# Patient Record
Sex: MALE | Race: ASIAN | NOT HISPANIC OR LATINO | URBAN - METROPOLITAN AREA
[De-identification: names, ages, dates, MRNs, and addresses within clinical notes are randomized per-mention and may not be internally consistent; named-entity substitution may affect disease eponyms.]

---

## 2021-01-01 ENCOUNTER — OUTPATIENT (OUTPATIENT)
Dept: OUTPATIENT SERVICES | Facility: HOSPITAL | Age: 66
LOS: 1 days | Discharge: HOME | End: 2021-01-01

## 2021-01-01 ENCOUNTER — NON-APPOINTMENT (OUTPATIENT)
Age: 66
End: 2021-01-01

## 2021-01-01 ENCOUNTER — RESULT REVIEW (OUTPATIENT)
Age: 66
End: 2021-01-01

## 2021-01-01 ENCOUNTER — APPOINTMENT (OUTPATIENT)
Dept: HEMATOLOGY ONCOLOGY | Facility: CLINIC | Age: 66
End: 2021-01-01
Payer: MEDICARE

## 2021-01-01 ENCOUNTER — LABORATORY RESULT (OUTPATIENT)
Age: 66
End: 2021-01-01

## 2021-01-01 ENCOUNTER — APPOINTMENT (OUTPATIENT)
Dept: INFUSION THERAPY | Facility: HOSPITAL | Age: 66
End: 2021-01-01

## 2021-01-01 ENCOUNTER — APPOINTMENT (OUTPATIENT)
Dept: ULTRASOUND IMAGING | Facility: IMAGING CENTER | Age: 66
End: 2021-01-01
Payer: MEDICARE

## 2021-01-01 ENCOUNTER — APPOINTMENT (OUTPATIENT)
Dept: HEMATOLOGY ONCOLOGY | Facility: CLINIC | Age: 66
End: 2021-01-01
Payer: MEDICAID

## 2021-01-01 ENCOUNTER — OUTPATIENT (OUTPATIENT)
Dept: OUTPATIENT SERVICES | Facility: HOSPITAL | Age: 66
LOS: 1 days | End: 2021-01-01
Payer: MEDICARE

## 2021-01-01 ENCOUNTER — APPOINTMENT (OUTPATIENT)
Dept: RADIATION ONCOLOGY | Facility: CLINIC | Age: 66
End: 2021-01-01
Payer: MEDICARE

## 2021-01-01 VITALS
TEMPERATURE: 97 F | HEART RATE: 90 BPM | DIASTOLIC BLOOD PRESSURE: 65 MMHG | RESPIRATION RATE: 16 BRPM | OXYGEN SATURATION: 99 % | WEIGHT: 139.99 LBS | SYSTOLIC BLOOD PRESSURE: 108 MMHG | BODY MASS INDEX: 21.93 KG/M2

## 2021-01-01 VITALS
DIASTOLIC BLOOD PRESSURE: 86 MMHG | RESPIRATION RATE: 16 BRPM | HEART RATE: 93 BPM | SYSTOLIC BLOOD PRESSURE: 134 MMHG | BODY MASS INDEX: 21.39 KG/M2 | WEIGHT: 140 LBS | OXYGEN SATURATION: 98 % | TEMPERATURE: 98.42 F

## 2021-01-01 VITALS
TEMPERATURE: 97.9 F | HEART RATE: 80 BPM | OXYGEN SATURATION: 99 % | HEIGHT: 67.01 IN | RESPIRATION RATE: 17 BRPM | DIASTOLIC BLOOD PRESSURE: 71 MMHG | WEIGHT: 139.99 LBS | BODY MASS INDEX: 21.97 KG/M2 | SYSTOLIC BLOOD PRESSURE: 107 MMHG

## 2021-01-01 VITALS
HEART RATE: 80 BPM | BODY MASS INDEX: 21.97 KG/M2 | WEIGHT: 140 LBS | OXYGEN SATURATION: 98 % | DIASTOLIC BLOOD PRESSURE: 78 MMHG | RESPIRATION RATE: 16 BRPM | SYSTOLIC BLOOD PRESSURE: 132 MMHG | HEIGHT: 67 IN

## 2021-01-01 DIAGNOSIS — C46.9 KAPOSI'S SARCOMA, UNSPECIFIED: ICD-10-CM

## 2021-01-01 DIAGNOSIS — R60.0 LOCALIZED EDEMA: ICD-10-CM

## 2021-01-01 DIAGNOSIS — R11.2 NAUSEA WITH VOMITING, UNSPECIFIED: ICD-10-CM

## 2021-01-01 DIAGNOSIS — Z00.8 ENCOUNTER FOR OTHER GENERAL EXAMINATION: ICD-10-CM

## 2021-01-01 DIAGNOSIS — Z51.11 ENCOUNTER FOR ANTINEOPLASTIC CHEMOTHERAPY: ICD-10-CM

## 2021-01-01 DIAGNOSIS — Z00.00 ENCOUNTER FOR GENERAL ADULT MEDICAL EXAMINATION W/OUT ABNORMAL FINDINGS: ICD-10-CM

## 2021-01-01 LAB
ALBUMIN SERPL ELPH-MCNC: 3.2 G/DL — LOW (ref 3.3–5)
ALBUMIN SERPL ELPH-MCNC: 3.3 G/DL — SIGNIFICANT CHANGE UP (ref 3.3–5)
ALBUMIN SERPL ELPH-MCNC: 3.7 G/DL — SIGNIFICANT CHANGE UP (ref 3.3–5)
ALP SERPL-CCNC: 59 U/L — SIGNIFICANT CHANGE UP (ref 40–120)
ALP SERPL-CCNC: 59 U/L — SIGNIFICANT CHANGE UP (ref 40–120)
ALP SERPL-CCNC: 60 U/L — SIGNIFICANT CHANGE UP (ref 40–120)
ALT FLD-CCNC: 10 U/L — SIGNIFICANT CHANGE UP (ref 10–45)
ALT FLD-CCNC: 10 U/L — SIGNIFICANT CHANGE UP (ref 10–45)
ALT FLD-CCNC: 9 U/L — LOW (ref 10–45)
ANION GAP SERPL CALC-SCNC: 10 MMOL/L — SIGNIFICANT CHANGE UP (ref 5–17)
ANION GAP SERPL CALC-SCNC: 12 MMOL/L — SIGNIFICANT CHANGE UP (ref 5–17)
ANION GAP SERPL CALC-SCNC: 16 MMOL/L — SIGNIFICANT CHANGE UP (ref 5–17)
AST SERPL-CCNC: 14 U/L — SIGNIFICANT CHANGE UP (ref 10–40)
AST SERPL-CCNC: 14 U/L — SIGNIFICANT CHANGE UP (ref 10–40)
AST SERPL-CCNC: 15 U/L — SIGNIFICANT CHANGE UP (ref 10–40)
BASOPHILS # BLD AUTO: 0.03 K/UL — SIGNIFICANT CHANGE UP (ref 0–0.2)
BASOPHILS # BLD AUTO: 0.05 K/UL — SIGNIFICANT CHANGE UP (ref 0–0.2)
BASOPHILS # BLD AUTO: 0.05 K/UL — SIGNIFICANT CHANGE UP (ref 0–0.2)
BASOPHILS # BLD AUTO: 0.07 K/UL — SIGNIFICANT CHANGE UP (ref 0–0.2)
BASOPHILS NFR BLD AUTO: 0.8 % — SIGNIFICANT CHANGE UP (ref 0–2)
BASOPHILS NFR BLD AUTO: 1.1 % — SIGNIFICANT CHANGE UP (ref 0–2)
BASOPHILS NFR BLD AUTO: 1.2 % — SIGNIFICANT CHANGE UP (ref 0–2)
BASOPHILS NFR BLD AUTO: 1.2 % — SIGNIFICANT CHANGE UP (ref 0–2)
BILIRUB SERPL-MCNC: 0.3 MG/DL — SIGNIFICANT CHANGE UP (ref 0.2–1.2)
BILIRUB SERPL-MCNC: 0.3 MG/DL — SIGNIFICANT CHANGE UP (ref 0.2–1.2)
BILIRUB SERPL-MCNC: 0.4 MG/DL — SIGNIFICANT CHANGE UP (ref 0.2–1.2)
BUN SERPL-MCNC: 8 MG/DL — SIGNIFICANT CHANGE UP (ref 7–23)
BUN SERPL-MCNC: 8 MG/DL — SIGNIFICANT CHANGE UP (ref 7–23)
BUN SERPL-MCNC: 9 MG/DL — SIGNIFICANT CHANGE UP (ref 7–23)
CALCIUM SERPL-MCNC: 8.4 MG/DL — SIGNIFICANT CHANGE UP (ref 8.4–10.5)
CALCIUM SERPL-MCNC: 8.5 MG/DL — SIGNIFICANT CHANGE UP (ref 8.4–10.5)
CALCIUM SERPL-MCNC: 8.8 MG/DL — SIGNIFICANT CHANGE UP (ref 8.4–10.5)
CHLORIDE SERPL-SCNC: 100 MMOL/L — SIGNIFICANT CHANGE UP (ref 96–108)
CHLORIDE SERPL-SCNC: 100 MMOL/L — SIGNIFICANT CHANGE UP (ref 96–108)
CHLORIDE SERPL-SCNC: 101 MMOL/L — SIGNIFICANT CHANGE UP (ref 96–108)
CO2 SERPL-SCNC: 22 MMOL/L — SIGNIFICANT CHANGE UP (ref 22–31)
CO2 SERPL-SCNC: 26 MMOL/L — SIGNIFICANT CHANGE UP (ref 22–31)
CO2 SERPL-SCNC: 26 MMOL/L — SIGNIFICANT CHANGE UP (ref 22–31)
CREAT SERPL-MCNC: 0.56 MG/DL — SIGNIFICANT CHANGE UP (ref 0.5–1.3)
CREAT SERPL-MCNC: 0.64 MG/DL — SIGNIFICANT CHANGE UP (ref 0.5–1.3)
CREAT SERPL-MCNC: 0.65 MG/DL — SIGNIFICANT CHANGE UP (ref 0.5–1.3)
EOSINOPHIL # BLD AUTO: 0.06 K/UL — SIGNIFICANT CHANGE UP (ref 0–0.5)
EOSINOPHIL # BLD AUTO: 0.07 K/UL — SIGNIFICANT CHANGE UP (ref 0–0.5)
EOSINOPHIL # BLD AUTO: 0.3 K/UL — SIGNIFICANT CHANGE UP (ref 0–0.5)
EOSINOPHIL # BLD AUTO: 0.32 K/UL — SIGNIFICANT CHANGE UP (ref 0–0.5)
EOSINOPHIL NFR BLD AUTO: 1.6 % — SIGNIFICANT CHANGE UP (ref 0–6)
EOSINOPHIL NFR BLD AUTO: 1.7 % — SIGNIFICANT CHANGE UP (ref 0–6)
EOSINOPHIL NFR BLD AUTO: 5.1 % — SIGNIFICANT CHANGE UP (ref 0–6)
EOSINOPHIL NFR BLD AUTO: 7.2 % — HIGH (ref 0–6)
GLUCOSE SERPL-MCNC: 117 MG/DL — HIGH (ref 70–99)
GLUCOSE SERPL-MCNC: 129 MG/DL — HIGH (ref 70–99)
GLUCOSE SERPL-MCNC: 145 MG/DL — HIGH (ref 70–99)
HBV CORE AB SER-ACNC: REACTIVE
HBV SURFACE AB SER-ACNC: REACTIVE
HBV SURFACE AG SER-ACNC: SIGNIFICANT CHANGE UP
HCT VFR BLD CALC: 27.3 % — LOW (ref 39–50)
HCT VFR BLD CALC: 28.2 % — LOW (ref 39–50)
HCT VFR BLD CALC: 30.9 % — LOW (ref 39–50)
HCT VFR BLD CALC: 34.9 % — LOW (ref 39–50)
HCV AB S/CO SERPL IA: 0.08 S/CO — SIGNIFICANT CHANGE UP (ref 0–0.99)
HCV AB SERPL-IMP: SIGNIFICANT CHANGE UP
HGB BLD-MCNC: 10.4 G/DL — LOW (ref 13–17)
HGB BLD-MCNC: 11.9 G/DL — LOW (ref 13–17)
HGB BLD-MCNC: 9.2 G/DL — LOW (ref 13–17)
HGB BLD-MCNC: 9.6 G/DL — LOW (ref 13–17)
IMM GRANULOCYTES NFR BLD AUTO: 1.9 % — HIGH (ref 0–1.5)
IMM GRANULOCYTES NFR BLD AUTO: 2.4 % — HIGH (ref 0–1.5)
IMM GRANULOCYTES NFR BLD AUTO: 2.6 % — HIGH (ref 0–1.5)
IMM GRANULOCYTES NFR BLD AUTO: 7.9 % — HIGH (ref 0–1.5)
LYMPHOCYTES # BLD AUTO: 0.49 K/UL — LOW (ref 1–3.3)
LYMPHOCYTES # BLD AUTO: 0.54 K/UL — LOW (ref 1–3.3)
LYMPHOCYTES # BLD AUTO: 0.58 K/UL — LOW (ref 1–3.3)
LYMPHOCYTES # BLD AUTO: 0.71 K/UL — LOW (ref 1–3.3)
LYMPHOCYTES # BLD AUTO: 11 % — LOW (ref 13–44)
LYMPHOCYTES # BLD AUTO: 12 % — LOW (ref 13–44)
LYMPHOCYTES # BLD AUTO: 13.9 % — SIGNIFICANT CHANGE UP (ref 13–44)
LYMPHOCYTES # BLD AUTO: 14 % — SIGNIFICANT CHANGE UP (ref 13–44)
MCHC RBC-ENTMCNC: 30.9 PG — SIGNIFICANT CHANGE UP (ref 27–34)
MCHC RBC-ENTMCNC: 30.9 PG — SIGNIFICANT CHANGE UP (ref 27–34)
MCHC RBC-ENTMCNC: 31.1 PG — SIGNIFICANT CHANGE UP (ref 27–34)
MCHC RBC-ENTMCNC: 31.3 PG — SIGNIFICANT CHANGE UP (ref 27–34)
MCHC RBC-ENTMCNC: 33.7 G/DL — SIGNIFICANT CHANGE UP (ref 32–36)
MCHC RBC-ENTMCNC: 33.7 G/DL — SIGNIFICANT CHANGE UP (ref 32–36)
MCHC RBC-ENTMCNC: 34 G/DL — SIGNIFICANT CHANGE UP (ref 32–36)
MCHC RBC-ENTMCNC: 34.1 G/DL — SIGNIFICANT CHANGE UP (ref 32–36)
MCV RBC AUTO: 90.6 FL — SIGNIFICANT CHANGE UP (ref 80–100)
MCV RBC AUTO: 91.7 FL — SIGNIFICANT CHANGE UP (ref 80–100)
MCV RBC AUTO: 91.9 FL — SIGNIFICANT CHANGE UP (ref 80–100)
MCV RBC AUTO: 92.2 FL — SIGNIFICANT CHANGE UP (ref 80–100)
MONOCYTES # BLD AUTO: 0.56 K/UL — SIGNIFICANT CHANGE UP (ref 0–0.9)
MONOCYTES # BLD AUTO: 0.63 K/UL — SIGNIFICANT CHANGE UP (ref 0–0.9)
MONOCYTES # BLD AUTO: 0.66 K/UL — SIGNIFICANT CHANGE UP (ref 0–0.9)
MONOCYTES # BLD AUTO: 0.73 K/UL — SIGNIFICANT CHANGE UP (ref 0–0.9)
MONOCYTES NFR BLD AUTO: 12.4 % — SIGNIFICANT CHANGE UP (ref 2–14)
MONOCYTES NFR BLD AUTO: 12.6 % — SIGNIFICANT CHANGE UP (ref 2–14)
MONOCYTES NFR BLD AUTO: 15.9 % — HIGH (ref 2–14)
MONOCYTES NFR BLD AUTO: 16.3 % — HIGH (ref 2–14)
NEUTROPHILS # BLD AUTO: 2.51 K/UL — SIGNIFICANT CHANGE UP (ref 1.8–7.4)
NEUTROPHILS # BLD AUTO: 2.68 K/UL — SIGNIFICANT CHANGE UP (ref 1.8–7.4)
NEUTROPHILS # BLD AUTO: 2.7 K/UL — SIGNIFICANT CHANGE UP (ref 1.8–7.4)
NEUTROPHILS # BLD AUTO: 3.98 K/UL — SIGNIFICANT CHANGE UP (ref 1.8–7.4)
NEUTROPHILS NFR BLD AUTO: 60.2 % — SIGNIFICANT CHANGE UP (ref 43–77)
NEUTROPHILS NFR BLD AUTO: 64.7 % — SIGNIFICANT CHANGE UP (ref 43–77)
NEUTROPHILS NFR BLD AUTO: 64.9 % — SIGNIFICANT CHANGE UP (ref 43–77)
NEUTROPHILS NFR BLD AUTO: 67.4 % — SIGNIFICANT CHANGE UP (ref 43–77)
NRBC # BLD: 0 /100 WBCS — SIGNIFICANT CHANGE UP (ref 0–0)
PLATELET # BLD AUTO: 226 K/UL — SIGNIFICANT CHANGE UP (ref 150–400)
PLATELET # BLD AUTO: 375 K/UL — SIGNIFICANT CHANGE UP (ref 150–400)
PLATELET # BLD AUTO: 377 K/UL — SIGNIFICANT CHANGE UP (ref 150–400)
PLATELET # BLD AUTO: 474 K/UL — HIGH (ref 150–400)
POTASSIUM SERPL-MCNC: 4 MMOL/L — SIGNIFICANT CHANGE UP (ref 3.5–5.3)
POTASSIUM SERPL-MCNC: 4.2 MMOL/L — SIGNIFICANT CHANGE UP (ref 3.5–5.3)
POTASSIUM SERPL-MCNC: 4.4 MMOL/L — SIGNIFICANT CHANGE UP (ref 3.5–5.3)
POTASSIUM SERPL-SCNC: 4 MMOL/L — SIGNIFICANT CHANGE UP (ref 3.5–5.3)
POTASSIUM SERPL-SCNC: 4.2 MMOL/L — SIGNIFICANT CHANGE UP (ref 3.5–5.3)
POTASSIUM SERPL-SCNC: 4.4 MMOL/L — SIGNIFICANT CHANGE UP (ref 3.5–5.3)
PROT SERPL-MCNC: 5.7 G/DL — LOW (ref 6–8.3)
PROT SERPL-MCNC: 5.9 G/DL — LOW (ref 6–8.3)
PROT SERPL-MCNC: 6.2 G/DL — SIGNIFICANT CHANGE UP (ref 6–8.3)
RBC # BLD: 2.96 M/UL — LOW (ref 4.2–5.8)
RBC # BLD: 3.07 M/UL — LOW (ref 4.2–5.8)
RBC # BLD: 3.37 M/UL — LOW (ref 4.2–5.8)
RBC # BLD: 3.85 M/UL — LOW (ref 4.2–5.8)
RBC # FLD: 11.9 % — SIGNIFICANT CHANGE UP (ref 10.3–14.5)
RBC # FLD: 12.3 % — SIGNIFICANT CHANGE UP (ref 10.3–14.5)
RBC # FLD: 13.4 % — SIGNIFICANT CHANGE UP (ref 10.3–14.5)
RBC # FLD: 13.4 % — SIGNIFICANT CHANGE UP (ref 10.3–14.5)
SODIUM SERPL-SCNC: 137 MMOL/L — SIGNIFICANT CHANGE UP (ref 135–145)
SODIUM SERPL-SCNC: 137 MMOL/L — SIGNIFICANT CHANGE UP (ref 135–145)
SODIUM SERPL-SCNC: 139 MMOL/L — SIGNIFICANT CHANGE UP (ref 135–145)
WBC # BLD: 3.87 K/UL — SIGNIFICANT CHANGE UP (ref 3.8–10.5)
WBC # BLD: 4.16 K/UL — SIGNIFICANT CHANGE UP (ref 3.8–10.5)
WBC # BLD: 4.45 K/UL — SIGNIFICANT CHANGE UP (ref 3.8–10.5)
WBC # BLD: 5.9 K/UL — SIGNIFICANT CHANGE UP (ref 3.8–10.5)
WBC # FLD AUTO: 3.87 K/UL — SIGNIFICANT CHANGE UP (ref 3.8–10.5)
WBC # FLD AUTO: 4.16 K/UL — SIGNIFICANT CHANGE UP (ref 3.8–10.5)
WBC # FLD AUTO: 4.45 K/UL — SIGNIFICANT CHANGE UP (ref 3.8–10.5)
WBC # FLD AUTO: 5.9 K/UL — SIGNIFICANT CHANGE UP (ref 3.8–10.5)

## 2021-01-01 PROCEDURE — G6002: CPT | Mod: 26

## 2021-01-01 PROCEDURE — 77427 RADIATION TX MANAGEMENT X5: CPT

## 2021-01-01 PROCEDURE — 77470 SPECIAL RADIATION TREATMENT: CPT | Mod: 26

## 2021-01-01 PROCEDURE — 77338 DESIGN MLC DEVICE FOR IMRT: CPT | Mod: 26

## 2021-01-01 PROCEDURE — 99442: CPT

## 2021-01-01 PROCEDURE — 93971 EXTREMITY STUDY: CPT | Mod: 26,RT

## 2021-01-01 PROCEDURE — 77301 RADIOTHERAPY DOSE PLAN IMRT: CPT | Mod: 26

## 2021-01-01 PROCEDURE — 99213 OFFICE O/P EST LOW 20 MIN: CPT

## 2021-01-01 PROCEDURE — 99205 OFFICE O/P NEW HI 60 MIN: CPT

## 2021-01-01 PROCEDURE — 99214 OFFICE O/P EST MOD 30 MIN: CPT

## 2021-01-01 PROCEDURE — 77334 RADIATION TREATMENT AID(S): CPT | Mod: 26

## 2021-01-01 PROCEDURE — 77014: CPT | Mod: 26

## 2021-01-01 PROCEDURE — 99024 POSTOP FOLLOW-UP VISIT: CPT

## 2021-01-01 PROCEDURE — 99215 OFFICE O/P EST HI 40 MIN: CPT

## 2021-01-01 PROCEDURE — 77300 RADIATION THERAPY DOSE PLAN: CPT | Mod: 26

## 2021-01-01 PROCEDURE — 93971 EXTREMITY STUDY: CPT

## 2021-01-01 RX ORDER — POLYETHYLENE GLYCOL 3350 17 G/17G
17 POWDER, FOR SOLUTION ORAL DAILY
Qty: 1 | Refills: 0 | Status: ACTIVE | COMMUNITY
Start: 2021-01-01 | End: 1900-01-01

## 2021-01-01 RX ORDER — METHADONE HYDROCHLORIDE 5 MG/1
5 TABLET ORAL
Qty: 30 | Refills: 0 | Status: DISCONTINUED | COMMUNITY
Start: 2021-10-14 | End: 2021-01-01

## 2021-01-01 RX ORDER — OXYCODONE HYDROCHLORIDE 10 MG/1
10 TABLET, FILM COATED, EXTENDED RELEASE ORAL
Qty: 30 | Refills: 0 | Status: DISCONTINUED | COMMUNITY
Start: 2021-01-01 | End: 2021-01-01

## 2021-01-01 RX ORDER — PROCHLORPERAZINE MALEATE 10 MG/1
10 TABLET ORAL EVERY 8 HOURS
Qty: 90 | Refills: 0 | Status: ACTIVE | COMMUNITY
Start: 2021-01-01 | End: 1900-01-01

## 2021-01-01 RX ORDER — GABAPENTIN 300 MG/1
300 CAPSULE ORAL 3 TIMES DAILY
Qty: 270 | Refills: 3 | Status: DISCONTINUED | COMMUNITY
Start: 2021-10-14 | End: 2021-01-01

## 2021-01-01 RX ORDER — MORPHINE SULFATE 15 MG/1
15 TABLET, FILM COATED, EXTENDED RELEASE ORAL
Qty: 30 | Refills: 0 | Status: DISCONTINUED | COMMUNITY
Start: 2021-01-01 | End: 2021-01-01

## 2021-09-28 ENCOUNTER — INPATIENT (INPATIENT)
Facility: HOSPITAL | Age: 66
LOS: 6 days | Discharge: HOME | DRG: 501 | End: 2021-10-05
Attending: INTERNAL MEDICINE | Admitting: STUDENT IN AN ORGANIZED HEALTH CARE EDUCATION/TRAINING PROGRAM
Payer: MEDICARE

## 2021-09-28 VITALS
HEART RATE: 78 BPM | SYSTOLIC BLOOD PRESSURE: 134 MMHG | DIASTOLIC BLOOD PRESSURE: 79 MMHG | RESPIRATION RATE: 17 BRPM | WEIGHT: 149.91 LBS | TEMPERATURE: 99 F | OXYGEN SATURATION: 96 % | HEIGHT: 67 IN

## 2021-09-28 LAB
ALBUMIN SERPL ELPH-MCNC: 3.9 G/DL — SIGNIFICANT CHANGE UP (ref 3.3–5)
ALP SERPL-CCNC: 82 U/L — SIGNIFICANT CHANGE UP (ref 40–120)
ALT FLD-CCNC: 11 U/L — SIGNIFICANT CHANGE UP (ref 10–45)
ANION GAP SERPL CALC-SCNC: 13 MMOL/L — SIGNIFICANT CHANGE UP (ref 5–17)
AST SERPL-CCNC: 14 U/L — SIGNIFICANT CHANGE UP (ref 10–40)
BASOPHILS # BLD AUTO: 0.05 K/UL — SIGNIFICANT CHANGE UP (ref 0–0.2)
BASOPHILS NFR BLD AUTO: 0.7 % — SIGNIFICANT CHANGE UP (ref 0–2)
BILIRUB SERPL-MCNC: 0.6 MG/DL — SIGNIFICANT CHANGE UP (ref 0.2–1.2)
BUN SERPL-MCNC: 14 MG/DL — SIGNIFICANT CHANGE UP (ref 7–23)
CALCIUM SERPL-MCNC: 8.9 MG/DL — SIGNIFICANT CHANGE UP (ref 8.4–10.5)
CHLORIDE SERPL-SCNC: 98 MMOL/L — SIGNIFICANT CHANGE UP (ref 96–108)
CO2 SERPL-SCNC: 22 MMOL/L — SIGNIFICANT CHANGE UP (ref 22–31)
CREAT SERPL-MCNC: 0.67 MG/DL — SIGNIFICANT CHANGE UP (ref 0.5–1.3)
EOSINOPHIL # BLD AUTO: 0.16 K/UL — SIGNIFICANT CHANGE UP (ref 0–0.5)
EOSINOPHIL NFR BLD AUTO: 2.4 % — SIGNIFICANT CHANGE UP (ref 0–6)
GAS PNL BLDV: SIGNIFICANT CHANGE UP
GLUCOSE SERPL-MCNC: 176 MG/DL — HIGH (ref 70–99)
HCT VFR BLD CALC: 37.6 % — LOW (ref 39–50)
HGB BLD-MCNC: 12.3 G/DL — LOW (ref 13–17)
IMM GRANULOCYTES NFR BLD AUTO: 0.3 % — SIGNIFICANT CHANGE UP (ref 0–1.5)
LYMPHOCYTES # BLD AUTO: 1.09 K/UL — SIGNIFICANT CHANGE UP (ref 1–3.3)
LYMPHOCYTES # BLD AUTO: 16.1 % — SIGNIFICANT CHANGE UP (ref 13–44)
MCHC RBC-ENTMCNC: 30.1 PG — SIGNIFICANT CHANGE UP (ref 27–34)
MCHC RBC-ENTMCNC: 32.7 GM/DL — SIGNIFICANT CHANGE UP (ref 32–36)
MCV RBC AUTO: 92.2 FL — SIGNIFICANT CHANGE UP (ref 80–100)
MONOCYTES # BLD AUTO: 0.67 K/UL — SIGNIFICANT CHANGE UP (ref 0–0.9)
MONOCYTES NFR BLD AUTO: 9.9 % — SIGNIFICANT CHANGE UP (ref 2–14)
NEUTROPHILS # BLD AUTO: 4.8 K/UL — SIGNIFICANT CHANGE UP (ref 1.8–7.4)
NEUTROPHILS NFR BLD AUTO: 70.6 % — SIGNIFICANT CHANGE UP (ref 43–77)
NRBC # BLD: 0 /100 WBCS — SIGNIFICANT CHANGE UP (ref 0–0)
PLATELET # BLD AUTO: 299 K/UL — SIGNIFICANT CHANGE UP (ref 150–400)
POTASSIUM SERPL-MCNC: 4.3 MMOL/L — SIGNIFICANT CHANGE UP (ref 3.5–5.3)
POTASSIUM SERPL-SCNC: 4.3 MMOL/L — SIGNIFICANT CHANGE UP (ref 3.5–5.3)
PROT SERPL-MCNC: 7 G/DL — SIGNIFICANT CHANGE UP (ref 6–8.3)
RBC # BLD: 4.08 M/UL — LOW (ref 4.2–5.8)
RBC # FLD: 12.1 % — SIGNIFICANT CHANGE UP (ref 10.3–14.5)
SODIUM SERPL-SCNC: 133 MMOL/L — LOW (ref 135–145)
WBC # BLD: 6.79 K/UL — SIGNIFICANT CHANGE UP (ref 3.8–10.5)
WBC # FLD AUTO: 6.79 K/UL — SIGNIFICANT CHANGE UP (ref 3.8–10.5)

## 2021-09-28 PROCEDURE — 93010 ELECTROCARDIOGRAM REPORT: CPT

## 2021-09-28 PROCEDURE — 99285 EMERGENCY DEPT VISIT HI MDM: CPT

## 2021-09-28 RX ORDER — ACETAMINOPHEN 500 MG
975 TABLET ORAL ONCE
Refills: 0 | Status: COMPLETED | OUTPATIENT
Start: 2021-09-28 | End: 2021-09-28

## 2021-09-28 RX ORDER — MORPHINE SULFATE 50 MG/1
2 CAPSULE, EXTENDED RELEASE ORAL ONCE
Refills: 0 | Status: DISCONTINUED | OUTPATIENT
Start: 2021-09-28 | End: 2021-09-28

## 2021-09-28 RX ORDER — MORPHINE SULFATE 50 MG/1
4 CAPSULE, EXTENDED RELEASE ORAL ONCE
Refills: 0 | Status: DISCONTINUED | OUTPATIENT
Start: 2021-09-28 | End: 2021-09-28

## 2021-09-28 RX ADMIN — MORPHINE SULFATE 2 MILLIGRAM(S): 50 CAPSULE, EXTENDED RELEASE ORAL at 22:50

## 2021-09-28 RX ADMIN — Medication 975 MILLIGRAM(S): at 22:50

## 2021-09-28 RX ADMIN — MORPHINE SULFATE 2 MILLIGRAM(S): 50 CAPSULE, EXTENDED RELEASE ORAL at 23:01

## 2021-09-28 RX ADMIN — Medication 975 MILLIGRAM(S): at 23:01

## 2021-09-28 NOTE — ED PROVIDER NOTE - PHYSICAL EXAMINATION
Attending MD Samuel :   PHYSICAL EXAM:    GENERAL: NAD  HEENT:  Atraumatic  CHEST/LUNG: Chest rise equal bilaterally  HEART: Regular rate and rhythm  ABDOMEN: Soft, Nontender, Nondistended  EXTREMITIES:  Extremities warm  PSYCH: A&Ox3  SKIN: No obvious rashes or lesions  MSK: No cervical spine TTP, able to range neck to the left and right/ No midline spinal TTP/ No swelling, redness, pain or discharge from   NEUROLOGY: Sensation diminished in RLE. RLE 5/5 at hip, 4/5 knee flexion, 5/5 knee extension, 0/5 ankle flexion, 5/5 ankle extension. LLE 5/5.

## 2021-09-28 NOTE — ED ADULT NURSE NOTE - OBJECTIVE STATEMENT
66y male arrived to ED complaining of back pain. 66y male arrived to ED complaining of back pain. PMHx colon CA s/p chemoradiation, chronic back pain. Patient sent to ED by PMD for r/o spinal cord compression due to complaints of RLE numbness/tingling x2 weeks, difficulty walking. Patient has baseline occasional urinary and bowel incontinence x10 years. Recent weight loss of 10lbs in 2 weeks. Patient found to have pelvic mass on MRI outpatient today. Endorses mild decreased sensation in RLE. Patient A&Ox4, ambulatory (w/ difficulty), VS stable. Neurologically intact on exam. No pain meds PTA. Mandarin speaking and accompanied by daughter at the bedside.

## 2021-09-28 NOTE — ED PROVIDER NOTE - NEUROLOGICAL, MLM
Alert and oriented. Right leg sensory deficit. Right leg hip flexion and foot dorsiflexion strength 3/5.

## 2021-09-28 NOTE — ED ADULT NURSE NOTE - NSIMPLEMENTINTERV_GEN_ALL_ED
Implemented All Fall Risk Interventions:  [place] to call system. Call bell, personal items and telephone within reach. Instruct patient to call for assistance. Room bathroom lighting operational. Non-slip footwear when patient is off stretcher. Physically safe environment: no spills, clutter or unnecessary equipment. Stretcher in lowest position, wheels locked, appropriate side rails in place. Provide visual cue, wrist band, yellow gown, etc. Monitor gait and stability. Monitor for mental status changes and reorient to person, place, and time. Review medications for side effects contributing to fall risk. Reinforce activity limits and safety measures with patient and family.

## 2021-09-28 NOTE — ED PROVIDER NOTE - OBJECTIVE STATEMENT
66M presents from PCP with right buttocks pain, lower back pain radiating down right leg. Patient also had right lower leg pain. He has numbness down the left leg. He is beginign to have trouble walking. Symptoms have been present and worsening for 10-15 days. Patient has baseline urinary incontinence x 10 years. Patient has had 10 pound weight loss in past two weeks. No n/v/d. No saddle ansesthsida. No abd pain. Patient has been using meloxicam 15mg daily for back and leg pain. Surgical hx colon ca s/p chemo/radiation/ressection 10 years ago. No other medical hx. No other meds.     MR today shows large mass in right lower quadrent with infiltration into the illiac muscle, involvement of the right lumbosacral truck and extension to the right anterior sacrum. No extension into the spinal canal is seen. pt has a sacral insufficiency fracture. 66M presents from PCP with right buttocks pain, lower back pain radiating down right leg. Patient also had right lower leg pain. He has numbness down the left leg. He is beginign to have trouble walking. Symptoms have been present and worsening for 10-15 days. Patient has baseline urinary incontinence x 10 years. Patient has had 10 pound weight loss in past two weeks. No n/v/d. No saddle anesthsia No abd pain. Patient has been using meloxicam 15mg daily for back and leg pain. Surgical hx colon ca s/p chemoradiation 10 years ago. No other medical hx. No other meds.     MR today shows large mass in right lower quadrant with infiltration into the illiac muscle, involvement of the right lumbosacral truck and extension to the right anterior sacrum. No extension into the spinal canal is seen. pt has a sacral insufficiency fracture. mild subluxation L5/SI. 66M presents from PCP with right buttocks pain, lower back pain radiating down right leg. Patient also had right lower leg pain. He has numbness down the left leg. He is beginning to have trouble walking. Symptoms have been present and worsening for 10-15 days. Patient has baseline urinary incontinence x 10 years. Patient has had 10 pound weight loss in past two weeks. No n/v/d. No saddle anesthesia No abd pain. Patient has been using meloxicam 15mg daily for back and leg pain. Surgical hx colon ca s/p chemoradiation 10 years ago. No other medical hx. No other meds.     MR today shows large mass in right lower quadrant with infiltration into the illiac muscle, involvement of the right lumbosacral truck and extension to the right anterior sacrum. No extension into the spinal canal is seen. pt has a sacral insufficiency fracture. mild subluxation L5/SI. 66M presents from PCP with right buttocks pain, lower back pain radiating down right leg since February worsening over the past two weeks. Patient also had right lower leg pain and numbness over the past two weeks. He is beginning to have trouble walking.  Patient has baseline urinary incontinence x 10 years. Patient has had 10 pound weight loss in past two weeks. No n/v/d. No saddle anesthesia .No abd pain. Patient has been using meloxicam 15mg daily for back and leg pain. Surgical hx colon ca s/p chemoradiation 10 years ago. No other medical hx. No other meds.     MR today shows large mass in right lower quadrant with infiltration into the illiac muscle, involvement of the right lumbosacral truck and extension to the right anterior sacrum. No extension into the spinal canal is seen. pt has a sacral insufficiency fracture. mild subluxation L5/SI.

## 2021-09-28 NOTE — ED ADULT TRIAGE NOTE - CHIEF COMPLAINT QUOTE
low pack pain going down leg; dx with mass on pelvis via MRI, sent from MD recommending CT to abdomen/pelvis to rule out compression fx/need surgery ; numbness and tingling in right foot x2 weeks, full ROM in right leg; baseline incontinent x10 years not new or worsening

## 2021-09-28 NOTE — ED PROVIDER NOTE - CLINICAL SUMMARY MEDICAL DECISION MAKING FREE TEXT BOX
Keisha - adult male presents with pelvic mass and weight loss, suspicious of cancer. pt also having back pain/right gluteal pain/right leg pain and numbness with associated right hip weakness and right foot dorsiflexion weakness. vss. Plan: CTAP to eval pelvic mass, labs, pain control, to be admitted for further management. Keisha - adult male presents with pelvic mass and weight loss, suspicious of cancer. pt also having back pain/right gluteal pain/right leg pain and numbness with associated right hip weakness and right foot dorsiflexion weakness. vss. Plan: CTAP to eval pelvic mass, labs, pain control, to be admitted for further management.    Attending MD Samuel: Agree with above. Doubt cord compression as MRI notes no spinal cord stenosis or involvement. Patient's weakness likely 2/2 mass effect. Will obtain CTAP to assess. Likely TBA for onc workup.

## 2021-09-28 NOTE — ED PROVIDER NOTE - MUSCULOSKELETAL, MLM
Spine appears normal, range of motion is not limited. Back pain. Right lower leg pain. Right lower leg venous dermatitis.

## 2021-09-29 DIAGNOSIS — R19.00 INTRA-ABDOMINAL AND PELVIC SWELLING, MASS AND LUMP, UNSPECIFIED SITE: ICD-10-CM

## 2021-09-29 LAB
APPEARANCE UR: ABNORMAL
BACTERIA # UR AUTO: NEGATIVE — SIGNIFICANT CHANGE UP
BILIRUB UR-MCNC: NEGATIVE — SIGNIFICANT CHANGE UP
COLOR SPEC: ABNORMAL
DIFF PNL FLD: NEGATIVE — SIGNIFICANT CHANGE UP
EPI CELLS # UR: 0 /HPF — SIGNIFICANT CHANGE UP
GLUCOSE UR QL: NEGATIVE — SIGNIFICANT CHANGE UP
HYALINE CASTS # UR AUTO: 2 /LPF — SIGNIFICANT CHANGE UP (ref 0–2)
KETONES UR-MCNC: NEGATIVE — SIGNIFICANT CHANGE UP
LEUKOCYTE ESTERASE UR-ACNC: NEGATIVE — SIGNIFICANT CHANGE UP
NITRITE UR-MCNC: NEGATIVE — SIGNIFICANT CHANGE UP
PH UR: 7.5 — SIGNIFICANT CHANGE UP (ref 5–8)
PROT UR-MCNC: SIGNIFICANT CHANGE UP
RBC CASTS # UR COMP ASSIST: 3 /HPF — SIGNIFICANT CHANGE UP (ref 0–4)
SARS-COV-2 RNA SPEC QL NAA+PROBE: SIGNIFICANT CHANGE UP
SP GR SPEC: 1.04 — HIGH (ref 1.01–1.02)
UROBILINOGEN FLD QL: NEGATIVE — SIGNIFICANT CHANGE UP
WBC UR QL: 1 /HPF — SIGNIFICANT CHANGE UP (ref 0–5)

## 2021-09-29 PROCEDURE — 71045 X-RAY EXAM CHEST 1 VIEW: CPT | Mod: 26

## 2021-09-29 PROCEDURE — 72197 MRI PELVIS W/O & W/DYE: CPT | Mod: 26

## 2021-09-29 PROCEDURE — 72158 MRI LUMBAR SPINE W/O & W/DYE: CPT | Mod: 26

## 2021-09-29 PROCEDURE — 74177 CT ABD & PELVIS W/CONTRAST: CPT | Mod: 26,MA

## 2021-09-29 RX ORDER — GABAPENTIN 400 MG/1
300 CAPSULE ORAL DAILY
Refills: 0 | Status: DISCONTINUED | OUTPATIENT
Start: 2021-09-29 | End: 2021-10-03

## 2021-09-29 RX ORDER — HYDROMORPHONE HYDROCHLORIDE 2 MG/ML
1 INJECTION INTRAMUSCULAR; INTRAVENOUS; SUBCUTANEOUS ONCE
Refills: 0 | Status: DISCONTINUED | OUTPATIENT
Start: 2021-09-29 | End: 2021-09-29

## 2021-09-29 RX ORDER — ENOXAPARIN SODIUM 100 MG/ML
40 INJECTION SUBCUTANEOUS DAILY
Refills: 0 | Status: DISCONTINUED | OUTPATIENT
Start: 2021-09-29 | End: 2021-10-01

## 2021-09-29 RX ORDER — HYDROMORPHONE HYDROCHLORIDE 2 MG/ML
0.5 INJECTION INTRAMUSCULAR; INTRAVENOUS; SUBCUTANEOUS EVERY 4 HOURS
Refills: 0 | Status: DISCONTINUED | OUTPATIENT
Start: 2021-09-29 | End: 2021-10-04

## 2021-09-29 RX ORDER — POLYETHYLENE GLYCOL 3350 17 G/17G
17 POWDER, FOR SOLUTION ORAL DAILY
Refills: 0 | Status: DISCONTINUED | OUTPATIENT
Start: 2021-09-29 | End: 2021-10-05

## 2021-09-29 RX ORDER — TAMSULOSIN HYDROCHLORIDE 0.4 MG/1
0.4 CAPSULE ORAL AT BEDTIME
Refills: 0 | Status: DISCONTINUED | OUTPATIENT
Start: 2021-09-29 | End: 2021-10-05

## 2021-09-29 RX ADMIN — HYDROMORPHONE HYDROCHLORIDE 0.5 MILLIGRAM(S): 2 INJECTION INTRAMUSCULAR; INTRAVENOUS; SUBCUTANEOUS at 20:38

## 2021-09-29 RX ADMIN — TAMSULOSIN HYDROCHLORIDE 0.4 MILLIGRAM(S): 0.4 CAPSULE ORAL at 22:32

## 2021-09-29 RX ADMIN — HYDROMORPHONE HYDROCHLORIDE 0.5 MILLIGRAM(S): 2 INJECTION INTRAMUSCULAR; INTRAVENOUS; SUBCUTANEOUS at 23:56

## 2021-09-29 RX ADMIN — HYDROMORPHONE HYDROCHLORIDE 0.5 MILLIGRAM(S): 2 INJECTION INTRAMUSCULAR; INTRAVENOUS; SUBCUTANEOUS at 23:41

## 2021-09-29 RX ADMIN — HYDROMORPHONE HYDROCHLORIDE 1 MILLIGRAM(S): 2 INJECTION INTRAMUSCULAR; INTRAVENOUS; SUBCUTANEOUS at 02:41

## 2021-09-29 RX ADMIN — GABAPENTIN 300 MILLIGRAM(S): 400 CAPSULE ORAL at 22:32

## 2021-09-29 RX ADMIN — ENOXAPARIN SODIUM 40 MILLIGRAM(S): 100 INJECTION SUBCUTANEOUS at 22:32

## 2021-09-29 RX ADMIN — HYDROMORPHONE HYDROCHLORIDE 0.5 MILLIGRAM(S): 2 INJECTION INTRAMUSCULAR; INTRAVENOUS; SUBCUTANEOUS at 20:53

## 2021-09-29 NOTE — H&P ADULT - HISTORY OF PRESENT ILLNESS
66M presents from PCP with right buttocks pain, lower back pain radiating down right leg since February worsening over the past two weeks. Patient also had right lower leg pain and numbness over the past two weeks. He is beginning to have trouble walking.  Patient has baseline urinary incontinence x 10 years. Patient has had 10 pound weight loss in past two weeks. No n/v/d. No saddle anesthesia .No abd pain. Patient has been using meloxicam 15mg daily for back and leg pain. Surgical hx colon ca s/p chemoradiation 10 years ago. No other medical hx. No other meds.     MR today shows large mass in right lower quadrant with infiltration into the illiac muscle, involvement of the right lumbosacral truck and extension to the right anterior sacrum. No extension into the spinal canal is seen. pt has a sacral insufficiency fracture. mild subluxation L5/SI.

## 2021-09-29 NOTE — CONSULT NOTE ADULT - SUBJECTIVE AND OBJECTIVE BOX
p ([id number])     HPI:  66M Hx Sx for rectal cancer w/ ostomy and reversal 10 years ago, no AC/AP, p/w R sciatica since february and 2 weeks R foot drop. 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 Exam: UE 5/5, BLE 5/5 except R DF 2/5.    --Anticoagulation:    =====================  PAST MEDICAL HISTORY   Abdominal tumor    Back pain      PAST SURGICAL HISTORY   No significant past surgical history          MEDICATIONS:  Antibiotics:    Neuro:    Other:      SOCIAL HISTORY:   Occupation:   Marital Status:     FAMILY HISTORY:      ROS: Negative except per HPI    LABS:                          12.3   6.79  )-----------( 299      ( 28 Sep 2021 22:20 )             37.6     09-28    133<L>  |  98  |  14  ----------------------------<  176<H>  4.3   |  22  |  0.67    Ca    8.9      28 Sep 2021 22:20    TPro  7.0  /  Alb  3.9  /  TBili  0.6  /  DBili  x   /  AST  14  /  ALT  11  /  AlkPhos  82  09-28

## 2021-09-29 NOTE — H&P ADULT - ASSESSMENT
66M presents from PCP with right buttocks pain, lower back pain radiating down right leg since February worsening over the past two weeks. Patient also had right lower leg pain and numbness over the past two weeks. He is beginning to have trouble walking.  Patient has baseline urinary incontinence x 10 years. Patient has had 10 pound weight loss in past two weeks. No n/v/d. No saddle anesthesia .No abd pain. Patient has been using meloxicam 15mg daily for back and leg pain. Surgical hx colon ca s/p chemoradiation 10 years ago. No other medical hx. No other meds.     MR today shows large mass in right lower quadrant with infiltration into the illiac muscle, involvement of the right lumbosacral truck and extension to the right anterior sacrum. No extension into the spinal canal is seen. pt has a sacral insufficiency fracture. mild subluxation L5/SI. 66M presents from PCP with right buttocks pain, lower back pain radiating down right leg since February worsening over the past two weeks. Patient also had right lower leg pain and numbness over the past two weeks. He is beginning to have trouble walking.  Patient has baseline urinary incontinence x 10 years. Patient has had 10 pound weight loss in past two weeks. No n/v/d. No saddle anesthesia .No abd pain. Patient has been using meloxicam 15mg daily for back and leg pain. Surgical hx colon ca s/p chemoradiation 10 years ago. No other medical hx. No other meds.     MR today shows large mass in right lower quadrant with infiltration into the illiac muscle, involvement of the right lumbosacral truck and extension to the right anterior sacrum. No extension into the spinal canal is seen. pt has a sacral insufficiency fracture. mild subluxation L5/SI.    right PRP mass  - MR Pelvis w/wo and MR lumbar wwo   -  IR CT guided biopsy of mass with sheath for diagnosis  - CT Chest with contast r/o other primary  - Heme/onc consult after diagnosis is made  - pain control with Dilaudid  - increase Neurontin as needed    BPH  - c/w flomax    HLD  - vascepa is not formulary    dvt px  - lovenox

## 2021-09-29 NOTE — CONSULT NOTE ADULT - ASSESSMENT
MikeBalwinder  66M Hx Sx for rectal cancer w/ ostomy and reversal 10 years ago, no AC/AP, p/w R sciatica since february and 2 weeks R foot drop. 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 Exam: UE 5/5, BLE 5/5 except R DF 2/5.  -Rec MR Pelvis w/wo  -Rec IR CT guided biopsy of mass with sheath for diagnosis  -Rec CT Chest r/o other primary  -Heme/onc consult after diagnosis is made MikeBalwinder  66M Hx Sx for rectal cancer w/ ostomy and reversal 10 years ago, no AC/AP, p/w R sciatica since february and 2 weeks R foot drop. 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 Exam: UE 5/5, BLE 5/5 except R DF 2/5.  -Rec MR Pelvis w/wo and MR lumbar wwo   -Rec IR CT guided biopsy of mass with sheath for diagnosis  -Rec CT Chest with contast r/o other primary  -Heme/onc consult after diagnosis is made

## 2021-09-29 NOTE — H&P ADULT - NSHPLABSRESULTS_GEN_ALL_CORE
LABS:                        12.3   6.79  )-----------( 299      ( 28 Sep 2021 22:20 )             37.6         133<L>  |  98  |  14  ----------------------------<  176<H>  4.3   |  22  |  0.67    Ca    8.9      28 Sep 2021 22:20    TPro  7.0  /  Alb  3.9  /  TBili  0.6  /  DBili  x   /  AST  14  /  ALT  11  /  AlkPhos  82            Urinalysis Basic - ( 29 Sep 2021 09:16 )    Color: Light Orange / Appearance: Slightly Turbid / S.036 / pH: x  Gluc: x / Ketone: Negative  / Bili: Negative / Urobili: Negative   Blood: x / Protein: Trace / Nitrite: Negative   Leuk Esterase: Negative / RBC: 3 /hpf / WBC 1 /HPF   Sq Epi: x / Non Sq Epi: 0 /hpf / Bacteria: Negative LABS:                        12.3   6.79  )-----------( 299      ( 28 Sep 2021 22:20 )             37.6         133<L>  |  98  |  14  ----------------------------<  176<H>  4.3   |  22  |  0.67    Ca    8.9      28 Sep 2021 22:20    TPro  7.0  /  Alb  3.9  /  TBili  0.6  /  DBili  x   /  AST  14  /  ALT  11  /  AlkPhos  82            Urinalysis Basic - ( 29 Sep 2021 09:16 )    Color: Light Orange / Appearance: Slightly Turbid / S.036 / pH: x  Gluc: x / Ketone: Negative  / Bili: Negative / Urobili: Negative   Blood: x / Protein: Trace / Nitrite: Negative   Leuk Esterase: Negative / RBC: 3 /hpf / WBC 1 /HPF   Sq Epi: x / Non Sq Epi: 0 /hpf / Bacteria: Negative    < from: CT Abdomen and Pelvis w/ IV Cont (21 @ 00:03) >      INTERPRETATION:  CLINICAL INFORMATION: Right buttock pain and weight loss, evaluate for pelvic mass.    COMPARISON: None.    CONTRAST/COMPLICATIONS:  IV Contrast: Omnipaque 350  90 cc administered   10 cc discarded  Oral Contrast: None  Complications: None reported at time of study completion    PROCEDURE:  CT of the Abdomen and Pelvis was performed.  Sagittal and coronal reformats were performed.    FINDINGS:  LOWER CHEST: Within normal limits.    LIVER: Within normal limits.  BILE DUCTS: Normal caliber.  GALLBLADDER: Within normal limits.  SPLEEN: Within normal limits.  PANCREAS: Within normal limits.  ADRENALS: Within normal limits.  KIDNEYS/URETERS: No renal stones or hydronephrosis. Subcentimeter hypodensities too small to characterize.    BLADDER: Within normal limits.  REPRODUCTIVE ORGANS: Prostate within normal limits.    BOWEL: No bowel obstruction. Appendix is normal. Right lower quadrant colonic anastomosis.  PERITONEUM: No ascites.  VESSELS: Atherosclerotic changes.  RETROPERITONEUM/LYMPH NODES: A 4.6 x 6.9 x 5.7 cm right pelvic sidewall mass with destruction of the right sacrum. The mass lifts/is inseparable from the right iliopsoas musculature and contacts the common iliac vein. The mass also extends to multiple right sacral neural foramina.  ABDOMINAL WALL: Within normal limits.  BONES: Erosion of the right sacrum secondary to the mass as described above and question minimal iliac erosion. Degenerative changes.    IMPRESSION:  Large right pelvic sidewall retroperitoneal mass with destruction of the sacrum and likely involvement of multiple nerve roots, concerning for primary malignancy.        --- End of Report ---      < end of copied text >

## 2021-09-30 LAB
ANION GAP SERPL CALC-SCNC: 12 MMOL/L — SIGNIFICANT CHANGE UP (ref 5–17)
BUN SERPL-MCNC: 17 MG/DL — SIGNIFICANT CHANGE UP (ref 7–23)
CALCIUM SERPL-MCNC: 9.1 MG/DL — SIGNIFICANT CHANGE UP (ref 8.4–10.5)
CHLORIDE SERPL-SCNC: 103 MMOL/L — SIGNIFICANT CHANGE UP (ref 96–108)
CHOLEST SERPL-MCNC: 153 MG/DL — SIGNIFICANT CHANGE UP
CO2 SERPL-SCNC: 26 MMOL/L — SIGNIFICANT CHANGE UP (ref 22–31)
CREAT SERPL-MCNC: 0.75 MG/DL — SIGNIFICANT CHANGE UP (ref 0.5–1.3)
FOLATE SERPL-MCNC: 11.5 NG/ML — SIGNIFICANT CHANGE UP
GLUCOSE SERPL-MCNC: 85 MG/DL — SIGNIFICANT CHANGE UP (ref 70–99)
HCT VFR BLD CALC: 38.8 % — LOW (ref 39–50)
HCV AB S/CO SERPL IA: 0.1 S/CO — SIGNIFICANT CHANGE UP (ref 0–0.99)
HCV AB SERPL-IMP: SIGNIFICANT CHANGE UP
HDLC SERPL-MCNC: 52 MG/DL — SIGNIFICANT CHANGE UP
HGB BLD-MCNC: 12.7 G/DL — LOW (ref 13–17)
LIPID PNL WITH DIRECT LDL SERPL: 86 MG/DL — SIGNIFICANT CHANGE UP
MAGNESIUM SERPL-MCNC: 2.5 MG/DL — SIGNIFICANT CHANGE UP (ref 1.6–2.6)
MCHC RBC-ENTMCNC: 30.2 PG — SIGNIFICANT CHANGE UP (ref 27–34)
MCHC RBC-ENTMCNC: 32.7 GM/DL — SIGNIFICANT CHANGE UP (ref 32–36)
MCV RBC AUTO: 92.4 FL — SIGNIFICANT CHANGE UP (ref 80–100)
NON HDL CHOLESTEROL: 101 MG/DL — SIGNIFICANT CHANGE UP
NRBC # BLD: 0 /100 WBCS — SIGNIFICANT CHANGE UP (ref 0–0)
PHOSPHATE SERPL-MCNC: 3.6 MG/DL — SIGNIFICANT CHANGE UP (ref 2.5–4.5)
PLATELET # BLD AUTO: 323 K/UL — SIGNIFICANT CHANGE UP (ref 150–400)
POTASSIUM SERPL-MCNC: 3.7 MMOL/L — SIGNIFICANT CHANGE UP (ref 3.5–5.3)
POTASSIUM SERPL-SCNC: 3.7 MMOL/L — SIGNIFICANT CHANGE UP (ref 3.5–5.3)
RBC # BLD: 4.2 M/UL — SIGNIFICANT CHANGE UP (ref 4.2–5.8)
RBC # FLD: 12.4 % — SIGNIFICANT CHANGE UP (ref 10.3–14.5)
SODIUM SERPL-SCNC: 141 MMOL/L — SIGNIFICANT CHANGE UP (ref 135–145)
TRIGL SERPL-MCNC: 71 MG/DL — SIGNIFICANT CHANGE UP
TSH SERPL-MCNC: 2.56 UIU/ML — SIGNIFICANT CHANGE UP (ref 0.27–4.2)
WBC # BLD: 5.12 K/UL — SIGNIFICANT CHANGE UP (ref 3.8–10.5)
WBC # FLD AUTO: 5.12 K/UL — SIGNIFICANT CHANGE UP (ref 3.8–10.5)

## 2021-09-30 PROCEDURE — 99223 1ST HOSP IP/OBS HIGH 75: CPT | Mod: GC

## 2021-09-30 PROCEDURE — 93971 EXTREMITY STUDY: CPT | Mod: 26,RT

## 2021-09-30 PROCEDURE — 71260 CT THORAX DX C+: CPT | Mod: 26

## 2021-09-30 RX ORDER — HYDROMORPHONE HYDROCHLORIDE 2 MG/ML
0.5 INJECTION INTRAMUSCULAR; INTRAVENOUS; SUBCUTANEOUS ONCE
Refills: 0 | Status: DISCONTINUED | OUTPATIENT
Start: 2021-09-30 | End: 2021-09-30

## 2021-09-30 RX ADMIN — TAMSULOSIN HYDROCHLORIDE 0.4 MILLIGRAM(S): 0.4 CAPSULE ORAL at 21:27

## 2021-09-30 RX ADMIN — HYDROMORPHONE HYDROCHLORIDE 0.5 MILLIGRAM(S): 2 INJECTION INTRAMUSCULAR; INTRAVENOUS; SUBCUTANEOUS at 21:28

## 2021-09-30 RX ADMIN — HYDROMORPHONE HYDROCHLORIDE 0.5 MILLIGRAM(S): 2 INJECTION INTRAMUSCULAR; INTRAVENOUS; SUBCUTANEOUS at 21:43

## 2021-09-30 RX ADMIN — ENOXAPARIN SODIUM 40 MILLIGRAM(S): 100 INJECTION SUBCUTANEOUS at 14:06

## 2021-09-30 RX ADMIN — POLYETHYLENE GLYCOL 3350 17 GRAM(S): 17 POWDER, FOR SOLUTION ORAL at 14:06

## 2021-09-30 RX ADMIN — GABAPENTIN 300 MILLIGRAM(S): 400 CAPSULE ORAL at 14:06

## 2021-09-30 NOTE — PROGRESS NOTE ADULT - ASSESSMENT
Mike Balwinder  66M Hx Sx for rectal cancer w/ ostomy and reversal 10 years ago, no AC/AP, p/w R sciatica since february and 2 weeks R foot drop. 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     - Adm medicine, q4h neuro checks  -MRI pelvis shows R contrast-enhancing sacral mass, nyperintense on T2, pending final read  - Rec IR CT guided biopsy of mass with sheath for diagnosis  - Rec CT Chest +C r/o other primary -pending  - Heme/onc consult   - q4h neuro checks  - pain control per primary Balwinder Mckeon  66M Hx Sx for rectal cancer w/ ostomy and reversal 10 years ago, no AC/AP, p/w R sciatica since february and 2 weeks R foot drop. 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     - Adm medicine, q4h neuro checks  -MRI pelvis shows R contrast-enhancing sacral mass, nyperintense on T2, pending final read  - Rec IR CT guided biopsy of mass with sheath for diagnosis  - Rec CT Chest +C r/o other primary -pending  - Rec Heme/onc consult   - q4h neuro checks  - pain control per primary

## 2021-09-30 NOTE — CONSULT NOTE ADULT - ATTENDING COMMENTS
66 M w/ h/o rectal cancer 10 yrs ago s/p chemo, RT and resection found to have a large RP mass. Await tissue for diagnosis. 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 [facility] post biopsy.

## 2021-09-30 NOTE — CONSULT NOTE ADULT - ASSESSMENT
Vascular & Interventional Radiology Brief Consult Note    Evaluate for Procedure: Retroperitoneal mass biopsy     HPI: 67 y/o M with right buttocks pain and lower back pain radiating down right leg since February worsening over the past two weeks who was found to have a large retroperitoneal mass in the right lower quadrant.    Allergies:   Medications (Abx/Cardiac/Anticoagulation/Blood Products)  enoxaparin Injectable: 40 milliGRAM(s) SubCutaneous (09-29 @ 22:32)  tamsulosin: 0.4 milliGRAM(s) Oral (09-29 @ 22:32)    Data:    69.2  T(C): 36.7  HR: 65  BP: 128/79  RR: 18  SpO2: 96%    -WBC 5.12 / HgB 12.7 / Hct 38.8 / Plt 323  -Na 141 / Cl 103 / BUN 17 / Glucose 85  -K 3.7 / CO2 26 / Cr 0.75  -ALT -- / Alk Phos -- / T.Bili --    Imaging: Recent MRI and CT reviewed     Assessment/Plan:   -67 y/o M with right buttocks pain and lower back pain radiating down right leg since February worsening over the past two weeks who was found to have a large retroperitoneal mass in the right lower quadrant.  -Please place IR procedure order under Dr. Betancourt   -Plan for procedure tomorrow, 10/01/21  -Please order CBC, BMP, and coags for morning of procedure   -NPO at midnight   -Hold morning anticoagulation

## 2021-09-30 NOTE — PROGRESS NOTE ADULT - ASSESSMENT
66M presents from PCP with right buttocks pain, lower back pain radiating down right leg since February worsening over the past two weeks. Patient also had right lower leg pain and numbness over the past two weeks. He is beginning to have trouble walking.  Patient has baseline urinary incontinence x 10 years. Patient has had 10 pound weight loss in past two weeks. No n/v/d. No saddle anesthesia .No abd pain. Patient has been using meloxicam 15mg daily for back and leg pain. Surgical hx colon ca s/p chemoradiation 10 years ago. No other medical hx. No other meds.     MR today shows large mass in right lower quadrant with infiltration into the illiac muscle, involvement of the right lumbosacral truck and extension to the right anterior sacrum. No extension into the spinal canal is seen. pt has a sacral insufficiency fracture. mild subluxation L5/SI.    right PRP mass  - MR Pelvis w/wo and MR lumbar wwo   -  IR CT guided biopsy of mass with sheath for diagnosis  - CT Chest with contast r/o other primary  - Heme/onc consult after diagnosis is made  - pain control with Dilaudid  - increase Neurontin as needed    BPH  - c/w flomax    HLD  - vascepa is not formulary    dvt px  - lovenox

## 2021-09-30 NOTE — CONSULT NOTE ADULT - ASSESSMENT
65 yo M with a h/o rectal cancer diagnosed 10 years ago, s/p ChemoRT and resection with ABHILASH for the past 5-6 years who presented from his PCP with right buttock pain, lower back pain radiating down right leg and chronic urinary incontinence. Imaging showed a large pelvic mass c/f malignancy. Oncology has been consulted for further recommendations.    #History of Rectal Cancer  #Pelvic Mass  -Patient was diagnosed with rectal cancer 10 years ago. Followed with Dr. Albert Bajwa ([facility] [phone]). s/p ChemoRT and resection with ABHILASH for 5-6 years. Complicated by chronic urinary incontinence  -Now presenting with buttock pain and LBP  -CT A/P 9/29 showing a 4.6 x 6.9 x 5.7 cm right pelvic sidewall mass with destruction of the right sacrum. The mass lifts/is inseparable from the right iliopsoas musculature and contacts the common iliac vein. The mass also extends to multiple right sacral neural foramina.  -MRI L-spine 9/29 showing Partially visualized destructive right pelvic lesion with infiltration of the right iliac bone and sacrum with at least mild to moderate right S1-S2 neuroforaminal narrowing  -MRI pelvis 9/29 shows R contrast-enhancing sacral mass, hyperintense on T2, pending final read  -Neurosurgery consulted. Appreciate recommendations  -Please check CT Chest with contrast for full malignancy w/u  -Please check CEA  -Agree with IR biopsy. Will follow-up pathology  -Spoke to daughter, Renetta, regarding cancer history. She will bring records from [facility] to the hospital today  -Will refer patient to [facility] on discharge    Hiwot Farrar MD  Hematology/Oncology Fellow, PGY-5  Pager: [phone]  After 5pm and on weekends please page on-call fellow 65 yo M with a h/o rectal cancer diagnosed 10 years ago, s/p ChemoRT and resection with ABHILASH for the past 5-6 years who presented from his PCP with right buttock pain, lower back pain radiating down right leg and chronic urinary incontinence. Imaging showed a large pelvic mass c/f malignancy. Oncology has been consulted for further recommendations.    #History of Rectal Cancer  #Pelvic Mass  -Patient was diagnosed with rectal cancer 10 years ago. Followed with Dr. Albert Bajwa ([facility] [phone], though has changed practices). s/p ChemoRT and resection with ABHILASH for 5-6 years. Complicated by chronic urinary incontinence. Patient also notes urgency with BM's.  -Now presenting with buttock pain and LBP  -CT A/P 9/29 showing a 4.6 x 6.9 x 5.7 cm right pelvic sidewall mass with destruction of the right sacrum. The mass lifts/is inseparable from the right iliopsoas musculature and contacts the common iliac vein. The mass also extends to multiple right sacral neural foramina.  -MRI L-spine 9/29 showing Partially visualized destructive right pelvic lesion with infiltration of the right iliac bone and sacrum with at least mild to moderate right S1-S2 neuroforaminal narrowing  -MRI pelvis 9/29 shows R contrast-enhancing sacral mass, hyperintense on T2, pending final read  -Neurosurgery consulted. Appreciate recommendations  -Please check CT Chest with contrast for full malignancy w/u  -Please check CEA  -Agree with IR biopsy. Will follow-up pathology  -Spoke to daughter, Renetta, regarding cancer history. She will bring records from [facility] to the hospital today  -Will refer patient to [facility] on discharge    Hiwot Farrar MD  Hematology/Oncology Fellow, PGY-5  Pager: [phone]  After 5pm and on weekends please page on-call fellow

## 2021-09-30 NOTE — CONSULT NOTE ADULT - SUBJECTIVE AND OBJECTIVE BOX
HPI:  66M presents from PCP with right buttocks pain, lower back pain radiating down right leg since February worsening over the past two weeks. Patient also had right lower leg pain and numbness over the past two weeks. He is beginning to have trouble walking.  Patient has baseline urinary incontinence x 10 years. Patient has had 10 pound weight loss in past two weeks. No n/v/d. No saddle anesthesia .No abd pain. Patient has been using meloxicam 15mg daily for back and leg pain. Surgical hx rectal ca s/p chemoradiation 10 years ago. No other medical hx. No other meds.     MR today shows large mass in right lower quadrant with infiltration into the illiac muscle, involvement of the right lumbosacral truck and extension to the right anterior sacrum. No extension into the spinal canal is seen. pt has a sacral insufficiency fracture. mild subluxation L5/SI.    Oncology has been consulted for further recommendations given the concern for relapsed rectal cancer.    I spoke with patient's daughter, Renetta, on the phone today. Patient was diagnosed with rectal cancer approximately 10 years ago. He was following with Dr. Albert Bajwa out of [facility]. He received ChemoRT and underwent a resection at the time of diagnosis. He has not been seen by Oncology in 5-6 years as he was told he was free of cancer and no longer required regular oncologic follow-up. He has been following-up regularly with his PCP.       PAST MEDICAL & SURGICAL HISTORY:  Abdominal tumor    Back pain    Rectal cancer    No significant past surgical history        Allergies    No Known Allergies    Intolerances        MEDICATIONS  (STANDING):  enoxaparin Injectable 40 milliGRAM(s) SubCutaneous daily  gabapentin 300 milliGRAM(s) Oral daily  polyethylene glycol 3350 17 Gram(s) Oral daily  tamsulosin 0.4 milliGRAM(s) Oral at bedtime    MEDICATIONS  (PRN):  HYDROmorphone  Injectable 0.5 milliGRAM(s) IV Push every 4 hours PRN Moderate Pain (4 - 6)      FAMILY HISTORY:      SOCIAL HISTORY: No EtOH, no tobacco    REVIEW OF SYSTEMS:  12 point review of systems is negative unless indicated above.      Weight (kg): 69.2 (09-29 @ 18:06)    T(F): 98.1 (09-29-21 @ 18:06), Max: 98.5 (09-29-21 @ 16:22)  HR: 65 (09-29-21 @ 18:06)  BP: 128/79 (09-29-21 @ 18:06)  RR: 18 (09-29-21 @ 18:06)  SpO2: 96% (09-29-21 @ 18:06)  Wt(kg): --    GENERAL: NAD, well-developed  HEAD:  Atraumatic, Normocephalic  EYES: EOMI, PERRLA, conjunctiva and sclera clear  NECK: Supple, No JVD  CHEST/LUNG: Clear to auscultation bilaterally; No wheeze  HEART: Regular rate and rhythm; No murmurs, rubs, or gallops  ABDOMEN: Soft, Nontender, Nondistended; Bowel sounds present  EXTREMITIES:  2+ Peripheral Pulses, No clubbing, cyanosis, or edema  NEUROLOGY: non-focal  SKIN: No rashes or lesions                          12.7   5.12  )-----------( 323      ( 30 Sep 2021 06:49 )             38.8       09-30    141  |  103  |  17  ----------------------------<  85  3.7   |  26  |  0.75    Ca    9.1      30 Sep 2021 06:53  Phos  3.6     09-30  Mg     2.5     09-30    TPro  7.0  /  Alb  3.9  /  TBili  0.6  /  DBili  x   /  AST  14  /  ALT  11  /  AlkPhos  82  09-28      Magnesium, Serum: 2.5 mg/dL (09-30 @ 06:53)  Phosphorus Level, Serum: 3.6 mg/dL (09-30 @ 06:53)           HPI:  66M presents from PCP with right buttocks pain, lower back pain radiating down right leg since February worsening over the past two weeks. Patient also had right lower leg pain and numbness over the past two weeks. He is beginning to have trouble walking.  Patient has baseline urinary incontinence x 10 years. Patient has had 10 pound weight loss in past two weeks. No n/v/d. No saddle anesthesia .No abd pain. Patient has been using meloxicam 15mg daily for back and leg pain. Surgical hx rectal ca s/p chemoradiation 10 years ago. No other medical hx. No other meds.     MR today shows large mass in right lower quadrant with infiltration into the illiac muscle, involvement of the right lumbosacral truck and extension to the right anterior sacrum. No extension into the spinal canal is seen. pt has a sacral insufficiency fracture. mild subluxation L5/SI.    Oncology has been consulted for further recommendations given the concern for relapsed rectal cancer.    I spoke with patient's daughter, Renetta, on the phone today. Patient was diagnosed with rectal cancer approximately 10 years ago. He was following with Dr. Albert Bajwa out of [facility]. He received ChemoRT and underwent a resection at the time of diagnosis. He has not been seen by Oncology in 5-6 years as he was told he was free of cancer and no longer required regular oncologic follow-up. He has been following-up regularly with his PCP.       PAST MEDICAL & SURGICAL HISTORY:  Abdominal tumor    Back pain    Rectal cancer    No significant past surgical history        Allergies    No Known Allergies    Intolerances        MEDICATIONS  (STANDING):  enoxaparin Injectable 40 milliGRAM(s) SubCutaneous daily  gabapentin 300 milliGRAM(s) Oral daily  polyethylene glycol 3350 17 Gram(s) Oral daily  tamsulosin 0.4 milliGRAM(s) Oral at bedtime    MEDICATIONS  (PRN):  HYDROmorphone  Injectable 0.5 milliGRAM(s) IV Push every 4 hours PRN Moderate Pain (4 - 6)      FAMILY HISTORY:      SOCIAL HISTORY: No EtOH, no tobacco    REVIEW OF SYSTEMS:  12 point review of systems is negative unless indicated above.      Weight (kg): 69.2 (09-29 @ 18:06)    T(F): 98.1 (09-29-21 @ 18:06), Max: 98.5 (09-29-21 @ 16:22)  HR: 65 (09-29-21 @ 18:06)  BP: 128/79 (09-29-21 @ 18:06)  RR: 18 (09-29-21 @ 18:06)  SpO2: 96% (09-29-21 @ 18:06)  Wt(kg): --    GENERAL: NAD, well-developed  HEAD:  Atraumatic, Normocephalic  EYES: EOMI, PERRLA, conjunctiva and sclera clear  NECK: Supple, No JVD  CHEST/LUNG: Clear to auscultation bilaterally; No wheeze  HEART: Regular rate and rhythm; No murmurs, rubs, or gallops  ABDOMEN: Soft, Nontender, Nondistended; Bowel sounds present  EXTREMITIES:  2+ Peripheral Pulses, No clubbing, cyanosis, or edema  NEUROLOGY: 4/5 strength in RLE, 5/5 in all others  SKIN: No rashes or lesions                          12.7   5.12  )-----------( 323      ( 30 Sep 2021 06:49 )             38.8       09-30    141  |  103  |  17  ----------------------------<  85  3.7   |  26  |  0.75    Ca    9.1      30 Sep 2021 06:53  Phos  3.6     09-30  Mg     2.5     09-30    TPro  7.0  /  Alb  3.9  /  TBili  0.6  /  DBili  x   /  AST  14  /  ALT  11  /  AlkPhos  82  09-28      Magnesium, Serum: 2.5 mg/dL (09-30 @ 06:53)  Phosphorus Level, Serum: 3.6 mg/dL (09-30 @ 06:53)

## 2021-10-01 LAB
ALBUMIN SERPL ELPH-MCNC: 3.6 G/DL — SIGNIFICANT CHANGE UP (ref 3.3–5)
ALP SERPL-CCNC: 79 U/L — SIGNIFICANT CHANGE UP (ref 40–120)
ALT FLD-CCNC: 8 U/L — LOW (ref 10–45)
ANION GAP SERPL CALC-SCNC: 12 MMOL/L — SIGNIFICANT CHANGE UP (ref 5–17)
AST SERPL-CCNC: 13 U/L — SIGNIFICANT CHANGE UP (ref 10–40)
BASOPHILS # BLD AUTO: 0.08 K/UL — SIGNIFICANT CHANGE UP (ref 0–0.2)
BASOPHILS NFR BLD AUTO: 1.4 % — SIGNIFICANT CHANGE UP (ref 0–2)
BILIRUB SERPL-MCNC: 0.6 MG/DL — SIGNIFICANT CHANGE UP (ref 0.2–1.2)
BUN SERPL-MCNC: 19 MG/DL — SIGNIFICANT CHANGE UP (ref 7–23)
CALCIUM SERPL-MCNC: 8.9 MG/DL — SIGNIFICANT CHANGE UP (ref 8.4–10.5)
CEA SERPL-MCNC: <0.6 NG/ML — SIGNIFICANT CHANGE UP (ref 0–3.8)
CHLORIDE SERPL-SCNC: 103 MMOL/L — SIGNIFICANT CHANGE UP (ref 96–108)
CO2 SERPL-SCNC: 22 MMOL/L — SIGNIFICANT CHANGE UP (ref 22–31)
COVID-19 SPIKE DOMAIN AB INTERP: POSITIVE
COVID-19 SPIKE DOMAIN ANTIBODY RESULT: 226 U/ML — HIGH
CREAT SERPL-MCNC: 0.77 MG/DL — SIGNIFICANT CHANGE UP (ref 0.5–1.3)
EOSINOPHIL # BLD AUTO: 0.41 K/UL — SIGNIFICANT CHANGE UP (ref 0–0.5)
EOSINOPHIL NFR BLD AUTO: 7 % — HIGH (ref 0–6)
GLUCOSE SERPL-MCNC: 91 MG/DL — SIGNIFICANT CHANGE UP (ref 70–99)
HCT VFR BLD CALC: 36.9 % — LOW (ref 39–50)
HGB BLD-MCNC: 12.4 G/DL — LOW (ref 13–17)
IMM GRANULOCYTES NFR BLD AUTO: 0.5 % — SIGNIFICANT CHANGE UP (ref 0–1.5)
INR BLD: 1.02 RATIO — SIGNIFICANT CHANGE UP (ref 0.88–1.16)
LYMPHOCYTES # BLD AUTO: 1.2 K/UL — SIGNIFICANT CHANGE UP (ref 1–3.3)
LYMPHOCYTES # BLD AUTO: 20.5 % — SIGNIFICANT CHANGE UP (ref 13–44)
MAGNESIUM SERPL-MCNC: 2.3 MG/DL — SIGNIFICANT CHANGE UP (ref 1.6–2.6)
MCHC RBC-ENTMCNC: 30.8 PG — SIGNIFICANT CHANGE UP (ref 27–34)
MCHC RBC-ENTMCNC: 33.6 GM/DL — SIGNIFICANT CHANGE UP (ref 32–36)
MCV RBC AUTO: 91.6 FL — SIGNIFICANT CHANGE UP (ref 80–100)
MONOCYTES # BLD AUTO: 0.84 K/UL — SIGNIFICANT CHANGE UP (ref 0–0.9)
MONOCYTES NFR BLD AUTO: 14.4 % — HIGH (ref 2–14)
NEUTROPHILS # BLD AUTO: 3.29 K/UL — SIGNIFICANT CHANGE UP (ref 1.8–7.4)
NEUTROPHILS NFR BLD AUTO: 56.2 % — SIGNIFICANT CHANGE UP (ref 43–77)
NRBC # BLD: 0 /100 WBCS — SIGNIFICANT CHANGE UP (ref 0–0)
PLATELET # BLD AUTO: 316 K/UL — SIGNIFICANT CHANGE UP (ref 150–400)
POTASSIUM SERPL-MCNC: 4 MMOL/L — SIGNIFICANT CHANGE UP (ref 3.5–5.3)
POTASSIUM SERPL-SCNC: 4 MMOL/L — SIGNIFICANT CHANGE UP (ref 3.5–5.3)
PROT SERPL-MCNC: 6.5 G/DL — SIGNIFICANT CHANGE UP (ref 6–8.3)
PROTHROM AB SERPL-ACNC: 12.2 SEC — SIGNIFICANT CHANGE UP (ref 10.6–13.6)
RBC # BLD: 4.03 M/UL — LOW (ref 4.2–5.8)
RBC # FLD: 12.2 % — SIGNIFICANT CHANGE UP (ref 10.3–14.5)
SARS-COV-2 IGG+IGM SERPL QL IA: 226 U/ML — HIGH
SARS-COV-2 IGG+IGM SERPL QL IA: POSITIVE
SODIUM SERPL-SCNC: 137 MMOL/L — SIGNIFICANT CHANGE UP (ref 135–145)
WBC # BLD: 5.85 K/UL — SIGNIFICANT CHANGE UP (ref 3.8–10.5)
WBC # FLD AUTO: 5.85 K/UL — SIGNIFICANT CHANGE UP (ref 3.8–10.5)

## 2021-10-01 RX ORDER — ACETAMINOPHEN 500 MG
1000 TABLET ORAL ONCE
Refills: 0 | Status: COMPLETED | OUTPATIENT
Start: 2021-10-01 | End: 2021-10-01

## 2021-10-01 RX ORDER — ENOXAPARIN SODIUM 100 MG/ML
40 INJECTION SUBCUTANEOUS DAILY
Refills: 0 | Status: DISCONTINUED | OUTPATIENT
Start: 2021-10-01 | End: 2021-10-03

## 2021-10-01 RX ADMIN — TAMSULOSIN HYDROCHLORIDE 0.4 MILLIGRAM(S): 0.4 CAPSULE ORAL at 21:38

## 2021-10-01 RX ADMIN — Medication 1000 MILLIGRAM(S): at 03:30

## 2021-10-01 RX ADMIN — HYDROMORPHONE HYDROCHLORIDE 0.5 MILLIGRAM(S): 2 INJECTION INTRAMUSCULAR; INTRAVENOUS; SUBCUTANEOUS at 06:16

## 2021-10-01 RX ADMIN — HYDROMORPHONE HYDROCHLORIDE 0.5 MILLIGRAM(S): 2 INJECTION INTRAMUSCULAR; INTRAVENOUS; SUBCUTANEOUS at 06:31

## 2021-10-01 RX ADMIN — HYDROMORPHONE HYDROCHLORIDE 0.5 MILLIGRAM(S): 2 INJECTION INTRAMUSCULAR; INTRAVENOUS; SUBCUTANEOUS at 01:10

## 2021-10-01 RX ADMIN — HYDROMORPHONE HYDROCHLORIDE 0.5 MILLIGRAM(S): 2 INJECTION INTRAMUSCULAR; INTRAVENOUS; SUBCUTANEOUS at 01:25

## 2021-10-01 RX ADMIN — HYDROMORPHONE HYDROCHLORIDE 0.5 MILLIGRAM(S): 2 INJECTION INTRAMUSCULAR; INTRAVENOUS; SUBCUTANEOUS at 21:38

## 2021-10-01 RX ADMIN — HYDROMORPHONE HYDROCHLORIDE 0.5 MILLIGRAM(S): 2 INJECTION INTRAMUSCULAR; INTRAVENOUS; SUBCUTANEOUS at 17:41

## 2021-10-01 RX ADMIN — HYDROMORPHONE HYDROCHLORIDE 0.5 MILLIGRAM(S): 2 INJECTION INTRAMUSCULAR; INTRAVENOUS; SUBCUTANEOUS at 18:17

## 2021-10-01 RX ADMIN — HYDROMORPHONE HYDROCHLORIDE 0.5 MILLIGRAM(S): 2 INJECTION INTRAMUSCULAR; INTRAVENOUS; SUBCUTANEOUS at 00:16

## 2021-10-01 RX ADMIN — ENOXAPARIN SODIUM 40 MILLIGRAM(S): 100 INJECTION SUBCUTANEOUS at 14:48

## 2021-10-01 RX ADMIN — Medication 400 MILLIGRAM(S): at 02:57

## 2021-10-01 RX ADMIN — HYDROMORPHONE HYDROCHLORIDE 0.5 MILLIGRAM(S): 2 INJECTION INTRAMUSCULAR; INTRAVENOUS; SUBCUTANEOUS at 23:20

## 2021-10-01 RX ADMIN — HYDROMORPHONE HYDROCHLORIDE 0.5 MILLIGRAM(S): 2 INJECTION INTRAMUSCULAR; INTRAVENOUS; SUBCUTANEOUS at 00:04

## 2021-10-01 NOTE — PROGRESS NOTE ADULT - ASSESSMENT
Balwinder Mckeon  66M Hx Sx for rectal cancer w/ ostomy and reversal 10 years ago, no AC/AP, p/w R sciatica since february and 2 weeks R foot drop. 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   -Adm Medicine, q4h neuro checks  -MRI R-contrast enhancing sacral mass  -CT chest two 2 mm left nodules  -CT-guided biopsy with sheath this AM with IR  -Pain per primary

## 2021-10-01 NOTE — PROGRESS NOTE ADULT - ASSESSMENT
66M presents from PCP with right buttocks pain, lower back pain radiating down right leg since February worsening over the past two weeks. Patient also had right lower leg pain and numbness over the past two weeks. He is beginning to have trouble walking.  Patient has baseline urinary incontinence x 10 years. Patient has had 10 pound weight loss in past two weeks. No n/v/d. No saddle anesthesia .No abd pain. Patient has been using meloxicam 15mg daily for back and leg pain. Surgical hx colon ca s/p chemoradiation 10 years ago. No other medical hx. No other meds.     MR today shows large mass in right lower quadrant with infiltration into the illiac muscle, involvement of the right lumbosacral truck and extension to the right anterior sacrum. No extension into the spinal canal is seen. pt has a sacral insufficiency fracture. mild subluxation L5/SI.    right PRP mass  - MR Pelvis w/wo and MR lumbar wwo  - done  -  IR CT guided biopsy of mass with sheath for diagnosis postponed to manday  - CT Chest with contrast r/o other primary done  - Heme/onc consult   - pain control with Dilaudid  - increase Neurontin as needed    BPH  - c/w flomax    HLD  - vascepa is not formulary    dvt px  - lovenox

## 2021-10-02 LAB
ALBUMIN SERPL ELPH-MCNC: 3.7 G/DL — SIGNIFICANT CHANGE UP (ref 3.3–5)
ALP SERPL-CCNC: 81 U/L — SIGNIFICANT CHANGE UP (ref 40–120)
ALT FLD-CCNC: 8 U/L — LOW (ref 10–45)
ANION GAP SERPL CALC-SCNC: 12 MMOL/L — SIGNIFICANT CHANGE UP (ref 5–17)
AST SERPL-CCNC: 13 U/L — SIGNIFICANT CHANGE UP (ref 10–40)
BASOPHILS # BLD AUTO: 0.06 K/UL — SIGNIFICANT CHANGE UP (ref 0–0.2)
BASOPHILS NFR BLD AUTO: 0.9 % — SIGNIFICANT CHANGE UP (ref 0–2)
BILIRUB SERPL-MCNC: 0.5 MG/DL — SIGNIFICANT CHANGE UP (ref 0.2–1.2)
BUN SERPL-MCNC: 26 MG/DL — HIGH (ref 7–23)
CALCIUM SERPL-MCNC: 9.1 MG/DL — SIGNIFICANT CHANGE UP (ref 8.4–10.5)
CHLORIDE SERPL-SCNC: 105 MMOL/L — SIGNIFICANT CHANGE UP (ref 96–108)
CO2 SERPL-SCNC: 23 MMOL/L — SIGNIFICANT CHANGE UP (ref 22–31)
CREAT SERPL-MCNC: 0.8 MG/DL — SIGNIFICANT CHANGE UP (ref 0.5–1.3)
EOSINOPHIL # BLD AUTO: 0.28 K/UL — SIGNIFICANT CHANGE UP (ref 0–0.5)
EOSINOPHIL NFR BLD AUTO: 4.4 % — SIGNIFICANT CHANGE UP (ref 0–6)
GLUCOSE SERPL-MCNC: 114 MG/DL — HIGH (ref 70–99)
HCT VFR BLD CALC: 37.4 % — LOW (ref 39–50)
HGB BLD-MCNC: 12.9 G/DL — LOW (ref 13–17)
IMM GRANULOCYTES NFR BLD AUTO: 0.5 % — SIGNIFICANT CHANGE UP (ref 0–1.5)
INR BLD: 1.04 RATIO — SIGNIFICANT CHANGE UP (ref 0.88–1.16)
LYMPHOCYTES # BLD AUTO: 0.68 K/UL — LOW (ref 1–3.3)
LYMPHOCYTES # BLD AUTO: 10.6 % — LOW (ref 13–44)
MCHC RBC-ENTMCNC: 31 PG — SIGNIFICANT CHANGE UP (ref 27–34)
MCHC RBC-ENTMCNC: 34.5 GM/DL — SIGNIFICANT CHANGE UP (ref 32–36)
MCV RBC AUTO: 89.9 FL — SIGNIFICANT CHANGE UP (ref 80–100)
MONOCYTES # BLD AUTO: 0.67 K/UL — SIGNIFICANT CHANGE UP (ref 0–0.9)
MONOCYTES NFR BLD AUTO: 10.5 % — SIGNIFICANT CHANGE UP (ref 2–14)
NEUTROPHILS # BLD AUTO: 4.67 K/UL — SIGNIFICANT CHANGE UP (ref 1.8–7.4)
NEUTROPHILS NFR BLD AUTO: 73.1 % — SIGNIFICANT CHANGE UP (ref 43–77)
NRBC # BLD: 0 /100 WBCS — SIGNIFICANT CHANGE UP (ref 0–0)
PLATELET # BLD AUTO: 307 K/UL — SIGNIFICANT CHANGE UP (ref 150–400)
POTASSIUM SERPL-MCNC: 3.8 MMOL/L — SIGNIFICANT CHANGE UP (ref 3.5–5.3)
POTASSIUM SERPL-SCNC: 3.8 MMOL/L — SIGNIFICANT CHANGE UP (ref 3.5–5.3)
PROT SERPL-MCNC: 6.6 G/DL — SIGNIFICANT CHANGE UP (ref 6–8.3)
PROTHROM AB SERPL-ACNC: 12.5 SEC — SIGNIFICANT CHANGE UP (ref 10.6–13.6)
RBC # BLD: 4.16 M/UL — LOW (ref 4.2–5.8)
RBC # FLD: 12.2 % — SIGNIFICANT CHANGE UP (ref 10.3–14.5)
SODIUM SERPL-SCNC: 140 MMOL/L — SIGNIFICANT CHANGE UP (ref 135–145)
WBC # BLD: 6.39 K/UL — SIGNIFICANT CHANGE UP (ref 3.8–10.5)
WBC # FLD AUTO: 6.39 K/UL — SIGNIFICANT CHANGE UP (ref 3.8–10.5)

## 2021-10-02 RX ADMIN — HYDROMORPHONE HYDROCHLORIDE 0.5 MILLIGRAM(S): 2 INJECTION INTRAMUSCULAR; INTRAVENOUS; SUBCUTANEOUS at 05:32

## 2021-10-02 RX ADMIN — HYDROMORPHONE HYDROCHLORIDE 0.5 MILLIGRAM(S): 2 INJECTION INTRAMUSCULAR; INTRAVENOUS; SUBCUTANEOUS at 12:43

## 2021-10-02 RX ADMIN — ENOXAPARIN SODIUM 40 MILLIGRAM(S): 100 INJECTION SUBCUTANEOUS at 12:12

## 2021-10-02 RX ADMIN — HYDROMORPHONE HYDROCHLORIDE 0.5 MILLIGRAM(S): 2 INJECTION INTRAMUSCULAR; INTRAVENOUS; SUBCUTANEOUS at 21:07

## 2021-10-02 RX ADMIN — GABAPENTIN 300 MILLIGRAM(S): 400 CAPSULE ORAL at 12:12

## 2021-10-02 RX ADMIN — HYDROMORPHONE HYDROCHLORIDE 0.5 MILLIGRAM(S): 2 INJECTION INTRAMUSCULAR; INTRAVENOUS; SUBCUTANEOUS at 01:34

## 2021-10-02 RX ADMIN — TAMSULOSIN HYDROCHLORIDE 0.4 MILLIGRAM(S): 0.4 CAPSULE ORAL at 21:07

## 2021-10-02 RX ADMIN — HYDROMORPHONE HYDROCHLORIDE 0.5 MILLIGRAM(S): 2 INJECTION INTRAMUSCULAR; INTRAVENOUS; SUBCUTANEOUS at 17:10

## 2021-10-02 RX ADMIN — HYDROMORPHONE HYDROCHLORIDE 0.5 MILLIGRAM(S): 2 INJECTION INTRAMUSCULAR; INTRAVENOUS; SUBCUTANEOUS at 12:23

## 2021-10-02 RX ADMIN — HYDROMORPHONE HYDROCHLORIDE 0.5 MILLIGRAM(S): 2 INJECTION INTRAMUSCULAR; INTRAVENOUS; SUBCUTANEOUS at 07:25

## 2021-10-02 RX ADMIN — POLYETHYLENE GLYCOL 3350 17 GRAM(S): 17 POWDER, FOR SOLUTION ORAL at 12:12

## 2021-10-02 RX ADMIN — HYDROMORPHONE HYDROCHLORIDE 0.5 MILLIGRAM(S): 2 INJECTION INTRAMUSCULAR; INTRAVENOUS; SUBCUTANEOUS at 21:25

## 2021-10-02 RX ADMIN — HYDROMORPHONE HYDROCHLORIDE 0.5 MILLIGRAM(S): 2 INJECTION INTRAMUSCULAR; INTRAVENOUS; SUBCUTANEOUS at 02:11

## 2021-10-02 RX ADMIN — HYDROMORPHONE HYDROCHLORIDE 0.5 MILLIGRAM(S): 2 INJECTION INTRAMUSCULAR; INTRAVENOUS; SUBCUTANEOUS at 16:42

## 2021-10-03 LAB
ANION GAP SERPL CALC-SCNC: 12 MMOL/L — SIGNIFICANT CHANGE UP (ref 5–17)
BUN SERPL-MCNC: 19 MG/DL — SIGNIFICANT CHANGE UP (ref 7–23)
CALCIUM SERPL-MCNC: 9.1 MG/DL — SIGNIFICANT CHANGE UP (ref 8.4–10.5)
CHLORIDE SERPL-SCNC: 102 MMOL/L — SIGNIFICANT CHANGE UP (ref 96–108)
CO2 SERPL-SCNC: 23 MMOL/L — SIGNIFICANT CHANGE UP (ref 22–31)
CREAT SERPL-MCNC: 0.68 MG/DL — SIGNIFICANT CHANGE UP (ref 0.5–1.3)
GLUCOSE SERPL-MCNC: 102 MG/DL — HIGH (ref 70–99)
HCT VFR BLD CALC: 36.2 % — LOW (ref 39–50)
HGB BLD-MCNC: 12.4 G/DL — LOW (ref 13–17)
MCHC RBC-ENTMCNC: 31.3 PG — SIGNIFICANT CHANGE UP (ref 27–34)
MCHC RBC-ENTMCNC: 34.3 GM/DL — SIGNIFICANT CHANGE UP (ref 32–36)
MCV RBC AUTO: 91.4 FL — SIGNIFICANT CHANGE UP (ref 80–100)
NRBC # BLD: 0 /100 WBCS — SIGNIFICANT CHANGE UP (ref 0–0)
PLATELET # BLD AUTO: 297 K/UL — SIGNIFICANT CHANGE UP (ref 150–400)
POTASSIUM SERPL-MCNC: 3.7 MMOL/L — SIGNIFICANT CHANGE UP (ref 3.5–5.3)
POTASSIUM SERPL-SCNC: 3.7 MMOL/L — SIGNIFICANT CHANGE UP (ref 3.5–5.3)
RBC # BLD: 3.96 M/UL — LOW (ref 4.2–5.8)
RBC # FLD: 12.2 % — SIGNIFICANT CHANGE UP (ref 10.3–14.5)
SARS-COV-2 RNA SPEC QL NAA+PROBE: SIGNIFICANT CHANGE UP
SODIUM SERPL-SCNC: 137 MMOL/L — SIGNIFICANT CHANGE UP (ref 135–145)
WBC # BLD: 4.95 K/UL — SIGNIFICANT CHANGE UP (ref 3.8–10.5)
WBC # FLD AUTO: 4.95 K/UL — SIGNIFICANT CHANGE UP (ref 3.8–10.5)

## 2021-10-03 RX ORDER — GABAPENTIN 400 MG/1
300 CAPSULE ORAL
Refills: 0 | Status: DISCONTINUED | OUTPATIENT
Start: 2021-10-03 | End: 2021-10-05

## 2021-10-03 RX ORDER — ACETAMINOPHEN 500 MG
1000 TABLET ORAL ONCE
Refills: 0 | Status: COMPLETED | OUTPATIENT
Start: 2021-10-03 | End: 2021-10-03

## 2021-10-03 RX ADMIN — HYDROMORPHONE HYDROCHLORIDE 0.5 MILLIGRAM(S): 2 INJECTION INTRAMUSCULAR; INTRAVENOUS; SUBCUTANEOUS at 21:49

## 2021-10-03 RX ADMIN — POLYETHYLENE GLYCOL 3350 17 GRAM(S): 17 POWDER, FOR SOLUTION ORAL at 12:02

## 2021-10-03 RX ADMIN — Medication 400 MILLIGRAM(S): at 00:51

## 2021-10-03 RX ADMIN — ENOXAPARIN SODIUM 40 MILLIGRAM(S): 100 INJECTION SUBCUTANEOUS at 12:02

## 2021-10-03 RX ADMIN — HYDROMORPHONE HYDROCHLORIDE 0.5 MILLIGRAM(S): 2 INJECTION INTRAMUSCULAR; INTRAVENOUS; SUBCUTANEOUS at 22:11

## 2021-10-03 RX ADMIN — GABAPENTIN 300 MILLIGRAM(S): 400 CAPSULE ORAL at 12:02

## 2021-10-03 RX ADMIN — Medication 1000 MILLIGRAM(S): at 01:21

## 2021-10-03 RX ADMIN — HYDROMORPHONE HYDROCHLORIDE 0.5 MILLIGRAM(S): 2 INJECTION INTRAMUSCULAR; INTRAVENOUS; SUBCUTANEOUS at 13:00

## 2021-10-03 RX ADMIN — GABAPENTIN 300 MILLIGRAM(S): 400 CAPSULE ORAL at 21:50

## 2021-10-03 RX ADMIN — HYDROMORPHONE HYDROCHLORIDE 0.5 MILLIGRAM(S): 2 INJECTION INTRAMUSCULAR; INTRAVENOUS; SUBCUTANEOUS at 17:55

## 2021-10-03 RX ADMIN — Medication 400 MILLIGRAM(S): at 20:28

## 2021-10-03 RX ADMIN — HYDROMORPHONE HYDROCHLORIDE 0.5 MILLIGRAM(S): 2 INJECTION INTRAMUSCULAR; INTRAVENOUS; SUBCUTANEOUS at 13:15

## 2021-10-03 RX ADMIN — HYDROMORPHONE HYDROCHLORIDE 0.5 MILLIGRAM(S): 2 INJECTION INTRAMUSCULAR; INTRAVENOUS; SUBCUTANEOUS at 18:10

## 2021-10-03 RX ADMIN — Medication 1000 MILLIGRAM(S): at 21:02

## 2021-10-03 RX ADMIN — TAMSULOSIN HYDROCHLORIDE 0.4 MILLIGRAM(S): 0.4 CAPSULE ORAL at 21:49

## 2021-10-03 NOTE — PROGRESS NOTE ADULT - ASSESSMENT
66M presents from PCP with right buttocks pain, lower back pain radiating down right leg since February worsening over the past two weeks. Patient also had right lower leg pain and numbness over the past two weeks. He is beginning to have trouble walking.  Patient has baseline urinary incontinence x 10 years. Patient has had 10 pound weight loss in past two weeks. No n/v/d. No saddle anesthesia .No abd pain. Patient has been using meloxicam 15mg daily for back and leg pain. Surgical hx colon ca s/p chemoradiation 10 years ago. No other medical hx. No other meds.     MR today shows large mass in right lower quadrant with infiltration into the illiac muscle, involvement of the right lumbosacral truck and extension to the right anterior sacrum. No extension into the spinal canal is seen. pt has a sacral insufficiency fracture. mild subluxation L5/SI.    right PRP mass  - MR Pelvis w/wo and MR lumbar wwo  - done  -  IR CT guided biopsy of mass with sheath for diagnosis postponed to Monday  - CT Chest with contrast r/o other primary done  - Heme/onc consult   - pain control with Dilaudid  - increase Neurontin     BPH  - c/w flomax    HLD  - vascepa is not formulary    d/w daughter    dvt px  - lovenox

## 2021-10-03 NOTE — PRE-ANESTHESIA EVALUATION ADULT - NSANTHAPLANRD_GEN_ALL_CORE
Beryl from the [facility] is the caller-    She is stating that patient had an appointment with Dr. Cedeño and lab work to be done prior. Patient did have lab work done at [facility] but had to reschedule his appt with doctor. Patient is now back at [facility] stating that he needs to redo his labs and patient is there right now and they need orders for these labs.    Ph#[phone]   monitored anesthesia care (MAC)

## 2021-10-04 ENCOUNTER — RESULT REVIEW (OUTPATIENT)
Age: 66
End: 2021-10-04

## 2021-10-04 ENCOUNTER — TRANSCRIPTION ENCOUNTER (OUTPATIENT)
Age: 66
End: 2021-10-04

## 2021-10-04 LAB
ALBUMIN SERPL ELPH-MCNC: 3.9 G/DL — SIGNIFICANT CHANGE UP (ref 3.3–5)
ALP SERPL-CCNC: 72 U/L — SIGNIFICANT CHANGE UP (ref 40–120)
ALT FLD-CCNC: 28 U/L — SIGNIFICANT CHANGE UP (ref 10–45)
ANION GAP SERPL CALC-SCNC: 13 MMOL/L — SIGNIFICANT CHANGE UP (ref 5–17)
AST SERPL-CCNC: 30 U/L — SIGNIFICANT CHANGE UP (ref 10–40)
BASOPHILS # BLD AUTO: 0.05 K/UL — SIGNIFICANT CHANGE UP (ref 0–0.2)
BASOPHILS NFR BLD AUTO: 1.1 % — SIGNIFICANT CHANGE UP (ref 0–2)
BILIRUB SERPL-MCNC: 0.4 MG/DL — SIGNIFICANT CHANGE UP (ref 0.2–1.2)
BUN SERPL-MCNC: 22 MG/DL — SIGNIFICANT CHANGE UP (ref 7–23)
CALCIUM SERPL-MCNC: 9 MG/DL — SIGNIFICANT CHANGE UP (ref 8.4–10.5)
CHLORIDE SERPL-SCNC: 104 MMOL/L — SIGNIFICANT CHANGE UP (ref 96–108)
CO2 SERPL-SCNC: 23 MMOL/L — SIGNIFICANT CHANGE UP (ref 22–31)
CREAT SERPL-MCNC: 0.62 MG/DL — SIGNIFICANT CHANGE UP (ref 0.5–1.3)
EOSINOPHIL # BLD AUTO: 0.31 K/UL — SIGNIFICANT CHANGE UP (ref 0–0.5)
EOSINOPHIL NFR BLD AUTO: 6.6 % — HIGH (ref 0–6)
GLUCOSE SERPL-MCNC: 98 MG/DL — SIGNIFICANT CHANGE UP (ref 70–99)
HCT VFR BLD CALC: 37.1 % — LOW (ref 39–50)
HGB BLD-MCNC: 12.4 G/DL — LOW (ref 13–17)
IMM GRANULOCYTES NFR BLD AUTO: 0.2 % — SIGNIFICANT CHANGE UP (ref 0–1.5)
INR BLD: 0.96 RATIO — SIGNIFICANT CHANGE UP (ref 0.88–1.16)
LYMPHOCYTES # BLD AUTO: 0.87 K/UL — LOW (ref 1–3.3)
LYMPHOCYTES # BLD AUTO: 18.6 % — SIGNIFICANT CHANGE UP (ref 13–44)
MAGNESIUM SERPL-MCNC: 2.5 MG/DL — SIGNIFICANT CHANGE UP (ref 1.6–2.6)
MCHC RBC-ENTMCNC: 30.6 PG — SIGNIFICANT CHANGE UP (ref 27–34)
MCHC RBC-ENTMCNC: 33.4 GM/DL — SIGNIFICANT CHANGE UP (ref 32–36)
MCV RBC AUTO: 91.6 FL — SIGNIFICANT CHANGE UP (ref 80–100)
MONOCYTES # BLD AUTO: 0.65 K/UL — SIGNIFICANT CHANGE UP (ref 0–0.9)
MONOCYTES NFR BLD AUTO: 13.9 % — SIGNIFICANT CHANGE UP (ref 2–14)
NEUTROPHILS # BLD AUTO: 2.79 K/UL — SIGNIFICANT CHANGE UP (ref 1.8–7.4)
NEUTROPHILS NFR BLD AUTO: 59.6 % — SIGNIFICANT CHANGE UP (ref 43–77)
NRBC # BLD: 0 /100 WBCS — SIGNIFICANT CHANGE UP (ref 0–0)
PLATELET # BLD AUTO: 285 K/UL — SIGNIFICANT CHANGE UP (ref 150–400)
POTASSIUM SERPL-MCNC: 3.7 MMOL/L — SIGNIFICANT CHANGE UP (ref 3.5–5.3)
POTASSIUM SERPL-SCNC: 3.7 MMOL/L — SIGNIFICANT CHANGE UP (ref 3.5–5.3)
PROT SERPL-MCNC: 6.7 G/DL — SIGNIFICANT CHANGE UP (ref 6–8.3)
PROTHROM AB SERPL-ACNC: 11.5 SEC — SIGNIFICANT CHANGE UP (ref 10.6–13.6)
RBC # BLD: 4.05 M/UL — LOW (ref 4.2–5.8)
RBC # FLD: 12.4 % — SIGNIFICANT CHANGE UP (ref 10.3–14.5)
SODIUM SERPL-SCNC: 140 MMOL/L — SIGNIFICANT CHANGE UP (ref 135–145)
WBC # BLD: 4.68 K/UL — SIGNIFICANT CHANGE UP (ref 3.8–10.5)
WBC # FLD AUTO: 4.68 K/UL — SIGNIFICANT CHANGE UP (ref 3.8–10.5)

## 2021-10-04 PROCEDURE — 88341 IMHCHEM/IMCYTCHM EA ADD ANTB: CPT | Mod: 26,59

## 2021-10-04 PROCEDURE — 88342 IMHCHEM/IMCYTCHM 1ST ANTB: CPT | Mod: 26,59

## 2021-10-04 PROCEDURE — 88360 TUMOR IMMUNOHISTOCHEM/MANUAL: CPT | Mod: 26

## 2021-10-04 PROCEDURE — 88305 TISSUE EXAM BY PATHOLOGIST: CPT | Mod: 26

## 2021-10-04 PROCEDURE — 77012 CT SCAN FOR NEEDLE BIOPSY: CPT | Mod: 26

## 2021-10-04 PROCEDURE — 88173 CYTOPATH EVAL FNA REPORT: CPT | Mod: 26

## 2021-10-04 PROCEDURE — 49180 BIOPSY ABDOMINAL MASS: CPT

## 2021-10-04 RX ORDER — OXYCODONE HYDROCHLORIDE 5 MG/1
5 TABLET ORAL EVERY 6 HOURS
Refills: 0 | Status: DISCONTINUED | OUTPATIENT
Start: 2021-10-04 | End: 2021-10-05

## 2021-10-04 RX ORDER — HYDROMORPHONE HYDROCHLORIDE 2 MG/ML
0.5 INJECTION INTRAMUSCULAR; INTRAVENOUS; SUBCUTANEOUS EVERY 4 HOURS
Refills: 0 | Status: DISCONTINUED | OUTPATIENT
Start: 2021-10-04 | End: 2021-10-05

## 2021-10-04 RX ADMIN — HYDROMORPHONE HYDROCHLORIDE 0.5 MILLIGRAM(S): 2 INJECTION INTRAMUSCULAR; INTRAVENOUS; SUBCUTANEOUS at 16:40

## 2021-10-04 RX ADMIN — TAMSULOSIN HYDROCHLORIDE 0.4 MILLIGRAM(S): 0.4 CAPSULE ORAL at 22:29

## 2021-10-04 RX ADMIN — HYDROMORPHONE HYDROCHLORIDE 0.5 MILLIGRAM(S): 2 INJECTION INTRAMUSCULAR; INTRAVENOUS; SUBCUTANEOUS at 16:16

## 2021-10-04 RX ADMIN — POLYETHYLENE GLYCOL 3350 17 GRAM(S): 17 POWDER, FOR SOLUTION ORAL at 11:00

## 2021-10-04 RX ADMIN — GABAPENTIN 300 MILLIGRAM(S): 400 CAPSULE ORAL at 10:54

## 2021-10-04 RX ADMIN — GABAPENTIN 300 MILLIGRAM(S): 400 CAPSULE ORAL at 22:29

## 2021-10-04 RX ADMIN — HYDROMORPHONE HYDROCHLORIDE 0.5 MILLIGRAM(S): 2 INJECTION INTRAMUSCULAR; INTRAVENOUS; SUBCUTANEOUS at 04:02

## 2021-10-04 RX ADMIN — HYDROMORPHONE HYDROCHLORIDE 0.5 MILLIGRAM(S): 2 INJECTION INTRAMUSCULAR; INTRAVENOUS; SUBCUTANEOUS at 03:47

## 2021-10-04 NOTE — DISCHARGE NOTE PROVIDER - HOSPITAL COURSE
66M Hx Sx for rectal cancer w/ ostomy and reversal 10 years ago, no AC/AP, presents from PCP with right buttocks pain, lower back pain radiating down right leg since February worsening over the past two weeks. Patient also had right lower leg pain and numbness over the past two weeks. He is beginning to have trouble walking.  Patient has baseline urinary incontinence x 10 years. Patient has had 10 pound weight loss in past two weeks. Admitted with Right buttock pain, radiating down right leg. 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 Patient had IR biopsy on 10/4/2021. He will follow up results with PCP within 1 week and follow up biopsy results with oncology Dr Janay Fung at [facility] within 1 week. Plan discussed with patient and daughter Renetta. Patient is covid vaccinated with Moderna. No PT needs. 66M presents from PCP with right buttocks pain, lower back pain radiating down right leg since February worsening over the past two weeks. Patient also had right lower leg pain and numbness over the past two weeks. He is beginning to have trouble walking.  Patient has baseline urinary incontinence x 10 years. Patient has had 10 pound weight loss in past two weeks. No n/v/d. No saddle anesthesia .No abd pain. Patient has been using meloxicam 15mg daily for back and leg pain. Surgical hx colon ca s/p chemoradiation 10 years ago. No other medical hx. No other meds.     MR today shows large mass in right lower quadrant with infiltration into the illiac muscle, involvement of the right lumbosacral truck and extension to the right anterior sacrum. No extension into the spinal canal is seen. pt has a sacral insufficiency fracture. mild subluxation L5/SI.    right PRP mass  - MR Pelvis w/wo and MR lumbar wwo  - done  -  IR CT guided biopsy of mass with sheath for diagnosis - done  - CT Chest with contrast r/o other primary done  - Heme/onc consult   - pain control with Dilaudid, and oxycodone 5/10 prn   - increase Neurontin 300 bid    BPH  - c/w flomax    HLD  - vascepa is not formulary

## 2021-10-04 NOTE — DISCHARGE NOTE PROVIDER - NSDCCPCAREPLAN_GEN_ALL_CORE_FT
PRINCIPAL DISCHARGE DIAGNOSIS  Diagnosis: Pelvic mass  Assessment and Plan of Treatment: Follow up with PCP and oncology in 1 week for results of biopsy taken on 10/4/2021  return to the hospital if worsens. 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 s/p biopsy  Follow up with PCP and oncology in 1 week for results of biopsy taken on 10/4/2021  return to the hospital if worsens. Bring these papers with you to pcp visit

## 2021-10-04 NOTE — DISCHARGE NOTE PROVIDER - NSDCFUADDAPPT_GEN_ALL_CORE_FT
APPTS ARE READY TO BE MADE: [ x] YES    Best Family or Patient Contact (if needed):    Additional Information about above appointments (if needed):    1: pcp  2: Dr Fung  3:     Other comments or requests:    APPTS ARE READY TO BE MADE: [ x] YES    Best Family or Patient Contact (if needed):    Additional Information about above appointments (if needed):    1: Oncology follow up for biopsy results  2: PCP follow up post discharge  3:     Other comments or requests:

## 2021-10-04 NOTE — DISCHARGE NOTE PROVIDER - PROVIDER TOKENS
PROVIDER:[TOKEN:[[id number]],FOLLOWUP:[1 week]],PROVIDER:[TOKEN:[[id number]],FOLLOWUP:[1-3 days],ESTABLISHEDPATIENT:[T]]

## 2021-10-04 NOTE — DISCHARGE NOTE PROVIDER - NSDCMRMEDTOKEN_GEN_ALL_CORE_FT
Flomax 0.4 mg oral capsule: 1 cap(s) orally once a day  gabapentin 300 mg oral capsule: 1 cap(s) orally once a day  MiraLax oral powder for reconstitution: 17gram once daily  naproxen 500 mg oral tablet: 1 tab(s) orally 2 times a day  Vascepa 1 g oral capsule: 2 cap(s) orally 2 times a day   gabapentin 300 mg oral capsule: 1 cap(s) orally 2 times a day  MiraLax oral powder for reconstitution: 17gram once daily  oxyCODONE 5 mg oral tablet: 1 tab(s) orally every 6 hours, As needed, Moderate Pain (4 - 6) MDD:4  tamsulosin 0.4 mg oral capsule: 1 cap(s) orally once a day (at bedtime)

## 2021-10-04 NOTE — PRE PROCEDURE NOTE - PRE PROCEDURE EVALUATION
Interventional Radiology Pre Procedure Note    HPI: 66y Male with pelvic mass. Biopsy is requested.     Allergies:   Medications (Abx/Cardiac/Anticoagulation/Blood Products)  enoxaparin Injectable: 40 milliGRAM(s) SubCutaneous (10-03 @ 12:02)  tamsulosin: 0.4 milliGRAM(s) Oral (10-03 @ 21:49)    Data:  170.2  69.2  T(C): 36.4  HR: 84  BP: 125/80  RR: 18  SpO2: 100%    Exam  General: No acute distress  Chest: Non labored breathing    -WBC 4.68 / HgB 12.4 / Hct 37.1 / Plt 285  -Na 140 / Cl 104 / BUN 22 / Glucose 98  -K 3.7 / CO2 23 / Cr 0.62  -ALT 28 / Alk Phos 72 / T.Bili 0.4  -INR0.96    Plan: 66y Male presents for pelvic mass biopsy. Procedure and risks discussed with patient and he is agreeable to proceed.   -Risks/Benefits/alternatives explained with the patient and/or healthcare proxy and witnessed informed consent obtained.

## 2021-10-04 NOTE — PROCEDURE NOTE - PROCEDURE FINDINGS AND DETAILS
Successful right iliacus mass biopsy using coaxial technique from anterior approach as discussed with neurosurgery team.

## 2021-10-04 NOTE — PROGRESS NOTE ADULT - ASSESSMENT
Mike Balwinder  66M Hx Sx for rectal cancer w/ ostomy and reversal 10 years ago, no AC/AP, p/w R sciatica since february and 2 weeks R foot drop. 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   -[employer]m Medicine, q4h neuro checks  -MRI R-contrast enhancing sacral mass  -CT chest two 2 mm left nodules  -CT-guided biopsy with sheath pending, scheduled for today. See IR note from 9/30  -Pain per primary

## 2021-10-04 NOTE — PROGRESS NOTE ADULT - ASSESSMENT
66M presents from PCP with right buttocks pain, lower back pain radiating down right leg since February worsening over the past two weeks. Patient also had right lower leg pain and numbness over the past two weeks. He is beginning to have trouble walking.  Patient has baseline urinary incontinence x 10 years. Patient has had 10 pound weight loss in past two weeks. No n/v/d. No saddle anesthesia .No abd pain. Patient has been using meloxicam 15mg daily for back and leg pain. Surgical hx colon ca s/p chemoradiation 10 years ago. No other medical hx. No other meds.     MR today shows large mass in right lower quadrant with infiltration into the illiac muscle, involvement of the right lumbosacral truck and extension to the right anterior sacrum. No extension into the spinal canal is seen. pt has a sacral insufficiency fracture. mild subluxation L5/SI.    right PRP mass  - MR Pelvis w/wo and MR lumbar wwo  - done  -  IR CT guided biopsy of mass with sheath for diagnosis - done  - CT Chest with contrast r/o other primary done  - Heme/onc consult   - pain control with Dilaudid, and oxycodone 5/10 prn   - increase Neurontin 300 bid    BPH  - c/w flomax    HLD  - vascepa is not formulary    d/w daughter    dvt px  - lovenox    follow up with Dr Janay Rai [facility] [place] for bx results

## 2021-10-04 NOTE — DISCHARGE NOTE PROVIDER - CARE PROVIDER_API CALL
Janay Fung (DO)  HematologyOncology; Internal Medicine  [address] [place] [address]  Phone: [phone]  Fax: [phone]  Follow Up Time: 1 week    CB RENTERIA  Internal Medicine  [address] [place] [id number]  Phone: ()-  Fax: ()-  Established Patient  Follow Up Time: 1-3 days

## 2021-10-05 ENCOUNTER — TRANSCRIPTION ENCOUNTER (OUTPATIENT)
Age: 66
End: 2021-10-05

## 2021-10-05 VITALS
OXYGEN SATURATION: 96 % | SYSTOLIC BLOOD PRESSURE: 125 MMHG | TEMPERATURE: 98 F | HEART RATE: 74 BPM | DIASTOLIC BLOOD PRESSURE: 75 MMHG | RESPIRATION RATE: 18 BRPM

## 2021-10-05 LAB
ALBUMIN SERPL ELPH-MCNC: 4 G/DL — SIGNIFICANT CHANGE UP (ref 3.3–5)
ALP SERPL-CCNC: 75 U/L — SIGNIFICANT CHANGE UP (ref 40–120)
ALT FLD-CCNC: 43 U/L — SIGNIFICANT CHANGE UP (ref 10–45)
ANION GAP SERPL CALC-SCNC: 13 MMOL/L — SIGNIFICANT CHANGE UP (ref 5–17)
AST SERPL-CCNC: 36 U/L — SIGNIFICANT CHANGE UP (ref 10–40)
BILIRUB SERPL-MCNC: 0.5 MG/DL — SIGNIFICANT CHANGE UP (ref 0.2–1.2)
BUN SERPL-MCNC: 20 MG/DL — SIGNIFICANT CHANGE UP (ref 7–23)
CALCIUM SERPL-MCNC: 9.1 MG/DL — SIGNIFICANT CHANGE UP (ref 8.4–10.5)
CHLORIDE SERPL-SCNC: 103 MMOL/L — SIGNIFICANT CHANGE UP (ref 96–108)
CO2 SERPL-SCNC: 25 MMOL/L — SIGNIFICANT CHANGE UP (ref 22–31)
CREAT SERPL-MCNC: 0.69 MG/DL — SIGNIFICANT CHANGE UP (ref 0.5–1.3)
GLUCOSE SERPL-MCNC: 100 MG/DL — HIGH (ref 70–99)
HCT VFR BLD CALC: 37.7 % — LOW (ref 39–50)
HGB BLD-MCNC: 12.3 G/DL — LOW (ref 13–17)
MCHC RBC-ENTMCNC: 30 PG — SIGNIFICANT CHANGE UP (ref 27–34)
MCHC RBC-ENTMCNC: 32.6 GM/DL — SIGNIFICANT CHANGE UP (ref 32–36)
MCV RBC AUTO: 92 FL — SIGNIFICANT CHANGE UP (ref 80–100)
NRBC # BLD: 0 /100 WBCS — SIGNIFICANT CHANGE UP (ref 0–0)
PLATELET # BLD AUTO: 311 K/UL — SIGNIFICANT CHANGE UP (ref 150–400)
POTASSIUM SERPL-MCNC: 3.9 MMOL/L — SIGNIFICANT CHANGE UP (ref 3.5–5.3)
POTASSIUM SERPL-SCNC: 3.9 MMOL/L — SIGNIFICANT CHANGE UP (ref 3.5–5.3)
PROT SERPL-MCNC: 7 G/DL — SIGNIFICANT CHANGE UP (ref 6–8.3)
RBC # BLD: 4.1 M/UL — LOW (ref 4.2–5.8)
RBC # FLD: 12.3 % — SIGNIFICANT CHANGE UP (ref 10.3–14.5)
SODIUM SERPL-SCNC: 141 MMOL/L — SIGNIFICANT CHANGE UP (ref 135–145)
WBC # BLD: 5 K/UL — SIGNIFICANT CHANGE UP (ref 3.8–10.5)
WBC # FLD AUTO: 5 K/UL — SIGNIFICANT CHANGE UP (ref 3.8–10.5)

## 2021-10-05 PROCEDURE — 80053 COMPREHEN METABOLIC PANEL: CPT

## 2021-10-05 PROCEDURE — 88342 IMHCHEM/IMCYTCHM 1ST ANTB: CPT

## 2021-10-05 PROCEDURE — 82947 ASSAY GLUCOSE BLOOD QUANT: CPT

## 2021-10-05 PROCEDURE — 82803 BLOOD GASES ANY COMBINATION: CPT

## 2021-10-05 PROCEDURE — 83605 ASSAY OF LACTIC ACID: CPT

## 2021-10-05 PROCEDURE — 96375 TX/PRO/DX INJ NEW DRUG ADDON: CPT

## 2021-10-05 PROCEDURE — 72197 MRI PELVIS W/O & W/DYE: CPT

## 2021-10-05 PROCEDURE — 84100 ASSAY OF PHOSPHORUS: CPT

## 2021-10-05 PROCEDURE — 80048 BASIC METABOLIC PNL TOTAL CA: CPT

## 2021-10-05 PROCEDURE — 84443 ASSAY THYROID STIM HORMONE: CPT

## 2021-10-05 PROCEDURE — 85014 HEMATOCRIT: CPT

## 2021-10-05 PROCEDURE — 88173 CYTOPATH EVAL FNA REPORT: CPT

## 2021-10-05 PROCEDURE — 82330 ASSAY OF CALCIUM: CPT

## 2021-10-05 PROCEDURE — 49180 BIOPSY ABDOMINAL MASS: CPT

## 2021-10-05 PROCEDURE — 85025 COMPLETE CBC W/AUTO DIFF WBC: CPT

## 2021-10-05 PROCEDURE — 85018 HEMOGLOBIN: CPT

## 2021-10-05 PROCEDURE — 71260 CT THORAX DX C+: CPT

## 2021-10-05 PROCEDURE — 85610 PROTHROMBIN TIME: CPT

## 2021-10-05 PROCEDURE — 77012 CT SCAN FOR NEEDLE BIOPSY: CPT

## 2021-10-05 PROCEDURE — U0005: CPT

## 2021-10-05 PROCEDURE — 81001 URINALYSIS AUTO W/SCOPE: CPT

## 2021-10-05 PROCEDURE — 93005 ELECTROCARDIOGRAM TRACING: CPT

## 2021-10-05 PROCEDURE — 84132 ASSAY OF SERUM POTASSIUM: CPT

## 2021-10-05 PROCEDURE — 99285 EMERGENCY DEPT VISIT HI MDM: CPT | Mod: 25

## 2021-10-05 PROCEDURE — 86803 HEPATITIS C AB TEST: CPT

## 2021-10-05 PROCEDURE — 86769 SARS-COV-2 COVID-19 ANTIBODY: CPT

## 2021-10-05 PROCEDURE — 96374 THER/PROPH/DIAG INJ IV PUSH: CPT | Mod: XU

## 2021-10-05 PROCEDURE — 82435 ASSAY OF BLOOD CHLORIDE: CPT

## 2021-10-05 PROCEDURE — 82378 CARCINOEMBRYONIC ANTIGEN: CPT

## 2021-10-05 PROCEDURE — U0003: CPT

## 2021-10-05 PROCEDURE — 88305 TISSUE EXAM BY PATHOLOGIST: CPT

## 2021-10-05 PROCEDURE — 72158 MRI LUMBAR SPINE W/O & W/DYE: CPT

## 2021-10-05 PROCEDURE — 88172 CYTP DX EVAL FNA 1ST EA SITE: CPT

## 2021-10-05 PROCEDURE — 84295 ASSAY OF SERUM SODIUM: CPT

## 2021-10-05 PROCEDURE — 83735 ASSAY OF MAGNESIUM: CPT

## 2021-10-05 PROCEDURE — 85027 COMPLETE CBC AUTOMATED: CPT

## 2021-10-05 PROCEDURE — 88341 IMHCHEM/IMCYTCHM EA ADD ANTB: CPT

## 2021-10-05 PROCEDURE — 36415 COLL VENOUS BLD VENIPUNCTURE: CPT

## 2021-10-05 PROCEDURE — 71045 X-RAY EXAM CHEST 1 VIEW: CPT

## 2021-10-05 PROCEDURE — 93971 EXTREMITY STUDY: CPT

## 2021-10-05 PROCEDURE — 99231 SBSQ HOSP IP/OBS SF/LOW 25: CPT

## 2021-10-05 PROCEDURE — A9585: CPT

## 2021-10-05 PROCEDURE — 88360 TUMOR IMMUNOHISTOCHEM/MANUAL: CPT

## 2021-10-05 PROCEDURE — 80061 LIPID PANEL: CPT

## 2021-10-05 PROCEDURE — 74177 CT ABD & PELVIS W/CONTRAST: CPT | Mod: MA

## 2021-10-05 PROCEDURE — 82746 ASSAY OF FOLIC ACID SERUM: CPT

## 2021-10-05 RX ORDER — GABAPENTIN 400 MG/1
1 CAPSULE ORAL
Qty: 60 | Refills: 0
Start: 2021-10-05 | End: 2021-01-01

## 2021-10-05 RX ORDER — TAMSULOSIN HYDROCHLORIDE 0.4 MG/1
1 CAPSULE ORAL
Qty: 0 | Refills: 0 | DISCHARGE
Start: 2021-10-05

## 2021-10-05 RX ORDER — GABAPENTIN 400 MG/1
1 CAPSULE ORAL
Qty: 0 | Refills: 0 | DISCHARGE

## 2021-10-05 RX ORDER — TAMSULOSIN HYDROCHLORIDE 0.4 MG/1
1 CAPSULE ORAL
Qty: 0 | Refills: 0 | DISCHARGE

## 2021-10-05 RX ORDER — ICOSAPENT ETHYL 500 MG/1
2 CAPSULE, LIQUID FILLED ORAL
Qty: 0 | Refills: 0 | DISCHARGE

## 2021-10-05 RX ORDER — OXYCODONE HYDROCHLORIDE 5 MG/1
1 TABLET ORAL
Qty: 16 | Refills: 0
Start: 2021-10-05 | End: 2021-10-08

## 2021-10-05 RX ADMIN — GABAPENTIN 300 MILLIGRAM(S): 400 CAPSULE ORAL at 12:13

## 2021-10-05 RX ADMIN — HYDROMORPHONE HYDROCHLORIDE 0.5 MILLIGRAM(S): 2 INJECTION INTRAMUSCULAR; INTRAVENOUS; SUBCUTANEOUS at 02:40

## 2021-10-05 RX ADMIN — OXYCODONE HYDROCHLORIDE 5 MILLIGRAM(S): 5 TABLET ORAL at 17:35

## 2021-10-05 RX ADMIN — HYDROMORPHONE HYDROCHLORIDE 0.5 MILLIGRAM(S): 2 INJECTION INTRAMUSCULAR; INTRAVENOUS; SUBCUTANEOUS at 12:13

## 2021-10-05 RX ADMIN — HYDROMORPHONE HYDROCHLORIDE 0.5 MILLIGRAM(S): 2 INJECTION INTRAMUSCULAR; INTRAVENOUS; SUBCUTANEOUS at 12:30

## 2021-10-05 RX ADMIN — OXYCODONE HYDROCHLORIDE 5 MILLIGRAM(S): 5 TABLET ORAL at 17:05

## 2021-10-05 RX ADMIN — HYDROMORPHONE HYDROCHLORIDE 0.5 MILLIGRAM(S): 2 INJECTION INTRAMUSCULAR; INTRAVENOUS; SUBCUTANEOUS at 03:10

## 2021-10-05 RX ADMIN — POLYETHYLENE GLYCOL 3350 17 GRAM(S): 17 POWDER, FOR SOLUTION ORAL at 12:13

## 2021-10-05 NOTE — PROGRESS NOTE ADULT - SUBJECTIVE AND OBJECTIVE BOX
Interventional Radiology Follow-Up Note.     Patient seen and examined @ bedside between 7am and 7:30 am.    This is a 66y Male s/p right iliacus mass biopsy on 10/4 in Interventional Radiology with Dr. Porras.     No complaint offered.      Medication:   enoxaparin Injectable: (10-03)  tamsulosin: (10-04)    Vitals:   T(F): 97.8, Max: 99.4 (21:09)  HR: 64  BP: 113/65  RR: 18  SpO2: 97%    Physical Exam:  General: Nontoxic, in NAD.  Abdomen: soft, NTND.   Extremities:  right groin clean, dry and intact, soft with no evidence of hematoma, +2 femoral pulse.       Aspartate Aminotransferase (AST/SGOT): 30 U/L (10-04-21 @ 06:51)  Alanine Aminotransferase (ALT/SGPT): 28 U/L (10-04-21 @ 06:51)        LABS:  Na: 140 (10-04 @ 06:51), 137 (10-03 @ 07:16)  K: 3.7 (10-04 @ 06:51), 3.7 (10-03 @ 07:16)  Cl: 104 (10-04 @ 06:51), 102 (10-03 @ 07:16)  CO2: 23 (10-04 @ 06:51), 23 (10-03 @ 07:16)  BUN: 22 (10-04 @ 06:51), 19 (10-03 @ 07:16)  Cr: 0.62 (10-04 @ 06:51), 0.68 (10-03 @ 07:16)  Glu: 98(10-04 @ 06:51), 102(10-03 @ 07:16)    Hgb: 12.4 (10-04 @ 06:53), 12.4 (10-03 @ 07:18)  Hct: 37.1 (10-04 @ 06:53), 36.2 (10-03 @ 07:18)  WBC: 4.68 (10-04 @ 06:53), 4.95 (10-03 @ 07:18)  Plt: 285 (10-04 @ 06:53), 297 (10-03 @ 07:18)    INR: 0.96 10-04-21 @ 06:55  PTT:           LIVER FUNCTIONS - ( 04 Oct 2021 06:51 )  Alb: 3.9 g/dL / Pro: 6.7 g/dL / ALK PHOS: 72 U/L / ALT: 28 U/L / AST: 30 U/L / GGT: x                    Assessment/Plan: 66M Hx Sx for rectal cancer w/ ostomy and reversal 10 years ago, no AC/AP, p/w R sciatica since february and 2 weeks R foot drop. 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  Pt s/p biopsy.     - Neurosx following/  - Pathology pending.   - IR will sign off.   - Will d/w Dr. Porras         Please call IR at  [phone] with any questions, concerns, or issues regarding above.    Also available on Teams.  
DATE OF SERVICE: 10-03-21 @ 13:23    Patient is a 66y old  Male who presents with a chief complaint of right leg tingling and numbness and pain (02 Oct 2021 12:15)      SUBJECTIVE / OVERNIGHT EVENTS:  c/o pain rle    MEDICATIONS  (STANDING):  enoxaparin Injectable 40 milliGRAM(s) SubCutaneous daily  gabapentin 300 milliGRAM(s) Oral daily  polyethylene glycol 3350 17 Gram(s) Oral daily  tamsulosin 0.4 milliGRAM(s) Oral at bedtime    MEDICATIONS  (PRN):  HYDROmorphone  Injectable 0.5 milliGRAM(s) IV Push every 4 hours PRN Moderate Pain (4 - 6)      Vital Signs Last 24 Hrs  T(C): 36.6 (03 Oct 2021 11:40), Max: 36.7 (02 Oct 2021 20:35)  T(F): 97.9 (03 Oct 2021 11:40), Max: 98.1 (02 Oct 2021 20:35)  HR: 88 (03 Oct 2021 11:40) (69 - 88)  BP: 119/74 (03 Oct 2021 11:40) (119/74 - 130/70)  BP(mean): --  RR: 18 (03 Oct 2021 11:40) (18 - 18)  SpO2: 95% (03 Oct 2021 11:40) (95% - 99%)  CAPILLARY BLOOD GLUCOSE        I&O's Summary    02 Oct 2021 07:01  -  03 Oct 2021 07:00  --------------------------------------------------------  IN: 840 mL / OUT: 0 mL / NET: 840 mL    03 Oct 2021 07:01  -  03 Oct 2021 13:23  --------------------------------------------------------  IN: 240 mL / OUT: 0 mL / NET: 240 mL        PHYSICAL EXAM:  GENERAL: NAD, well-developed  HEAD:  Atraumatic, Normocephalic  EYES: EOMI, PERRLA, conjunctiva and sclera clear  NECK: Supple, No JVD  CHEST/LUNG: Clear to auscultation bilaterally; No wheeze  HEART: Regular rate and rhythm; No murmurs, rubs, or gallops  ABDOMEN: Soft, Nontender, Nondistended; Bowel sounds present  EXTREMITIES:  2+ Peripheral Pulses, No clubbing, cyanosis, or edema  PSYCH: AAOx3  NEUROLOGY: non-focal  SKIN: No rashes or lesions    LABS:                        12.4   4.95  )-----------( 297      ( 03 Oct 2021 07:18 )             36.2     10-03    137  |  102  |  19  ----------------------------<  102<H>  3.7   |  23  |  0.68    Ca    9.1      03 Oct 2021 07:16    TPro  6.6  /  Alb  3.7  /  TBili  0.5  /  DBili  x   /  AST  13  /  ALT  8<L>  /  AlkPhos  81  10-02    PT/INR - ( 02 Oct 2021 06:13 )   PT: 12.5 sec;   INR: 1.04 ratio                   RADIOLOGY & ADDITIONAL TESTS:    Imaging Personally Reviewed:    Consultant(s) Notes Reviewed:      Care Discussed with Consultants/Other Providers:  
DATE OF SERVICE: 21 @ 12:23    Patient is a 66y old  Male who presents with a chief complaint of right leg tingling and numbness and pain (30 Sep 2021 10:07)      SUBJECTIVE / OVERNIGHT EVENTS:  rle pain    MEDICATIONS  (STANDING):  enoxaparin Injectable 40 milliGRAM(s) SubCutaneous daily  gabapentin 300 milliGRAM(s) Oral daily  polyethylene glycol 3350 17 Gram(s) Oral daily  tamsulosin 0.4 milliGRAM(s) Oral at bedtime    MEDICATIONS  (PRN):  HYDROmorphone  Injectable 0.5 milliGRAM(s) IV Push every 4 hours PRN Moderate Pain (4 - 6)      Vital Signs Last 24 Hrs  T(C): 36.7 (29 Sep 2021 18:06), Max: 36.9 (29 Sep 2021 16:22)  T(F): 98.1 (29 Sep 2021 18:06), Max: 98.5 (29 Sep 2021 16:22)  HR: 65 (29 Sep 2021 18:06) (65 - 69)  BP: 128/79 (29 Sep 2021 18:06) (125/81 - 128/79)  BP(mean): --  RR: 18 (29 Sep 2021 18:06) (18 - 18)  SpO2: 96% (29 Sep 2021 18:06) (96% - 98%)  CAPILLARY BLOOD GLUCOSE        I&O's Summary      PHYSICAL EXAM:  GENERAL: NAD, well-developed  HEAD:  Atraumatic, Normocephalic  EYES: EOMI, PERRLA, conjunctiva and sclera clear  NECK: Supple, No JVD  CHEST/LUNG: Clear to auscultation bilaterally; No wheeze  HEART: Regular rate and rhythm; No murmurs, rubs, or gallops  ABDOMEN: Soft, Nontender, Nondistended; Bowel sounds present  EXTREMITIES:  2+ Peripheral Pulses, No clubbing, cyanosis, or edema  PSYCH: AAOx3  NEUROLOGY: non-focal  SKIN: No rashes or lesions    LABS:                        12.7   5.12  )-----------( 323      ( 30 Sep 2021 06:49 )             38.8         141  |  103  |  17  ----------------------------<  85  3.7   |  26  |  0.75    Ca    9.1      30 Sep 2021 06:53  Phos  3.6       Mg     2.5         TPro  7.0  /  Alb  3.9  /  TBili  0.6  /  DBili  x   /  AST  14  /  ALT  11  /  AlkPhos  82            Urinalysis Basic - ( 29 Sep 2021 09:16 )    Color: Light Orange / Appearance: Slightly Turbid / S.036 / pH: x  Gluc: x / Ketone: Negative  / Bili: Negative / Urobili: Negative   Blood: x / Protein: Trace / Nitrite: Negative   Leuk Esterase: Negative / RBC: 3 /hpf / WBC 1 /HPF   Sq Epi: x / Non Sq Epi: 0 /hpf / Bacteria: Negative    < from: CT Abdomen and Pelvis w/ IV Cont (21 @ 00:03) >  FINDINGS:  LOWER CHEST: Within normal limits.    LIVER: Within normal limits.  BILE DUCTS: Normal caliber.  GALLBLADDER: Within normal limits.  SPLEEN: Within normal limits.  PANCREAS: Within normal limits.  ADRENALS: Within normal limits.  KIDNEYS/URETERS: No renal stones or hydronephrosis. Subcentimeter hypodensities too small to characterize.    BLADDER: Within normal limits.  REPRODUCTIVE ORGANS: Prostate within normal limits.    BOWEL: No bowel obstruction. Appendix is normal. Right lower quadrant colonic anastomosis.  PERITONEUM: No ascites.  VESSELS: Atherosclerotic changes.  RETROPERITONEUM/LYMPH NODES: A 4.6 x 6.9 x 5.7 cm right pelvic sidewall mass with destruction of the right sacrum. The mass lifts/is inseparable from the right iliopsoas musculature and contacts the common iliac vein. The mass also extends to multiple right sacral neural foramina.  ABDOMINAL WALL: Within normal limits.  BONES: Erosion of the right sacrum secondary to the mass as described above and question minimal iliac erosion. Degenerative changes.    IMPRESSION:  Large right pelvic sidewall retroperitoneal mass with destruction of the sacrum and likely involvement of multiple nerve roots, concerning for primary malignancy.          < end of copied text >  < from: MR Lumbar Spine w/wo IV Cont (21 @ 22:15) >  FINDINGS:    ALIGNMENT:  The alignment is normal.    VERTEBRAE: Partially visualized enhancing destructive mass involving the right pelvis with destructive changes of the iliac bone and sacrum. Please see concurrent MRI of the pelvis for further evaluation.    Lumbar spine demonstrates no destructive lesion. Endplate degenerative changes are noted most prominent at L5-S1 level.    DISCS: The disc spaces are maintained.    CONUS MEDULLARIS AND CAUDA EQUINA: The conus medullaris terminates at L1-L2. There is normal appearance of the conus medullaris and cauda equina.    EVALUATION OF INDIVIDUAL LEVELS:  L1-2: No disc herniation, spinal canal or neuroforaminal stenosis.    L2-3: Mild disc bulge. No spinal canal or neuroforaminal stenosis.    L3-4: No disc herniation, spinal canal or neuroforaminal stenosis.    L4-5: No disc herniation, spinal canal or neuroforaminal stenosis.    L5-S1: Right central annular fissure. Diffuse disc bulge. No spinal canal stenosis. Mild bilateral neuroforaminal stenosis.    S1-S2: No spinal canal stenosis. Destructive lesion partially invades the right S1-S2 neuroforamen resulting in at least mild to moderate stenosis.    PARAVERTEBRAL SOFT TISSUES: Paraspinal musculature is unremarkable. Please see recent CT abdomen and pelvis for intra-abdominal findings.    IMPRESSION:    No spinal canal stenosis or destructive lesion throughout the lumbar spine.    Partially visualized destructive right pelvic lesion with infiltration of the right iliac bone and sacrum with at least mild to moderate right S1-S2 neuroforaminal narrowing. Please see concurrent dedicated MRI of the pelvis for further evaluation.    --- End of Report ---      < end of copied text >      RADIOLOGY & ADDITIONAL TESTS:    Imaging Personally Reviewed:    Consultant(s) Notes Reviewed:      Care Discussed with Consultants/Other Providers:  
DATE OF SERVICE: 10-04-21 @ 19:57    Patient is a 66y old  Male who presents with a chief complaint of right leg tingling and numbness and pain (04 Oct 2021 16:18)      SUBJECTIVE / OVERNIGHT EVENTS:  no new complaints    MEDICATIONS  (STANDING):  gabapentin 300 milliGRAM(s) Oral two times a day  polyethylene glycol 3350 17 Gram(s) Oral daily  tamsulosin 0.4 milliGRAM(s) Oral at bedtime    MEDICATIONS  (PRN):  HYDROmorphone  Injectable 0.5 milliGRAM(s) IV Push every 4 hours PRN Severe Pain (7 - 10)  oxyCODONE    IR 5 milliGRAM(s) Oral every 6 hours PRN Moderate Pain (4 - 6)      Vital Signs Last 24 Hrs  T(C): 36.5 (04 Oct 2021 11:22), Max: 37 (03 Oct 2021 21:09)  T(F): 97.7 (04 Oct 2021 11:22), Max: 98.6 (03 Oct 2021 21:09)  HR: 65 (04 Oct 2021 11:22) (64 - 84)  BP: 116/72 (04 Oct 2021 11:22) (101/78 - 125/80)  BP(mean): 92 (04 Oct 2021 10:15) (86 - 92)  RR: 18 (04 Oct 2021 11:22) (14 - 19)  SpO2: 98% (04 Oct 2021 11:22) (95% - 100%)  CAPILLARY BLOOD GLUCOSE        I&O's Summary    03 Oct 2021 07:01  -  04 Oct 2021 07:00  --------------------------------------------------------  IN: 1560 mL / OUT: 0 mL / NET: 1560 mL    04 Oct 2021 07:01  -  04 Oct 2021 19:57  --------------------------------------------------------  IN: 240 mL / OUT: 0 mL / NET: 240 mL        PHYSICAL EXAM:  GENERAL: NAD, well-developed  HEAD:  Atraumatic, Normocephalic  EYES: EOMI, PERRLA, conjunctiva and sclera clear  NECK: Supple, No JVD  CHEST/LUNG: Clear to auscultation bilaterally; No wheeze  HEART: Regular rate and rhythm; No murmurs, rubs, or gallops  ABDOMEN: Soft, Nontender, Nondistended; Bowel sounds present  EXTREMITIES:  2+ Peripheral Pulses, No clubbing, cyanosis, or edema  PSYCH: AAOx3  NEUROLOGY: non-focal  SKIN: No rashes or lesions    LABS:                        12.4   4.68  )-----------( 285      ( 04 Oct 2021 06:53 )             37.1     10-04    140  |  104  |  22  ----------------------------<  98  3.7   |  23  |  0.62    Ca    9.0      04 Oct 2021 06:51  Mg     2.5     10-04    TPro  6.7  /  Alb  3.9  /  TBili  0.4  /  DBili  x   /  AST  30  /  ALT  28  /  AlkPhos  72  10-04    PT/INR - ( 04 Oct 2021 06:55 )   PT: 11.5 sec;   INR: 0.96 ratio                   RADIOLOGY & ADDITIONAL TESTS:    Imaging Personally Reviewed:    Consultant(s) Notes Reviewed:      Care Discussed with Consultants/Other Providers:  
p ([id number])     HPI:  66M Hx Sx for rectal cancer w/ ostomy and reversal 10 years ago, no AC/AP, p/w R sciatica since february and 2 weeks R foot drop. 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    Exam: UE 5/5, BLE 5/5 except R DF 2/5.    --Anticoagulation:    =====================  PAST MEDICAL HISTORY   Abdominal tumor    Back pain      PAST SURGICAL HISTORY   No significant past surgical history          MEDICATIONS:  Antibiotics:    Neuro:    Other:      SOCIAL HISTORY:   Occupation:   Marital Status:     FAMILY HISTORY:      ROS: Negative except per HPI      
DATE OF SERVICE: 10-01-21 @ 16:57    Patient is a 66y old  Male who presents with a chief complaint of right leg tingling and numbness and pain (01 Oct 2021 04:44)      SUBJECTIVE / OVERNIGHT EVENTS:  No chest pain. No shortness of breath. No new complaints. No events overnight. pain is controlled    MEDICATIONS  (STANDING):  enoxaparin Injectable 40 milliGRAM(s) SubCutaneous daily  gabapentin 300 milliGRAM(s) Oral daily  polyethylene glycol 3350 17 Gram(s) Oral daily  tamsulosin 0.4 milliGRAM(s) Oral at bedtime    MEDICATIONS  (PRN):  HYDROmorphone  Injectable 0.5 milliGRAM(s) IV Push every 4 hours PRN Moderate Pain (4 - 6)      Vital Signs Last 24 Hrs  T(C): 36.7 (01 Oct 2021 14:44), Max: 37 (01 Oct 2021 06:06)  T(F): 98.1 (01 Oct 2021 14:44), Max: 98.6 (01 Oct 2021 06:06)  HR: 72 (01 Oct 2021 14:44) (65 - 78)  BP: 123/72 (01 Oct 2021 14:44) (115/75 - 124/87)  BP(mean): --  RR: 18 (01 Oct 2021 14:44) (18 - 18)  SpO2: 97% (01 Oct 2021 14:44) (95% - 98%)  CAPILLARY BLOOD GLUCOSE        I&O's Summary    30 Sep 2021 07:01  -  01 Oct 2021 07:00  --------------------------------------------------------  IN: 600 mL / OUT: 0 mL / NET: 600 mL        PHYSICAL EXAM:  GENERAL: NAD, well-developed  HEAD:  Atraumatic, Normocephalic  EYES: EOMI, PERRLA, conjunctiva and sclera clear  NECK: Supple, No JVD  CHEST/LUNG: Clear to auscultation bilaterally; No wheeze  HEART: Regular rate and rhythm; No murmurs, rubs, or gallops  ABDOMEN: Soft, Nontender, Nondistended; Bowel sounds present  EXTREMITIES:  2+ Peripheral Pulses, No clubbing, cyanosis, or edema  PSYCH: AAOx3  NEUROLOGY: non-focal  SKIN: No rashes or lesions    LABS:                        12.4   5.85  )-----------( 316      ( 01 Oct 2021 07:11 )             36.9     10-01    137  |  103  |  19  ----------------------------<  91  4.0   |  22  |  0.77    Ca    8.9      01 Oct 2021 07:11  Phos  3.6     09-30  Mg     2.3     10-01    TPro  6.5  /  Alb  3.6  /  TBili  0.6  /  DBili  x   /  AST  13  /  ALT  8<L>  /  AlkPhos  79  10-01    PT/INR - ( 01 Oct 2021 07:36 )   PT: 12.2 sec;   INR: 1.02 ratio         < from: CT Abdomen and Pelvis w/ IV Cont (09.29.21 @ 00:03) >  INTERPRETATION:  CLINICAL INFORMATION: Right buttock pain and weight loss, evaluate for pelvic mass.    COMPARISON: None.    CONTRAST/COMPLICATIONS:  IV Contrast: Omnipaque 350  90 cc administered   10 cc discarded  Oral Contrast: None  Complications: None reported at time of study completion    PROCEDURE:  CT of the Abdomen and Pelvis was performed.  Sagittal and coronal reformats were performed.    FINDINGS:  LOWER CHEST: Within normal limits.    LIVER: Within normal limits.  BILE DUCTS: Normal caliber.  GALLBLADDER: Within normal limits.  SPLEEN: Within normal limits.  PANCREAS: Within normal limits.  ADRENALS: Within normal limits.  KIDNEYS/URETERS: No renal stones or hydronephrosis. Subcentimeter hypodensities too small to characterize.    BLADDER: Within normal limits.  REPRODUCTIVE ORGANS: Prostate within normal limits.    BOWEL: No bowel obstruction. Appendix is normal. Right lower quadrant colonic anastomosis.  PERITONEUM: No ascites.  VESSELS: Atherosclerotic changes.  RETROPERITONEUM/LYMPH NODES: A 4.6 x 6.9 x 5.7 cm right pelvic sidewall mass with destruction of the right sacrum. The mass lifts/is inseparable from the right iliopsoas musculature and contacts the common iliac vein. The mass also extends to multiple right sacral neural foramina.  ABDOMINAL WALL: Within normal limits.  BONES: Erosion of the right sacrum secondary to the mass as described above and question minimal iliac erosion. Degenerative changes.    IMPRESSION:  Large right pelvic sidewall retroperitoneal mass with destruction of the sacrum and likely involvement of multiple nerve roots, concerning for primary malignancy.        --- End of Report ---      < end of copied text >  < from: CT Chest w/ IV Cont (09.30.21 @ 09:22) >  PROCEDURE:  CT of the Chest was performed.  Sagittal and coronal reformats were performed.    FINDINGS:    LUNGS AND AIRWAYS: Patent central airways.  Right upper lobe calcified granuloma. 2 mm left upper lobe nodules (series 8, images 82 and 63).  PLEURA: No pleural effusion.  MEDIASTINUM AND MONSE: No lymphadenopathy. Calcified left hilar lymph nodes consistent with prior granulomatous disease.  VESSELS: Within normal limits.  HEART: Heart size is normal. No pericardial effusion.  CHEST WALL AND LOWER NECK: Within normal limits.  VISUALIZED UPPER ABDOMEN: Within normal limits.  BONES: Degenerative changes of the spine.    IMPRESSION:  Two 2 mm left upper lobe nodules. Recommend follow-up per protocol of primary malignancy.    < end of copied text >  < from: MR Lumbar Spine w/wo IV Cont (09.29.21 @ 22:15) >  FINDINGS:    ALIGNMENT:  The alignment is normal.    VERTEBRAE: Partially visualized enhancing destructive mass involving the right pelvis with destructive changes of the iliac bone and sacrum. Please see concurrent MRI of the pelvis for further evaluation.    Lumbar spine demonstrates no destructive lesion. Endplate degenerative changes are noted most prominent at L5-S1 level.    DISCS: The disc spaces are maintained.    CONUS MEDULLARIS AND CAUDA EQUINA: The conus medullaris terminates at L1-L2. There is normal appearance of the conus medullaris and cauda equina.    EVALUATION OF INDIVIDUAL LEVELS:  L1-2: No disc herniation, spinal canal or neuroforaminal stenosis.    L2-3: Mild disc bulge. No spinal canal or neuroforaminal stenosis.    L3-4: No disc herniation, spinal canal or neuroforaminal stenosis.    L4-5: No disc herniation, spinal canal or neuroforaminal stenosis.    L5-S1: Right central annular fissure. Diffuse disc bulge. No spinal canal stenosis. Mild bilateral neuroforaminal stenosis.    S1-S2: No spinal canal stenosis. Destructive lesion partially invades the right S1-S2 neuroforamen resulting in at least mild to moderate stenosis.    PARAVERTEBRAL SOFT TISSUES: Paraspinal musculature is unremarkable. Please see recent CT abdomen and pelvis for intra-abdominal findings.    IMPRESSION:    No spinal canal stenosis or destructive lesion throughout the lumbar spine.    Partially visualized destructive right pelvic lesion with infiltration of the right iliac bone and sacrum with at least mild to moderate right S1-S2 neuroforaminal narrowing. Please see concurrent dedicated MRI of the pelvis for further evaluation.      < end of copied text >  < from: MR Pelvis w/wo IV Cont (09.29.21 @ 22:14) >  FINDINGS:  Enhancing, multilobulated mass measuring 9.7 x 7.5 x 6.2 cm (AP x TR x CC) which appears to be centered in the right hemisacrum and extends into the right pelvic sidewall. The mass abuts and displaces the posterior aspect of the distal psoas and right iliacus muscles. The mass extends to the right neural foramina encasing the exiting L5-S3 nerve roots. A portion of the mass appears to cross the sacroiliac joint extending into the adjacent posterior iliac bone most pronounced at series 4 image 17. There is a superimposed or insufficiency fracture of the sacrum involving the S1 segment and bilateral sacral alae.    VESSELS: Right pelvic mass exerts mass effect on the right common iliac vein. The mass abuts the posterior aspect of the right common iliac artery. 180 degrees encasement of theproximal right internal and external iliac veins. Complete encasement of the proximal right internal iliac artery.      REPRODUCTIVE ORGANS: Prostate within normal limits. Mild prominence of the right seminal vesicle of uncertain significance.  BLADDER: Within normal limits.    VISUALIZED PORTIONS:  ABDOMINAL ORGANS: Not included in field-of-view. No ascites.  BOWEL: Within normal limits.    ABDOMINAL WALL: Within normal limits.  No additional osseous lesions are identified.      IMPRESSION:  Enhancing multilobulated mass lesion arising in the right hemipelvis which appears to be centered in the region of the right hemisacrum extending into the right pelvic sidewall with partial encasement of the sacral nerve roots and right sided vasculature, as further described above. Differential diagnosis for a lesion of this appearance includes metastasis, tumors of neural origin, fibrous tumor, or less likely chondrosarcoma given absence of internal mineralization on CT. Histologic sampling is recommended for definitive diagnosis.    < end of copied text >            RADIOLOGY & ADDITIONAL TESTS:    Imaging Personally Reviewed:    Consultant(s) Notes Reviewed:      Care Discussed with Consultants/Other Providers:  
p ([id number])     HPI:  66M Hx Sx for rectal cancer w/ ostomy and reversal 10 years ago, no AC/AP, p/w R sciatica since february and 2 weeks R foot drop. 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    Exam: UE 5/5, BLE 5/5 except R DF 2/5.    --Anticoagulation:    =====================  PAST MEDICAL HISTORY   Abdominal tumor    Back pain      PAST SURGICAL HISTORY   No significant past surgical history          MEDICATIONS:  Antibiotics:    Neuro:    Other:      SOCIAL HISTORY:   Occupation:   Marital Status:     FAMILY HISTORY:      ROS: Negative except per HPI    LABS:                          12.7   5.12  )-----------( 323      ( 30 Sep 2021 06:49 )             38.8       09-30    141  |  103  |  17  ----------------------------<  85  3.7   |  26  |  0.75    Ca    9.1      30 Sep 2021 06:53  Phos  3.6     09-30  Mg     2.5     09-30        
DATE OF SERVICE: 10-02-21 @ 12:16    Patient is a 66y old  Male who presents with a chief complaint of right leg tingling and numbness and pain (02 Oct 2021 08:25)      SUBJECTIVE / OVERNIGHT EVENTS:  No chest pain. No shortness of breath. No complaints. No events overnight. pain is controlled    MEDICATIONS  (STANDING):  enoxaparin Injectable 40 milliGRAM(s) SubCutaneous daily  gabapentin 300 milliGRAM(s) Oral daily  polyethylene glycol 3350 17 Gram(s) Oral daily  tamsulosin 0.4 milliGRAM(s) Oral at bedtime    MEDICATIONS  (PRN):  HYDROmorphone  Injectable 0.5 milliGRAM(s) IV Push every 4 hours PRN Moderate Pain (4 - 6)      Vital Signs Last 24 Hrs  T(C): 36.4 (02 Oct 2021 11:49), Max: 37.1 (01 Oct 2021 22:05)  T(F): 97.5 (02 Oct 2021 11:49), Max: 98.7 (01 Oct 2021 22:05)  HR: 86 (02 Oct 2021 11:49) (64 - 97)  BP: 123/73 (02 Oct 2021 11:49) (106/67 - 123/73)  BP(mean): --  RR: 18 (02 Oct 2021 11:49) (18 - 18)  SpO2: 95% (02 Oct 2021 11:49) (94% - 97%)  CAPILLARY BLOOD GLUCOSE        I&O's Summary    01 Oct 2021 07:01  -  02 Oct 2021 07:00  --------------------------------------------------------  IN: 720 mL / OUT: 0 mL / NET: 720 mL    02 Oct 2021 07:01  -  02 Oct 2021 12:16  --------------------------------------------------------  IN: 240 mL / OUT: 0 mL / NET: 240 mL        PHYSICAL EXAM:  GENERAL: NAD, well-developed  HEAD:  Atraumatic, Normocephalic  EYES: EOMI, PERRLA, conjunctiva and sclera clear  NECK: Supple, No JVD  CHEST/LUNG: Clear to auscultation bilaterally; No wheeze  HEART: Regular rate and rhythm; No murmurs, rubs, or gallops  ABDOMEN: Soft, Nontender, Nondistended; Bowel sounds present  EXTREMITIES:  2+ Peripheral Pulses, No clubbing, cyanosis, or edema  PSYCH: AAOx3  NEUROLOGY: non-focal  SKIN: No rashes or lesions    LABS:                        12.9   6.39  )-----------( 307      ( 02 Oct 2021 06:12 )             37.4     10-02    140  |  105  |  26<H>  ----------------------------<  114<H>  3.8   |  23  |  0.80    Ca    9.1      02 Oct 2021 06:14  Mg     2.3     10-01    TPro  6.6  /  Alb  3.7  /  TBili  0.5  /  DBili  x   /  AST  13  /  ALT  8<L>  /  AlkPhos  81  10-02    PT/INR - ( 02 Oct 2021 06:13 )   PT: 12.5 sec;   INR: 1.04 ratio                   RADIOLOGY & ADDITIONAL TESTS:    Imaging Personally Reviewed:    Consultant(s) Notes Reviewed:      Care Discussed with Consultants/Other Providers:  
DATE OF SERVICE: 10-05-21 @ 18:14    Patient is a 66y old  Male who presents with a chief complaint of right leg tingling and numbness and pain (05 Oct 2021 07:25)      SUBJECTIVE / OVERNIGHT EVENTS:  No chest pain. No shortness of breath. No complaints. No events overnight.     MEDICATIONS  (STANDING):  gabapentin 300 milliGRAM(s) Oral two times a day  polyethylene glycol 3350 17 Gram(s) Oral daily  tamsulosin 0.4 milliGRAM(s) Oral at bedtime    MEDICATIONS  (PRN):  oxyCODONE    IR 5 milliGRAM(s) Oral every 6 hours PRN Moderate Pain (4 - 6)      Vital Signs Last 24 Hrs  T(C): 36.7 (05 Oct 2021 11:32), Max: 37.4 (04 Oct 2021 21:09)  T(F): 98 (05 Oct 2021 11:32), Max: 99.4 (04 Oct 2021 21:09)  HR: 74 (05 Oct 2021 11:32) (64 - 81)  BP: 125/75 (05 Oct 2021 11:32) (108/65 - 125/75)  BP(mean): --  RR: 18 (05 Oct 2021 11:32) (17 - 18)  SpO2: 96% (05 Oct 2021 11:32) (96% - 97%)  CAPILLARY BLOOD GLUCOSE        I&O's Summary    04 Oct 2021 07:01  -  05 Oct 2021 07:00  --------------------------------------------------------  IN: 480 mL / OUT: 0 mL / NET: 480 mL    05 Oct 2021 07:01  -  05 Oct 2021 18:14  --------------------------------------------------------  IN: 240 mL / OUT: 0 mL / NET: 240 mL        PHYSICAL EXAM:  GENERAL: NAD, well-developed  HEAD:  Atraumatic, Normocephalic  EYES: EOMI, PERRLA, conjunctiva and sclera clear  NECK: Supple, No JVD  CHEST/LUNG: Clear to auscultation bilaterally; No wheeze  HEART: Regular rate and rhythm; No murmurs, rubs, or gallops  ABDOMEN: Soft, Nontender, Nondistended; Bowel sounds present  EXTREMITIES:  2+ Peripheral Pulses, No clubbing, cyanosis, or edema  PSYCH: AAOx3  NEUROLOGY: non-focal  SKIN: No rashes or lesions    LABS:                        12.3   5.00  )-----------( 311      ( 05 Oct 2021 07:10 )             37.7     10-05    141  |  103  |  20  ----------------------------<  100<H>  3.9   |  25  |  0.69    Ca    9.1      05 Oct 2021 07:05  Mg     2.5     10-04    TPro  7.0  /  Alb  4.0  /  TBili  0.5  /  DBili  x   /  AST  36  /  ALT  43  /  AlkPhos  75  10-05    PT/INR - ( 04 Oct 2021 06:55 )   PT: 11.5 sec;   INR: 0.96 ratio                   RADIOLOGY & ADDITIONAL TESTS:    Imaging Personally Reviewed:    Consultant(s) Notes Reviewed:      Care Discussed with Consultants/Other Providers:  
p (5308)     HPI:  66M Hx Sx for rectal cancer w/ ostomy and reversal 10 years ago, no AC/AP, p/w R sciatica since february and 2 weeks R foot drop. 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    Exam: UE 5/5, BLE 5/5 except R DF 2/5.    --Anticoagulation:    =====================  PAST MEDICAL HISTORY   Abdominal tumor    Back pain      PAST SURGICAL HISTORY   No significant past surgical history          MEDICATIONS:  Antibiotics:    Neuro:    Other:      SOCIAL HISTORY:   Occupation:   Marital Status:     FAMILY HISTORY:      ROS: Negative except per HPI      
p (1480)     HPI:  66M Hx Sx for rectal cancer w/ ostomy and reversal 10 years ago, no AC/AP, p/w R sciatica since february and 2 weeks R foot drop. 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    Exam: UE 5/5, BLE 5/5 except R DF 2/5.    --Anticoagulation:    =====================  PAST MEDICAL HISTORY   Abdominal tumor    Back pain      PAST SURGICAL HISTORY   No significant past surgical history          MEDICATIONS:  Antibiotics:    Neuro:    Other:      SOCIAL HISTORY:   Occupation:   Marital Status:     FAMILY HISTORY:      ROS: Negative except per HPI    LABS:                                      12.3   6.79  )-----------( 299      ( 28 Sep 2021 22:20 )             37.6     09-28    133<L>  |  98  |  14  ----------------------------<  176<H>  4.3   |  22  |  0.67    Ca    8.9      28 Sep 2021 22:20    TPro  7.0  /  Alb  3.9  /  TBili  0.6  /  DBili  x   /  AST  14  /  ALT  11  /  AlkPhos  82  09-28      
p (1480)     HPI:  66M Hx Sx for rectal cancer w/ ostomy and reversal 10 years ago, no AC/AP, p/w R sciatica since february and 2 weeks R foot drop. 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    Exam: UE 5/5, BLE 5/5 except R DF 2/5.    --Anticoagulation:    =====================  PAST MEDICAL HISTORY   Abdominal tumor    Back pain      PAST SURGICAL HISTORY   No significant past surgical history          MEDICATIONS:  Antibiotics:    Neuro:    Other:      SOCIAL HISTORY:   Occupation:   Marital Status:     FAMILY HISTORY:      ROS: Negative except per HPI    LABS:                          12.7   5.12  )-----------( 323      ( 30 Sep 2021 06:49 )             38.8       09-30    141  |  103  |  17  ----------------------------<  85  3.7   |  26  |  0.75    Ca    9.1      30 Sep 2021 06:53  Phos  3.6     09-30  Mg     2.5     09-30

## 2021-10-05 NOTE — PROGRESS NOTE ADULT - REASON FOR ADMISSION
right leg tingling and numbness and pain

## 2021-10-05 NOTE — DISCHARGE NOTE NURSING/CASE MANAGEMENT/SOCIAL WORK - PATIENT PORTAL LINK FT
You can access the FollowMyHealth Patient Portal offered by [facility] by registering at the following website: [email or web address]. By joining [facility]’s FollowMyHealth portal, you will also be able to view your health information using other applications (apps) compatible with our system.

## 2021-10-05 NOTE — DISCHARGE NOTE NURSING/CASE MANAGEMENT/SOCIAL WORK - NSDCFUADDAPPT_GEN_ALL_CORE_FT
APPTS ARE READY TO BE MADE: [ x] YES    Best Family or Patient Contact (if needed):    Additional Information about above appointments (if needed):    1: Oncology follow up for biopsy results  2: PCP follow up post discharge  3:     Other comments or requests:

## 2021-10-05 NOTE — PROGRESS NOTE ADULT - ASSESSMENT
66M presents from PCP with right buttocks pain, lower back pain radiating down right leg since February worsening over the past two weeks. Patient also had right lower leg pain and numbness over the past two weeks. He is beginning to have trouble walking.  Patient has baseline urinary incontinence x 10 years. Patient has had 10 pound weight loss in past two weeks. No n/v/d. No saddle anesthesia .No abd pain. Patient has been using meloxicam 15mg daily for back and leg pain. Surgical hx colon ca s/p chemoradiation 10 years ago. No other medical hx. No other meds.     MR today shows large mass in right lower quadrant with infiltration into the illiac muscle, involvement of the right lumbosacral truck and extension to the right anterior sacrum. No extension into the spinal canal is seen. pt has a sacral insufficiency fracture. mild subluxation L5/SI.    right PRP mass  - MR Pelvis w/wo and MR lumbar wwo  - done  -  IR CT guided biopsy of mass with sheath for diagnosis - done  - CT Chest with contrast r/o other primary done  - Heme/onc consult   - pain control with Dilaudid, and oxycodone 5/10 prn   - increase Neurontin 300 bid    BPH  - c/w flomax    HLD  - vascepa is not formulary    d/w daughter    dvt px  - lovenox    follow up with Dr Janay Fung [facility] [place] for bx results

## 2021-10-05 NOTE — PROGRESS NOTE ADULT - ASSESSMENT
Balwinder Mckeon  66M Hx Sx for rectal cancer w/ ostomy and reversal 10 years ago, no AC/AP, p/w R sciatica since february and 2 weeks R foot drop. 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   -S/p CT-guided biopsy with sheath  -Please contact neurosurgery once biopsy results are back  -Nsgy to check for bx results  -MRI R-contrast enhancing sacral mass  -CT chest two 2 mm left nodules

## 2021-10-05 NOTE — PROGRESS NOTE ADULT - PROVIDER SPECIALTY LIST ADULT
Internal Medicine
Neurosurgery
Internal Medicine
Intervent Radiology
Internal Medicine
Internal Medicine
Neurosurgery
Neurosurgery

## 2021-10-11 LAB — NON-GYNECOLOGICAL CYTOLOGY STUDY: SIGNIFICANT CHANGE UP

## 2021-10-13 ENCOUNTER — TRANSCRIPTION ENCOUNTER (OUTPATIENT)
Age: 66
End: 2021-10-13

## 2021-10-13 ENCOUNTER — OUTPATIENT (OUTPATIENT)
Dept: OUTPATIENT SERVICES | Facility: HOSPITAL | Age: 66
LOS: 1 days | Discharge: HOME | End: 2021-10-13

## 2021-10-13 DIAGNOSIS — C46.9 KAPOSI'S SARCOMA, UNSPECIFIED: ICD-10-CM

## 2021-10-13 PROBLEM — D49.89 NEOPLASM OF UNSPECIFIED BEHAVIOR OF OTHER SPECIFIED SITES: Chronic | Status: ACTIVE | Noted: 2021-09-28

## 2021-10-13 PROBLEM — M54.9 DORSALGIA, UNSPECIFIED: Chronic | Status: ACTIVE | Noted: 2021-09-28

## 2021-10-13 PROBLEM — C20 MALIGNANT NEOPLASM OF RECTUM: Chronic | Status: ACTIVE | Noted: 2021-09-29

## 2021-10-14 ENCOUNTER — APPOINTMENT (OUTPATIENT)
Dept: HEMATOLOGY ONCOLOGY | Facility: CLINIC | Age: 66
End: 2021-10-14
Payer: MEDICARE

## 2021-10-14 ENCOUNTER — NON-APPOINTMENT (OUTPATIENT)
Age: 66
End: 2021-10-14

## 2021-10-14 VITALS
OXYGEN SATURATION: 97 % | HEART RATE: 76 BPM | SYSTOLIC BLOOD PRESSURE: 102 MMHG | WEIGHT: 150.11 LBS | BODY MASS INDEX: 23.02 KG/M2 | DIASTOLIC BLOOD PRESSURE: 68 MMHG | HEIGHT: 67.83 IN | TEMPERATURE: 97.8 F | RESPIRATION RATE: 18 BRPM

## 2021-10-14 DIAGNOSIS — Z85.048 PERSONAL HISTORY OF OTHER MALIGNANT NEOPLASM OF RECTUM, RECTOSIGMOID JUNCTION, AND ANUS: ICD-10-CM

## 2021-10-14 DIAGNOSIS — R19.00 INTRA-ABDOMINAL AND PELVIC SWELLING, MASS AND LUMP, UNSPECIFIED SITE: ICD-10-CM

## 2021-10-14 PROCEDURE — 99205 OFFICE O/P NEW HI 60 MIN: CPT

## 2021-10-14 PROCEDURE — G2212 PROLONG OUTPT/OFFICE VIS: CPT

## 2021-10-14 PROCEDURE — 99215 OFFICE O/P EST HI 40 MIN: CPT

## 2021-10-18 ENCOUNTER — OUTPATIENT (OUTPATIENT)
Dept: OUTPATIENT SERVICES | Facility: HOSPITAL | Age: 66
LOS: 1 days | End: 2021-10-18
Payer: MEDICARE

## 2021-10-18 ENCOUNTER — APPOINTMENT (OUTPATIENT)
Dept: ULTRASOUND IMAGING | Facility: CLINIC | Age: 66
End: 2021-10-18
Payer: MEDICARE

## 2021-10-18 DIAGNOSIS — R60.0 LOCALIZED EDEMA: ICD-10-CM

## 2021-10-18 DIAGNOSIS — G89.3 NEOPLASM RELATED PAIN (ACUTE) (CHRONIC): ICD-10-CM

## 2021-10-18 DIAGNOSIS — R19.00 INTRA-ABDOMINAL AND PELVIC SWELLING, MASS AND LUMP, UNSPECIFIED SITE: ICD-10-CM

## 2021-10-18 PROCEDURE — 93971 EXTREMITY STUDY: CPT

## 2021-10-18 PROCEDURE — 93971 EXTREMITY STUDY: CPT | Mod: 26,RT

## 2021-10-21 ENCOUNTER — OUTPATIENT (OUTPATIENT)
Dept: OUTPATIENT SERVICES | Facility: HOSPITAL | Age: 66
LOS: 1 days | Discharge: HOME | End: 2021-10-21
Payer: MEDICAID

## 2021-10-21 ENCOUNTER — APPOINTMENT (OUTPATIENT)
Dept: RADIATION ONCOLOGY | Facility: CLINIC | Age: 66
End: 2021-10-21
Payer: MEDICARE

## 2021-10-21 VITALS
SYSTOLIC BLOOD PRESSURE: 128 MMHG | TEMPERATURE: 96.8 F | OXYGEN SATURATION: 98 % | RESPIRATION RATE: 16 BRPM | HEART RATE: 80 BPM | DIASTOLIC BLOOD PRESSURE: 75 MMHG

## 2021-10-21 PROCEDURE — 99205 OFFICE O/P NEW HI 60 MIN: CPT | Mod: 25

## 2021-10-21 NOTE — REASON FOR VISIT
[Other: _____] : [unfilled] [Consideration for Non-Curative Therapy] : consideration for non-curative therapy for [Rectal Cancer] : cancer

## 2021-10-21 NOTE — REVIEW OF SYSTEMS
[Fatigue] : fatigue [Recent Change In Weight] : ~T recent weight change [Loss of Hearing] : loss of hearing [Diarrhea] : diarrhea [Joint Pain] : joint pain [Disturbance Of Gait] : gait disturbance [Anxiety] : anxiety [Insomnia] : insomnia [Eye Pain] : no eye pain [Visual Disturbances] : no visual disturbances [Chest Pain] : no chest pain [Palpitations] : no palpitations [Shortness Of Breath] : no shortness of breath [Cough] : no cough [Abdominal Pain] : no abdominal pain [Vomiting] : no vomiting [Constipation] : no constipation [Nocturia] : no nocturia [Urinary Frequency] : no urinary frequency [Muscle Pain] : no muscle pain [Muscle Weakness] : no muscle weakness [Skin Rash] : no skin rash [Skin Wound] : no skin wound [Confused] : no confusion [Dizziness] : no dizziness [Easy Bleeding] : no tendency for easy bleeding [Easy Bruising] : no tendency for easy bruising [Swollen Glands] : no swollen glands [FreeTextEntry4] : RADHA [FreeTextEntry9] : as noted in HPI

## 2021-10-21 NOTE — DATA REVIEWED
[FreeTextEntry1] : MR lumbar spine/pelvis 9/29/21 showed destructive right pelvic lesion with infiltration of right iliac/sacrum, involvement of S1/S2 neuroforamina. 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 Final histology pending.

## 2021-10-21 NOTE — DISEASE MANAGEMENT
[Clinical] : TNM Stage: c [N/A] : Currently not applicable [FreeTextEntry4] : high grade malignant neoplasm; final staging pending final pathology results [TTNM] : x [NTNM] : x [MTNM] : x

## 2021-10-21 NOTE — HISTORY OF PRESENT ILLNESS
[FreeTextEntry1] : [place]  # [id number] & daughter\par \par 66 M w/ h/o rectal cancer in 2011, s/p chemoradiation ([facility] in [facility], under Dr. Tadeo Mosqueda), now with large invasive right pelvic mass c/w high grade malignancy. \par \par Patient reported back pain which started in Feb 2021, went to PT but symptoms persisted and he ultimately saw pain management for worsening pain in the low back and RLE.  Hospital evaluation recommended by pain management (Dr. Wilmar Oliva) for worsening pain\par \par 9/29/21: MRI lumbar spine: No spinal canal stenosis or destructive lesion throughout the lumbar spine.\par Partially visualized destructive right pelvic lesion with infiltration of the right iliac bone and sacrum with at least mild to moderate right S1-S2 neuroforaminal narrowing\par 9/29/21: MR pelvis: Enhancing multilobulated mass lesion arising in the right hemipelvis which appears to be centered in the region of the right hemisacrum extending into the right pelvic sidewall with partial encasement of the sacral nerve roots and right sided vasculature, as further described above. Differential diagnosis for a lesion of this appearance includes metastasis, tumors of neural origin, fibrous tumor, or less likely chondrosarcoma given absence of internal mineralization on CT. Histologic sampling is recommended for definitive diagnosis.\par \par 9/30/21: CT chest: Two 2 mm left upper lobe nodules. Recommend follow-up per protocol of primary malignancy\par \par 10/4/21: Right iliac core biopsy: Pathology showed high grade malignant neoplasm. Final histology pending.\par \par 10/14/21: Saw Dr. Fung- Right leg doppler to r/o DVT (10/18/21: Right leg US: negative for DVT)\par \par Today: Notes pain right buttock radiating to right leg/foot 6-10/10. Worse with walking and partially relieved with opioids/ gabapentin/tylenol. Notes abnormal frequent BMs since surgery 2011 with fecal incontinence-  sensation is intact, worsening chronic urinary incontinence over past 2 weeks. Unintentional weight loss of 10 lbs past 3 weeks due to pain and lack of sleep. Uses walker

## 2021-10-21 NOTE — VITALS
[Maximal Pain Intensity: 6/10] : 6/10 [Pain Location: ___] : Pain Location: [unfilled] [OTC] : OTC [Opioid] : opioid [Adjuvant (neuroleptics)] : adjuvant (neuroleptics) [70: Cares for self; unalbe to carry on normal activity or do active work.] : 70: Cares for self; unable to carry on normal activity or do active work. [6 - Distress Level] : Distress Level: 6 [ECOG Performance Status: 2 - Ambulatory and capable of all self care but unable to carry out any work activities] : Performance Status: 2 - Ambulatory and capable of all self care but unable to carry out any work activities. Up and about more than 50% of waking hours

## 2021-10-21 NOTE — PHYSICAL EXAM
[General Appearance - Alert] : alert [General Appearance - In No Acute Distress] : in no acute distress [Sclera] : the sclera and conjunctiva were normal [Hearing Threshold Finger Rub Not [place]] : hearing was normal [Skin Color & Pigmentation] : normal skin color and pigmentation [Oriented To Time, Place, And Person] : oriented to person, place, and time [Thin] : thin [Extraocular Movements] : extraocular movements were intact [Outer Ear] : the ears and nose were normal in appearance [Respiration, Rhythm And Depth] : normal respiratory rhythm and effort [Exaggerated Use Of Accessory Muscles For Inspiration] : no accessory muscle use [Normal] : no JVD, no calf tenderness [Abdomen Soft] : soft [Nondistended] : nondistended [Abdomen Tenderness] : non-tender [Musculoskeletal - Swelling] : no joint swelling [Nail Clubbing] : no clubbing  or cyanosis of the fingernails [No Spine Tenderness] : no tenderness to palpation of the vertebral spine [] : no rash [Affect] : the affect was normal [Not Anxious] : not anxious [de-identified] : gait instability; flexion of right hip limited by pain

## 2021-10-22 PROCEDURE — 77263 THER RADIOLOGY TX PLNG CPLX: CPT

## 2021-11-10 NOTE — REVIEW OF SYSTEMS
[Constipation: Grade 0] : Constipation: Grade 0 [Diarrhea: Grade 0] : Diarrhea: Grade 0 [Urinary Tract Pain: Grade 0] : Urinary Tract Pain: Grade 0 [Dermatitis Radiation: Grade 0] : Dermatitis Radiation: Grade 0

## 2021-11-10 NOTE — DISEASE MANAGEMENT
[Clinical] : TNM Stage: c [IIIB] : IIIB [TTNM] : 3 [NTNM] : x [MTNM] : x [de-identified] : 900 [de-identified] : 5573 [de-identified] : right pelvis

## 2021-11-10 NOTE — HISTORY OF PRESENT ILLNESS
[FreeTextEntry1] : 66M w/ history of rectal cancer treated with chemoradiation, chemotherapy, and surgery in 2011, now with invasive right pelvic mass involving the iliac/sacrum and the lumbosacral trunks/sciatic nerve. 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 \par \par 11/10/21: 3/10 Fx: Notes right hip pain radiating to right leg worse since starting RT partially controlled with opioids and gabapentin 300 mg TID. Skin care reviewed. Plan to increase gabapentin

## 2021-11-10 NOTE — VITALS
[Maximal Pain Intensity: 6/10] : 6/10 [Pain Location: ___] : Pain Location: [unfilled] [Opioid] : opioid [Adjuvant (neuroleptics)] : adjuvant (neuroleptics) [60: Requires occasional assistance, but is able to care for most of his/her needs] : 60: Requires occasional assistance, but is able to care for most of his/her needs

## 2021-11-10 NOTE — PHYSICAL EXAM
[General Appearance - Alert] : alert [General Appearance - In No Acute Distress] : in no acute distress [] : no respiratory distress [Abdomen Soft] : soft [Oriented To Time, Place, And Person] : oriented to person, place, and time [Thin] : thin [Extraocular Movements] : extraocular movements were intact [Sclera] : the sclera and conjunctiva were normal [Respiration, Rhythm And Depth] : normal respiratory rhythm and effort [Exaggerated Use Of Accessory Muscles For Inspiration] : no accessory muscle use [Arterial Pulses Normal] : the arterial pulses were normal [Nondistended] : nondistended [Abdomen Tenderness] : non-tender [Normal] : normal skin color and pigmentation and no rash [Not Anxious] : not anxious [de-identified] : uncomfortable [de-identified] : some right leg swelling, stable [de-identified] : no erythema

## 2021-11-13 PROBLEM — R19.00 PELVIC MASS: Status: ACTIVE | Noted: 2021-01-01

## 2021-11-13 NOTE — HISTORY OF PRESENT ILLNESS
[Disease: _____________________] : Disease: [unfilled] [AJCC Stage: ____] : AJCC Stage: [unfilled] [de-identified] : 66 M w/ h/o rectal cancer in 2011 treated with chemotherapy and radiation, presents for evaluation of large RP mass c/w high grade malignancy. \par \par Balwinder walker has been experiencing low back/upper buttock pain since Feb 2021. Pain has been progressive and refractory to conservative treatment with PT and NSAIDs. He was referred by his PCP to Dr Wilmar Oliva, pain management, who directed him to go to [facility] for further evaluation. On Sept 29, 2021 pt went to [facility] ER and underwent a CT A/P \par which showed a 4.6 x 6.9 x 5.7 cm R pelvic sidewall mass with destruction of the sacrum and involvement of multiple nerve roots concerning for primary malignancy, CT Chest with 2 mm two JOURDAN nodules. An MRI Pelvis described this mass as invading hemisacrum, involving nerve roots and vasculature. Biopsy was performed on 10/4/21 and pathology so far only c/w high grade malignancy. Preliminary diagnosis upon discussion with pathologist is that this may be a sarcoma.  [de-identified] : high grade malignancy [de-identified] : Patient has admitted to worsening pain in the low back and RLE\par \par - minimal relief with oxycodone , wears out after 2 hours\par - swelling of R foot (r > L), after he was discharged from hospital\par - difficulty with self-care ADLs, receives assistance\par + difficulty with solid bowel movements, 7-8 bowel movements but tiny amounts - uses MiraLAX \par - radiating sensation from his right low back to his R anterior shin region \par - decreased sensation and strength of his right foot - uses walker as of today\par - appetite reported to be stable\par - difficult sleepy\par - unintentional weight loss (currently 150 lbs, was 160 lbs during his hospital admission)\par \par 50% pain reduction in first 2 hrs after taking the oxycodone then pain increases. \par has had abnormal frequent BMs since surgery 2011 but fecal incontinence started before going to the hospital. Loss of continence but sensation is intact. \par + chronic urinary incontinence but this is getting worse over past 2 weeks. \par Can only walk 10 steps due to pain and weakness in R leg. \par \par \par

## 2021-11-13 NOTE — REASON FOR VISIT
[Initial Consultation] : an initial consultation [Other: _____] : [unfilled] [FreeTextEntry2] : High grade malignancy

## 2021-11-13 NOTE — REVIEW OF SYSTEMS
[Constipation] : constipation [Joint Pain] : joint pain [Joint Stiffness] : joint stiffness [Muscle Pain] : muscle pain [Muscle Weakness] : muscle weakness [Difficulty Walking] : difficulty walking [Negative] : Allergic/Immunologic

## 2021-11-13 NOTE — PHYSICAL EXAM
[Ambulatory and capable of all self care but unable to carry out any work activities] : Status 2- Ambulatory and capable of all self care but unable to carry out any work activities. Up and about more than 50% of waking hours [Normal] : affect appropriate [de-identified] : RLE edema  [de-identified] : RLE weakness 2/2 pain

## 2021-11-17 NOTE — REVIEW OF SYSTEMS
[Constipation: Grade 0] : Constipation: Grade 0 [Diarrhea: Grade 0] : Diarrhea: Grade 0 [Fatigue: Grade 1 - Fatigue relieved by rest] : Fatigue: Grade 1 - Fatigue relieved by rest [Urinary Tract Pain: Grade 0] : Urinary Tract Pain: Grade 0 [Dermatitis Radiation: Grade 0] : Dermatitis Radiation: Grade 0

## 2021-11-17 NOTE — DISEASE MANAGEMENT
[Clinical] : TNM Stage: c [IIIB] : IIIB [TTNM] : 3 [NTNM] : x [MTNM] : x [de-identified] : 5561 [de-identified] : 6232 [de-identified] : right pelvis

## 2021-11-17 NOTE — HISTORY OF PRESENT ILLNESS
[FreeTextEntry1] : 66M w/ history of rectal cancer treated with chemoradiation, chemotherapy, and surgery in 2011, now with invasive right pelvic mass involving the iliac/sacrum and the lumbosacral trunks/sciatic nerve. 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 \par \par 11/17/21: 8/10 Fx: Pain is less intense but constant throbbing 5-6/10 with increased dose of gabapentin/opioids. Plan to increase gabapentin & PT referral\par \par 11/10/21: 3/10 Fx: Notes right hip pain radiating to right leg worse since starting RT partially controlled with opioids and gabapentin 300 mg TID. Skin care reviewed. Plan to increase gabapentin

## 2021-11-17 NOTE — PHYSICAL EXAM
[General Appearance - Alert] : alert [General Appearance - In No Acute Distress] : in no acute distress [Thin] : thin [Sclera] : the sclera and conjunctiva were normal [Extraocular Movements] : extraocular movements were intact [] : no respiratory distress [Respiration, Rhythm And Depth] : normal respiratory rhythm and effort [Exaggerated Use Of Accessory Muscles For Inspiration] : no accessory muscle use [Arterial Pulses Normal] : the arterial pulses were normal [Abdomen Soft] : soft [Nondistended] : nondistended [Abdomen Tenderness] : non-tender [Normal] : normal skin color and pigmentation and no rash [Oriented To Time, Place, And Person] : oriented to person, place, and time [Not Anxious] : not anxious [Outer Ear] : the ears and nose were normal in appearance [Hearing Threshold Finger Rub Not [place]] : hearing was normal [de-identified] : uncomfortable [de-identified] : some right leg swelling, stable [de-identified] : no erythema

## 2021-11-17 NOTE — REASON FOR VISIT
[Routine On-Treatment] : a routine on-treatment visit for [Other: ___] : [unfilled] [Other: _____] : [unfilled] [Spouse] : spouse

## 2021-11-20 NOTE — HISTORY OF PRESENT ILLNESS
[Disease: _____________________] : Disease: [unfilled] [AJCC Stage: ____] : AJCC Stage: [unfilled] [de-identified] : 66 M w/ h/o rectal cancer in 2011 treated with chemotherapy and radiation, presents for evaluation of large RP mass c/w high grade malignancy. \par \par Balwinder reports has been experiencing low back/upper buttock pain since Feb 2021. Pain has been progressive and refractory to conservative treatment with PT and NSAIDs. He was referred by his PCP to Dr Wilmar Oliva, pain management, who directed him to go to [facility] for further evaluation. On Sept 29, 2021 pt went to [facility] ER and underwent a CT A/P \par which showed a 4.6 x 6.9 x 5.7 cm R pelvic sidewall mass with destruction of the sacrum and involvement of multiple nerve roots concerning for primary malignancy, CT Chest with 2 mm two JOURDAN nodules. An MRI Pelvis described this mass as invading hemisacrum, involving nerve roots and vasculature. Biopsy was performed on 10/4/21 and pathology so far only c/w high grade malignancy. Preliminary diagnosis upon discussion with pathologist is that this may be a sarcoma. was seen by Dr Fung initially\par \par Referred to Dr. Alvarado for RT and received palliative RT \par \par path review at [facility] confirmed to be high grade sarcoma likely UPS  [de-identified] : presents with daughter for eval\par pain improved but not using pain meds regularly \par completed RT today \par no fever chills nausea or vomitting\par no bowel or bladder incontinence

## 2021-11-20 NOTE — PHYSICAL EXAM
[Ambulatory and capable of all self care but unable to carry out any work activities] : Status 2- Ambulatory and capable of all self care but unable to carry out any work activities. Up and about more than 50% of waking hours [Normal] : affect appropriate [de-identified] : anicteric  [de-identified] : no jvd  [de-identified] : reg rate  [de-identified] : normal respiratory effort, no audible wheeze [de-identified] : moves UE normally

## 2021-11-20 NOTE — HISTORY OF PRESENT ILLNESS
[Disease: _____________________] : Disease: [unfilled] [AJCC Stage: ____] : AJCC Stage: [unfilled] [de-identified] : 66 M w/ h/o rectal cancer in 2011 treated with chemotherapy and radiation, presents for evaluation of large RP mass c/w high grade malignancy. \par \par Balwinder reports has been experiencing low back/upper buttock pain since Feb 2021. Pain has been progressive and refractory to conservative treatment with PT and NSAIDs. He was referred by his PCP to Dr Wilmar Oliva, pain management, who directed him to go to [facility] for further evaluation. On Sept 29, 2021 pt went to [facility] ER and underwent a CT A/P \par which showed a 4.6 x 6.9 x 5.7 cm R pelvic sidewall mass with destruction of the sacrum and involvement of multiple nerve roots concerning for primary malignancy, CT Chest with 2 mm two JOURDAN nodules. An MRI Pelvis described this mass as invading hemisacrum, involving nerve roots and vasculature. Biopsy was performed on 10/4/21 and pathology so far only c/w high grade malignancy. Preliminary diagnosis upon discussion with pathologist is that this may be a sarcoma. was seen by Dr Fung initially\par \par Referred to Dr. 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

## 2021-11-20 NOTE — PHYSICAL EXAM
[Ambulatory and capable of all self care but unable to carry out any work activities] : Status 2- Ambulatory and capable of all self care but unable to carry out any work activities. Up and about more than 50% of waking hours [Normal] : affect appropriate [de-identified] : anicteric  [de-identified] : no jvd  [de-identified] : reg rate  [de-identified] : normal respiratory effort, no audible wheeze [de-identified] : moves UE normally

## 2021-12-05 NOTE — PHYSICAL EXAM
[Ambulatory and capable of all self care but unable to carry out any work activities] : Status 2- Ambulatory and capable of all self care but unable to carry out any work activities. Up and about more than 50% of waking hours [Normal] : normal spine exam without palpable tenderness, no kyphosis or scoliosis [de-identified] : anicteric  [de-identified] : no jvd  [de-identified] : normal respiratory effort, no audible wheeze [de-identified] : reg rate  [de-identified] : no lower extremity swelling B/L [de-identified] : soft NT/ND [de-identified] : moves UE normally  [de-identified] : + tender edema to right lateral hip and buttock area; no induration, no masses, no fluctuance, no warmth, erythema or skin breaks

## 2021-12-05 NOTE — HISTORY OF PRESENT ILLNESS
[Disease: _____________________] : Disease: [unfilled] [AJCC Stage: ____] : AJCC Stage: [unfilled] [de-identified] : 66 M w/ h/o rectal cancer in 2011 treated with chemotherapy and radiation, presents for evaluation of large RP mass c/w high grade malignancy. \par \par Balwinder reports has been experiencing low back/upper buttock pain since Feb 2021. Pain has been progressive and refractory to conservative treatment with PT and NSAIDs. He was referred by his PCP to Dr Wilmar Oliva, pain management, who directed him to go to [facility] for further evaluation. On Sept 29, 2021 pt went to [facility] ER and underwent a CT A/P \par which showed a 4.6 x 6.9 x 5.7 cm R pelvic sidewall mass with destruction of the sacrum and involvement of multiple nerve roots concerning for primary malignancy, CT Chest with 2 mm two JOURDAN nodules. An MRI Pelvis described this mass as invading hemisacrum, involving nerve roots and vasculature. Biopsy was performed on 10/4/21 and pathology so far only c/w high grade malignancy. Preliminary diagnosis upon discussion with pathologist is that this may be a sarcoma. was seen by Dr Fung initially\par \par Referred to Dr. 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  Patient completed RT and states that he feels the pain has improved slightly.  He notes some new swelling to the right buttocks at the site of radiation that developed one week afterward completing treatment that is uncomfortable and doesn't allow patient to lie on his right side.  Patient is currently taking Gabapentin 900 mg TID and Oxycodone 10 mg every 4 hours.  Patient takes Miralax daily and states that he has 4-5 BM's daily (his baseline since his previous rectal cancer).  Patient is able to ambulate with a walker at home but the daughter notes that the patient puts very little weight on his RLE.  Patient's daughter states that patient's appetite has been good and he is eating well.  Denies any other complaints.

## 2021-12-05 NOTE — REVIEW OF SYSTEMS
[Fatigue] : fatigue [Negative] : Allergic/Immunologic [Joint Pain] : joint pain [Muscle Pain] : muscle pain [FreeTextEntry9] : leg weakness , back pain

## 2021-12-16 PROBLEM — Z00.00 ENCOUNTER FOR PREVENTIVE HEALTH EXAMINATION: Status: ACTIVE | Noted: 2021-10-13

## 2021-12-17 NOTE — VITALS
[Maximal Pain Intensity: 4/10] : 4/10 [Opioid] : opioid [Adjuvant (neuroleptics)] : adjuvant (neuroleptics) [60: Requires occasional assistance, but is able to care for most of his/her needs] : 60: Requires occasional assistance, but is able to care for most of his/her needs

## 2021-12-18 NOTE — HISTORY OF PRESENT ILLNESS
[Home] : at home, [unfilled] , at the time of the visit. [Verbal consent obtained from patient] : the patient, [unfilled] [FreeTextEntry1] : Mandarin -dtr Renetta\par \par 66-year-old male with history of rectal cancer treated with chemoradiation, chemotherapy, and surgery in 2011, now with retroperitoneal/right pelvic malignancy. MR lumbar spine/pelvis 9/29/21 showed destructive right pelvic lesion with infiltration of right iliac/sacrum, involvement of S1/S2 neuroforamina. 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 Patient underwent a course of radiation therapy as outlined below. \par \par Radiation Treatment Summary: Pelvis_RT  3,000 cGy  from 11/08/2021 - 11/19/2021 \par CLINICAL COURSE: Tolerated the treatment well with no adverse effects from the radiation therapy; \par his Neurontin was uptitrated during treatment and his pain improved over the course of therapy.      \par \par Today: Notes improvement in pain right buttock/leg. Now with intermittent pain 0-4/10 relieved with opioids and position change. Some constipation, managed with miralax. Ambulation improved with walker. Saw pain management yesterday. Had telephonic visit today with med/onc. Continuing on chemotherapy.

## 2021-12-18 NOTE — PHYSICAL EXAM
[General Appearance - Alert] : alert [General Appearance - In No Acute Distress] : in no acute distress [Oriented To Time, Place, And Person] : oriented to person, place, and time [General Appearance - Well Developed] : well developed [General Appearance - Well Nourished] : well nourished [Sclera] : the sclera and conjunctiva were normal [Extraocular Movements] : extraocular movements were intact [Outer Ear] : the ears and nose were normal in appearance [Hearing Threshold Finger Rub Not [place]] : hearing was normal [] : no respiratory distress [Respiration, Rhythm And Depth] : normal respiratory rhythm and effort [Exaggerated Use Of Accessory Muscles For Inspiration] : no accessory muscle use [Nondistended] : nondistended

## 2021-12-18 NOTE — HISTORY OF PRESENT ILLNESS
[Home] : at home, [unfilled] , at the time of the visit. [Medical Office: ([facility])___] : at the medical office located in  [Family Member] : family member [Verbal consent obtained from patient] : the patient, [unfilled] [Disease: _____________________] : Disease: [unfilled] [AJCC Stage: ____] : AJCC Stage: [unfilled] [de-identified] : 66 M w/ h/o rectal cancer in 2011 treated with chemotherapy and radiation, presents for evaluation of large RP mass c/w high grade malignancy. \par \par Balwinder reports has been experiencing low back/upper buttock pain since Feb 2021. Pain has been progressive and refractory to conservative treatment with PT and NSAIDs. He was referred by his PCP to Dr Wilmar Oliva, pain management, who directed him to go to [facility] for further evaluation. On Sept 29, 2021 pt went to [facility] ER and underwent a CT A/P \par which showed a 4.6 x 6.9 x 5.7 cm R pelvic sidewall mass with destruction of the sacrum and involvement of multiple nerve roots concerning for primary malignancy, CT Chest with 2 mm two JOURDAN nodules. An MRI Pelvis described this mass as invading hemisacrum, involving nerve roots and vasculature. Biopsy was performed on 10/4/21 and pathology so far only c/w high grade malignancy. Preliminary diagnosis upon discussion with pathologist is that this may be a sarcoma. was seen by Dr Fung initially\par \par Referred to Dr. 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

## 2021-12-18 NOTE — REVIEW OF SYSTEMS
[Diarrhea: Grade 0] : Diarrhea: Grade 0 [Fatigue: Grade 1 - Fatigue relieved by rest] : Fatigue: Grade 1 - Fatigue relieved by rest [Hematuria: Grade 0] : Hematuria: Grade 0 [Urinary Tract Pain: Grade 0] : Urinary Tract Pain: Grade 0 [Dermatitis Radiation: Grade 0] : Dermatitis Radiation: Grade 0 [Constipation: Grade 1 - Occasional or intermittent symptoms; occasional use of stool softeners, laxatives, dietary modification, or enema] : Constipation: Grade 1 - Occasional or intermittent symptoms; occasional use of stool softeners, laxatives, dietary modification, or enema [Nausea: Grade 0] : Nausea: Grade 0 [Proctitis: Grade 0] : Proctitis: Grade 0 [Rectal Pain: Grade 0] : Rectal Pain: Grade 0

## 2021-12-18 NOTE — ASSESSMENT
[Work-up necessary to assess local, regional or metastatic recurrence] : Work-up necessary to assess local, regional or metastatic recurrence [FreeTextEntry1] : S

## 2021-12-29 NOTE — HISTORY OF PRESENT ILLNESS
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 Moon.\par \par Patient is Mandarin-speaking and prefers for his daughter Gaby to translate for him. \par \par Patient developed pain in his R hip in 2/2021. It was thought that he was suffering with sciatica and he started PT. This did not make much difference in his pain. He established care with a pain management doctor in [place].  Workup revealed a large RP mass. \par He is s/p 10 fractions of RT to the pelvic mass, completed 11/19. \par \par His pain is localized to the R buttock and extends down his R leg. He endorses tingling along the dorsum of his r foot. The pain is always present  The pain renders him unable to bear weight on his R leg. He uses a walker for assistance with ambulation. \par Currently he rates his pain as 6/10. He states that since the chemotherapy has started the intensity of his pain has lessened. Prior to chemotherapy his pain got up to 10/10 regularly. Nowadays his pain tends to only get to 10/10 at night, and this is \par \par He is currently taking Gabapentin 900mg TID, PRN Oxycodone 10mg, up to four times daily.  \par He was started on methadone two months ago for his pain, he felt restless on it and self-discontinued it.  He was subsequently started on MS ER 15mg BID.  He states that he did not feel well on it and stopped using it. \par \par ROS:\par +cold R foot - applies hot packs to it\par +low appetite since starting chemotherapy \par +10lb weight loss in the last 2 months\par +nausea - the smell of some foods spur nausea - has prochlorperazine for PRN use\par +pressure sore on back\par +constipation - uses miralax prn\par \par He spends majority of the day lying flat in bed. Requires frequent readjustments due to his pain. He spends the day watching TV, talking to his friends. \par \par Patient is , lives with his wife. They have one daughter, Gaby and three grandchildren. \par \par I-Stop Ref#: [id number]

## 2021-12-29 NOTE — DATA REVIEWED
[FreeTextEntry1] : MRI L Spine (9/29/21):FINDINGS:\par \par ALIGNMENT:  The alignment is normal.\par \par VERTEBRAE: Partially visualized enhancing destructive mass involving the right pelvis with destructive changes of the iliac bone and sacrum. Please see concurrent MRI of the pelvis for further evaluation.\par \par Lumbar spine demonstrates no destructive lesion. Endplate degenerative changes are noted most prominent at L5-S1 level.\par \par DISCS: The disc spaces are maintained.\par \par CONUS MEDULLARIS AND CAUDA EQUINA: The conus medullaris terminates at L1-L2. There is normal appearance of the conus medullaris and cauda equina.\par \par EVALUATION OF INDIVIDUAL LEVELS:\par L1-2: No disc herniation, spinal canal or neuroforaminal stenosis.\par \par L2-3: Mild disc bulge. No spinal canal or neuroforaminal stenosis.\par \par L3-4: No disc herniation, spinal canal or neuroforaminal stenosis.\par \par L4-5: No disc herniation, spinal canal or neuroforaminal stenosis.\par \par L5-S1: Right central annular fissure. Diffuse disc bulge. No spinal canal stenosis. Mild bilateral neuroforaminal stenosis.\par \par S1-S2: No spinal canal stenosis. Destructive lesion partially invades the right S1-S2 neuroforamen resulting in at least mild to moderate stenosis.\par \par PARAVERTEBRAL SOFT TISSUES: Paraspinal musculature is unremarkable. Please see recent CT abdomen and pelvis for intra-abdominal findings.\par \par IMPRESSION:\par No spinal canal stenosis or destructive lesion throughout the lumbar spine.\par Partially visualized destructive right pelvic lesion with infiltration of the right iliac bone and sacrum with at least mild to moderate right S1-S2 neuroforaminal narrowing. Please see concurrent dedicated MRI of the pelvis for further evaluation.\par \par \par \par MRI Pelvis (9/29/21):\par FINDINGS:\par Enhancing, multilobulated mass measuring 9.7 x 7.5 x 6.2 cm (AP x TR x CC) which appears to be centered in the right hemisacrum and extends into the right pelvic sidewall. The mass abuts and displaces the posterior aspect of the distal psoas and right iliacus muscles. The mass extends to the right neural foramina encasing the exiting L5-S3 nerve roots. A portion of the mass appears to cross the sacroiliac joint extending into the adjacent posterior iliac bone most pronounced at series 4 image 17. There is a superimposed or insufficiency fracture of the sacrum involving the S1 segment and bilateral sacral alae.\par \par VESSELS: Right pelvic mass exerts mass effect on the right common iliac vein. The mass abuts the posterior aspect of the right common iliac artery. 180 degrees encasement of the proximal right internal and external iliac veins. Complete encasement of the proximal right internal iliac artery.\par \par \par REPRODUCTIVE ORGANS: Prostate within normal limits. Mild prominence of the right seminal vesicle of uncertain significance.\par BLADDER: Within normal limits.\par \par VISUALIZED PORTIONS:\par ABDOMINAL ORGANS: Not included in field-of-view. No ascites.\par BOWEL: Within normal limits.\par \par ABDOMINAL WALL: Within normal limits.\par No additional osseous lesions are identified.\par \par IMPRESSION:\par Enhancing multilobulated mass lesion arising in the right hemipelvis which appears to be centered in the region of the right hemisacrum extending into the right pelvic sidewall with partial encasement of the sacral nerve roots and right sided vasculature, as further described above. Differential diagnosis for a lesion of this appearance includes metastasis, tumors of neural origin, fibrous tumor, or less likely chondrosarcoma given absence of internal mineralization on CT. Histologic sampling is recommended for definitive diagnosis.

## 2021-12-29 NOTE — PHYSICAL EXAM
[General Appearance - Alert] : alert [Sclera] : the sclera and conjunctiva were normal [Normal Oral Mucosa] : normal oral mucosa [Neck Appearance] : the appearance of the neck was normal [Bowel Sounds] : normal bowel sounds [Abdomen Soft] : soft [Abdomen Tenderness] : non-tender [Oriented To Time, Place, And Person] : oriented to person, place, and time [] : no respiratory distress [Auscultation Breath Sounds / Voice Sounds] : lungs were clear to auscultation bilaterally [Heart Rate And Rhythm] : heart rate was normal and rhythm regular [Heart Sounds] : normal S1 and S2 [FreeTextEntry1] : +TTP R leg  [Skin Color & Pigmentation] : normal skin color and pigmentation

## 2021-12-29 NOTE — ASSESSMENT
[______] : HCP: [unfilled] [FreeTextEntry1] : 66yoM with:\par \par 1. Soft tissue sarcoma - On Gemzar/Taxotere; Med Onc follow up. \par \par 2. Pain 2/2 Neoplasm - Discussed the rationale for using a longer-acting opioid for his pain control. Recommend he re-trial methadone, we can use a small dose to start - 1mg TID.  He should c/w PRN Oxycodone IR 10mg.  \par \par Medical cannabis certification completed today. Provided cannabis education, overview of state program, and discussed adverse effects in detail. Counseled that vaporized cannabis is not the preferred route of administration due to the fact that both short-term and long-term risks associated with vaporizing oils are not yet fully understood. Recommend starting with 1:1 THC:CBD formulation at low dose of THC (~2mg/dose).\par \par 3. Nausea - c/w PRN compazine; medical cannabis may be of benefit.\par \par 4. Encounter for Palliative Care - Emotional support provided.\par Health Care Proxy (HCP) form completed in office today after explanation of the role of a HCP.\par \par Follow up in 2 weeks, call sooner with questions or issues.

## 2022-01-01 ENCOUNTER — APPOINTMENT (OUTPATIENT)
Dept: CT IMAGING | Facility: IMAGING CENTER | Age: 67
End: 2022-01-01
Payer: MEDICARE

## 2022-01-01 ENCOUNTER — NON-APPOINTMENT (OUTPATIENT)
Age: 67
End: 2022-01-01

## 2022-01-01 ENCOUNTER — RESULT REVIEW (OUTPATIENT)
Age: 67
End: 2022-01-01

## 2022-01-01 ENCOUNTER — APPOINTMENT (OUTPATIENT)
Dept: INFUSION THERAPY | Facility: HOSPITAL | Age: 67
End: 2022-01-01

## 2022-01-01 ENCOUNTER — OUTPATIENT (OUTPATIENT)
Dept: OUTPATIENT SERVICES | Facility: HOSPITAL | Age: 67
LOS: 1 days | End: 2022-01-01
Payer: MEDICARE

## 2022-01-01 ENCOUNTER — APPOINTMENT (OUTPATIENT)
Dept: HEMATOLOGY ONCOLOGY | Facility: CLINIC | Age: 67
End: 2022-01-01

## 2022-01-01 ENCOUNTER — TRANSCRIPTION ENCOUNTER (OUTPATIENT)
Age: 67
End: 2022-01-01

## 2022-01-01 ENCOUNTER — APPOINTMENT (OUTPATIENT)
Dept: HEMATOLOGY ONCOLOGY | Facility: CLINIC | Age: 67
End: 2022-01-01
Payer: MEDICARE

## 2022-01-01 ENCOUNTER — OUTPATIENT (OUTPATIENT)
Dept: OUTPATIENT SERVICES | Facility: HOSPITAL | Age: 67
LOS: 1 days | Discharge: HOME | End: 2022-01-01

## 2022-01-01 ENCOUNTER — APPOINTMENT (OUTPATIENT)
Dept: MRI IMAGING | Facility: CLINIC | Age: 67
End: 2022-01-01
Payer: MEDICARE

## 2022-01-01 ENCOUNTER — INPATIENT (INPATIENT)
Facility: HOSPITAL | Age: 67
LOS: 12 days | Discharge: HOME HEALTH | DRG: 949 | End: 2022-10-03
Attending: PHYSICAL MEDICINE & REHABILITATION | Admitting: PHYSICAL MEDICINE & REHABILITATION
Payer: MEDICARE

## 2022-01-01 ENCOUNTER — INPATIENT (INPATIENT)
Facility: HOSPITAL | Age: 67
LOS: 7 days | Discharge: NURSING, REHAB OR LONG-TERM CARE | DRG: 25 | End: 2022-09-20
Attending: NEUROLOGICAL SURGERY | Admitting: NEUROLOGICAL SURGERY
Payer: MEDICARE

## 2022-01-01 ENCOUNTER — APPOINTMENT (OUTPATIENT)
Dept: NEUROSURGERY | Facility: HOSPITAL | Age: 67
End: 2022-01-01

## 2022-01-01 ENCOUNTER — APPOINTMENT (OUTPATIENT)
Dept: ULTRASOUND IMAGING | Facility: CLINIC | Age: 67
End: 2022-01-01
Payer: MEDICARE

## 2022-01-01 ENCOUNTER — APPOINTMENT (OUTPATIENT)
Dept: PHYSICAL MEDICINE AND REHAB | Facility: CLINIC | Age: 67
End: 2022-01-01

## 2022-01-01 ENCOUNTER — APPOINTMENT (OUTPATIENT)
Dept: PHYSICAL MEDICINE AND REHAB | Facility: CLINIC | Age: 67
End: 2022-01-01
Payer: MEDICARE

## 2022-01-01 ENCOUNTER — OUTPATIENT (OUTPATIENT)
Dept: OUTPATIENT SERVICES | Facility: HOSPITAL | Age: 67
LOS: 1 days | End: 2022-01-01
Payer: COMMERCIAL

## 2022-01-01 ENCOUNTER — APPOINTMENT (OUTPATIENT)
Dept: CT IMAGING | Facility: CLINIC | Age: 67
End: 2022-01-01
Payer: MEDICARE

## 2022-01-01 ENCOUNTER — OUTPATIENT (OUTPATIENT)
Dept: OUTPATIENT SERVICES | Facility: HOSPITAL | Age: 67
LOS: 1 days | Discharge: HOME | End: 2022-01-01
Payer: MEDICARE

## 2022-01-01 ENCOUNTER — APPOINTMENT (OUTPATIENT)
Dept: RADIATION ONCOLOGY | Facility: CLINIC | Age: 67
End: 2022-01-01
Payer: MEDICARE

## 2022-01-01 ENCOUNTER — APPOINTMENT (OUTPATIENT)
Dept: CARDIOLOGY | Facility: CLINIC | Age: 67
End: 2022-01-01
Payer: MEDICARE

## 2022-01-01 ENCOUNTER — APPOINTMENT (OUTPATIENT)
Dept: CT IMAGING | Facility: IMAGING CENTER | Age: 67
End: 2022-01-01

## 2022-01-01 ENCOUNTER — APPOINTMENT (OUTPATIENT)
Dept: GERIATRICS | Facility: CLINIC | Age: 67
End: 2022-01-01

## 2022-01-01 ENCOUNTER — APPOINTMENT (OUTPATIENT)
Dept: CARDIOLOGY | Facility: CLINIC | Age: 67
End: 2022-01-01

## 2022-01-01 VITALS
HEART RATE: 76 BPM | OXYGEN SATURATION: 100 % | RESPIRATION RATE: 16 BRPM | TEMPERATURE: 97 F | SYSTOLIC BLOOD PRESSURE: 97 MMHG | DIASTOLIC BLOOD PRESSURE: 63 MMHG

## 2022-01-01 VITALS
TEMPERATURE: 98.2 F | BODY MASS INDEX: 20.4 KG/M2 | WEIGHT: 129.98 LBS | SYSTOLIC BLOOD PRESSURE: 119 MMHG | RESPIRATION RATE: 16 BRPM | OXYGEN SATURATION: 99 % | HEART RATE: 58 BPM | HEIGHT: 67.01 IN | DIASTOLIC BLOOD PRESSURE: 75 MMHG

## 2022-01-01 VITALS
TEMPERATURE: 99 F | WEIGHT: 139.99 LBS | OXYGEN SATURATION: 98 % | HEART RATE: 80 BPM | RESPIRATION RATE: 18 BRPM | SYSTOLIC BLOOD PRESSURE: 105 MMHG | HEIGHT: 67 IN | DIASTOLIC BLOOD PRESSURE: 63 MMHG

## 2022-01-01 VITALS
DIASTOLIC BLOOD PRESSURE: 74 MMHG | RESPIRATION RATE: 16 BRPM | SYSTOLIC BLOOD PRESSURE: 123 MMHG | WEIGHT: 130 LBS | BODY MASS INDEX: 20.36 KG/M2 | TEMPERATURE: 98.6 F | HEART RATE: 75 BPM | OXYGEN SATURATION: 97 %

## 2022-01-01 VITALS
BODY MASS INDEX: 21.92 KG/M2 | DIASTOLIC BLOOD PRESSURE: 79 MMHG | OXYGEN SATURATION: 99 % | RESPIRATION RATE: 16 BRPM | WEIGHT: 139.99 LBS | HEART RATE: 77 BPM | TEMPERATURE: 97.1 F | SYSTOLIC BLOOD PRESSURE: 117 MMHG

## 2022-01-01 VITALS
DIASTOLIC BLOOD PRESSURE: 56 MMHG | SYSTOLIC BLOOD PRESSURE: 116 MMHG | OXYGEN SATURATION: 98 % | RESPIRATION RATE: 16 BRPM | WEIGHT: 138 LBS | HEART RATE: 67 BPM | TEMPERATURE: 98.2 F | BODY MASS INDEX: 21.61 KG/M2

## 2022-01-01 VITALS
OXYGEN SATURATION: 97 % | HEART RATE: 56 BPM | TEMPERATURE: 98 F | DIASTOLIC BLOOD PRESSURE: 59 MMHG | RESPIRATION RATE: 16 BRPM | HEIGHT: 68 IN | SYSTOLIC BLOOD PRESSURE: 103 MMHG | WEIGHT: 113.98 LBS

## 2022-01-01 VITALS
TEMPERATURE: 98.2 F | DIASTOLIC BLOOD PRESSURE: 72 MMHG | OXYGEN SATURATION: 98 % | SYSTOLIC BLOOD PRESSURE: 115 MMHG | RESPIRATION RATE: 16 BRPM | HEIGHT: 67 IN | BODY MASS INDEX: 21.19 KG/M2 | WEIGHT: 134.99 LBS | HEART RATE: 57 BPM

## 2022-01-01 VITALS
WEIGHT: 131.99 LBS | RESPIRATION RATE: 16 BRPM | DIASTOLIC BLOOD PRESSURE: 59 MMHG | HEART RATE: 79 BPM | SYSTOLIC BLOOD PRESSURE: 96 MMHG | BODY MASS INDEX: 20.67 KG/M2 | OXYGEN SATURATION: 99 % | TEMPERATURE: 97 F

## 2022-01-01 VITALS
DIASTOLIC BLOOD PRESSURE: 64 MMHG | SYSTOLIC BLOOD PRESSURE: 98 MMHG | BODY MASS INDEX: 21.14 KG/M2 | OXYGEN SATURATION: 99 % | RESPIRATION RATE: 16 BRPM | WEIGHT: 135 LBS | TEMPERATURE: 96.3 F | HEART RATE: 73 BPM

## 2022-01-01 VITALS
RESPIRATION RATE: 16 BRPM | WEIGHT: 139.99 LBS | HEART RATE: 90 BPM | DIASTOLIC BLOOD PRESSURE: 71 MMHG | SYSTOLIC BLOOD PRESSURE: 99 MMHG | OXYGEN SATURATION: 98 % | BODY MASS INDEX: 21.92 KG/M2 | TEMPERATURE: 97.1 F

## 2022-01-01 VITALS
BODY MASS INDEX: 21.18 KG/M2 | SYSTOLIC BLOOD PRESSURE: 101 MMHG | TEMPERATURE: 97.3 F | OXYGEN SATURATION: 99 % | HEART RATE: 79 BPM | HEIGHT: 67.01 IN | WEIGHT: 134.92 LBS | RESPIRATION RATE: 16 BRPM | DIASTOLIC BLOOD PRESSURE: 66 MMHG

## 2022-01-01 VITALS
HEART RATE: 66 BPM | TEMPERATURE: 98.4 F | OXYGEN SATURATION: 99 % | WEIGHT: 129.98 LBS | BODY MASS INDEX: 20.4 KG/M2 | HEIGHT: 67.01 IN | DIASTOLIC BLOOD PRESSURE: 65 MMHG | RESPIRATION RATE: 16 BRPM | SYSTOLIC BLOOD PRESSURE: 106 MMHG

## 2022-01-01 VITALS
OXYGEN SATURATION: 99 % | SYSTOLIC BLOOD PRESSURE: 94 MMHG | HEART RATE: 79 BPM | DIASTOLIC BLOOD PRESSURE: 57 MMHG | RESPIRATION RATE: 16 BRPM

## 2022-01-01 VITALS
OXYGEN SATURATION: 97 % | HEART RATE: 64 BPM | HEIGHT: 67 IN | DIASTOLIC BLOOD PRESSURE: 75 MMHG | TEMPERATURE: 98 F | SYSTOLIC BLOOD PRESSURE: 130 MMHG | RESPIRATION RATE: 16 BRPM

## 2022-01-01 VITALS
HEIGHT: 67.01 IN | RESPIRATION RATE: 16 BRPM | BODY MASS INDEX: 21.19 KG/M2 | OXYGEN SATURATION: 97 % | TEMPERATURE: 97 F | WEIGHT: 134.99 LBS | SYSTOLIC BLOOD PRESSURE: 113 MMHG | HEART RATE: 67 BPM | DIASTOLIC BLOOD PRESSURE: 72 MMHG

## 2022-01-01 VITALS
OXYGEN SATURATION: 100 % | WEIGHT: 140 LBS | SYSTOLIC BLOOD PRESSURE: 103 MMHG | RESPIRATION RATE: 16 BRPM | DIASTOLIC BLOOD PRESSURE: 66 MMHG | BODY MASS INDEX: 21.92 KG/M2 | HEART RATE: 73 BPM | TEMPERATURE: 97.7 F

## 2022-01-01 VITALS
RESPIRATION RATE: 18 BRPM | SYSTOLIC BLOOD PRESSURE: 125 MMHG | TEMPERATURE: 98 F | HEART RATE: 57 BPM | DIASTOLIC BLOOD PRESSURE: 74 MMHG | OXYGEN SATURATION: 98 %

## 2022-01-01 VITALS — WEIGHT: 126.99 LBS

## 2022-01-01 DIAGNOSIS — E88.89 OTHER SPECIFIED METABOLIC DISORDERS: ICD-10-CM

## 2022-01-01 DIAGNOSIS — D32.0 BENIGN NEOPLASM OF CEREBRAL MENINGES: ICD-10-CM

## 2022-01-01 DIAGNOSIS — R53.1 WEAKNESS: ICD-10-CM

## 2022-01-01 DIAGNOSIS — Z51.5 ENCOUNTER FOR PALLIATIVE CARE: ICD-10-CM

## 2022-01-01 DIAGNOSIS — Z45.2 ENCOUNTER FOR ADJUSTMENT AND MANAGEMENT OF VASCULAR ACCESS DEVICE: ICD-10-CM

## 2022-01-01 DIAGNOSIS — C78.00 SECONDARY MALIGNANT NEOPLASM OF UNSPECIFIED LUNG: ICD-10-CM

## 2022-01-01 DIAGNOSIS — C49.5 MALIGNANT NEOPLASM OF CONNECTIVE AND SOFT TISSUE OF PELVIS: ICD-10-CM

## 2022-01-01 DIAGNOSIS — R60.0 LOCALIZED EDEMA: ICD-10-CM

## 2022-01-01 DIAGNOSIS — Z00.8 ENCOUNTER FOR OTHER GENERAL EXAMINATION: ICD-10-CM

## 2022-01-01 DIAGNOSIS — C49.9 SECONDARY MALIGNANT NEOPLASM OF UNSPECIFIED LUNG: ICD-10-CM

## 2022-01-01 DIAGNOSIS — Z51.11 ENCOUNTER FOR ANTINEOPLASTIC CHEMOTHERAPY: ICD-10-CM

## 2022-01-01 DIAGNOSIS — C46.9 KAPOSI'S SARCOMA, UNSPECIFIED: ICD-10-CM

## 2022-01-01 DIAGNOSIS — Z51.89 ENCOUNTER FOR OTHER SPECIFIED AFTERCARE: ICD-10-CM

## 2022-01-01 DIAGNOSIS — K59.03 DRUG INDUCED CONSTIPATION: ICD-10-CM

## 2022-01-01 DIAGNOSIS — R11.2 NAUSEA WITH VOMITING, UNSPECIFIED: ICD-10-CM

## 2022-01-01 DIAGNOSIS — G89.3 NEOPLASM RELATED PAIN (ACUTE) (CHRONIC): ICD-10-CM

## 2022-01-01 DIAGNOSIS — C79.31 SECONDARY MALIGNANT NEOPLASM OF BRAIN: ICD-10-CM

## 2022-01-01 DIAGNOSIS — G93.9 DISORDER OF BRAIN, UNSPECIFIED: ICD-10-CM

## 2022-01-01 DIAGNOSIS — C49.9 MALIGNANT NEOPLASM OF CONNECTIVE AND SOFT TISSUE, UNSPECIFIED: ICD-10-CM

## 2022-01-01 DIAGNOSIS — Z11.52 ENCOUNTER FOR SCREENING FOR COVID-19: ICD-10-CM

## 2022-01-01 DIAGNOSIS — E86.0 DEHYDRATION: ICD-10-CM

## 2022-01-01 DIAGNOSIS — R11.0 NAUSEA: ICD-10-CM

## 2022-01-01 DIAGNOSIS — D43.0 NEOPLASM OF UNCERTAIN BEHAVIOR OF BRAIN, SUPRATENTORIAL: ICD-10-CM

## 2022-01-01 LAB
A1C WITH ESTIMATED AVERAGE GLUCOSE RESULT: 6.1 % — HIGH (ref 4–5.6)
ALBUMIN SERPL ELPH-MCNC: 2.1 G/DL — LOW (ref 3.3–5)
ALBUMIN SERPL ELPH-MCNC: 2.4 G/DL — LOW (ref 3.3–5)
ALBUMIN SERPL ELPH-MCNC: 2.4 G/DL — LOW (ref 3.3–5)
ALBUMIN SERPL ELPH-MCNC: 3.3 G/DL — SIGNIFICANT CHANGE UP (ref 3.3–5)
ALBUMIN SERPL ELPH-MCNC: 3.4 G/DL — SIGNIFICANT CHANGE UP (ref 3.3–5)
ALBUMIN SERPL ELPH-MCNC: 3.4 G/DL — SIGNIFICANT CHANGE UP (ref 3.3–5)
ALBUMIN SERPL ELPH-MCNC: 3.5 G/DL — SIGNIFICANT CHANGE UP (ref 3.3–5)
ALBUMIN SERPL ELPH-MCNC: 3.5 G/DL — SIGNIFICANT CHANGE UP (ref 3.3–5)
ALBUMIN SERPL ELPH-MCNC: 3.6 G/DL — SIGNIFICANT CHANGE UP (ref 3.3–5)
ALBUMIN SERPL ELPH-MCNC: 3.7 G/DL
ALBUMIN SERPL ELPH-MCNC: 3.7 G/DL — SIGNIFICANT CHANGE UP (ref 3.3–5)
ALBUMIN SERPL ELPH-MCNC: 3.8 G/DL
ALBUMIN SERPL ELPH-MCNC: 3.8 G/DL
ALBUMIN SERPL ELPH-MCNC: 3.9 G/DL
ALBUMIN SERPL ELPH-MCNC: 4 G/DL
ALBUMIN SERPL ELPH-MCNC: 4.2 G/DL — SIGNIFICANT CHANGE UP (ref 3.3–5)
ALP BLD-CCNC: 192 U/L
ALP BLD-CCNC: 251 U/L
ALP BLD-CCNC: 296 U/L
ALP BLD-CCNC: 340 U/L
ALP BLD-CCNC: 429 U/L
ALP BLD-CCNC: 540 U/L
ALP BLD-CCNC: 580 U/L
ALP BLD-CCNC: 60 U/L
ALP BLD-CCNC: 63 U/L
ALP SERPL-CCNC: 103 U/L — SIGNIFICANT CHANGE UP (ref 40–120)
ALP SERPL-CCNC: 124 U/L — HIGH (ref 40–120)
ALP SERPL-CCNC: 159 U/L — HIGH (ref 40–120)
ALP SERPL-CCNC: 44 U/L — SIGNIFICANT CHANGE UP (ref 40–120)
ALP SERPL-CCNC: 47 U/L — SIGNIFICANT CHANGE UP (ref 40–120)
ALP SERPL-CCNC: 48 U/L — SIGNIFICANT CHANGE UP (ref 40–120)
ALP SERPL-CCNC: 49 U/L — SIGNIFICANT CHANGE UP (ref 40–120)
ALP SERPL-CCNC: 49 U/L — SIGNIFICANT CHANGE UP (ref 40–120)
ALP SERPL-CCNC: 51 U/L — SIGNIFICANT CHANGE UP (ref 40–120)
ALP SERPL-CCNC: 53 U/L — SIGNIFICANT CHANGE UP (ref 40–120)
ALP SERPL-CCNC: 55 U/L — SIGNIFICANT CHANGE UP (ref 40–120)
ALP SERPL-CCNC: 57 U/L — SIGNIFICANT CHANGE UP (ref 40–120)
ALP SERPL-CCNC: 87 U/L — SIGNIFICANT CHANGE UP (ref 40–120)
ALP SERPL-CCNC: 94 U/L — SIGNIFICANT CHANGE UP (ref 40–120)
ALT FLD-CCNC: 10 U/L — SIGNIFICANT CHANGE UP (ref 10–45)
ALT FLD-CCNC: 13 U/L — SIGNIFICANT CHANGE UP (ref 10–45)
ALT FLD-CCNC: 18 U/L — SIGNIFICANT CHANGE UP (ref 10–45)
ALT FLD-CCNC: 18 U/L — SIGNIFICANT CHANGE UP (ref 10–45)
ALT FLD-CCNC: 34 U/L — SIGNIFICANT CHANGE UP (ref 10–45)
ALT FLD-CCNC: 41 U/L — SIGNIFICANT CHANGE UP (ref 10–45)
ALT FLD-CCNC: 46 U/L — HIGH (ref 10–45)
ALT FLD-CCNC: 5 U/L — LOW (ref 10–45)
ALT FLD-CCNC: 56 U/L — HIGH (ref 10–45)
ALT FLD-CCNC: 6 U/L — LOW (ref 10–45)
ALT FLD-CCNC: 7 U/L — LOW (ref 10–45)
ALT FLD-CCNC: 7 U/L — LOW (ref 10–45)
ALT FLD-CCNC: 8 U/L — LOW (ref 10–45)
ALT FLD-CCNC: 9 U/L — LOW (ref 10–45)
ALT SERPL-CCNC: 104 U/L
ALT SERPL-CCNC: 12 U/L
ALT SERPL-CCNC: 18 U/L
ALT SERPL-CCNC: 192 U/L
ALT SERPL-CCNC: 202 U/L
ALT SERPL-CCNC: 21 U/L
ALT SERPL-CCNC: 32 U/L
ALT SERPL-CCNC: 51 U/L
ALT SERPL-CCNC: 55 U/L
ANION GAP SERPL CALC-SCNC: 10 MMOL/L
ANION GAP SERPL CALC-SCNC: 10 MMOL/L
ANION GAP SERPL CALC-SCNC: 10 MMOL/L — SIGNIFICANT CHANGE UP (ref 5–17)
ANION GAP SERPL CALC-SCNC: 10 MMOL/L — SIGNIFICANT CHANGE UP (ref 5–17)
ANION GAP SERPL CALC-SCNC: 11 MMOL/L
ANION GAP SERPL CALC-SCNC: 11 MMOL/L — SIGNIFICANT CHANGE UP (ref 5–17)
ANION GAP SERPL CALC-SCNC: 12 MMOL/L — SIGNIFICANT CHANGE UP (ref 5–17)
ANION GAP SERPL CALC-SCNC: 12 MMOL/L — SIGNIFICANT CHANGE UP (ref 5–17)
ANION GAP SERPL CALC-SCNC: 13 MMOL/L
ANION GAP SERPL CALC-SCNC: 13 MMOL/L — SIGNIFICANT CHANGE UP (ref 5–17)
ANION GAP SERPL CALC-SCNC: 14 MMOL/L — SIGNIFICANT CHANGE UP (ref 5–17)
ANION GAP SERPL CALC-SCNC: 15 MMOL/L — SIGNIFICANT CHANGE UP (ref 5–17)
ANION GAP SERPL CALC-SCNC: 3 MMOL/L — LOW (ref 5–17)
ANION GAP SERPL CALC-SCNC: 5 MMOL/L — SIGNIFICANT CHANGE UP (ref 5–17)
ANION GAP SERPL CALC-SCNC: 6 MMOL/L — SIGNIFICANT CHANGE UP (ref 5–17)
ANION GAP SERPL CALC-SCNC: 6 MMOL/L — SIGNIFICANT CHANGE UP (ref 5–17)
ANION GAP SERPL CALC-SCNC: 8 MMOL/L — SIGNIFICANT CHANGE UP (ref 5–17)
ANION GAP SERPL CALC-SCNC: 8 MMOL/L — SIGNIFICANT CHANGE UP (ref 5–17)
ANION GAP SERPL CALC-SCNC: 9 MMOL/L
ANION GAP SERPL CALC-SCNC: 9 MMOL/L — SIGNIFICANT CHANGE UP (ref 5–17)
ANION GAP SERPL CALC-SCNC: 9 MMOL/L — SIGNIFICANT CHANGE UP (ref 5–17)
APPEARANCE UR: CLEAR — SIGNIFICANT CHANGE UP
APPEARANCE UR: CLEAR — SIGNIFICANT CHANGE UP
APPEARANCE: CLEAR
APPEARANCE: CLEAR
APTT BLD: 34.3 SEC — SIGNIFICANT CHANGE UP (ref 27.5–35.5)
APTT BLD: 37.3 SEC — HIGH (ref 27.5–35.5)
AST SERPL-CCNC: 10 U/L — SIGNIFICANT CHANGE UP (ref 10–40)
AST SERPL-CCNC: 104 U/L
AST SERPL-CCNC: 11 U/L — SIGNIFICANT CHANGE UP (ref 10–40)
AST SERPL-CCNC: 11 U/L — SIGNIFICANT CHANGE UP (ref 10–40)
AST SERPL-CCNC: 12 U/L — SIGNIFICANT CHANGE UP (ref 10–40)
AST SERPL-CCNC: 128 U/L
AST SERPL-CCNC: 13 U/L — SIGNIFICANT CHANGE UP (ref 10–40)
AST SERPL-CCNC: 137 U/L
AST SERPL-CCNC: 14 U/L — SIGNIFICANT CHANGE UP (ref 10–40)
AST SERPL-CCNC: 16 U/L — SIGNIFICANT CHANGE UP (ref 10–40)
AST SERPL-CCNC: 17 U/L
AST SERPL-CCNC: 18 U/L
AST SERPL-CCNC: 18 U/L — SIGNIFICANT CHANGE UP (ref 10–40)
AST SERPL-CCNC: 20 U/L — SIGNIFICANT CHANGE UP (ref 10–40)
AST SERPL-CCNC: 21 U/L
AST SERPL-CCNC: 22 U/L — SIGNIFICANT CHANGE UP (ref 10–40)
AST SERPL-CCNC: 26 U/L — SIGNIFICANT CHANGE UP (ref 10–40)
AST SERPL-CCNC: 28 U/L
AST SERPL-CCNC: 28 U/L — SIGNIFICANT CHANGE UP (ref 10–40)
AST SERPL-CCNC: 32 U/L
AST SERPL-CCNC: 42 U/L
BACTERIA # UR AUTO: NEGATIVE /HPF — SIGNIFICANT CHANGE UP
BACTERIA # UR AUTO: NEGATIVE — SIGNIFICANT CHANGE UP
BACTERIA: NEGATIVE
BASOPHILS # BLD AUTO: 0 K/UL — SIGNIFICANT CHANGE UP (ref 0–0.2)
BASOPHILS # BLD AUTO: 0.01 K/UL — SIGNIFICANT CHANGE UP (ref 0–0.2)
BASOPHILS # BLD AUTO: 0.02 K/UL — SIGNIFICANT CHANGE UP (ref 0–0.2)
BASOPHILS # BLD AUTO: 0.02 K/UL — SIGNIFICANT CHANGE UP (ref 0–0.2)
BASOPHILS # BLD AUTO: 0.03 K/UL — SIGNIFICANT CHANGE UP (ref 0–0.2)
BASOPHILS # BLD AUTO: 0.04 K/UL — SIGNIFICANT CHANGE UP (ref 0–0.2)
BASOPHILS # BLD AUTO: 0.05 K/UL — SIGNIFICANT CHANGE UP (ref 0–0.2)
BASOPHILS # BLD AUTO: 0.06 K/UL — SIGNIFICANT CHANGE UP (ref 0–0.2)
BASOPHILS NFR BLD AUTO: 0 % — SIGNIFICANT CHANGE UP (ref 0–2)
BASOPHILS NFR BLD AUTO: 0.1 % — SIGNIFICANT CHANGE UP (ref 0–2)
BASOPHILS NFR BLD AUTO: 0.4 % — SIGNIFICANT CHANGE UP (ref 0–2)
BASOPHILS NFR BLD AUTO: 0.6 % — SIGNIFICANT CHANGE UP (ref 0–2)
BASOPHILS NFR BLD AUTO: 0.7 % — SIGNIFICANT CHANGE UP (ref 0–2)
BASOPHILS NFR BLD AUTO: 0.8 % — SIGNIFICANT CHANGE UP (ref 0–2)
BASOPHILS NFR BLD AUTO: 0.9 % — SIGNIFICANT CHANGE UP (ref 0–2)
BASOPHILS NFR BLD AUTO: 1 % — SIGNIFICANT CHANGE UP (ref 0–2)
BASOPHILS NFR BLD AUTO: 1 % — SIGNIFICANT CHANGE UP (ref 0–2)
BASOPHILS NFR BLD AUTO: 1.2 % — SIGNIFICANT CHANGE UP (ref 0–2)
BASOPHILS NFR BLD AUTO: 1.4 % — SIGNIFICANT CHANGE UP (ref 0–2)
BASOPHILS NFR BLD AUTO: 1.9 % — SIGNIFICANT CHANGE UP (ref 0–2)
BILIRUB SERPL-MCNC: 0.2 MG/DL
BILIRUB SERPL-MCNC: 0.2 MG/DL — SIGNIFICANT CHANGE UP (ref 0.2–1.2)
BILIRUB SERPL-MCNC: 0.2 MG/DL — SIGNIFICANT CHANGE UP (ref 0.2–1.2)
BILIRUB SERPL-MCNC: 0.3 MG/DL — SIGNIFICANT CHANGE UP (ref 0.2–1.2)
BILIRUB SERPL-MCNC: 0.4 MG/DL
BILIRUB SERPL-MCNC: 0.4 MG/DL — SIGNIFICANT CHANGE UP (ref 0.2–1.2)
BILIRUB SERPL-MCNC: 0.4 MG/DL — SIGNIFICANT CHANGE UP (ref 0.2–1.2)
BILIRUB SERPL-MCNC: 0.5 MG/DL
BILIRUB SERPL-MCNC: 0.5 MG/DL — SIGNIFICANT CHANGE UP (ref 0.2–1.2)
BILIRUB SERPL-MCNC: 0.6 MG/DL
BILIRUB SERPL-MCNC: 0.7 MG/DL
BILIRUB SERPL-MCNC: 0.7 MG/DL — SIGNIFICANT CHANGE UP (ref 0.2–1.2)
BILIRUB SERPL-MCNC: 0.8 MG/DL
BILIRUB SERPL-MCNC: 1.1 MG/DL
BILIRUB SERPL-MCNC: <0.2 MG/DL — SIGNIFICANT CHANGE UP (ref 0.2–1.2)
BILIRUB UR-MCNC: NEGATIVE — SIGNIFICANT CHANGE UP
BILIRUB UR-MCNC: NEGATIVE — SIGNIFICANT CHANGE UP
BILIRUBIN URINE: NEGATIVE
BILIRUBIN URINE: NEGATIVE
BLD GP AB SCN SERPL QL: NEGATIVE — SIGNIFICANT CHANGE UP
BLOOD URINE: NEGATIVE
BLOOD URINE: NEGATIVE
BUN SERPL-MCNC: 10 MG/DL — SIGNIFICANT CHANGE UP (ref 7–23)
BUN SERPL-MCNC: 11 MG/DL
BUN SERPL-MCNC: 11 MG/DL — SIGNIFICANT CHANGE UP (ref 7–23)
BUN SERPL-MCNC: 12 MG/DL
BUN SERPL-MCNC: 12 MG/DL — SIGNIFICANT CHANGE UP (ref 7–23)
BUN SERPL-MCNC: 16 MG/DL — SIGNIFICANT CHANGE UP (ref 7–23)
BUN SERPL-MCNC: 18 MG/DL — SIGNIFICANT CHANGE UP (ref 7–23)
BUN SERPL-MCNC: 21 MG/DL — SIGNIFICANT CHANGE UP (ref 7–23)
BUN SERPL-MCNC: 21 MG/DL — SIGNIFICANT CHANGE UP (ref 7–23)
BUN SERPL-MCNC: 22 MG/DL — SIGNIFICANT CHANGE UP (ref 7–23)
BUN SERPL-MCNC: 23 MG/DL — SIGNIFICANT CHANGE UP (ref 7–23)
BUN SERPL-MCNC: 26 MG/DL — HIGH (ref 7–23)
BUN SERPL-MCNC: 26 MG/DL — HIGH (ref 7–23)
BUN SERPL-MCNC: 27 MG/DL — HIGH (ref 7–23)
BUN SERPL-MCNC: 30 MG/DL — HIGH (ref 7–23)
BUN SERPL-MCNC: 30 MG/DL — HIGH (ref 7–23)
BUN SERPL-MCNC: 31 MG/DL — HIGH (ref 7–23)
BUN SERPL-MCNC: 8 MG/DL — SIGNIFICANT CHANGE UP (ref 7–23)
BUN SERPL-MCNC: 8 MG/DL — SIGNIFICANT CHANGE UP (ref 7–23)
BUN SERPL-MCNC: 9 MG/DL — SIGNIFICANT CHANGE UP (ref 7–23)
BUN SERPL-MCNC: 9 MG/DL — SIGNIFICANT CHANGE UP (ref 7–23)
CALCIUM SERPL-MCNC: 8 MG/DL — LOW (ref 8.4–10.5)
CALCIUM SERPL-MCNC: 8.1 MG/DL — LOW (ref 8.4–10.5)
CALCIUM SERPL-MCNC: 8.2 MG/DL — LOW (ref 8.4–10.5)
CALCIUM SERPL-MCNC: 8.4 MG/DL — SIGNIFICANT CHANGE UP (ref 8.4–10.5)
CALCIUM SERPL-MCNC: 8.5 MG/DL — SIGNIFICANT CHANGE UP (ref 8.4–10.5)
CALCIUM SERPL-MCNC: 8.6 MG/DL — SIGNIFICANT CHANGE UP (ref 8.4–10.5)
CALCIUM SERPL-MCNC: 8.7 MG/DL
CALCIUM SERPL-MCNC: 8.7 MG/DL
CALCIUM SERPL-MCNC: 8.7 MG/DL — SIGNIFICANT CHANGE UP (ref 8.4–10.5)
CALCIUM SERPL-MCNC: 8.7 MG/DL — SIGNIFICANT CHANGE UP (ref 8.4–10.5)
CALCIUM SERPL-MCNC: 8.8 MG/DL
CALCIUM SERPL-MCNC: 8.8 MG/DL — SIGNIFICANT CHANGE UP (ref 8.4–10.5)
CALCIUM SERPL-MCNC: 8.8 MG/DL — SIGNIFICANT CHANGE UP (ref 8.4–10.5)
CALCIUM SERPL-MCNC: 8.9 MG/DL
CALCIUM SERPL-MCNC: 8.9 MG/DL — SIGNIFICANT CHANGE UP (ref 8.4–10.5)
CALCIUM SERPL-MCNC: 8.9 MG/DL — SIGNIFICANT CHANGE UP (ref 8.4–10.5)
CALCIUM SERPL-MCNC: 9 MG/DL
CALCIUM SERPL-MCNC: 9 MG/DL
CALCIUM SERPL-MCNC: 9 MG/DL — SIGNIFICANT CHANGE UP (ref 8.4–10.5)
CALCIUM SERPL-MCNC: 9.1 MG/DL — SIGNIFICANT CHANGE UP (ref 8.4–10.5)
CALCIUM SERPL-MCNC: 9.2 MG/DL
CALCIUM SERPL-MCNC: 9.2 MG/DL — SIGNIFICANT CHANGE UP (ref 8.4–10.5)
CALCIUM SERPL-MCNC: 9.3 MG/DL
CALCIUM SERPL-MCNC: 9.4 MG/DL
CALCIUM SERPL-MCNC: 9.4 MG/DL — SIGNIFICANT CHANGE UP (ref 8.4–10.5)
CALCIUM SERPL-MCNC: 9.5 MG/DL — SIGNIFICANT CHANGE UP (ref 8.4–10.5)
CHLORIDE SERPL-SCNC: 100 MMOL/L — SIGNIFICANT CHANGE UP (ref 96–108)
CHLORIDE SERPL-SCNC: 101 MMOL/L
CHLORIDE SERPL-SCNC: 101 MMOL/L
CHLORIDE SERPL-SCNC: 101 MMOL/L — SIGNIFICANT CHANGE UP (ref 96–108)
CHLORIDE SERPL-SCNC: 102 MMOL/L
CHLORIDE SERPL-SCNC: 102 MMOL/L — SIGNIFICANT CHANGE UP (ref 96–108)
CHLORIDE SERPL-SCNC: 103 MMOL/L
CHLORIDE SERPL-SCNC: 103 MMOL/L — SIGNIFICANT CHANGE UP (ref 96–108)
CHLORIDE SERPL-SCNC: 103 MMOL/L — SIGNIFICANT CHANGE UP (ref 96–108)
CHLORIDE SERPL-SCNC: 104 MMOL/L
CHLORIDE SERPL-SCNC: 104 MMOL/L — SIGNIFICANT CHANGE UP (ref 96–108)
CHLORIDE SERPL-SCNC: 105 MMOL/L — SIGNIFICANT CHANGE UP (ref 96–108)
CHLORIDE SERPL-SCNC: 108 MMOL/L — SIGNIFICANT CHANGE UP (ref 96–108)
CHLORIDE SERPL-SCNC: 109 MMOL/L — HIGH (ref 96–108)
CHLORIDE SERPL-SCNC: 110 MMOL/L — HIGH (ref 96–108)
CHLORIDE SERPL-SCNC: 111 MMOL/L — HIGH (ref 96–108)
CHLORIDE SERPL-SCNC: 99 MMOL/L — SIGNIFICANT CHANGE UP (ref 96–108)
CO2 SERPL-SCNC: 23 MMOL/L — SIGNIFICANT CHANGE UP (ref 22–31)
CO2 SERPL-SCNC: 23 MMOL/L — SIGNIFICANT CHANGE UP (ref 22–31)
CO2 SERPL-SCNC: 24 MMOL/L
CO2 SERPL-SCNC: 24 MMOL/L — SIGNIFICANT CHANGE UP (ref 22–31)
CO2 SERPL-SCNC: 25 MMOL/L — SIGNIFICANT CHANGE UP (ref 22–31)
CO2 SERPL-SCNC: 26 MMOL/L
CO2 SERPL-SCNC: 27 MMOL/L
CO2 SERPL-SCNC: 27 MMOL/L
CO2 SERPL-SCNC: 27 MMOL/L — SIGNIFICANT CHANGE UP (ref 22–31)
CO2 SERPL-SCNC: 28 MMOL/L
CO2 SERPL-SCNC: 28 MMOL/L — SIGNIFICANT CHANGE UP (ref 22–31)
CO2 SERPL-SCNC: 30 MMOL/L — SIGNIFICANT CHANGE UP (ref 22–31)
CO2 SERPL-SCNC: 31 MMOL/L — SIGNIFICANT CHANGE UP (ref 22–31)
CO2 SERPL-SCNC: 32 MMOL/L — HIGH (ref 22–31)
COLOR SPEC: YELLOW — SIGNIFICANT CHANGE UP
COLOR SPEC: YELLOW — SIGNIFICANT CHANGE UP
COLOR: NORMAL
COLOR: NORMAL
CREAT SERPL-MCNC: 0.35 MG/DL — LOW (ref 0.5–1.3)
CREAT SERPL-MCNC: 0.38 MG/DL — LOW (ref 0.5–1.3)
CREAT SERPL-MCNC: 0.43 MG/DL — LOW (ref 0.5–1.3)
CREAT SERPL-MCNC: 0.44 MG/DL — LOW (ref 0.5–1.3)
CREAT SERPL-MCNC: 0.46 MG/DL — LOW (ref 0.5–1.3)
CREAT SERPL-MCNC: 0.46 MG/DL — LOW (ref 0.5–1.3)
CREAT SERPL-MCNC: 0.48 MG/DL — LOW (ref 0.5–1.3)
CREAT SERPL-MCNC: 0.49 MG/DL
CREAT SERPL-MCNC: 0.49 MG/DL — LOW (ref 0.5–1.3)
CREAT SERPL-MCNC: 0.5 MG/DL — SIGNIFICANT CHANGE UP (ref 0.5–1.3)
CREAT SERPL-MCNC: 0.51 MG/DL
CREAT SERPL-MCNC: 0.51 MG/DL — SIGNIFICANT CHANGE UP (ref 0.5–1.3)
CREAT SERPL-MCNC: 0.52 MG/DL — SIGNIFICANT CHANGE UP (ref 0.5–1.3)
CREAT SERPL-MCNC: 0.53 MG/DL
CREAT SERPL-MCNC: 0.53 MG/DL — SIGNIFICANT CHANGE UP (ref 0.5–1.3)
CREAT SERPL-MCNC: 0.56 MG/DL
CREAT SERPL-MCNC: 0.57 MG/DL — SIGNIFICANT CHANGE UP (ref 0.5–1.3)
CREAT SERPL-MCNC: 0.58 MG/DL — SIGNIFICANT CHANGE UP (ref 0.5–1.3)
CREAT SERPL-MCNC: 0.59 MG/DL
CREAT SERPL-MCNC: 0.59 MG/DL — SIGNIFICANT CHANGE UP (ref 0.5–1.3)
CREAT SERPL-MCNC: 0.61 MG/DL
CREAT SERPL-MCNC: 0.63 MG/DL
CREAT SERPL-MCNC: 0.63 MG/DL
CREAT SERPL-MCNC: 0.63 MG/DL — SIGNIFICANT CHANGE UP (ref 0.5–1.3)
CREAT SERPL-MCNC: 0.65 MG/DL — SIGNIFICANT CHANGE UP (ref 0.5–1.3)
CREAT SERPL-MCNC: 0.68 MG/DL — SIGNIFICANT CHANGE UP (ref 0.5–1.3)
CREAT SERPL-MCNC: 0.69 MG/DL
CREAT SERPL-MCNC: 0.71 MG/DL — SIGNIFICANT CHANGE UP (ref 0.5–1.3)
CULTURE RESULTS: SIGNIFICANT CHANGE UP
DIFF PNL FLD: ABNORMAL
DIFF PNL FLD: NEGATIVE — SIGNIFICANT CHANGE UP
EGFR: 100 ML/MIN/1.73M2 — SIGNIFICANT CHANGE UP
EGFR: 101 ML/MIN/1.73M2
EGFR: 101 ML/MIN/1.73M2 — SIGNIFICANT CHANGE UP
EGFR: 104 ML/MIN/1.73M2
EGFR: 104 ML/MIN/1.73M2
EGFR: 105 ML/MIN/1.73M2
EGFR: 106 ML/MIN/1.73M2
EGFR: 106 ML/MIN/1.73M2 — SIGNIFICANT CHANGE UP
EGFR: 108 ML/MIN/1.73M2
EGFR: 109 ML/MIN/1.73M2 — SIGNIFICANT CHANGE UP
EGFR: 110 ML/MIN/1.73M2
EGFR: 110 ML/MIN/1.73M2 — SIGNIFICANT CHANGE UP
EGFR: 111 ML/MIN/1.73M2
EGFR: 111 ML/MIN/1.73M2 — SIGNIFICANT CHANGE UP
EGFR: 111 ML/MIN/1.73M2 — SIGNIFICANT CHANGE UP
EGFR: 112 ML/MIN/1.73M2
EGFR: 112 ML/MIN/1.73M2 — SIGNIFICANT CHANGE UP
EGFR: 113 ML/MIN/1.73M2 — SIGNIFICANT CHANGE UP
EGFR: 114 ML/MIN/1.73M2 — SIGNIFICANT CHANGE UP
EGFR: 114 ML/MIN/1.73M2 — SIGNIFICANT CHANGE UP
EGFR: 115 ML/MIN/1.73M2 — SIGNIFICANT CHANGE UP
EGFR: 116 ML/MIN/1.73M2 — SIGNIFICANT CHANGE UP
EGFR: 121 ML/MIN/1.73M2 — SIGNIFICANT CHANGE UP
EGFR: 124 ML/MIN/1.73M2 — SIGNIFICANT CHANGE UP
EOSINOPHIL # BLD AUTO: 0 K/UL — SIGNIFICANT CHANGE UP (ref 0–0.5)
EOSINOPHIL # BLD AUTO: 0 K/UL — SIGNIFICANT CHANGE UP (ref 0–0.5)
EOSINOPHIL # BLD AUTO: 0.01 K/UL — SIGNIFICANT CHANGE UP (ref 0–0.5)
EOSINOPHIL # BLD AUTO: 0.02 K/UL — SIGNIFICANT CHANGE UP (ref 0–0.5)
EOSINOPHIL # BLD AUTO: 0.03 K/UL — SIGNIFICANT CHANGE UP (ref 0–0.5)
EOSINOPHIL # BLD AUTO: 0.04 K/UL — SIGNIFICANT CHANGE UP (ref 0–0.5)
EOSINOPHIL # BLD AUTO: 0.06 K/UL — SIGNIFICANT CHANGE UP (ref 0–0.5)
EOSINOPHIL # BLD AUTO: 0.06 K/UL — SIGNIFICANT CHANGE UP (ref 0–0.5)
EOSINOPHIL # BLD AUTO: 0.07 K/UL — SIGNIFICANT CHANGE UP (ref 0–0.5)
EOSINOPHIL # BLD AUTO: 0.08 K/UL — SIGNIFICANT CHANGE UP (ref 0–0.5)
EOSINOPHIL # BLD AUTO: 0.08 K/UL — SIGNIFICANT CHANGE UP (ref 0–0.5)
EOSINOPHIL # BLD AUTO: 0.13 K/UL — SIGNIFICANT CHANGE UP (ref 0–0.5)
EOSINOPHIL # BLD AUTO: 0.14 K/UL — SIGNIFICANT CHANGE UP (ref 0–0.5)
EOSINOPHIL # BLD AUTO: 0.16 K/UL — SIGNIFICANT CHANGE UP (ref 0–0.5)
EOSINOPHIL # BLD AUTO: 0.23 K/UL — SIGNIFICANT CHANGE UP (ref 0–0.5)
EOSINOPHIL NFR BLD AUTO: 0 % — SIGNIFICANT CHANGE UP (ref 0–6)
EOSINOPHIL NFR BLD AUTO: 0 % — SIGNIFICANT CHANGE UP (ref 0–6)
EOSINOPHIL NFR BLD AUTO: 0.1 % — SIGNIFICANT CHANGE UP (ref 0–6)
EOSINOPHIL NFR BLD AUTO: 0.4 % — SIGNIFICANT CHANGE UP (ref 0–6)
EOSINOPHIL NFR BLD AUTO: 0.6 % — SIGNIFICANT CHANGE UP (ref 0–6)
EOSINOPHIL NFR BLD AUTO: 0.6 % — SIGNIFICANT CHANGE UP (ref 0–6)
EOSINOPHIL NFR BLD AUTO: 0.9 % — SIGNIFICANT CHANGE UP (ref 0–6)
EOSINOPHIL NFR BLD AUTO: 1 % — SIGNIFICANT CHANGE UP (ref 0–6)
EOSINOPHIL NFR BLD AUTO: 1.5 % — SIGNIFICANT CHANGE UP (ref 0–6)
EOSINOPHIL NFR BLD AUTO: 1.6 % — SIGNIFICANT CHANGE UP (ref 0–6)
EOSINOPHIL NFR BLD AUTO: 1.9 % — SIGNIFICANT CHANGE UP (ref 0–6)
EOSINOPHIL NFR BLD AUTO: 2 % — SIGNIFICANT CHANGE UP (ref 0–6)
EOSINOPHIL NFR BLD AUTO: 2.3 % — SIGNIFICANT CHANGE UP (ref 0–6)
EOSINOPHIL NFR BLD AUTO: 2.5 % — SIGNIFICANT CHANGE UP (ref 0–6)
EOSINOPHIL NFR BLD AUTO: 2.6 % — SIGNIFICANT CHANGE UP (ref 0–6)
EOSINOPHIL NFR BLD AUTO: 2.7 % — SIGNIFICANT CHANGE UP (ref 0–6)
EOSINOPHIL NFR BLD AUTO: 3 % — SIGNIFICANT CHANGE UP (ref 0–6)
EOSINOPHIL NFR BLD AUTO: 3 % — SIGNIFICANT CHANGE UP (ref 0–6)
EOSINOPHIL NFR BLD AUTO: 3.1 % — SIGNIFICANT CHANGE UP (ref 0–6)
EOSINOPHIL NFR BLD AUTO: 5.8 % — SIGNIFICANT CHANGE UP (ref 0–6)
EOSINOPHIL NFR BLD AUTO: 6 % — SIGNIFICANT CHANGE UP (ref 0–6)
EPI CELLS # UR: 0 /HPF — SIGNIFICANT CHANGE UP
EPI CELLS # UR: SIGNIFICANT CHANGE UP
ESTIMATED AVERAGE GLUCOSE: 128 MG/DL — HIGH (ref 68–114)
FERRITIN SERPL-MCNC: 565 NG/ML — HIGH (ref 30–400)
FOLATE SERPL-MCNC: 6.8 NG/ML — SIGNIFICANT CHANGE UP
FULL GENE SEQUENCE RESULT: NORMAL
GAS PNL BLDA: SIGNIFICANT CHANGE UP
GLUCOSE QUALITATIVE U: NEGATIVE
GLUCOSE QUALITATIVE U: NEGATIVE
GLUCOSE SERPL-MCNC: 103 MG/DL
GLUCOSE SERPL-MCNC: 104 MG/DL
GLUCOSE SERPL-MCNC: 108 MG/DL — HIGH (ref 70–99)
GLUCOSE SERPL-MCNC: 109 MG/DL — HIGH (ref 70–99)
GLUCOSE SERPL-MCNC: 111 MG/DL — HIGH (ref 70–99)
GLUCOSE SERPL-MCNC: 114 MG/DL — HIGH (ref 70–99)
GLUCOSE SERPL-MCNC: 115 MG/DL — HIGH (ref 70–99)
GLUCOSE SERPL-MCNC: 118 MG/DL — HIGH (ref 70–99)
GLUCOSE SERPL-MCNC: 119 MG/DL
GLUCOSE SERPL-MCNC: 121 MG/DL
GLUCOSE SERPL-MCNC: 122 MG/DL — HIGH (ref 70–99)
GLUCOSE SERPL-MCNC: 124 MG/DL — HIGH (ref 70–99)
GLUCOSE SERPL-MCNC: 129 MG/DL — HIGH (ref 70–99)
GLUCOSE SERPL-MCNC: 133 MG/DL — HIGH (ref 70–99)
GLUCOSE SERPL-MCNC: 149 MG/DL
GLUCOSE SERPL-MCNC: 149 MG/DL — HIGH (ref 70–99)
GLUCOSE SERPL-MCNC: 151 MG/DL — HIGH (ref 70–99)
GLUCOSE SERPL-MCNC: 171 MG/DL — HIGH (ref 70–99)
GLUCOSE SERPL-MCNC: 176 MG/DL — HIGH (ref 70–99)
GLUCOSE SERPL-MCNC: 178 MG/DL
GLUCOSE SERPL-MCNC: 187 MG/DL — HIGH (ref 70–99)
GLUCOSE SERPL-MCNC: 205 MG/DL — HIGH (ref 70–99)
GLUCOSE SERPL-MCNC: 89 MG/DL
GLUCOSE SERPL-MCNC: 89 MG/DL — SIGNIFICANT CHANGE UP (ref 70–99)
GLUCOSE SERPL-MCNC: 90 MG/DL — SIGNIFICANT CHANGE UP (ref 70–99)
GLUCOSE SERPL-MCNC: 94 MG/DL — SIGNIFICANT CHANGE UP (ref 70–99)
GLUCOSE SERPL-MCNC: 95 MG/DL — SIGNIFICANT CHANGE UP (ref 70–99)
GLUCOSE SERPL-MCNC: 96 MG/DL
GLUCOSE SERPL-MCNC: 99 MG/DL
GLUCOSE UR QL: NEGATIVE — SIGNIFICANT CHANGE UP
GLUCOSE UR QL: NEGATIVE — SIGNIFICANT CHANGE UP
HAPTOGLOB SERPL-MCNC: 326 MG/DL — HIGH (ref 34–200)
HCT VFR BLD CALC: 26.9 % — LOW (ref 39–50)
HCT VFR BLD CALC: 28.2 % — LOW (ref 39–50)
HCT VFR BLD CALC: 28.6 % — LOW (ref 39–50)
HCT VFR BLD CALC: 29.1 % — LOW (ref 39–50)
HCT VFR BLD CALC: 29.1 % — LOW (ref 39–50)
HCT VFR BLD CALC: 29.7 % — LOW (ref 39–50)
HCT VFR BLD CALC: 29.8 % — LOW (ref 39–50)
HCT VFR BLD CALC: 30.3 % — LOW (ref 39–50)
HCT VFR BLD CALC: 30.4 % — LOW (ref 39–50)
HCT VFR BLD CALC: 31.1 % — LOW (ref 39–50)
HCT VFR BLD CALC: 31.2 % — LOW (ref 39–50)
HCT VFR BLD CALC: 32 % — LOW (ref 39–50)
HCT VFR BLD CALC: 32 % — LOW (ref 39–50)
HCT VFR BLD CALC: 32.3 % — LOW (ref 39–50)
HCT VFR BLD CALC: 32.7 % — LOW (ref 39–50)
HCT VFR BLD CALC: 32.7 % — LOW (ref 39–50)
HCT VFR BLD CALC: 33 % — LOW (ref 39–50)
HCT VFR BLD CALC: 33.4 % — LOW (ref 39–50)
HCT VFR BLD CALC: 33.8 % — LOW (ref 39–50)
HCT VFR BLD CALC: 34.1 % — LOW (ref 39–50)
HCT VFR BLD CALC: 34.8 % — LOW (ref 39–50)
HCT VFR BLD CALC: 34.8 % — LOW (ref 39–50)
HCT VFR BLD CALC: 35.4 % — LOW (ref 39–50)
HCT VFR BLD CALC: 35.9 % — LOW (ref 39–50)
HCT VFR BLD CALC: 36.1 % — LOW (ref 39–50)
HCT VFR BLD CALC: 36.2 % — LOW (ref 39–50)
HCT VFR BLD CALC: 36.4 % — LOW (ref 39–50)
HCT VFR BLD CALC: 36.6 % — LOW (ref 39–50)
HCT VFR BLD CALC: 36.8 % — LOW (ref 39–50)
HCT VFR BLD CALC: 36.9 % — LOW (ref 39–50)
HCT VFR BLD CALC: 38 % — LOW (ref 39–50)
HCT VFR BLD CALC: 38.3 % — LOW (ref 39–50)
HCT VFR BLD CALC: 39.1 % — SIGNIFICANT CHANGE UP (ref 39–50)
HCT VFR BLD CALC: 40.6 % — SIGNIFICANT CHANGE UP (ref 39–50)
HGB BLD-MCNC: 10.1 G/DL — LOW (ref 13–17)
HGB BLD-MCNC: 10.2 G/DL — LOW (ref 13–17)
HGB BLD-MCNC: 10.6 G/DL — LOW (ref 13–17)
HGB BLD-MCNC: 10.7 G/DL — LOW (ref 13–17)
HGB BLD-MCNC: 11 G/DL — LOW (ref 13–17)
HGB BLD-MCNC: 11.1 G/DL — LOW (ref 13–17)
HGB BLD-MCNC: 11.2 G/DL — LOW (ref 13–17)
HGB BLD-MCNC: 11.3 G/DL — LOW (ref 13–17)
HGB BLD-MCNC: 11.4 G/DL — LOW (ref 13–17)
HGB BLD-MCNC: 11.6 G/DL — LOW (ref 13–17)
HGB BLD-MCNC: 11.7 G/DL — LOW (ref 13–17)
HGB BLD-MCNC: 11.8 G/DL — LOW (ref 13–17)
HGB BLD-MCNC: 11.8 G/DL — LOW (ref 13–17)
HGB BLD-MCNC: 11.9 G/DL — LOW (ref 13–17)
HGB BLD-MCNC: 12.1 G/DL — LOW (ref 13–17)
HGB BLD-MCNC: 12.1 G/DL — LOW (ref 13–17)
HGB BLD-MCNC: 12.4 G/DL — LOW (ref 13–17)
HGB BLD-MCNC: 12.4 G/DL — LOW (ref 13–17)
HGB BLD-MCNC: 12.6 G/DL — LOW (ref 13–17)
HGB BLD-MCNC: 12.7 G/DL — LOW (ref 13–17)
HGB BLD-MCNC: 12.9 G/DL — LOW (ref 13–17)
HGB BLD-MCNC: 13.1 G/DL — SIGNIFICANT CHANGE UP (ref 13–17)
HGB BLD-MCNC: 8.9 G/DL — LOW (ref 13–17)
HGB BLD-MCNC: 9.4 G/DL — LOW (ref 13–17)
HGB BLD-MCNC: 9.6 G/DL — LOW (ref 13–17)
HGB BLD-MCNC: 9.7 G/DL — LOW (ref 13–17)
HYALINE CASTS # UR AUTO: 0 /LPF — SIGNIFICANT CHANGE UP (ref 0–2)
HYALINE CASTS: 1 /LPF
IMM GRANULOCYTES NFR BLD AUTO: 0 % — SIGNIFICANT CHANGE UP (ref 0–1.5)
IMM GRANULOCYTES NFR BLD AUTO: 0.3 % — SIGNIFICANT CHANGE UP (ref 0–1.5)
IMM GRANULOCYTES NFR BLD AUTO: 0.4 % — SIGNIFICANT CHANGE UP (ref 0–0.9)
IMM GRANULOCYTES NFR BLD AUTO: 0.4 % — SIGNIFICANT CHANGE UP (ref 0–1.5)
IMM GRANULOCYTES NFR BLD AUTO: 0.5 % — SIGNIFICANT CHANGE UP (ref 0–1.5)
IMM GRANULOCYTES NFR BLD AUTO: 0.6 % — SIGNIFICANT CHANGE UP (ref 0–0.9)
IMM GRANULOCYTES NFR BLD AUTO: 0.7 % — SIGNIFICANT CHANGE UP (ref 0–1.5)
IMM GRANULOCYTES NFR BLD AUTO: 0.7 % — SIGNIFICANT CHANGE UP (ref 0–1.5)
IMM GRANULOCYTES NFR BLD AUTO: 0.9 % — SIGNIFICANT CHANGE UP (ref 0–1.5)
IMM GRANULOCYTES NFR BLD AUTO: 1.2 % — SIGNIFICANT CHANGE UP (ref 0–1.5)
IMM GRANULOCYTES NFR BLD AUTO: 1.9 % — HIGH (ref 0–1.5)
IMM GRANULOCYTES NFR BLD AUTO: 1.9 % — HIGH (ref 0–1.5)
IMM GRANULOCYTES NFR BLD AUTO: 2.6 % — HIGH (ref 0–1.5)
IMM GRANULOCYTES NFR BLD AUTO: 2.7 % — HIGH (ref 0–1.5)
IMM GRANULOCYTES NFR BLD AUTO: 5.3 % — HIGH (ref 0–1.5)
INR BLD: 0.99 RATIO — SIGNIFICANT CHANGE UP (ref 0.88–1.16)
INR BLD: 1.03 RATIO — SIGNIFICANT CHANGE UP (ref 0.88–1.16)
INTERPRETATION PGDFRB: NORMAL
IRON SATN MFR SERPL: 35 % — SIGNIFICANT CHANGE UP (ref 16–55)
IRON SATN MFR SERPL: 40 % — SIGNIFICANT CHANGE UP (ref 16–55)
IRON SATN MFR SERPL: 76 UG/DL — SIGNIFICANT CHANGE UP (ref 45–165)
IRON SATN MFR SERPL: 93 UG/DL — SIGNIFICANT CHANGE UP (ref 45–165)
KETONES UR-MCNC: NEGATIVE — SIGNIFICANT CHANGE UP
KETONES UR-MCNC: NEGATIVE — SIGNIFICANT CHANGE UP
KETONES URINE: NEGATIVE
KETONES URINE: NEGATIVE
LDH SERPL L TO P-CCNC: 208 U/L — SIGNIFICANT CHANGE UP (ref 50–242)
LEUKOCYTE ESTERASE UR-ACNC: NEGATIVE — SIGNIFICANT CHANGE UP
LEUKOCYTE ESTERASE UR-ACNC: NEGATIVE — SIGNIFICANT CHANGE UP
LEUKOCYTE ESTERASE URINE: NEGATIVE
LEUKOCYTE ESTERASE URINE: NEGATIVE
LYMPHOCYTES # BLD AUTO: 0.24 K/UL — LOW (ref 1–3.3)
LYMPHOCYTES # BLD AUTO: 0.3 K/UL — LOW (ref 1–3.3)
LYMPHOCYTES # BLD AUTO: 0.45 K/UL — LOW (ref 1–3.3)
LYMPHOCYTES # BLD AUTO: 0.5 K/UL — LOW (ref 1–3.3)
LYMPHOCYTES # BLD AUTO: 0.61 K/UL — LOW (ref 1–3.3)
LYMPHOCYTES # BLD AUTO: 0.62 K/UL — LOW (ref 1–3.3)
LYMPHOCYTES # BLD AUTO: 0.63 K/UL — LOW (ref 1–3.3)
LYMPHOCYTES # BLD AUTO: 0.67 K/UL — LOW (ref 1–3.3)
LYMPHOCYTES # BLD AUTO: 0.69 K/UL — LOW (ref 1–3.3)
LYMPHOCYTES # BLD AUTO: 0.7 K/UL — LOW (ref 1–3.3)
LYMPHOCYTES # BLD AUTO: 0.73 K/UL — LOW (ref 1–3.3)
LYMPHOCYTES # BLD AUTO: 0.74 K/UL — LOW (ref 1–3.3)
LYMPHOCYTES # BLD AUTO: 0.74 K/UL — LOW (ref 1–3.3)
LYMPHOCYTES # BLD AUTO: 0.81 K/UL — LOW (ref 1–3.3)
LYMPHOCYTES # BLD AUTO: 0.81 K/UL — LOW (ref 1–3.3)
LYMPHOCYTES # BLD AUTO: 0.82 K/UL — LOW (ref 1–3.3)
LYMPHOCYTES # BLD AUTO: 0.82 K/UL — LOW (ref 1–3.3)
LYMPHOCYTES # BLD AUTO: 0.84 K/UL — LOW (ref 1–3.3)
LYMPHOCYTES # BLD AUTO: 0.9 K/UL — LOW (ref 1–3.3)
LYMPHOCYTES # BLD AUTO: 13 % — SIGNIFICANT CHANGE UP (ref 13–44)
LYMPHOCYTES # BLD AUTO: 13.3 % — SIGNIFICANT CHANGE UP (ref 13–44)
LYMPHOCYTES # BLD AUTO: 14.4 % — SIGNIFICANT CHANGE UP (ref 13–44)
LYMPHOCYTES # BLD AUTO: 15 % — SIGNIFICANT CHANGE UP (ref 13–44)
LYMPHOCYTES # BLD AUTO: 15.8 % — SIGNIFICANT CHANGE UP (ref 13–44)
LYMPHOCYTES # BLD AUTO: 17.1 % — SIGNIFICANT CHANGE UP (ref 13–44)
LYMPHOCYTES # BLD AUTO: 17.2 % — SIGNIFICANT CHANGE UP (ref 13–44)
LYMPHOCYTES # BLD AUTO: 17.7 % — SIGNIFICANT CHANGE UP (ref 13–44)
LYMPHOCYTES # BLD AUTO: 19.5 % — SIGNIFICANT CHANGE UP (ref 13–44)
LYMPHOCYTES # BLD AUTO: 23.3 % — SIGNIFICANT CHANGE UP (ref 13–44)
LYMPHOCYTES # BLD AUTO: 24.7 % — SIGNIFICANT CHANGE UP (ref 13–44)
LYMPHOCYTES # BLD AUTO: 25 % — SIGNIFICANT CHANGE UP (ref 13–44)
LYMPHOCYTES # BLD AUTO: 25.2 % — SIGNIFICANT CHANGE UP (ref 13–44)
LYMPHOCYTES # BLD AUTO: 25.5 % — SIGNIFICANT CHANGE UP (ref 13–44)
LYMPHOCYTES # BLD AUTO: 25.5 % — SIGNIFICANT CHANGE UP (ref 13–44)
LYMPHOCYTES # BLD AUTO: 27.1 % — SIGNIFICANT CHANGE UP (ref 13–44)
LYMPHOCYTES # BLD AUTO: 27.2 % — SIGNIFICANT CHANGE UP (ref 13–44)
LYMPHOCYTES # BLD AUTO: 33 % — SIGNIFICANT CHANGE UP (ref 13–44)
LYMPHOCYTES # BLD AUTO: 4.2 % — LOW (ref 13–44)
LYMPHOCYTES # BLD AUTO: 5.6 % — LOW (ref 13–44)
LYMPHOCYTES # BLD AUTO: 7.7 % — LOW (ref 13–44)
Lab: NORMAL
MAGNESIUM SERPL-MCNC: 2.3 MG/DL
MAGNESIUM SERPL-MCNC: 2.3 MG/DL — SIGNIFICANT CHANGE UP (ref 1.6–2.6)
MAGNESIUM SERPL-MCNC: 2.4 MG/DL — SIGNIFICANT CHANGE UP (ref 1.6–2.6)
MANUAL SMEAR VERIFICATION: SIGNIFICANT CHANGE UP
MCHC RBC-ENTMCNC: 30.7 PG — SIGNIFICANT CHANGE UP (ref 27–34)
MCHC RBC-ENTMCNC: 30.8 PG — SIGNIFICANT CHANGE UP (ref 27–34)
MCHC RBC-ENTMCNC: 30.9 PG — SIGNIFICANT CHANGE UP (ref 27–34)
MCHC RBC-ENTMCNC: 30.9 PG — SIGNIFICANT CHANGE UP (ref 27–34)
MCHC RBC-ENTMCNC: 31.1 PG — SIGNIFICANT CHANGE UP (ref 27–34)
MCHC RBC-ENTMCNC: 31.1 PG — SIGNIFICANT CHANGE UP (ref 27–34)
MCHC RBC-ENTMCNC: 31.2 PG — SIGNIFICANT CHANGE UP (ref 27–34)
MCHC RBC-ENTMCNC: 31.3 PG — SIGNIFICANT CHANGE UP (ref 27–34)
MCHC RBC-ENTMCNC: 31.4 PG — SIGNIFICANT CHANGE UP (ref 27–34)
MCHC RBC-ENTMCNC: 31.4 PG — SIGNIFICANT CHANGE UP (ref 27–34)
MCHC RBC-ENTMCNC: 31.5 PG — SIGNIFICANT CHANGE UP (ref 27–34)
MCHC RBC-ENTMCNC: 31.6 PG — SIGNIFICANT CHANGE UP (ref 27–34)
MCHC RBC-ENTMCNC: 31.8 PG — SIGNIFICANT CHANGE UP (ref 27–34)
MCHC RBC-ENTMCNC: 31.8 PG — SIGNIFICANT CHANGE UP (ref 27–34)
MCHC RBC-ENTMCNC: 31.9 PG — SIGNIFICANT CHANGE UP (ref 27–34)
MCHC RBC-ENTMCNC: 31.9 PG — SIGNIFICANT CHANGE UP (ref 27–34)
MCHC RBC-ENTMCNC: 32.2 PG — SIGNIFICANT CHANGE UP (ref 27–34)
MCHC RBC-ENTMCNC: 32.3 GM/DL — SIGNIFICANT CHANGE UP (ref 32–36)
MCHC RBC-ENTMCNC: 32.3 PG — SIGNIFICANT CHANGE UP (ref 27–34)
MCHC RBC-ENTMCNC: 32.4 G/DL — SIGNIFICANT CHANGE UP (ref 32–36)
MCHC RBC-ENTMCNC: 32.4 GM/DL — SIGNIFICANT CHANGE UP (ref 32–36)
MCHC RBC-ENTMCNC: 32.4 PG — SIGNIFICANT CHANGE UP (ref 27–34)
MCHC RBC-ENTMCNC: 32.4 PG — SIGNIFICANT CHANGE UP (ref 27–34)
MCHC RBC-ENTMCNC: 32.5 G/DL — SIGNIFICANT CHANGE UP (ref 32–36)
MCHC RBC-ENTMCNC: 32.5 GM/DL — SIGNIFICANT CHANGE UP (ref 32–36)
MCHC RBC-ENTMCNC: 32.5 GM/DL — SIGNIFICANT CHANGE UP (ref 32–36)
MCHC RBC-ENTMCNC: 32.5 PG — SIGNIFICANT CHANGE UP (ref 27–34)
MCHC RBC-ENTMCNC: 32.6 G/DL — SIGNIFICANT CHANGE UP (ref 32–36)
MCHC RBC-ENTMCNC: 32.6 G/DL — SIGNIFICANT CHANGE UP (ref 32–36)
MCHC RBC-ENTMCNC: 32.6 PG — SIGNIFICANT CHANGE UP (ref 27–34)
MCHC RBC-ENTMCNC: 32.7 G/DL — SIGNIFICANT CHANGE UP (ref 32–36)
MCHC RBC-ENTMCNC: 32.7 GM/DL — SIGNIFICANT CHANGE UP (ref 32–36)
MCHC RBC-ENTMCNC: 32.8 G/DL — SIGNIFICANT CHANGE UP (ref 32–36)
MCHC RBC-ENTMCNC: 32.8 G/DL — SIGNIFICANT CHANGE UP (ref 32–36)
MCHC RBC-ENTMCNC: 32.8 PG — SIGNIFICANT CHANGE UP (ref 27–34)
MCHC RBC-ENTMCNC: 32.9 G/DL — SIGNIFICANT CHANGE UP (ref 32–36)
MCHC RBC-ENTMCNC: 32.9 GM/DL — SIGNIFICANT CHANGE UP (ref 32–36)
MCHC RBC-ENTMCNC: 33 G/DL — SIGNIFICANT CHANGE UP (ref 32–36)
MCHC RBC-ENTMCNC: 33.1 G/DL — SIGNIFICANT CHANGE UP (ref 32–36)
MCHC RBC-ENTMCNC: 33.1 G/DL — SIGNIFICANT CHANGE UP (ref 32–36)
MCHC RBC-ENTMCNC: 33.1 GM/DL — SIGNIFICANT CHANGE UP (ref 32–36)
MCHC RBC-ENTMCNC: 33.1 PG — SIGNIFICANT CHANGE UP (ref 27–34)
MCHC RBC-ENTMCNC: 33.2 G/DL — SIGNIFICANT CHANGE UP (ref 32–36)
MCHC RBC-ENTMCNC: 33.2 G/DL — SIGNIFICANT CHANGE UP (ref 32–36)
MCHC RBC-ENTMCNC: 33.2 GM/DL — SIGNIFICANT CHANGE UP (ref 32–36)
MCHC RBC-ENTMCNC: 33.3 G/DL — SIGNIFICANT CHANGE UP (ref 32–36)
MCHC RBC-ENTMCNC: 33.3 PG — SIGNIFICANT CHANGE UP (ref 27–34)
MCHC RBC-ENTMCNC: 33.4 GM/DL — SIGNIFICANT CHANGE UP (ref 32–36)
MCHC RBC-ENTMCNC: 33.5 GM/DL — SIGNIFICANT CHANGE UP (ref 32–36)
MCHC RBC-ENTMCNC: 33.6 G/DL — SIGNIFICANT CHANGE UP (ref 32–36)
MCHC RBC-ENTMCNC: 33.6 PG — SIGNIFICANT CHANGE UP (ref 27–34)
MCHC RBC-ENTMCNC: 33.6 PG — SIGNIFICANT CHANGE UP (ref 27–34)
MCHC RBC-ENTMCNC: 33.7 GM/DL — SIGNIFICANT CHANGE UP (ref 32–36)
MCHC RBC-ENTMCNC: 33.9 GM/DL — SIGNIFICANT CHANGE UP (ref 32–36)
MCHC RBC-ENTMCNC: 33.9 GM/DL — SIGNIFICANT CHANGE UP (ref 32–36)
MCHC RBC-ENTMCNC: 34.3 GM/DL — SIGNIFICANT CHANGE UP (ref 32–36)
MCV RBC AUTO: 100 FL — SIGNIFICANT CHANGE UP (ref 80–100)
MCV RBC AUTO: 101.8 FL — HIGH (ref 80–100)
MCV RBC AUTO: 92.1 FL — SIGNIFICANT CHANGE UP (ref 80–100)
MCV RBC AUTO: 93.1 FL — SIGNIFICANT CHANGE UP (ref 80–100)
MCV RBC AUTO: 93.3 FL — SIGNIFICANT CHANGE UP (ref 80–100)
MCV RBC AUTO: 93.3 FL — SIGNIFICANT CHANGE UP (ref 80–100)
MCV RBC AUTO: 93.8 FL — SIGNIFICANT CHANGE UP (ref 80–100)
MCV RBC AUTO: 94 FL — SIGNIFICANT CHANGE UP (ref 80–100)
MCV RBC AUTO: 94.2 FL — SIGNIFICANT CHANGE UP (ref 80–100)
MCV RBC AUTO: 94.3 FL — SIGNIFICANT CHANGE UP (ref 80–100)
MCV RBC AUTO: 94.9 FL — SIGNIFICANT CHANGE UP (ref 80–100)
MCV RBC AUTO: 95.3 FL — SIGNIFICANT CHANGE UP (ref 80–100)
MCV RBC AUTO: 95.5 FL — SIGNIFICANT CHANGE UP (ref 80–100)
MCV RBC AUTO: 95.8 FL — SIGNIFICANT CHANGE UP (ref 80–100)
MCV RBC AUTO: 96.2 FL — SIGNIFICANT CHANGE UP (ref 80–100)
MCV RBC AUTO: 96.2 FL — SIGNIFICANT CHANGE UP (ref 80–100)
MCV RBC AUTO: 96.3 FL — SIGNIFICANT CHANGE UP (ref 80–100)
MCV RBC AUTO: 96.6 FL — SIGNIFICANT CHANGE UP (ref 80–100)
MCV RBC AUTO: 96.8 FL — SIGNIFICANT CHANGE UP (ref 80–100)
MCV RBC AUTO: 96.8 FL — SIGNIFICANT CHANGE UP (ref 80–100)
MCV RBC AUTO: 97 FL — SIGNIFICANT CHANGE UP (ref 80–100)
MCV RBC AUTO: 97.1 FL — SIGNIFICANT CHANGE UP (ref 80–100)
MCV RBC AUTO: 97.4 FL — SIGNIFICANT CHANGE UP (ref 80–100)
MCV RBC AUTO: 97.5 FL — SIGNIFICANT CHANGE UP (ref 80–100)
MCV RBC AUTO: 97.8 FL — SIGNIFICANT CHANGE UP (ref 80–100)
MCV RBC AUTO: 97.9 FL — SIGNIFICANT CHANGE UP (ref 80–100)
MCV RBC AUTO: 97.9 FL — SIGNIFICANT CHANGE UP (ref 80–100)
MCV RBC AUTO: 98.2 FL — SIGNIFICANT CHANGE UP (ref 80–100)
MCV RBC AUTO: 98.4 FL — SIGNIFICANT CHANGE UP (ref 80–100)
MCV RBC AUTO: 99 FL — SIGNIFICANT CHANGE UP (ref 80–100)
MCV RBC AUTO: 99.1 FL — SIGNIFICANT CHANGE UP (ref 80–100)
MCV RBC AUTO: 99.4 FL — SIGNIFICANT CHANGE UP (ref 80–100)
METAMYELOCYTES # FLD: 1 % — HIGH (ref 0–0)
MICROSCOPIC-UA: NORMAL
MONOCYTES # BLD AUTO: 0.29 K/UL — SIGNIFICANT CHANGE UP (ref 0–0.9)
MONOCYTES # BLD AUTO: 0.3 K/UL — SIGNIFICANT CHANGE UP (ref 0–0.9)
MONOCYTES # BLD AUTO: 0.32 K/UL — SIGNIFICANT CHANGE UP (ref 0–0.9)
MONOCYTES # BLD AUTO: 0.34 K/UL — SIGNIFICANT CHANGE UP (ref 0–0.9)
MONOCYTES # BLD AUTO: 0.39 K/UL — SIGNIFICANT CHANGE UP (ref 0–0.9)
MONOCYTES # BLD AUTO: 0.39 K/UL — SIGNIFICANT CHANGE UP (ref 0–0.9)
MONOCYTES # BLD AUTO: 0.41 K/UL — SIGNIFICANT CHANGE UP (ref 0–0.9)
MONOCYTES # BLD AUTO: 0.43 K/UL — SIGNIFICANT CHANGE UP (ref 0–0.9)
MONOCYTES # BLD AUTO: 0.43 K/UL — SIGNIFICANT CHANGE UP (ref 0–0.9)
MONOCYTES # BLD AUTO: 0.49 K/UL — SIGNIFICANT CHANGE UP (ref 0–0.9)
MONOCYTES # BLD AUTO: 0.6 K/UL — SIGNIFICANT CHANGE UP (ref 0–0.9)
MONOCYTES # BLD AUTO: 0.63 K/UL — SIGNIFICANT CHANGE UP (ref 0–0.9)
MONOCYTES # BLD AUTO: 0.68 K/UL — SIGNIFICANT CHANGE UP (ref 0–0.9)
MONOCYTES # BLD AUTO: 0.74 K/UL — SIGNIFICANT CHANGE UP (ref 0–0.9)
MONOCYTES # BLD AUTO: 0.79 K/UL — SIGNIFICANT CHANGE UP (ref 0–0.9)
MONOCYTES # BLD AUTO: 0.83 K/UL — SIGNIFICANT CHANGE UP (ref 0–0.9)
MONOCYTES # BLD AUTO: 0.87 K/UL — SIGNIFICANT CHANGE UP (ref 0–0.9)
MONOCYTES # BLD AUTO: 0.89 K/UL — SIGNIFICANT CHANGE UP (ref 0–0.9)
MONOCYTES # BLD AUTO: 1.32 K/UL — HIGH (ref 0–0.9)
MONOCYTES NFR BLD AUTO: 10.3 % — SIGNIFICANT CHANGE UP (ref 2–14)
MONOCYTES NFR BLD AUTO: 10.6 % — SIGNIFICANT CHANGE UP (ref 2–14)
MONOCYTES NFR BLD AUTO: 10.7 % — SIGNIFICANT CHANGE UP (ref 2–14)
MONOCYTES NFR BLD AUTO: 11.9 % — SIGNIFICANT CHANGE UP (ref 2–14)
MONOCYTES NFR BLD AUTO: 11.9 % — SIGNIFICANT CHANGE UP (ref 2–14)
MONOCYTES NFR BLD AUTO: 12.9 % — SIGNIFICANT CHANGE UP (ref 2–14)
MONOCYTES NFR BLD AUTO: 13.4 % — SIGNIFICANT CHANGE UP (ref 2–14)
MONOCYTES NFR BLD AUTO: 14.1 % — HIGH (ref 2–14)
MONOCYTES NFR BLD AUTO: 14.7 % — HIGH (ref 2–14)
MONOCYTES NFR BLD AUTO: 15.6 % — HIGH (ref 2–14)
MONOCYTES NFR BLD AUTO: 16.1 % — HIGH (ref 2–14)
MONOCYTES NFR BLD AUTO: 16.2 % — HIGH (ref 2–14)
MONOCYTES NFR BLD AUTO: 16.7 % — HIGH (ref 2–14)
MONOCYTES NFR BLD AUTO: 17.1 % — HIGH (ref 2–14)
MONOCYTES NFR BLD AUTO: 17.2 % — HIGH (ref 2–14)
MONOCYTES NFR BLD AUTO: 18 % — HIGH (ref 2–14)
MONOCYTES NFR BLD AUTO: 22.6 % — HIGH (ref 2–14)
MONOCYTES NFR BLD AUTO: 25 % — HIGH (ref 2–14)
MONOCYTES NFR BLD AUTO: 7.6 % — SIGNIFICANT CHANGE UP (ref 2–14)
MONOCYTES NFR BLD AUTO: 7.7 % — SIGNIFICANT CHANGE UP (ref 2–14)
MONOCYTES NFR BLD AUTO: 7.8 % — SIGNIFICANT CHANGE UP (ref 2–14)
MRSA PCR RESULT.: SIGNIFICANT CHANGE UP
NEUTROPHILS # BLD AUTO: 1.16 K/UL — LOW (ref 1.8–7.4)
NEUTROPHILS # BLD AUTO: 1.22 K/UL — LOW (ref 1.8–7.4)
NEUTROPHILS # BLD AUTO: 1.37 K/UL — LOW (ref 1.8–7.4)
NEUTROPHILS # BLD AUTO: 1.37 K/UL — LOW (ref 1.8–7.4)
NEUTROPHILS # BLD AUTO: 1.6 K/UL — LOW (ref 1.8–7.4)
NEUTROPHILS # BLD AUTO: 1.66 K/UL — LOW (ref 1.8–7.4)
NEUTROPHILS # BLD AUTO: 1.72 K/UL — LOW (ref 1.8–7.4)
NEUTROPHILS # BLD AUTO: 1.79 K/UL — LOW (ref 1.8–7.4)
NEUTROPHILS # BLD AUTO: 1.83 K/UL — SIGNIFICANT CHANGE UP (ref 1.8–7.4)
NEUTROPHILS # BLD AUTO: 1.97 K/UL — SIGNIFICANT CHANGE UP (ref 1.8–7.4)
NEUTROPHILS # BLD AUTO: 2.45 K/UL — SIGNIFICANT CHANGE UP (ref 1.8–7.4)
NEUTROPHILS # BLD AUTO: 2.51 K/UL — SIGNIFICANT CHANGE UP (ref 1.8–7.4)
NEUTROPHILS # BLD AUTO: 2.66 K/UL — SIGNIFICANT CHANGE UP (ref 1.8–7.4)
NEUTROPHILS # BLD AUTO: 2.67 K/UL — SIGNIFICANT CHANGE UP (ref 1.8–7.4)
NEUTROPHILS # BLD AUTO: 3.1 K/UL — SIGNIFICANT CHANGE UP (ref 1.8–7.4)
NEUTROPHILS # BLD AUTO: 3.21 K/UL — SIGNIFICANT CHANGE UP (ref 1.8–7.4)
NEUTROPHILS # BLD AUTO: 3.27 K/UL — SIGNIFICANT CHANGE UP (ref 1.8–7.4)
NEUTROPHILS # BLD AUTO: 3.47 K/UL — SIGNIFICANT CHANGE UP (ref 1.8–7.4)
NEUTROPHILS # BLD AUTO: 3.62 K/UL — SIGNIFICANT CHANGE UP (ref 1.8–7.4)
NEUTROPHILS # BLD AUTO: 4.64 K/UL — SIGNIFICANT CHANGE UP (ref 1.8–7.4)
NEUTROPHILS # BLD AUTO: 9.46 K/UL — HIGH (ref 1.8–7.4)
NEUTROPHILS NFR BLD AUTO: 43.5 % — SIGNIFICANT CHANGE UP (ref 43–77)
NEUTROPHILS NFR BLD AUTO: 45 % — SIGNIFICANT CHANGE UP (ref 43–77)
NEUTROPHILS NFR BLD AUTO: 53 % — SIGNIFICANT CHANGE UP (ref 43–77)
NEUTROPHILS NFR BLD AUTO: 56.4 % — SIGNIFICANT CHANGE UP (ref 43–77)
NEUTROPHILS NFR BLD AUTO: 56.9 % — SIGNIFICANT CHANGE UP (ref 43–77)
NEUTROPHILS NFR BLD AUTO: 58 % — SIGNIFICANT CHANGE UP (ref 43–77)
NEUTROPHILS NFR BLD AUTO: 58.5 % — SIGNIFICANT CHANGE UP (ref 43–77)
NEUTROPHILS NFR BLD AUTO: 61.3 % — SIGNIFICANT CHANGE UP (ref 43–77)
NEUTROPHILS NFR BLD AUTO: 61.4 % — SIGNIFICANT CHANGE UP (ref 43–77)
NEUTROPHILS NFR BLD AUTO: 62 % — SIGNIFICANT CHANGE UP (ref 43–77)
NEUTROPHILS NFR BLD AUTO: 63.1 % — SIGNIFICANT CHANGE UP (ref 43–77)
NEUTROPHILS NFR BLD AUTO: 63.4 % — SIGNIFICANT CHANGE UP (ref 43–77)
NEUTROPHILS NFR BLD AUTO: 66 % — SIGNIFICANT CHANGE UP (ref 43–77)
NEUTROPHILS NFR BLD AUTO: 66.7 % — SIGNIFICANT CHANGE UP (ref 43–77)
NEUTROPHILS NFR BLD AUTO: 67 % — SIGNIFICANT CHANGE UP (ref 43–77)
NEUTROPHILS NFR BLD AUTO: 69.1 % — SIGNIFICANT CHANGE UP (ref 43–77)
NEUTROPHILS NFR BLD AUTO: 80.2 % — HIGH (ref 43–77)
NEUTROPHILS NFR BLD AUTO: 86.4 % — HIGH (ref 43–77)
NEUTROPHILS NFR BLD AUTO: 87.3 % — HIGH (ref 43–77)
NITRITE UR-MCNC: NEGATIVE — SIGNIFICANT CHANGE UP
NITRITE UR-MCNC: NEGATIVE — SIGNIFICANT CHANGE UP
NITRITE URINE: NEGATIVE
NITRITE URINE: NEGATIVE
NRBC # BLD: 0 /100 WBCS — SIGNIFICANT CHANGE UP (ref 0–0)
NRBC # BLD: 0 /100 — SIGNIFICANT CHANGE UP (ref 0–0)
NRBC # BLD: 0 /100 — SIGNIFICANT CHANGE UP (ref 0–0)
NRBC # BLD: SIGNIFICANT CHANGE UP /100 WBCS (ref 0–0)
NRBC # BLD: SIGNIFICANT CHANGE UP /100 WBCS (ref 0–0)
OB PNL STL: NEGATIVE — SIGNIFICANT CHANGE UP
PH UR: 6 — SIGNIFICANT CHANGE UP (ref 5–8)
PH UR: 8.5 — HIGH (ref 5–8)
PH URINE: 7
PH URINE: 7.5
PHOSPHATE SERPL-MCNC: 2.1 MG/DL — LOW (ref 2.5–4.5)
PHOSPHATE SERPL-MCNC: 3.1 MG/DL — SIGNIFICANT CHANGE UP (ref 2.5–4.5)
PHOSPHATE SERPL-MCNC: 3.3 MG/DL — SIGNIFICANT CHANGE UP (ref 2.5–4.5)
PHOSPHATE SERPL-MCNC: 3.4 MG/DL — SIGNIFICANT CHANGE UP (ref 2.5–4.5)
PLAT MORPH BLD: NORMAL — SIGNIFICANT CHANGE UP
PLATELET # BLD AUTO: 120 K/UL — LOW (ref 150–400)
PLATELET # BLD AUTO: 138 K/UL — LOW (ref 150–400)
PLATELET # BLD AUTO: 142 K/UL — LOW (ref 150–400)
PLATELET # BLD AUTO: 146 K/UL — LOW (ref 150–400)
PLATELET # BLD AUTO: 146 K/UL — LOW (ref 150–400)
PLATELET # BLD AUTO: 147 K/UL — LOW (ref 150–400)
PLATELET # BLD AUTO: 148 K/UL — LOW (ref 150–400)
PLATELET # BLD AUTO: 149 K/UL — LOW (ref 150–400)
PLATELET # BLD AUTO: 151 K/UL — SIGNIFICANT CHANGE UP (ref 150–400)
PLATELET # BLD AUTO: 154 K/UL — SIGNIFICANT CHANGE UP (ref 150–400)
PLATELET # BLD AUTO: 160 K/UL — SIGNIFICANT CHANGE UP (ref 150–400)
PLATELET # BLD AUTO: 161 K/UL — SIGNIFICANT CHANGE UP (ref 150–400)
PLATELET # BLD AUTO: 163 K/UL — SIGNIFICANT CHANGE UP (ref 150–400)
PLATELET # BLD AUTO: 167 K/UL — SIGNIFICANT CHANGE UP (ref 150–400)
PLATELET # BLD AUTO: 169 K/UL — SIGNIFICANT CHANGE UP (ref 150–400)
PLATELET # BLD AUTO: 175 K/UL — SIGNIFICANT CHANGE UP (ref 150–400)
PLATELET # BLD AUTO: 178 K/UL — SIGNIFICANT CHANGE UP (ref 150–400)
PLATELET # BLD AUTO: 182 K/UL — SIGNIFICANT CHANGE UP (ref 150–400)
PLATELET # BLD AUTO: 182 K/UL — SIGNIFICANT CHANGE UP (ref 150–400)
PLATELET # BLD AUTO: 190 K/UL — SIGNIFICANT CHANGE UP (ref 150–400)
PLATELET # BLD AUTO: 193 K/UL — SIGNIFICANT CHANGE UP (ref 150–400)
PLATELET # BLD AUTO: 199 K/UL — SIGNIFICANT CHANGE UP (ref 150–400)
PLATELET # BLD AUTO: 201 K/UL — SIGNIFICANT CHANGE UP (ref 150–400)
PLATELET # BLD AUTO: 218 K/UL — SIGNIFICANT CHANGE UP (ref 150–400)
PLATELET # BLD AUTO: 327 K/UL — SIGNIFICANT CHANGE UP (ref 150–400)
PLATELET # BLD AUTO: 338 K/UL — SIGNIFICANT CHANGE UP (ref 150–400)
PLATELET # BLD AUTO: 342 K/UL — SIGNIFICANT CHANGE UP (ref 150–400)
PLATELET # BLD AUTO: 371 K/UL — SIGNIFICANT CHANGE UP (ref 150–400)
PLATELET # BLD AUTO: 380 K/UL — SIGNIFICANT CHANGE UP (ref 150–400)
PLATELET # BLD AUTO: 381 K/UL — SIGNIFICANT CHANGE UP (ref 150–400)
PLATELET # BLD AUTO: 383 K/UL — SIGNIFICANT CHANGE UP (ref 150–400)
PLATELET # BLD AUTO: 393 K/UL — SIGNIFICANT CHANGE UP (ref 150–400)
PLATELET # BLD AUTO: 416 K/UL — HIGH (ref 150–400)
PLATELET # BLD AUTO: 472 K/UL — HIGH (ref 150–400)
POTASSIUM SERPL-MCNC: 3.7 MMOL/L — SIGNIFICANT CHANGE UP (ref 3.5–5.3)
POTASSIUM SERPL-MCNC: 3.8 MMOL/L — SIGNIFICANT CHANGE UP (ref 3.5–5.3)
POTASSIUM SERPL-MCNC: 3.8 MMOL/L — SIGNIFICANT CHANGE UP (ref 3.5–5.3)
POTASSIUM SERPL-MCNC: 3.9 MMOL/L — SIGNIFICANT CHANGE UP (ref 3.5–5.3)
POTASSIUM SERPL-MCNC: 4 MMOL/L — SIGNIFICANT CHANGE UP (ref 3.5–5.3)
POTASSIUM SERPL-MCNC: 4.1 MMOL/L — SIGNIFICANT CHANGE UP (ref 3.5–5.3)
POTASSIUM SERPL-MCNC: 4.2 MMOL/L — SIGNIFICANT CHANGE UP (ref 3.5–5.3)
POTASSIUM SERPL-SCNC: 3.7 MMOL/L — SIGNIFICANT CHANGE UP (ref 3.5–5.3)
POTASSIUM SERPL-SCNC: 3.8 MMOL/L
POTASSIUM SERPL-SCNC: 3.8 MMOL/L — SIGNIFICANT CHANGE UP (ref 3.5–5.3)
POTASSIUM SERPL-SCNC: 3.8 MMOL/L — SIGNIFICANT CHANGE UP (ref 3.5–5.3)
POTASSIUM SERPL-SCNC: 3.9 MMOL/L
POTASSIUM SERPL-SCNC: 3.9 MMOL/L — SIGNIFICANT CHANGE UP (ref 3.5–5.3)
POTASSIUM SERPL-SCNC: 4 MMOL/L
POTASSIUM SERPL-SCNC: 4 MMOL/L — SIGNIFICANT CHANGE UP (ref 3.5–5.3)
POTASSIUM SERPL-SCNC: 4.1 MMOL/L
POTASSIUM SERPL-SCNC: 4.1 MMOL/L
POTASSIUM SERPL-SCNC: 4.1 MMOL/L — SIGNIFICANT CHANGE UP (ref 3.5–5.3)
POTASSIUM SERPL-SCNC: 4.2 MMOL/L
POTASSIUM SERPL-SCNC: 4.2 MMOL/L — SIGNIFICANT CHANGE UP (ref 3.5–5.3)
POTASSIUM SERPL-SCNC: 4.3 MMOL/L
PROT SERPL-MCNC: 5.1 G/DL — LOW (ref 6–8.3)
PROT SERPL-MCNC: 5.3 G/DL — LOW (ref 6–8.3)
PROT SERPL-MCNC: 5.3 G/DL — LOW (ref 6–8.3)
PROT SERPL-MCNC: 5.4 G/DL — LOW (ref 6–8.3)
PROT SERPL-MCNC: 5.5 G/DL — LOW (ref 6–8.3)
PROT SERPL-MCNC: 5.6 G/DL — LOW (ref 6–8.3)
PROT SERPL-MCNC: 5.7 G/DL — LOW (ref 6–8.3)
PROT SERPL-MCNC: 5.8 G/DL — LOW (ref 6–8.3)
PROT SERPL-MCNC: 5.8 G/DL — LOW (ref 6–8.3)
PROT SERPL-MCNC: 5.9 G/DL — LOW (ref 6–8.3)
PROT SERPL-MCNC: 6 G/DL
PROT SERPL-MCNC: 6.1 G/DL
PROT SERPL-MCNC: 6.1 G/DL
PROT SERPL-MCNC: 6.2 G/DL
PROT SERPL-MCNC: 6.2 G/DL
PROT SERPL-MCNC: 6.4 G/DL
PROT SERPL-MCNC: 6.4 G/DL
PROT SERPL-MCNC: 6.4 G/DL — SIGNIFICANT CHANGE UP (ref 6–8.3)
PROT SERPL-MCNC: 6.5 G/DL
PROT SERPL-MCNC: 6.5 G/DL
PROT SERPL-MCNC: 7.4 G/DL — SIGNIFICANT CHANGE UP (ref 6–8.3)
PROT UR-MCNC: 15
PROT UR-MCNC: SIGNIFICANT CHANGE UP
PROTEIN URINE: NEGATIVE
PROTEIN URINE: NEGATIVE
PROTHROM AB SERPL-ACNC: 11.4 SEC — SIGNIFICANT CHANGE UP (ref 10.5–13.4)
PROTHROM AB SERPL-ACNC: 12 SEC — SIGNIFICANT CHANGE UP (ref 10.5–13.4)
RBC # BLD: 2.89 M/UL — LOW (ref 4.2–5.8)
RBC # BLD: 2.96 M/UL — LOW (ref 4.2–5.8)
RBC # BLD: 3 M/UL — LOW (ref 4.2–5.8)
RBC # BLD: 3.12 M/UL — LOW (ref 4.2–5.8)
RBC # BLD: 3.12 M/UL — LOW (ref 4.2–5.8)
RBC # BLD: 3.14 M/UL — LOW (ref 4.2–5.8)
RBC # BLD: 3.15 M/UL — LOW (ref 4.2–5.8)
RBC # BLD: 3.17 M/UL — LOW (ref 4.2–5.8)
RBC # BLD: 3.24 M/UL — LOW (ref 4.2–5.8)
RBC # BLD: 3.27 M/UL — LOW (ref 4.2–5.8)
RBC # BLD: 3.29 M/UL — LOW (ref 4.2–5.8)
RBC # BLD: 3.3 M/UL — LOW (ref 4.2–5.8)
RBC # BLD: 3.3 M/UL — LOW (ref 4.2–5.8)
RBC # BLD: 3.4 M/UL — LOW (ref 4.2–5.8)
RBC # BLD: 3.41 M/UL — LOW (ref 4.2–5.8)
RBC # BLD: 3.42 M/UL — LOW (ref 4.2–5.8)
RBC # BLD: 3.44 M/UL — LOW (ref 4.2–5.8)
RBC # BLD: 3.44 M/UL — LOW (ref 4.2–5.8)
RBC # BLD: 3.47 M/UL — LOW (ref 4.2–5.8)
RBC # BLD: 3.49 M/UL — LOW (ref 4.2–5.8)
RBC # BLD: 3.54 M/UL — LOW (ref 4.2–5.8)
RBC # BLD: 3.57 M/UL — LOW (ref 4.2–5.8)
RBC # BLD: 3.69 M/UL — LOW (ref 4.2–5.8)
RBC # BLD: 3.69 M/UL — LOW (ref 4.2–5.8)
RBC # BLD: 3.73 M/UL — LOW (ref 4.2–5.8)
RBC # BLD: 3.74 M/UL — LOW (ref 4.2–5.8)
RBC # BLD: 3.79 M/UL — LOW (ref 4.2–5.8)
RBC # BLD: 3.8 M/UL — LOW (ref 4.2–5.8)
RBC # BLD: 3.8 M/UL — LOW (ref 4.2–5.8)
RBC # BLD: 3.84 M/UL — LOW (ref 4.2–5.8)
RBC # BLD: 3.91 M/UL — LOW (ref 4.2–5.8)
RBC # BLD: 4.01 M/UL — LOW (ref 4.2–5.8)
RBC # BLD: 4.17 M/UL — LOW (ref 4.2–5.8)
RBC # FLD: 13.2 % — SIGNIFICANT CHANGE UP (ref 10.3–14.5)
RBC # FLD: 13.3 % — SIGNIFICANT CHANGE UP (ref 10.3–14.5)
RBC # FLD: 13.3 % — SIGNIFICANT CHANGE UP (ref 10.3–14.5)
RBC # FLD: 13.5 % — SIGNIFICANT CHANGE UP (ref 10.3–14.5)
RBC # FLD: 13.6 % — SIGNIFICANT CHANGE UP (ref 10.3–14.5)
RBC # FLD: 13.7 % — SIGNIFICANT CHANGE UP (ref 10.3–14.5)
RBC # FLD: 13.8 % — SIGNIFICANT CHANGE UP (ref 10.3–14.5)
RBC # FLD: 13.9 % — SIGNIFICANT CHANGE UP (ref 10.3–14.5)
RBC # FLD: 13.9 % — SIGNIFICANT CHANGE UP (ref 10.3–14.5)
RBC # FLD: 14 % — SIGNIFICANT CHANGE UP (ref 10.3–14.5)
RBC # FLD: 14 % — SIGNIFICANT CHANGE UP (ref 10.3–14.5)
RBC # FLD: 14.3 % — SIGNIFICANT CHANGE UP (ref 10.3–14.5)
RBC # FLD: 14.4 % — SIGNIFICANT CHANGE UP (ref 10.3–14.5)
RBC # FLD: 14.6 % — HIGH (ref 10.3–14.5)
RBC # FLD: 14.6 % — HIGH (ref 10.3–14.5)
RBC # FLD: 14.9 % — HIGH (ref 10.3–14.5)
RBC # FLD: 15.1 % — HIGH (ref 10.3–14.5)
RBC # FLD: 15.2 % — HIGH (ref 10.3–14.5)
RBC # FLD: 15.2 % — HIGH (ref 10.3–14.5)
RBC # FLD: 15.3 % — HIGH (ref 10.3–14.5)
RBC # FLD: 15.4 % — HIGH (ref 10.3–14.5)
RBC # FLD: 15.5 % — HIGH (ref 10.3–14.5)
RBC # FLD: 15.6 % — HIGH (ref 10.3–14.5)
RBC # FLD: 15.8 % — HIGH (ref 10.3–14.5)
RBC # FLD: 15.9 % — HIGH (ref 10.3–14.5)
RBC # FLD: 16.4 % — HIGH (ref 10.3–14.5)
RBC # FLD: 16.5 % — HIGH (ref 10.3–14.5)
RBC # FLD: 16.5 % — HIGH (ref 10.3–14.5)
RBC # FLD: 16.8 % — HIGH (ref 10.3–14.5)
RBC # FLD: 17.1 % — HIGH (ref 10.3–14.5)
RBC # FLD: 17.3 % — HIGH (ref 10.3–14.5)
RBC BLD AUTO: SIGNIFICANT CHANGE UP
RBC CASTS # UR COMP ASSIST: 1 /HPF — SIGNIFICANT CHANGE UP (ref 0–4)
RBC CASTS # UR COMP ASSIST: SIGNIFICANT CHANGE UP /HPF (ref 0–4)
RED BLOOD CELLS URINE: 0 /HPF
REF LAB TEST METHOD: NORMAL
RETICS #: 88.4 K/UL — SIGNIFICANT CHANGE UP (ref 25–125)
RETICS/RBC NFR: 2.6 % — HIGH (ref 0.5–2.5)
REVIEWED BY: NORMAL
RH IG SCN BLD-IMP: POSITIVE — SIGNIFICANT CHANGE UP
S AUREUS DNA NOSE QL NAA+PROBE: DETECTED
SARS-COV-2 RNA SPEC QL NAA+PROBE: SIGNIFICANT CHANGE UP
SODIUM SERPL-SCNC: 137 MMOL/L — SIGNIFICANT CHANGE UP (ref 135–145)
SODIUM SERPL-SCNC: 137 MMOL/L — SIGNIFICANT CHANGE UP (ref 135–145)
SODIUM SERPL-SCNC: 138 MMOL/L
SODIUM SERPL-SCNC: 138 MMOL/L — SIGNIFICANT CHANGE UP (ref 135–145)
SODIUM SERPL-SCNC: 139 MMOL/L
SODIUM SERPL-SCNC: 139 MMOL/L
SODIUM SERPL-SCNC: 139 MMOL/L — SIGNIFICANT CHANGE UP (ref 135–145)
SODIUM SERPL-SCNC: 140 MMOL/L
SODIUM SERPL-SCNC: 140 MMOL/L — SIGNIFICANT CHANGE UP (ref 135–145)
SODIUM SERPL-SCNC: 141 MMOL/L
SODIUM SERPL-SCNC: 141 MMOL/L — SIGNIFICANT CHANGE UP (ref 135–145)
SODIUM SERPL-SCNC: 142 MMOL/L
SODIUM SERPL-SCNC: 142 MMOL/L — SIGNIFICANT CHANGE UP (ref 135–145)
SODIUM SERPL-SCNC: 143 MMOL/L
SODIUM SERPL-SCNC: 143 MMOL/L — SIGNIFICANT CHANGE UP (ref 135–145)
SODIUM SERPL-SCNC: 144 MMOL/L — SIGNIFICANT CHANGE UP (ref 135–145)
SODIUM SERPL-SCNC: 145 MMOL/L — SIGNIFICANT CHANGE UP (ref 135–145)
SP GR SPEC: 1.02 — SIGNIFICANT CHANGE UP (ref 1.01–1.02)
SP GR SPEC: 1.05 — HIGH (ref 1.01–1.02)
SPECIFIC GRAVITY URINE: 1.01
SPECIFIC GRAVITY URINE: 1.01
SPECIMEN SOURCE: SIGNIFICANT CHANGE UP
SQUAMOUS EPITHELIAL CELLS: 0 /HPF
SURGICAL PATHOLOGY STUDY: SIGNIFICANT CHANGE UP
TA REPEAT RESULT: NORMAL
TEST PERFORMANCE INFO SPEC: NORMAL
TIBC SERPL-MCNC: 219 UG/DL — LOW (ref 220–430)
TIBC SERPL-MCNC: 234 UG/DL — SIGNIFICANT CHANGE UP (ref 220–430)
TSH SERPL-ACNC: 1.61 UIU/ML
UIBC SERPL-MCNC: 141 UG/DL — SIGNIFICANT CHANGE UP (ref 110–370)
UIBC SERPL-MCNC: 143 UG/DL — SIGNIFICANT CHANGE UP (ref 110–370)
UROBILINOGEN FLD QL: ABNORMAL
UROBILINOGEN FLD QL: NEGATIVE — SIGNIFICANT CHANGE UP
UROBILINOGEN URINE: NORMAL
UROBILINOGEN URINE: NORMAL
VIT B12 SERPL-MCNC: 377 PG/ML — SIGNIFICANT CHANGE UP (ref 232–1245)
WBC # BLD: 10.83 K/UL — HIGH (ref 3.8–10.5)
WBC # BLD: 13.23 K/UL — HIGH (ref 3.8–10.5)
WBC # BLD: 2.43 K/UL — LOW (ref 3.8–10.5)
WBC # BLD: 2.58 K/UL — LOW (ref 3.8–10.5)
WBC # BLD: 2.66 K/UL — LOW (ref 3.8–10.5)
WBC # BLD: 2.7 K/UL — LOW (ref 3.8–10.5)
WBC # BLD: 2.72 K/UL — LOW (ref 3.8–10.5)
WBC # BLD: 2.75 K/UL — LOW (ref 3.8–10.5)
WBC # BLD: 2.76 K/UL — LOW (ref 3.8–10.5)
WBC # BLD: 2.92 K/UL — LOW (ref 3.8–10.5)
WBC # BLD: 2.99 K/UL — LOW (ref 3.8–10.5)
WBC # BLD: 3.02 K/UL — LOW (ref 3.8–10.5)
WBC # BLD: 3.06 K/UL — LOW (ref 3.8–10.5)
WBC # BLD: 3.21 K/UL — LOW (ref 3.8–10.5)
WBC # BLD: 3.63 K/UL — LOW (ref 3.8–10.5)
WBC # BLD: 3.86 K/UL — SIGNIFICANT CHANGE UP (ref 3.8–10.5)
WBC # BLD: 3.91 K/UL — SIGNIFICANT CHANGE UP (ref 3.8–10.5)
WBC # BLD: 4.19 K/UL — SIGNIFICANT CHANGE UP (ref 3.8–10.5)
WBC # BLD: 4.3 K/UL — SIGNIFICANT CHANGE UP (ref 3.8–10.5)
WBC # BLD: 4.7 K/UL — SIGNIFICANT CHANGE UP (ref 3.8–10.5)
WBC # BLD: 4.91 K/UL — SIGNIFICANT CHANGE UP (ref 3.8–10.5)
WBC # BLD: 4.94 K/UL — SIGNIFICANT CHANGE UP (ref 3.8–10.5)
WBC # BLD: 5.08 K/UL — SIGNIFICANT CHANGE UP (ref 3.8–10.5)
WBC # BLD: 5.2 K/UL — SIGNIFICANT CHANGE UP (ref 3.8–10.5)
WBC # BLD: 5.28 K/UL — SIGNIFICANT CHANGE UP (ref 3.8–10.5)
WBC # BLD: 5.37 K/UL — SIGNIFICANT CHANGE UP (ref 3.8–10.5)
WBC # BLD: 5.4 K/UL — SIGNIFICANT CHANGE UP (ref 3.8–10.5)
WBC # BLD: 6.05 K/UL — SIGNIFICANT CHANGE UP (ref 3.8–10.5)
WBC # BLD: 6.36 K/UL — SIGNIFICANT CHANGE UP (ref 3.8–10.5)
WBC # BLD: 6.43 K/UL — SIGNIFICANT CHANGE UP (ref 3.8–10.5)
WBC # BLD: 6.99 K/UL — SIGNIFICANT CHANGE UP (ref 3.8–10.5)
WBC # BLD: 6.99 K/UL — SIGNIFICANT CHANGE UP (ref 3.8–10.5)
WBC # BLD: 7.45 K/UL — SIGNIFICANT CHANGE UP (ref 3.8–10.5)
WBC # BLD: 7.84 K/UL — SIGNIFICANT CHANGE UP (ref 3.8–10.5)
WBC # FLD AUTO: 10.83 K/UL — HIGH (ref 3.8–10.5)
WBC # FLD AUTO: 13.23 K/UL — HIGH (ref 3.8–10.5)
WBC # FLD AUTO: 2.43 K/UL — LOW (ref 3.8–10.5)
WBC # FLD AUTO: 2.58 K/UL — LOW (ref 3.8–10.5)
WBC # FLD AUTO: 2.66 K/UL — LOW (ref 3.8–10.5)
WBC # FLD AUTO: 2.7 K/UL — LOW (ref 3.8–10.5)
WBC # FLD AUTO: 2.72 K/UL — LOW (ref 3.8–10.5)
WBC # FLD AUTO: 2.75 K/UL — LOW (ref 3.8–10.5)
WBC # FLD AUTO: 2.76 K/UL — LOW (ref 3.8–10.5)
WBC # FLD AUTO: 2.92 K/UL — LOW (ref 3.8–10.5)
WBC # FLD AUTO: 2.99 K/UL — LOW (ref 3.8–10.5)
WBC # FLD AUTO: 3.02 K/UL — LOW (ref 3.8–10.5)
WBC # FLD AUTO: 3.06 K/UL — LOW (ref 3.8–10.5)
WBC # FLD AUTO: 3.21 K/UL — LOW (ref 3.8–10.5)
WBC # FLD AUTO: 3.63 K/UL — LOW (ref 3.8–10.5)
WBC # FLD AUTO: 3.86 K/UL — SIGNIFICANT CHANGE UP (ref 3.8–10.5)
WBC # FLD AUTO: 3.91 K/UL — SIGNIFICANT CHANGE UP (ref 3.8–10.5)
WBC # FLD AUTO: 4.19 K/UL — SIGNIFICANT CHANGE UP (ref 3.8–10.5)
WBC # FLD AUTO: 4.3 K/UL — SIGNIFICANT CHANGE UP (ref 3.8–10.5)
WBC # FLD AUTO: 4.7 K/UL — SIGNIFICANT CHANGE UP (ref 3.8–10.5)
WBC # FLD AUTO: 4.91 K/UL — SIGNIFICANT CHANGE UP (ref 3.8–10.5)
WBC # FLD AUTO: 4.94 K/UL — SIGNIFICANT CHANGE UP (ref 3.8–10.5)
WBC # FLD AUTO: 5.08 K/UL — SIGNIFICANT CHANGE UP (ref 3.8–10.5)
WBC # FLD AUTO: 5.2 K/UL — SIGNIFICANT CHANGE UP (ref 3.8–10.5)
WBC # FLD AUTO: 5.28 K/UL — SIGNIFICANT CHANGE UP (ref 3.8–10.5)
WBC # FLD AUTO: 5.37 K/UL — SIGNIFICANT CHANGE UP (ref 3.8–10.5)
WBC # FLD AUTO: 5.4 K/UL — SIGNIFICANT CHANGE UP (ref 3.8–10.5)
WBC # FLD AUTO: 6.05 K/UL — SIGNIFICANT CHANGE UP (ref 3.8–10.5)
WBC # FLD AUTO: 6.36 K/UL — SIGNIFICANT CHANGE UP (ref 3.8–10.5)
WBC # FLD AUTO: 6.43 K/UL — SIGNIFICANT CHANGE UP (ref 3.8–10.5)
WBC # FLD AUTO: 6.99 K/UL — SIGNIFICANT CHANGE UP (ref 3.8–10.5)
WBC # FLD AUTO: 6.99 K/UL — SIGNIFICANT CHANGE UP (ref 3.8–10.5)
WBC # FLD AUTO: 7.45 K/UL — SIGNIFICANT CHANGE UP (ref 3.8–10.5)
WBC # FLD AUTO: 7.84 K/UL — SIGNIFICANT CHANGE UP (ref 3.8–10.5)
WBC UR QL: 0 /HPF — SIGNIFICANT CHANGE UP (ref 0–5)
WBC UR QL: SIGNIFICANT CHANGE UP /HPF (ref 0–5)
WHITE BLOOD CELLS URINE: 0 /HPF

## 2022-01-01 PROCEDURE — 99232 SBSQ HOSP IP/OBS MODERATE 35: CPT

## 2022-01-01 PROCEDURE — 74230 X-RAY XM SWLNG FUNCJ C+: CPT | Mod: 26

## 2022-01-01 PROCEDURE — 99214 OFFICE O/P EST MOD 30 MIN: CPT | Mod: 95

## 2022-01-01 PROCEDURE — 88313 SPECIAL STAINS GROUP 2: CPT | Mod: 26

## 2022-01-01 PROCEDURE — 99214 OFFICE O/P EST MOD 30 MIN: CPT

## 2022-01-01 PROCEDURE — C1769: CPT

## 2022-01-01 PROCEDURE — 71045 X-RAY EXAM CHEST 1 VIEW: CPT | Mod: 26

## 2022-01-01 PROCEDURE — 93971 EXTREMITY STUDY: CPT

## 2022-01-01 PROCEDURE — 97166 OT EVAL MOD COMPLEX 45 MIN: CPT

## 2022-01-01 PROCEDURE — 93306 TTE W/DOPPLER COMPLETE: CPT

## 2022-01-01 PROCEDURE — 71260 CT THORAX DX C+: CPT | Mod: 26

## 2022-01-01 PROCEDURE — 93971 EXTREMITY STUDY: CPT | Mod: 26,RT

## 2022-01-01 PROCEDURE — 93010 ELECTROCARDIOGRAM REPORT: CPT

## 2022-01-01 PROCEDURE — 99223 1ST HOSP IP/OBS HIGH 75: CPT

## 2022-01-01 PROCEDURE — 88331 PATH CONSLTJ SURG 1 BLK 1SPC: CPT | Mod: 26

## 2022-01-01 PROCEDURE — 99442: CPT

## 2022-01-01 PROCEDURE — 85730 THROMBOPLASTIN TIME PARTIAL: CPT

## 2022-01-01 PROCEDURE — 99443: CPT

## 2022-01-01 PROCEDURE — 83605 ASSAY OF LACTIC ACID: CPT

## 2022-01-01 PROCEDURE — 96365 THER/PROPH/DIAG IV INF INIT: CPT

## 2022-01-01 PROCEDURE — 96374 THER/PROPH/DIAG INJ IV PUSH: CPT | Mod: XU

## 2022-01-01 PROCEDURE — 61781 SCAN PROC CRANIAL INTRA: CPT

## 2022-01-01 PROCEDURE — 88307 TISSUE EXAM BY PATHOLOGIST: CPT

## 2022-01-01 PROCEDURE — 85014 HEMATOCRIT: CPT

## 2022-01-01 PROCEDURE — 77001 FLUOROGUIDE FOR VEIN DEVICE: CPT

## 2022-01-01 PROCEDURE — 80048 BASIC METABOLIC PNL TOTAL CA: CPT

## 2022-01-01 PROCEDURE — 74177 CT ABD & PELVIS W/CONTRAST: CPT | Mod: 26

## 2022-01-01 PROCEDURE — 83735 ASSAY OF MAGNESIUM: CPT

## 2022-01-01 PROCEDURE — 72192 CT PELVIS W/O DYE: CPT | Mod: MA

## 2022-01-01 PROCEDURE — 97116 GAIT TRAINING THERAPY: CPT

## 2022-01-01 PROCEDURE — U0005: CPT

## 2022-01-01 PROCEDURE — 70496 CT ANGIOGRAPHY HEAD: CPT | Mod: MA

## 2022-01-01 PROCEDURE — 74177 CT ABD & PELVIS W/CONTRAST: CPT

## 2022-01-01 PROCEDURE — 92526 ORAL FUNCTION THERAPY: CPT

## 2022-01-01 PROCEDURE — 70450 CT HEAD/BRAIN W/O DYE: CPT | Mod: 26

## 2022-01-01 PROCEDURE — 99222 1ST HOSP IP/OBS MODERATE 55: CPT

## 2022-01-01 PROCEDURE — 70498 CT ANGIOGRAPHY NECK: CPT | Mod: MA

## 2022-01-01 PROCEDURE — 99213 OFFICE O/P EST LOW 20 MIN: CPT

## 2022-01-01 PROCEDURE — 85610 PROTHROMBIN TIME: CPT

## 2022-01-01 PROCEDURE — U0003: CPT

## 2022-01-01 PROCEDURE — 99233 SBSQ HOSP IP/OBS HIGH 50: CPT

## 2022-01-01 PROCEDURE — 99291 CRITICAL CARE FIRST HOUR: CPT

## 2022-01-01 PROCEDURE — 88307 TISSUE EXAM BY PATHOLOGIST: CPT | Mod: 26

## 2022-01-01 PROCEDURE — 90834 PSYTX W PT 45 MINUTES: CPT

## 2022-01-01 PROCEDURE — 71260 CT THORAX DX C+: CPT | Mod: MA

## 2022-01-01 PROCEDURE — 72197 MRI PELVIS W/O & W/DYE: CPT

## 2022-01-01 PROCEDURE — C9399: CPT

## 2022-01-01 PROCEDURE — 76377 3D RENDER W/INTRP POSTPROCES: CPT

## 2022-01-01 PROCEDURE — C1889: CPT

## 2022-01-01 PROCEDURE — 72197 MRI PELVIS W/O & W/DYE: CPT | Mod: 26

## 2022-01-01 PROCEDURE — 36561 INSERT TUNNELED CV CATH: CPT

## 2022-01-01 PROCEDURE — 90832 PSYTX W PT 30 MINUTES: CPT

## 2022-01-01 PROCEDURE — A9585: CPT

## 2022-01-01 PROCEDURE — 96116 NUBHVL XM PHYS/QHP 1ST HR: CPT

## 2022-01-01 PROCEDURE — 71045 X-RAY EXAM CHEST 1 VIEW: CPT

## 2022-01-01 PROCEDURE — 88334 PATH CONSLTJ SURG CYTO XM EA: CPT

## 2022-01-01 PROCEDURE — 99205 OFFICE O/P NEW HI 60 MIN: CPT

## 2022-01-01 PROCEDURE — 88342 IMHCHEM/IMCYTCHM 1ST ANTB: CPT

## 2022-01-01 PROCEDURE — 86850 RBC ANTIBODY SCREEN: CPT

## 2022-01-01 PROCEDURE — 88334 PATH CONSLTJ SURG CYTO XM EA: CPT | Mod: 26,59

## 2022-01-01 PROCEDURE — 88313 SPECIAL STAINS GROUP 2: CPT

## 2022-01-01 PROCEDURE — 70496 CT ANGIOGRAPHY HEAD: CPT | Mod: 26,MA

## 2022-01-01 PROCEDURE — 71260 CT THORAX DX C+: CPT

## 2022-01-01 PROCEDURE — 99215 OFFICE O/P EST HI 40 MIN: CPT

## 2022-01-01 PROCEDURE — 70553 MRI BRAIN STEM W/O & W/DYE: CPT | Mod: 26

## 2022-01-01 PROCEDURE — 99239 HOSP IP/OBS DSCHRG MGMT >30: CPT

## 2022-01-01 PROCEDURE — 72131 CT LUMBAR SPINE W/O DYE: CPT | Mod: 26,MA

## 2022-01-01 PROCEDURE — 72128 CT CHEST SPINE W/O DYE: CPT | Mod: 26,MA

## 2022-01-01 PROCEDURE — 86900 BLOOD TYPING SEROLOGIC ABO: CPT

## 2022-01-01 PROCEDURE — 74177 CT ABD & PELVIS W/CONTRAST: CPT | Mod: 26,MA

## 2022-01-01 PROCEDURE — 93970 EXTREMITY STUDY: CPT

## 2022-01-01 PROCEDURE — 82947 ASSAY GLUCOSE BLOOD QUANT: CPT

## 2022-01-01 PROCEDURE — 82803 BLOOD GASES ANY COMBINATION: CPT

## 2022-01-01 PROCEDURE — 69990 MICROSURGERY ADD-ON: CPT | Mod: 59

## 2022-01-01 PROCEDURE — 70450 CT HEAD/BRAIN W/O DYE: CPT

## 2022-01-01 PROCEDURE — 87641 MR-STAPH DNA AMP PROBE: CPT

## 2022-01-01 PROCEDURE — 61510 CRNEC TREPH EXC BRN TUM STTL: CPT

## 2022-01-01 PROCEDURE — 88360 TUMOR IMMUNOHISTOCHEM/MANUAL: CPT

## 2022-01-01 PROCEDURE — 99231 SBSQ HOSP IP/OBS SF/LOW 25: CPT

## 2022-01-01 PROCEDURE — 87086 URINE CULTURE/COLONY COUNT: CPT

## 2022-01-01 PROCEDURE — 80053 COMPREHEN METABOLIC PANEL: CPT

## 2022-01-01 PROCEDURE — 76937 US GUIDE VASCULAR ACCESS: CPT | Mod: 26

## 2022-01-01 PROCEDURE — C1894: CPT

## 2022-01-01 PROCEDURE — 93356 MYOCRD STRAIN IMG SPCKL TRCK: CPT

## 2022-01-01 PROCEDURE — C9803: CPT

## 2022-01-01 PROCEDURE — 72131 CT LUMBAR SPINE W/O DYE: CPT | Mod: MA

## 2022-01-01 PROCEDURE — 70553 MRI BRAIN STEM W/O & W/DYE: CPT | Mod: MA

## 2022-01-01 PROCEDURE — 84295 ASSAY OF SERUM SODIUM: CPT

## 2022-01-01 PROCEDURE — 84100 ASSAY OF PHOSPHORUS: CPT

## 2022-01-01 PROCEDURE — 72192 CT PELVIS W/O DYE: CPT | Mod: 26,MA,59

## 2022-01-01 PROCEDURE — 36415 COLL VENOUS BLD VENIPUNCTURE: CPT

## 2022-01-01 PROCEDURE — 74177 CT ABD & PELVIS W/CONTRAST: CPT | Mod: MA

## 2022-01-01 PROCEDURE — 88342 IMHCHEM/IMCYTCHM 1ST ANTB: CPT | Mod: 26,59

## 2022-01-01 PROCEDURE — 97162 PT EVAL MOD COMPLEX 30 MIN: CPT

## 2022-01-01 PROCEDURE — 99285 EMERGENCY DEPT VISIT HI MDM: CPT

## 2022-01-01 PROCEDURE — 85025 COMPLETE CBC W/AUTO DIFF WBC: CPT

## 2022-01-01 PROCEDURE — 81001 URINALYSIS AUTO W/SCOPE: CPT

## 2022-01-01 PROCEDURE — 72128 CT CHEST SPINE W/O DYE: CPT | Mod: MA

## 2022-01-01 PROCEDURE — 85018 HEMOGLOBIN: CPT

## 2022-01-01 PROCEDURE — 77001 FLUOROGUIDE FOR VEIN DEVICE: CPT | Mod: 26

## 2022-01-01 PROCEDURE — 84132 ASSAY OF SERUM POTASSIUM: CPT

## 2022-01-01 PROCEDURE — 86901 BLOOD TYPING SEROLOGIC RH(D): CPT

## 2022-01-01 PROCEDURE — 88341 IMHCHEM/IMCYTCHM EA ADD ANTB: CPT | Mod: 26,59

## 2022-01-01 PROCEDURE — 85027 COMPLETE CBC AUTOMATED: CPT

## 2022-01-01 PROCEDURE — 88331 PATH CONSLTJ SURG 1 BLK 1SPC: CPT

## 2022-01-01 PROCEDURE — C1713: CPT

## 2022-01-01 PROCEDURE — 76937 US GUIDE VASCULAR ACCESS: CPT

## 2022-01-01 PROCEDURE — 92610 EVALUATE SWALLOWING FUNCTION: CPT

## 2022-01-01 PROCEDURE — 87640 STAPH A DNA AMP PROBE: CPT

## 2022-01-01 PROCEDURE — C1788: CPT

## 2022-01-01 PROCEDURE — 83036 HEMOGLOBIN GLYCOSYLATED A1C: CPT

## 2022-01-01 PROCEDURE — 96375 TX/PRO/DX INJ NEW DRUG ADDON: CPT | Mod: XU

## 2022-01-01 PROCEDURE — 93970 EXTREMITY STUDY: CPT | Mod: 26

## 2022-01-01 PROCEDURE — 97110 THERAPEUTIC EXERCISES: CPT

## 2022-01-01 PROCEDURE — 71260 CT THORAX DX C+: CPT | Mod: 26,MA

## 2022-01-01 PROCEDURE — 88360 TUMOR IMMUNOHISTOCHEM/MANUAL: CPT | Mod: 26

## 2022-01-01 PROCEDURE — 82435 ASSAY OF BLOOD CHLORIDE: CPT

## 2022-01-01 PROCEDURE — 93306 TTE W/DOPPLER COMPLETE: CPT | Mod: 26

## 2022-01-01 PROCEDURE — 70498 CT ANGIOGRAPHY NECK: CPT | Mod: 26,MA

## 2022-01-01 PROCEDURE — 99213 OFFICE O/P EST LOW 20 MIN: CPT | Mod: 95

## 2022-01-01 PROCEDURE — 82330 ASSAY OF CALCIUM: CPT

## 2022-01-01 PROCEDURE — 88341 IMHCHEM/IMCYTCHM EA ADD ANTB: CPT

## 2022-01-01 DEVICE — SURGICEL 2 X 14": Type: IMPLANTABLE DEVICE | Site: RIGHT | Status: FUNCTIONAL

## 2022-01-01 DEVICE — PLATE COVER BURRHOLE UN3 W/TAB 10MM: Type: IMPLANTABLE DEVICE | Site: RIGHT | Status: FUNCTIONAL

## 2022-01-01 DEVICE — KIT A-LINE 1LUM 20G X 12CM SAFE KIT: Type: IMPLANTABLE DEVICE | Site: RIGHT | Status: FUNCTIONAL

## 2022-01-01 DEVICE — MATRIX DURAGEN PLUS DURAL REGENERATION 3X3: Type: IMPLANTABLE DEVICE | Site: RIGHT | Status: FUNCTIONAL

## 2022-01-01 DEVICE — PLATE UN3 2 HOLE RIGID: Type: IMPLANTABLE DEVICE | Site: RIGHT | Status: FUNCTIONAL

## 2022-01-01 DEVICE — SCREW UN3 AXS SELF DRILL 1.5X4MM 5/PK: Type: IMPLANTABLE DEVICE | Site: RIGHT | Status: FUNCTIONAL

## 2022-01-01 DEVICE — SURGIFOAM PAD 8CM X 12.5CM X 10MM (100): Type: IMPLANTABLE DEVICE | Site: RIGHT | Status: FUNCTIONAL

## 2022-01-01 DEVICE — SURGIFLO MATRIX WITH THROMBIN KIT: Type: IMPLANTABLE DEVICE | Site: RIGHT | Status: FUNCTIONAL

## 2022-01-01 DEVICE — DVC ADHERUS AUTOSPRAY W/ DURAL SEALANT EXT TIP: Type: IMPLANTABLE DEVICE | Site: RIGHT | Status: FUNCTIONAL

## 2022-01-01 DEVICE — MAYFIELD SKULL PIN ADULT PLASTIC: Type: IMPLANTABLE DEVICE | Site: RIGHT | Status: FUNCTIONAL

## 2022-01-01 RX ORDER — METHADONE HYDROCHLORIDE 40 MG/1
3 TABLET ORAL
Refills: 0 | Status: DISCONTINUED | OUTPATIENT
Start: 2022-01-01 | End: 2022-01-01

## 2022-01-01 RX ORDER — DEXAMETHASONE 0.5 MG/5ML
4 ELIXIR ORAL EVERY 12 HOURS
Refills: 0 | Status: COMPLETED | OUTPATIENT
Start: 2022-01-01 | End: 2022-01-01

## 2022-01-01 RX ORDER — TETRACAINE/BENZOCAINE/BUTAMBEN 2%-14%-2%
1 OINTMENT (GRAM) TOPICAL
Qty: 0 | Refills: 0 | DISCHARGE
Start: 2022-01-01

## 2022-01-01 RX ORDER — DEXAMETHASONE 0.5 MG/5ML
2 ELIXIR ORAL EVERY 12 HOURS
Refills: 0 | Status: CANCELLED | OUTPATIENT
Start: 2022-01-01 | End: 2022-01-01

## 2022-01-01 RX ORDER — GABAPENTIN 400 MG/1
900 CAPSULE ORAL THREE TIMES A DAY
Refills: 0 | Status: DISCONTINUED | OUTPATIENT
Start: 2022-01-01 | End: 2022-01-01

## 2022-01-01 RX ORDER — OXYCODONE HYDROCHLORIDE 5 MG/1
5 TABLET ORAL EVERY 4 HOURS
Refills: 0 | Status: DISCONTINUED | OUTPATIENT
Start: 2022-01-01 | End: 2022-01-01

## 2022-01-01 RX ORDER — CHLORHEXIDINE GLUCONATE 213 G/1000ML
1 SOLUTION TOPICAL EVERY 12 HOURS
Refills: 0 | Status: DISCONTINUED | OUTPATIENT
Start: 2022-01-01 | End: 2022-01-01

## 2022-01-01 RX ORDER — TAMSULOSIN HYDROCHLORIDE 0.4 MG/1
0.4 CAPSULE ORAL
Qty: 30 | Refills: 0 | Status: ACTIVE | COMMUNITY
Start: 2022-01-01

## 2022-01-01 RX ORDER — ACETAMINOPHEN 500 MG
650 TABLET ORAL EVERY 6 HOURS
Refills: 0 | Status: DISCONTINUED | OUTPATIENT
Start: 2022-01-01 | End: 2022-01-01

## 2022-01-01 RX ORDER — GABAPENTIN 400 MG/1
1000 CAPSULE ORAL THREE TIMES A DAY
Refills: 0 | Status: DISCONTINUED | OUTPATIENT
Start: 2022-01-01 | End: 2022-01-01

## 2022-01-01 RX ORDER — SODIUM,POTASSIUM PHOSPHATES 278-250MG
1 POWDER IN PACKET (EA) ORAL ONCE
Refills: 0 | Status: COMPLETED | OUTPATIENT
Start: 2022-01-01 | End: 2022-01-01

## 2022-01-01 RX ORDER — POLYETHYLENE GLYCOL 3350 17 G/17G
17 POWDER, FOR SOLUTION ORAL
Refills: 0 | Status: DISCONTINUED | OUTPATIENT
Start: 2022-01-01 | End: 2022-01-01

## 2022-01-01 RX ORDER — SALIVA SUBSTITUTE COMB NO.11 351 MG
5 POWDER IN PACKET (EA) MUCOUS MEMBRANE THREE TIMES A DAY
Refills: 0 | Status: DISCONTINUED | OUTPATIENT
Start: 2022-01-01 | End: 2022-01-01

## 2022-01-01 RX ORDER — OXYCODONE HYDROCHLORIDE 5 MG/1
10 TABLET ORAL ONCE
Refills: 0 | Status: DISCONTINUED | OUTPATIENT
Start: 2022-01-01 | End: 2022-01-01

## 2022-01-01 RX ORDER — SENNA PLUS 8.6 MG/1
2 TABLET ORAL
Qty: 0 | Refills: 0 | DISCHARGE
Start: 2022-01-01

## 2022-01-01 RX ORDER — LIDOCAINE AND PRILOCAINE 25; 25 MG/G; MG/G
2.5-2.5 CREAM TOPICAL
Qty: 1 | Refills: 2 | Status: ACTIVE | COMMUNITY
Start: 2022-01-01 | End: 1900-01-01

## 2022-01-01 RX ORDER — ACETAMINOPHEN 500 MG
1000 TABLET ORAL ONCE
Refills: 0 | Status: COMPLETED | OUTPATIENT
Start: 2022-01-01 | End: 2022-01-01

## 2022-01-01 RX ORDER — DEXAMETHASONE 0.5 MG/5ML
3 ELIXIR ORAL EVERY 12 HOURS
Refills: 0 | Status: CANCELLED | OUTPATIENT
Start: 2022-01-01 | End: 2022-01-01

## 2022-01-01 RX ORDER — PANTOPRAZOLE SODIUM 20 MG/1
40 TABLET, DELAYED RELEASE ORAL
Refills: 0 | Status: DISCONTINUED | OUTPATIENT
Start: 2022-01-01 | End: 2022-01-01

## 2022-01-01 RX ORDER — METHADONE HYDROCHLORIDE 40 MG/1
2 TABLET ORAL THREE TIMES A DAY
Refills: 0 | Status: DISCONTINUED | OUTPATIENT
Start: 2022-01-01 | End: 2022-01-01

## 2022-01-01 RX ORDER — METHADONE HYDROCHLORIDE 40 MG/1
4 TABLET ORAL AT BEDTIME
Refills: 0 | Status: DISCONTINUED | OUTPATIENT
Start: 2022-01-01 | End: 2022-01-01

## 2022-01-01 RX ORDER — POTASSIUM CHLORIDE 20 MEQ
20 PACKET (EA) ORAL ONCE
Refills: 0 | Status: COMPLETED | OUTPATIENT
Start: 2022-01-01 | End: 2022-01-01

## 2022-01-01 RX ORDER — METHADONE HYDROCHLORIDE 10 MG/5ML
10 SOLUTION ORAL
Qty: 150 | Refills: 0 | Status: ACTIVE | COMMUNITY
Start: 2021-01-01 | End: 1900-01-01

## 2022-01-01 RX ORDER — SENNA PLUS 8.6 MG/1
2 TABLET ORAL AT BEDTIME
Refills: 0 | Status: DISCONTINUED | OUTPATIENT
Start: 2022-01-01 | End: 2022-01-01

## 2022-01-01 RX ORDER — DEXAMETHASONE 0.5 MG/5ML
4 ELIXIR ORAL EVERY 8 HOURS
Refills: 0 | Status: COMPLETED | OUTPATIENT
Start: 2022-01-01 | End: 2022-01-01

## 2022-01-01 RX ORDER — SODIUM CHLORIDE 9 MG/ML
1000 INJECTION INTRAMUSCULAR; INTRAVENOUS; SUBCUTANEOUS
Refills: 0 | Status: DISCONTINUED | OUTPATIENT
Start: 2022-01-01 | End: 2022-01-01

## 2022-01-01 RX ORDER — DEXAMETHASONE 0.5 MG/5ML
4 ELIXIR ORAL EVERY 6 HOURS
Refills: 0 | Status: DISCONTINUED | OUTPATIENT
Start: 2022-01-01 | End: 2022-01-01

## 2022-01-01 RX ORDER — HYDROMORPHONE HYDROCHLORIDE 2 MG/ML
0.25 INJECTION INTRAMUSCULAR; INTRAVENOUS; SUBCUTANEOUS ONCE
Refills: 0 | Status: DISCONTINUED | OUTPATIENT
Start: 2022-01-01 | End: 2022-01-01

## 2022-01-01 RX ORDER — OXYCODONE HYDROCHLORIDE 5 MG/1
10 TABLET ORAL EVERY 4 HOURS
Refills: 0 | Status: DISCONTINUED | OUTPATIENT
Start: 2022-01-01 | End: 2022-01-01

## 2022-01-01 RX ORDER — METHADONE HYDROCHLORIDE 40 MG/1
1 TABLET ORAL
Qty: 0 | Refills: 0 | DISCHARGE

## 2022-01-01 RX ORDER — DEXAMETHASONE 0.5 MG/5ML
3 ELIXIR ORAL EVERY 12 HOURS
Refills: 0 | Status: COMPLETED | OUTPATIENT
Start: 2022-01-01 | End: 2022-01-01

## 2022-01-01 RX ORDER — SODIUM CHLORIDE 9 MG/ML
1000 INJECTION, SOLUTION INTRAVENOUS
Refills: 0 | Status: DISCONTINUED | OUTPATIENT
Start: 2022-01-01 | End: 2022-01-01

## 2022-01-01 RX ORDER — POLYETHYLENE GLYCOL 3350 17 G/17G
17 POWDER, FOR SOLUTION ORAL
Qty: 0 | Refills: 0 | DISCHARGE
Start: 2022-01-01

## 2022-01-01 RX ORDER — LOPERAMIDE HYDROCHLORIDE 2 MG/1
2 CAPSULE ORAL
Qty: 20 | Refills: 0 | Status: COMPLETED | COMMUNITY
Start: 2022-01-01

## 2022-01-01 RX ORDER — CALCIUM CARBONATE 500(1250)
2 TABLET ORAL EVERY 6 HOURS
Refills: 0 | Status: DISCONTINUED | OUTPATIENT
Start: 2022-01-01 | End: 2022-01-01

## 2022-01-01 RX ORDER — DEXAMETHASONE 0.5 MG/5ML
2 ELIXIR ORAL EVERY 12 HOURS
Refills: 0 | Status: COMPLETED | OUTPATIENT
Start: 2022-01-01 | End: 2022-01-01

## 2022-01-01 RX ORDER — DEXAMETHASONE 0.5 MG/5ML
2 ELIXIR ORAL DAILY
Refills: 0 | Status: CANCELLED | OUTPATIENT
Start: 2022-01-01 | End: 2022-01-01

## 2022-01-01 RX ORDER — PANTOPRAZOLE SODIUM 20 MG/1
40 TABLET, DELAYED RELEASE ORAL
Qty: 0 | Refills: 0 | DISCHARGE
Start: 2022-01-01

## 2022-01-01 RX ORDER — DEXAMETHASONE 0.5 MG/5ML
ELIXIR ORAL
Refills: 0 | Status: DISCONTINUED | OUTPATIENT
Start: 2022-01-01 | End: 2022-01-01

## 2022-01-01 RX ORDER — LEVETIRACETAM 250 MG/1
1 TABLET, FILM COATED ORAL
Qty: 60 | Refills: 0
Start: 2022-01-01 | End: 2022-11-01

## 2022-01-01 RX ORDER — METHADONE HYDROCHLORIDE 40 MG/1
3 TABLET ORAL THREE TIMES A DAY
Refills: 0 | Status: DISCONTINUED | OUTPATIENT
Start: 2022-01-01 | End: 2022-01-01

## 2022-01-01 RX ORDER — DEXAMETHASONE 0.5 MG/5ML
4 ELIXIR ORAL EVERY 8 HOURS
Refills: 0 | Status: DISCONTINUED | OUTPATIENT
Start: 2022-01-01 | End: 2022-01-01

## 2022-01-01 RX ORDER — DEXAMETHASONE 0.5 MG/5ML
2 ELIXIR ORAL DAILY
Refills: 0 | Status: DISCONTINUED | OUTPATIENT
Start: 2022-01-01 | End: 2022-01-01

## 2022-01-01 RX ORDER — LEVETIRACETAM 250 MG/1
500 TABLET, FILM COATED ORAL
Refills: 0 | Status: DISCONTINUED | OUTPATIENT
Start: 2022-01-01 | End: 2022-01-01

## 2022-01-01 RX ORDER — LEVETIRACETAM 250 MG/1
500 TABLET, FILM COATED ORAL EVERY 12 HOURS
Refills: 0 | Status: DISCONTINUED | OUTPATIENT
Start: 2022-01-01 | End: 2022-01-01

## 2022-01-01 RX ORDER — ENOXAPARIN SODIUM 100 MG/ML
30 INJECTION SUBCUTANEOUS EVERY 24 HOURS
Refills: 0 | Status: DISCONTINUED | OUTPATIENT
Start: 2022-01-01 | End: 2022-01-01

## 2022-01-01 RX ORDER — INFLUENZA VIRUS VACCINE 15; 15; 15; 15 UG/.5ML; UG/.5ML; UG/.5ML; UG/.5ML
0.7 SUSPENSION INTRAMUSCULAR ONCE
Refills: 0 | Status: DISCONTINUED | OUTPATIENT
Start: 2022-01-01 | End: 2022-01-01

## 2022-01-01 RX ORDER — GABAPENTIN 300 MG/1
300 CAPSULE ORAL 3 TIMES DAILY
Qty: 270 | Refills: 1 | Status: ACTIVE | COMMUNITY
Start: 2021-01-01 | End: 1900-01-01

## 2022-01-01 RX ORDER — ENOXAPARIN SODIUM 100 MG/ML
40 INJECTION SUBCUTANEOUS
Refills: 0 | Status: DISCONTINUED | OUTPATIENT
Start: 2022-01-01 | End: 2022-01-01

## 2022-01-01 RX ORDER — ENOXAPARIN SODIUM 100 MG/ML
40 INJECTION SUBCUTANEOUS
Qty: 0 | Refills: 0 | DISCHARGE
Start: 2022-01-01

## 2022-01-01 RX ORDER — PANTOPRAZOLE SODIUM 20 MG/1
40 TABLET, DELAYED RELEASE ORAL DAILY
Refills: 0 | Status: DISCONTINUED | OUTPATIENT
Start: 2022-01-01 | End: 2022-01-01

## 2022-01-01 RX ORDER — CEFAZOLIN SODIUM 1 G
2000 VIAL (EA) INJECTION EVERY 8 HOURS
Refills: 0 | Status: COMPLETED | OUTPATIENT
Start: 2022-01-01 | End: 2022-01-01

## 2022-01-01 RX ORDER — TETRACAINE/BENZOCAINE/BUTAMBEN 2%-14%-2%
1 OINTMENT (GRAM) TOPICAL THREE TIMES A DAY
Refills: 0 | Status: DISCONTINUED | OUTPATIENT
Start: 2022-01-01 | End: 2022-01-01

## 2022-01-01 RX ORDER — DEXAMETHASONE 0.5 MG/5ML
4 ELIXIR ORAL ONCE
Refills: 0 | Status: COMPLETED | OUTPATIENT
Start: 2022-01-01 | End: 2022-01-01

## 2022-01-01 RX ORDER — GABAPENTIN 400 MG/1
1000 CAPSULE ORAL
Qty: 0 | Refills: 0 | DISCHARGE
Start: 2022-01-01

## 2022-01-01 RX ORDER — PANTOPRAZOLE SODIUM 20 MG/1
1 TABLET, DELAYED RELEASE ORAL
Qty: 30 | Refills: 0
Start: 2022-01-01 | End: 2022-11-01

## 2022-01-01 RX ORDER — ONDANSETRON 8 MG/1
4 TABLET, FILM COATED ORAL EVERY 6 HOURS
Refills: 0 | Status: DISCONTINUED | OUTPATIENT
Start: 2022-01-01 | End: 2022-01-01

## 2022-01-01 RX ORDER — CALCIUM CARBONATE 500(1250)
1 TABLET ORAL
Qty: 60 | Refills: 0
Start: 2022-01-01 | End: 2022-11-01

## 2022-01-01 RX ORDER — DEXAMETHASONE 0.5 MG/5ML
2 ELIXIR ORAL
Qty: 18 | Refills: 0
Start: 2022-01-01 | End: 2022-01-01

## 2022-01-01 RX ORDER — PAZOPANIB HYDROCHLORIDE 200 MG/1
1 TABLET, FILM COATED ORAL
Qty: 0 | Refills: 0 | DISCHARGE
Start: 2022-01-01

## 2022-01-01 RX ORDER — POTASSIUM CHLORIDE 20 MEQ
40 PACKET (EA) ORAL ONCE
Refills: 0 | Status: DISCONTINUED | OUTPATIENT
Start: 2022-01-01 | End: 2022-01-01

## 2022-01-01 RX ORDER — POLYETHYLENE GLYCOL 3350 17 G/17G
17 POWDER, FOR SOLUTION ORAL DAILY
Refills: 0 | Status: DISCONTINUED | OUTPATIENT
Start: 2022-01-01 | End: 2022-01-01

## 2022-01-01 RX ORDER — LEVETIRACETAM 250 MG/1
1 TABLET, FILM COATED ORAL
Qty: 0 | Refills: 0 | DISCHARGE
Start: 2022-01-01

## 2022-01-01 RX ORDER — SALIVA SUBSTITUTE COMB NO.11 351 MG
5 POWDER IN PACKET (EA) MUCOUS MEMBRANE
Qty: 0 | Refills: 0 | DISCHARGE
Start: 2022-01-01

## 2022-01-01 RX ORDER — ENOXAPARIN SODIUM 100 MG/ML
40 INJECTION SUBCUTANEOUS AT BEDTIME
Refills: 0 | Status: DISCONTINUED | OUTPATIENT
Start: 2022-01-01 | End: 2022-01-01

## 2022-01-01 RX ORDER — PAZOPANIB HYDROCHLORIDE 200 MG/1
200 TABLET, FILM COATED ORAL DAILY
Qty: 30 | Refills: 0 | Status: ACTIVE | COMMUNITY
Start: 2022-01-01 | End: 1900-01-01

## 2022-01-01 RX ORDER — OXYCODONE HYDROCHLORIDE 5 MG/1
5 TABLET ORAL ONCE
Refills: 0 | Status: DISCONTINUED | OUTPATIENT
Start: 2022-01-01 | End: 2022-01-01

## 2022-01-01 RX ORDER — DEXAMETHASONE 0.5 MG/5ML
4 ELIXIR ORAL
Qty: 0 | Refills: 0 | DISCHARGE
Start: 2022-01-01

## 2022-01-01 RX ORDER — ICOSAPENT ETHYL 1000 MG/1
1 CAPSULE ORAL
Qty: 120 | Refills: 0 | Status: COMPLETED | COMMUNITY
Start: 2022-01-01

## 2022-01-01 RX ORDER — DEXAMETHASONE 0.5 MG/5ML
1 ELIXIR ORAL
Qty: 18 | Refills: 0
Start: 2022-01-01 | End: 2022-01-01

## 2022-01-01 RX ORDER — AZELASTINE HYDROCHLORIDE 0.5 MG/ML
0.05 SOLUTION/ DROPS OPHTHALMIC
Qty: 6 | Refills: 0 | Status: COMPLETED | COMMUNITY
Start: 2022-01-01

## 2022-01-01 RX ORDER — METHADONE HYDROCHLORIDE 40 MG/1
0.5 TABLET ORAL
Qty: 21 | Refills: 0
Start: 2022-01-01 | End: 2022-01-01

## 2022-01-01 RX ORDER — ENOXAPARIN SODIUM 100 MG/ML
40 INJECTION SUBCUTANEOUS EVERY 24 HOURS
Refills: 0 | Status: DISCONTINUED | OUTPATIENT
Start: 2022-01-01 | End: 2022-01-01

## 2022-01-01 RX ORDER — ACETAMINOPHEN 500 MG
2 TABLET ORAL
Qty: 0 | Refills: 0 | DISCHARGE
Start: 2022-01-01

## 2022-01-01 RX ORDER — OLANZAPINE 15 MG/1
5 TABLET, FILM COATED ORAL AT BEDTIME
Refills: 0 | Status: DISCONTINUED | OUTPATIENT
Start: 2022-01-01 | End: 2022-01-01

## 2022-01-01 RX ORDER — POLYETHYLENE GLYCOL 3350 17 G/17G
0 POWDER, FOR SOLUTION ORAL
Qty: 0 | Refills: 0 | DISCHARGE

## 2022-01-01 RX ORDER — MUPIROCIN 20 MG/G
1 OINTMENT TOPICAL
Refills: 0 | Status: COMPLETED | OUTPATIENT
Start: 2022-01-01 | End: 2022-01-01

## 2022-01-01 RX ORDER — HYDROCORTISONE 1 %
12 CREAM (GRAM) TOPICAL
Qty: 400 | Refills: 0 | Status: COMPLETED | COMMUNITY
Start: 2022-01-01

## 2022-01-01 RX ORDER — OXYCODONE 10 MG/1
10 TABLET ORAL
Qty: 80 | Refills: 0 | Status: ACTIVE | COMMUNITY
Start: 2021-10-14 | End: 1900-01-01

## 2022-01-01 RX ORDER — GABAPENTIN 400 MG/1
10 CAPSULE ORAL
Qty: 900 | Refills: 0
Start: 2022-01-01 | End: 2022-11-01

## 2022-01-01 RX ORDER — METHADONE HYDROCHLORIDE 40 MG/1
1 TABLET ORAL
Qty: 0 | Refills: 0 | DISCHARGE
Start: 2022-01-01

## 2022-01-01 RX ORDER — OLANZAPINE 15 MG/1
2.5 TABLET, FILM COATED ORAL AT BEDTIME
Refills: 0 | Status: DISCONTINUED | OUTPATIENT
Start: 2022-01-01 | End: 2022-01-01

## 2022-01-01 RX ORDER — SENNA PLUS 8.6 MG/1
2 TABLET ORAL
Qty: 60 | Refills: 0
Start: 2022-01-01 | End: 2022-11-01

## 2022-01-01 RX ORDER — METHADONE HYDROCHLORIDE 40 MG/1
2.5 TABLET ORAL THREE TIMES A DAY
Refills: 0 | Status: DISCONTINUED | OUTPATIENT
Start: 2022-01-01 | End: 2022-01-01

## 2022-01-01 RX ORDER — OXYCODONE HYDROCHLORIDE 5 MG/1
1 TABLET ORAL
Qty: 56 | Refills: 0
Start: 2022-01-01 | End: 2022-01-01

## 2022-01-01 RX ORDER — AZELASTINE HYDROCHLORIDE 137 UG/1
0.1 SPRAY, METERED NASAL
Qty: 30 | Refills: 0 | Status: COMPLETED | COMMUNITY
Start: 2022-01-01

## 2022-01-01 RX ORDER — OLANZAPINE 15 MG/1
1 TABLET, FILM COATED ORAL
Qty: 30 | Refills: 0
Start: 2022-01-01 | End: 2022-11-01

## 2022-01-01 RX ORDER — OMEPRAZOLE 40 MG/1
40 CAPSULE, DELAYED RELEASE ORAL
Qty: 30 | Refills: 0 | Status: COMPLETED | COMMUNITY
Start: 2022-01-01

## 2022-01-01 RX ORDER — CALCIUM CARBONATE 500(1250)
1 TABLET ORAL EVERY 6 HOURS
Refills: 0 | Status: DISCONTINUED | OUTPATIENT
Start: 2022-01-01 | End: 2022-01-01

## 2022-01-01 RX ORDER — OXYCODONE HYDROCHLORIDE 5 MG/1
1 TABLET ORAL
Qty: 0 | Refills: 0 | DISCHARGE
Start: 2022-01-01

## 2022-01-01 RX ORDER — ONDANSETRON 8 MG/1
4 TABLET, FILM COATED ORAL EVERY 4 HOURS
Refills: 0 | Status: DISCONTINUED | OUTPATIENT
Start: 2022-01-01 | End: 2022-01-01

## 2022-01-01 RX ORDER — CALCIUM CARBONATE 500(1250)
2 TABLET ORAL
Qty: 0 | Refills: 0 | DISCHARGE
Start: 2022-01-01

## 2022-01-01 RX ORDER — MUPIROCIN 20 MG/G
1 OINTMENT TOPICAL
Refills: 0 | Status: DISCONTINUED | OUTPATIENT
Start: 2022-01-01 | End: 2022-01-01

## 2022-01-01 RX ORDER — DEXAMETHASONE 0.5 MG/5ML
4 ELIXIR ORAL EVERY 12 HOURS
Refills: 0 | Status: CANCELLED | OUTPATIENT
Start: 2022-01-01 | End: 2022-01-01

## 2022-01-01 RX ORDER — POTASSIUM CHLORIDE 20 MEQ
40 PACKET (EA) ORAL ONCE
Refills: 0 | Status: COMPLETED | OUTPATIENT
Start: 2022-01-01 | End: 2022-01-01

## 2022-01-01 RX ADMIN — SODIUM CHLORIDE 75 MILLILITER(S): 9 INJECTION, SOLUTION INTRAVENOUS at 13:57

## 2022-01-01 RX ADMIN — OXYCODONE HYDROCHLORIDE 5 MILLIGRAM(S): 5 TABLET ORAL at 04:04

## 2022-01-01 RX ADMIN — GABAPENTIN 1000 MILLIGRAM(S): 400 CAPSULE ORAL at 06:31

## 2022-01-01 RX ADMIN — METHADONE HYDROCHLORIDE 2.5 MILLIGRAM(S): 40 TABLET ORAL at 05:52

## 2022-01-01 RX ADMIN — LEVETIRACETAM 500 MILLIGRAM(S): 250 TABLET, FILM COATED ORAL at 05:51

## 2022-01-01 RX ADMIN — CHLORHEXIDINE GLUCONATE 1 APPLICATION(S): 213 SOLUTION TOPICAL at 19:30

## 2022-01-01 RX ADMIN — Medication 1000 MILLIGRAM(S): at 14:30

## 2022-01-01 RX ADMIN — METHADONE HYDROCHLORIDE 2.5 MILLIGRAM(S): 40 TABLET ORAL at 06:06

## 2022-01-01 RX ADMIN — GABAPENTIN 1000 MILLIGRAM(S): 400 CAPSULE ORAL at 05:01

## 2022-01-01 RX ADMIN — LEVETIRACETAM 400 MILLIGRAM(S): 250 TABLET, FILM COATED ORAL at 05:13

## 2022-01-01 RX ADMIN — CHLORHEXIDINE GLUCONATE 1 APPLICATION(S): 213 SOLUTION TOPICAL at 06:14

## 2022-01-01 RX ADMIN — METHADONE HYDROCHLORIDE 2 MILLIGRAM(S): 40 TABLET ORAL at 15:01

## 2022-01-01 RX ADMIN — PANTOPRAZOLE SODIUM 40 MILLIGRAM(S): 20 TABLET, DELAYED RELEASE ORAL at 06:33

## 2022-01-01 RX ADMIN — LEVETIRACETAM 500 MILLIGRAM(S): 250 TABLET, FILM COATED ORAL at 17:05

## 2022-01-01 RX ADMIN — METHADONE HYDROCHLORIDE 2.5 MILLIGRAM(S): 40 TABLET ORAL at 20:44

## 2022-01-01 RX ADMIN — METHADONE HYDROCHLORIDE 2 MILLIGRAM(S): 40 TABLET ORAL at 05:47

## 2022-01-01 RX ADMIN — Medication 3 MILLIGRAM(S): at 19:27

## 2022-01-01 RX ADMIN — CHLORHEXIDINE GLUCONATE 1 APPLICATION(S): 213 SOLUTION TOPICAL at 05:14

## 2022-01-01 RX ADMIN — Medication 5 MILLILITER(S): at 05:24

## 2022-01-01 RX ADMIN — LEVETIRACETAM 500 MILLIGRAM(S): 250 TABLET, FILM COATED ORAL at 17:27

## 2022-01-01 RX ADMIN — METHADONE HYDROCHLORIDE 2.5 MILLIGRAM(S): 40 TABLET ORAL at 21:39

## 2022-01-01 RX ADMIN — METHADONE HYDROCHLORIDE 2.5 MILLIGRAM(S): 40 TABLET ORAL at 05:32

## 2022-01-01 RX ADMIN — PANTOPRAZOLE SODIUM 40 MILLIGRAM(S): 20 TABLET, DELAYED RELEASE ORAL at 13:33

## 2022-01-01 RX ADMIN — MUPIROCIN 1 APPLICATION(S): 20 OINTMENT TOPICAL at 05:02

## 2022-01-01 RX ADMIN — Medication 5 MILLILITER(S): at 21:41

## 2022-01-01 RX ADMIN — Medication 2 DROP(S): at 05:12

## 2022-01-01 RX ADMIN — LEVETIRACETAM 400 MILLIGRAM(S): 250 TABLET, FILM COATED ORAL at 17:20

## 2022-01-01 RX ADMIN — LEVETIRACETAM 400 MILLIGRAM(S): 250 TABLET, FILM COATED ORAL at 18:58

## 2022-01-01 RX ADMIN — Medication 2 MILLIGRAM(S): at 05:33

## 2022-01-01 RX ADMIN — METHADONE HYDROCHLORIDE 2.5 MILLIGRAM(S): 40 TABLET ORAL at 06:30

## 2022-01-01 RX ADMIN — LEVETIRACETAM 500 MILLIGRAM(S): 250 TABLET, FILM COATED ORAL at 17:42

## 2022-01-01 RX ADMIN — Medication 4 MILLIGRAM(S): at 23:02

## 2022-01-01 RX ADMIN — METHADONE HYDROCHLORIDE 2 MILLIGRAM(S): 40 TABLET ORAL at 21:47

## 2022-01-01 RX ADMIN — GABAPENTIN 1000 MILLIGRAM(S): 400 CAPSULE ORAL at 13:25

## 2022-01-01 RX ADMIN — CHLORHEXIDINE GLUCONATE 1 APPLICATION(S): 213 SOLUTION TOPICAL at 05:35

## 2022-01-01 RX ADMIN — CHLORHEXIDINE GLUCONATE 1 APPLICATION(S): 213 SOLUTION TOPICAL at 21:17

## 2022-01-01 RX ADMIN — Medication 4 MILLIGRAM(S): at 00:58

## 2022-01-01 RX ADMIN — Medication 4 MILLIGRAM(S): at 21:14

## 2022-01-01 RX ADMIN — METHADONE HYDROCHLORIDE 2.5 MILLIGRAM(S): 40 TABLET ORAL at 13:09

## 2022-01-01 RX ADMIN — Medication 1 SPRAY(S): at 14:00

## 2022-01-01 RX ADMIN — POLYETHYLENE GLYCOL 3350 17 GRAM(S): 17 POWDER, FOR SOLUTION ORAL at 05:14

## 2022-01-01 RX ADMIN — MUPIROCIN 1 APPLICATION(S): 20 OINTMENT TOPICAL at 05:53

## 2022-01-01 RX ADMIN — METHADONE HYDROCHLORIDE 2.5 MILLIGRAM(S): 40 TABLET ORAL at 14:46

## 2022-01-01 RX ADMIN — GABAPENTIN 900 MILLIGRAM(S): 400 CAPSULE ORAL at 05:23

## 2022-01-01 RX ADMIN — Medication 5 MILLILITER(S): at 05:14

## 2022-01-01 RX ADMIN — CHLORHEXIDINE GLUCONATE 1 APPLICATION(S): 213 SOLUTION TOPICAL at 17:46

## 2022-01-01 RX ADMIN — LEVETIRACETAM 400 MILLIGRAM(S): 250 TABLET, FILM COATED ORAL at 01:00

## 2022-01-01 RX ADMIN — OLANZAPINE 5 MILLIGRAM(S): 15 TABLET, FILM COATED ORAL at 21:55

## 2022-01-01 RX ADMIN — Medication 4 MILLIGRAM(S): at 13:24

## 2022-01-01 RX ADMIN — HYDROMORPHONE HYDROCHLORIDE 0.25 MILLIGRAM(S): 2 INJECTION INTRAMUSCULAR; INTRAVENOUS; SUBCUTANEOUS at 01:10

## 2022-01-01 RX ADMIN — OXYCODONE HYDROCHLORIDE 5 MILLIGRAM(S): 5 TABLET ORAL at 10:18

## 2022-01-01 RX ADMIN — ENOXAPARIN SODIUM 30 MILLIGRAM(S): 100 INJECTION SUBCUTANEOUS at 15:44

## 2022-01-01 RX ADMIN — METHADONE HYDROCHLORIDE 2 MILLIGRAM(S): 40 TABLET ORAL at 22:30

## 2022-01-01 RX ADMIN — GABAPENTIN 1000 MILLIGRAM(S): 400 CAPSULE ORAL at 05:52

## 2022-01-01 RX ADMIN — PANTOPRAZOLE SODIUM 40 MILLIGRAM(S): 20 TABLET, DELAYED RELEASE ORAL at 13:18

## 2022-01-01 RX ADMIN — GABAPENTIN 1000 MILLIGRAM(S): 400 CAPSULE ORAL at 15:37

## 2022-01-01 RX ADMIN — Medication 5 MILLILITER(S): at 21:02

## 2022-01-01 RX ADMIN — ENOXAPARIN SODIUM 40 MILLIGRAM(S): 100 INJECTION SUBCUTANEOUS at 17:31

## 2022-01-01 RX ADMIN — GABAPENTIN 1000 MILLIGRAM(S): 400 CAPSULE ORAL at 15:58

## 2022-01-01 RX ADMIN — Medication 4 MILLIGRAM(S): at 23:43

## 2022-01-01 RX ADMIN — LEVETIRACETAM 500 MILLIGRAM(S): 250 TABLET, FILM COATED ORAL at 05:36

## 2022-01-01 RX ADMIN — OLANZAPINE 5 MILLIGRAM(S): 15 TABLET, FILM COATED ORAL at 21:27

## 2022-01-01 RX ADMIN — Medication 4 MILLIGRAM(S): at 05:59

## 2022-01-01 RX ADMIN — Medication 4 MILLIGRAM(S): at 00:59

## 2022-01-01 RX ADMIN — METHADONE HYDROCHLORIDE 2.5 MILLIGRAM(S): 40 TABLET ORAL at 21:41

## 2022-01-01 RX ADMIN — PANTOPRAZOLE SODIUM 40 MILLIGRAM(S): 20 TABLET, DELAYED RELEASE ORAL at 15:43

## 2022-01-01 RX ADMIN — CHLORHEXIDINE GLUCONATE 1 APPLICATION(S): 213 SOLUTION TOPICAL at 05:27

## 2022-01-01 RX ADMIN — Medication 3 MILLIGRAM(S): at 18:33

## 2022-01-01 RX ADMIN — GABAPENTIN 900 MILLIGRAM(S): 400 CAPSULE ORAL at 15:01

## 2022-01-01 RX ADMIN — METHADONE HYDROCHLORIDE 2.5 MILLIGRAM(S): 40 TABLET ORAL at 13:18

## 2022-01-01 RX ADMIN — GABAPENTIN 1000 MILLIGRAM(S): 400 CAPSULE ORAL at 21:55

## 2022-01-01 RX ADMIN — METHADONE HYDROCHLORIDE 2.5 MILLIGRAM(S): 40 TABLET ORAL at 06:00

## 2022-01-01 RX ADMIN — GABAPENTIN 1000 MILLIGRAM(S): 400 CAPSULE ORAL at 13:34

## 2022-01-01 RX ADMIN — Medication 4 MILLIGRAM(S): at 17:20

## 2022-01-01 RX ADMIN — SODIUM CHLORIDE 75 MILLILITER(S): 9 INJECTION, SOLUTION INTRAVENOUS at 00:03

## 2022-01-01 RX ADMIN — SENNA PLUS 2 TABLET(S): 8.6 TABLET ORAL at 21:40

## 2022-01-01 RX ADMIN — OXYCODONE HYDROCHLORIDE 5 MILLIGRAM(S): 5 TABLET ORAL at 09:48

## 2022-01-01 RX ADMIN — LEVETIRACETAM 500 MILLIGRAM(S): 250 TABLET, FILM COATED ORAL at 17:03

## 2022-01-01 RX ADMIN — METHADONE HYDROCHLORIDE 3 MILLIGRAM(S): 40 TABLET ORAL at 05:23

## 2022-01-01 RX ADMIN — POLYETHYLENE GLYCOL 3350 17 GRAM(S): 17 POWDER, FOR SOLUTION ORAL at 09:48

## 2022-01-01 RX ADMIN — LEVETIRACETAM 400 MILLIGRAM(S): 250 TABLET, FILM COATED ORAL at 05:58

## 2022-01-01 RX ADMIN — Medication 4 MILLIGRAM(S): at 18:33

## 2022-01-01 RX ADMIN — Medication 100 MILLIGRAM(S): at 05:53

## 2022-01-01 RX ADMIN — GABAPENTIN 1000 MILLIGRAM(S): 400 CAPSULE ORAL at 13:10

## 2022-01-01 RX ADMIN — MUPIROCIN 1 APPLICATION(S): 20 OINTMENT TOPICAL at 17:46

## 2022-01-01 RX ADMIN — GABAPENTIN 1000 MILLIGRAM(S): 400 CAPSULE ORAL at 21:10

## 2022-01-01 RX ADMIN — Medication 1 SPRAY(S): at 21:16

## 2022-01-01 RX ADMIN — GABAPENTIN 900 MILLIGRAM(S): 400 CAPSULE ORAL at 22:29

## 2022-01-01 RX ADMIN — GABAPENTIN 1000 MILLIGRAM(S): 400 CAPSULE ORAL at 23:41

## 2022-01-01 RX ADMIN — Medication 4 MILLIGRAM(S): at 14:18

## 2022-01-01 RX ADMIN — Medication 5 MILLILITER(S): at 21:19

## 2022-01-01 RX ADMIN — Medication 650 MILLIGRAM(S): at 23:47

## 2022-01-01 RX ADMIN — Medication 2 MILLIGRAM(S): at 05:53

## 2022-01-01 RX ADMIN — Medication 1 SPRAY(S): at 06:06

## 2022-01-01 RX ADMIN — Medication 5 MILLILITER(S): at 05:59

## 2022-01-01 RX ADMIN — SENNA PLUS 2 TABLET(S): 8.6 TABLET ORAL at 21:21

## 2022-01-01 RX ADMIN — GABAPENTIN 1000 MILLIGRAM(S): 400 CAPSULE ORAL at 13:18

## 2022-01-01 RX ADMIN — ENOXAPARIN SODIUM 30 MILLIGRAM(S): 100 INJECTION SUBCUTANEOUS at 13:03

## 2022-01-01 RX ADMIN — GABAPENTIN 1000 MILLIGRAM(S): 400 CAPSULE ORAL at 21:19

## 2022-01-01 RX ADMIN — METHADONE HYDROCHLORIDE 2 MILLIGRAM(S): 40 TABLET ORAL at 05:20

## 2022-01-01 RX ADMIN — METHADONE HYDROCHLORIDE 2.5 MILLIGRAM(S): 40 TABLET ORAL at 21:20

## 2022-01-01 RX ADMIN — Medication 5 MILLILITER(S): at 05:32

## 2022-01-01 RX ADMIN — OXYCODONE HYDROCHLORIDE 5 MILLIGRAM(S): 5 TABLET ORAL at 01:31

## 2022-01-01 RX ADMIN — LEVETIRACETAM 500 MILLIGRAM(S): 250 TABLET, FILM COATED ORAL at 05:26

## 2022-01-01 RX ADMIN — Medication 4 MILLIGRAM(S): at 00:11

## 2022-01-01 RX ADMIN — GABAPENTIN 1000 MILLIGRAM(S): 400 CAPSULE ORAL at 21:54

## 2022-01-01 RX ADMIN — METHADONE HYDROCHLORIDE 2.5 MILLIGRAM(S): 40 TABLET ORAL at 21:11

## 2022-01-01 RX ADMIN — LEVETIRACETAM 400 MILLIGRAM(S): 250 TABLET, FILM COATED ORAL at 05:35

## 2022-01-01 RX ADMIN — Medication 1 SPRAY(S): at 05:15

## 2022-01-01 RX ADMIN — Medication 650 MILLIGRAM(S): at 23:02

## 2022-01-01 RX ADMIN — Medication 5 MILLILITER(S): at 06:40

## 2022-01-01 RX ADMIN — ENOXAPARIN SODIUM 40 MILLIGRAM(S): 100 INJECTION SUBCUTANEOUS at 17:24

## 2022-01-01 RX ADMIN — LEVETIRACETAM 500 MILLIGRAM(S): 250 TABLET, FILM COATED ORAL at 17:32

## 2022-01-01 RX ADMIN — POLYETHYLENE GLYCOL 3350 17 GRAM(S): 17 POWDER, FOR SOLUTION ORAL at 17:30

## 2022-01-01 RX ADMIN — Medication 4 MILLIGRAM(S): at 17:21

## 2022-01-01 RX ADMIN — PANTOPRAZOLE SODIUM 40 MILLIGRAM(S): 20 TABLET, DELAYED RELEASE ORAL at 06:00

## 2022-01-01 RX ADMIN — Medication 4 MILLIGRAM(S): at 17:32

## 2022-01-01 RX ADMIN — METHADONE HYDROCHLORIDE 2 MILLIGRAM(S): 40 TABLET ORAL at 05:58

## 2022-01-01 RX ADMIN — METHADONE HYDROCHLORIDE 2.5 MILLIGRAM(S): 40 TABLET ORAL at 13:29

## 2022-01-01 RX ADMIN — PANTOPRAZOLE SODIUM 40 MILLIGRAM(S): 20 TABLET, DELAYED RELEASE ORAL at 14:20

## 2022-01-01 RX ADMIN — METHADONE HYDROCHLORIDE 2.5 MILLIGRAM(S): 40 TABLET ORAL at 06:02

## 2022-01-01 RX ADMIN — Medication 5 MILLILITER(S): at 14:18

## 2022-01-01 RX ADMIN — Medication 100 MILLIGRAM(S): at 15:01

## 2022-01-01 RX ADMIN — LEVETIRACETAM 400 MILLIGRAM(S): 250 TABLET, FILM COATED ORAL at 05:41

## 2022-01-01 RX ADMIN — POLYETHYLENE GLYCOL 3350 17 GRAM(S): 17 POWDER, FOR SOLUTION ORAL at 05:21

## 2022-01-01 RX ADMIN — Medication 3 MILLIGRAM(S): at 06:31

## 2022-01-01 RX ADMIN — SENNA PLUS 2 TABLET(S): 8.6 TABLET ORAL at 21:58

## 2022-01-01 RX ADMIN — LEVETIRACETAM 500 MILLIGRAM(S): 250 TABLET, FILM COATED ORAL at 05:14

## 2022-01-01 RX ADMIN — GABAPENTIN 1000 MILLIGRAM(S): 400 CAPSULE ORAL at 21:40

## 2022-01-01 RX ADMIN — SODIUM CHLORIDE 75 MILLILITER(S): 9 INJECTION INTRAMUSCULAR; INTRAVENOUS; SUBCUTANEOUS at 21:04

## 2022-01-01 RX ADMIN — METHADONE HYDROCHLORIDE 2.5 MILLIGRAM(S): 40 TABLET ORAL at 05:14

## 2022-01-01 RX ADMIN — Medication 5 MILLILITER(S): at 13:32

## 2022-01-01 RX ADMIN — CHLORHEXIDINE GLUCONATE 1 APPLICATION(S): 213 SOLUTION TOPICAL at 18:35

## 2022-01-01 RX ADMIN — Medication 5 MILLILITER(S): at 06:14

## 2022-01-01 RX ADMIN — Medication 4 MILLIGRAM(S): at 05:21

## 2022-01-01 RX ADMIN — CHLORHEXIDINE GLUCONATE 1 APPLICATION(S): 213 SOLUTION TOPICAL at 17:31

## 2022-01-01 RX ADMIN — Medication 1 SPRAY(S): at 21:48

## 2022-01-01 RX ADMIN — Medication 4 MILLIGRAM(S): at 00:37

## 2022-01-01 RX ADMIN — Medication 650 MILLIGRAM(S): at 13:25

## 2022-01-01 RX ADMIN — METHADONE HYDROCHLORIDE 2 MILLIGRAM(S): 40 TABLET ORAL at 21:01

## 2022-01-01 RX ADMIN — LEVETIRACETAM 500 MILLIGRAM(S): 250 TABLET, FILM COATED ORAL at 06:01

## 2022-01-01 RX ADMIN — GABAPENTIN 1000 MILLIGRAM(S): 400 CAPSULE ORAL at 06:00

## 2022-01-01 RX ADMIN — LEVETIRACETAM 500 MILLIGRAM(S): 250 TABLET, FILM COATED ORAL at 06:06

## 2022-01-01 RX ADMIN — Medication 1 SPRAY(S): at 09:27

## 2022-01-01 RX ADMIN — Medication 5 MILLILITER(S): at 05:01

## 2022-01-01 RX ADMIN — METHADONE HYDROCHLORIDE 2.5 MILLIGRAM(S): 40 TABLET ORAL at 05:26

## 2022-01-01 RX ADMIN — OLANZAPINE 5 MILLIGRAM(S): 15 TABLET, FILM COATED ORAL at 21:34

## 2022-01-01 RX ADMIN — METHADONE HYDROCHLORIDE 2.5 MILLIGRAM(S): 40 TABLET ORAL at 15:58

## 2022-01-01 RX ADMIN — Medication 5 MILLILITER(S): at 21:47

## 2022-01-01 RX ADMIN — Medication 4 MILLIGRAM(S): at 11:28

## 2022-01-01 RX ADMIN — MUPIROCIN 1 APPLICATION(S): 20 OINTMENT TOPICAL at 17:21

## 2022-01-01 RX ADMIN — GABAPENTIN 1000 MILLIGRAM(S): 400 CAPSULE ORAL at 14:33

## 2022-01-01 RX ADMIN — ENOXAPARIN SODIUM 30 MILLIGRAM(S): 100 INJECTION SUBCUTANEOUS at 13:27

## 2022-01-01 RX ADMIN — CHLORHEXIDINE GLUCONATE 1 APPLICATION(S): 213 SOLUTION TOPICAL at 06:09

## 2022-01-01 RX ADMIN — Medication 5 MILLILITER(S): at 14:35

## 2022-01-01 RX ADMIN — GABAPENTIN 1000 MILLIGRAM(S): 400 CAPSULE ORAL at 06:02

## 2022-01-01 RX ADMIN — LEVETIRACETAM 500 MILLIGRAM(S): 250 TABLET, FILM COATED ORAL at 06:10

## 2022-01-01 RX ADMIN — Medication 5 MILLILITER(S): at 23:42

## 2022-01-01 RX ADMIN — PANTOPRAZOLE SODIUM 40 MILLIGRAM(S): 20 TABLET, DELAYED RELEASE ORAL at 13:28

## 2022-01-01 RX ADMIN — ENOXAPARIN SODIUM 30 MILLIGRAM(S): 100 INJECTION SUBCUTANEOUS at 15:58

## 2022-01-01 RX ADMIN — Medication 650 MILLIGRAM(S): at 00:40

## 2022-01-01 RX ADMIN — Medication 5 MILLILITER(S): at 13:10

## 2022-01-01 RX ADMIN — Medication 4 MILLIGRAM(S): at 11:42

## 2022-01-01 RX ADMIN — LEVETIRACETAM 500 MILLIGRAM(S): 250 TABLET, FILM COATED ORAL at 17:30

## 2022-01-01 RX ADMIN — LEVETIRACETAM 500 MILLIGRAM(S): 250 TABLET, FILM COATED ORAL at 18:26

## 2022-01-01 RX ADMIN — METHADONE HYDROCHLORIDE 2 MILLIGRAM(S): 40 TABLET ORAL at 05:13

## 2022-01-01 RX ADMIN — Medication 5 MILLILITER(S): at 13:27

## 2022-01-01 RX ADMIN — Medication 2 MILLIGRAM(S): at 05:14

## 2022-01-01 RX ADMIN — GABAPENTIN 1000 MILLIGRAM(S): 400 CAPSULE ORAL at 15:43

## 2022-01-01 RX ADMIN — METHADONE HYDROCHLORIDE 2.5 MILLIGRAM(S): 40 TABLET ORAL at 15:26

## 2022-01-01 RX ADMIN — METHADONE HYDROCHLORIDE 2.5 MILLIGRAM(S): 40 TABLET ORAL at 21:34

## 2022-01-01 RX ADMIN — GABAPENTIN 1000 MILLIGRAM(S): 400 CAPSULE ORAL at 05:36

## 2022-01-01 RX ADMIN — GABAPENTIN 1000 MILLIGRAM(S): 400 CAPSULE ORAL at 21:12

## 2022-01-01 RX ADMIN — OLANZAPINE 5 MILLIGRAM(S): 15 TABLET, FILM COATED ORAL at 21:53

## 2022-01-01 RX ADMIN — LEVETIRACETAM 400 MILLIGRAM(S): 250 TABLET, FILM COATED ORAL at 17:46

## 2022-01-01 RX ADMIN — CHLORHEXIDINE GLUCONATE 1 APPLICATION(S): 213 SOLUTION TOPICAL at 18:10

## 2022-01-01 RX ADMIN — LEVETIRACETAM 500 MILLIGRAM(S): 250 TABLET, FILM COATED ORAL at 18:09

## 2022-01-01 RX ADMIN — CHLORHEXIDINE GLUCONATE 1 APPLICATION(S): 213 SOLUTION TOPICAL at 06:02

## 2022-01-01 RX ADMIN — GABAPENTIN 1000 MILLIGRAM(S): 400 CAPSULE ORAL at 13:28

## 2022-01-01 RX ADMIN — GABAPENTIN 1000 MILLIGRAM(S): 400 CAPSULE ORAL at 05:13

## 2022-01-01 RX ADMIN — Medication 5 MILLILITER(S): at 13:23

## 2022-01-01 RX ADMIN — GABAPENTIN 1000 MILLIGRAM(S): 400 CAPSULE ORAL at 21:02

## 2022-01-01 RX ADMIN — Medication 4 MILLIGRAM(S): at 06:00

## 2022-01-01 RX ADMIN — ENOXAPARIN SODIUM 30 MILLIGRAM(S): 100 INJECTION SUBCUTANEOUS at 14:47

## 2022-01-01 RX ADMIN — Medication 5 MILLILITER(S): at 13:40

## 2022-01-01 RX ADMIN — Medication 4 MILLIGRAM(S): at 17:19

## 2022-01-01 RX ADMIN — LEVETIRACETAM 400 MILLIGRAM(S): 250 TABLET, FILM COATED ORAL at 17:18

## 2022-01-01 RX ADMIN — Medication 5 MILLILITER(S): at 06:09

## 2022-01-01 RX ADMIN — OLANZAPINE 5 MILLIGRAM(S): 15 TABLET, FILM COATED ORAL at 22:25

## 2022-01-01 RX ADMIN — ENOXAPARIN SODIUM 30 MILLIGRAM(S): 100 INJECTION SUBCUTANEOUS at 14:20

## 2022-01-01 RX ADMIN — Medication 4 MILLIGRAM(S): at 05:41

## 2022-01-01 RX ADMIN — Medication 3 MILLIGRAM(S): at 17:46

## 2022-01-01 RX ADMIN — SENNA PLUS 2 TABLET(S): 8.6 TABLET ORAL at 21:35

## 2022-01-01 RX ADMIN — Medication 5 MILLILITER(S): at 06:01

## 2022-01-01 RX ADMIN — GABAPENTIN 1000 MILLIGRAM(S): 400 CAPSULE ORAL at 21:14

## 2022-01-01 RX ADMIN — METHADONE HYDROCHLORIDE 2.5 MILLIGRAM(S): 40 TABLET ORAL at 21:55

## 2022-01-01 RX ADMIN — Medication 5 MILLILITER(S): at 21:12

## 2022-01-01 RX ADMIN — METHADONE HYDROCHLORIDE 2 MILLIGRAM(S): 40 TABLET ORAL at 21:04

## 2022-01-01 RX ADMIN — GABAPENTIN 1000 MILLIGRAM(S): 400 CAPSULE ORAL at 21:53

## 2022-01-01 RX ADMIN — CHLORHEXIDINE GLUCONATE 1 APPLICATION(S): 213 SOLUTION TOPICAL at 17:35

## 2022-01-01 RX ADMIN — LEVETIRACETAM 500 MILLIGRAM(S): 250 TABLET, FILM COATED ORAL at 18:33

## 2022-01-01 RX ADMIN — SENNA PLUS 2 TABLET(S): 8.6 TABLET ORAL at 21:47

## 2022-01-01 RX ADMIN — Medication 1000 MILLIGRAM(S): at 15:00

## 2022-01-01 RX ADMIN — PANTOPRAZOLE SODIUM 40 MILLIGRAM(S): 20 TABLET, DELAYED RELEASE ORAL at 15:58

## 2022-01-01 RX ADMIN — OXYCODONE HYDROCHLORIDE 5 MILLIGRAM(S): 5 TABLET ORAL at 00:59

## 2022-01-01 RX ADMIN — METHADONE HYDROCHLORIDE 2 MILLIGRAM(S): 40 TABLET ORAL at 21:35

## 2022-01-01 RX ADMIN — Medication 400 MILLIGRAM(S): at 13:58

## 2022-01-01 RX ADMIN — GABAPENTIN 1000 MILLIGRAM(S): 400 CAPSULE ORAL at 14:21

## 2022-01-01 RX ADMIN — Medication 650 MILLIGRAM(S): at 01:00

## 2022-01-01 RX ADMIN — Medication 3 MILLIGRAM(S): at 18:10

## 2022-01-01 RX ADMIN — METHADONE HYDROCHLORIDE 2 MILLIGRAM(S): 40 TABLET ORAL at 13:35

## 2022-01-01 RX ADMIN — LEVETIRACETAM 500 MILLIGRAM(S): 250 TABLET, FILM COATED ORAL at 17:23

## 2022-01-01 RX ADMIN — LEVETIRACETAM 400 MILLIGRAM(S): 250 TABLET, FILM COATED ORAL at 17:30

## 2022-01-01 RX ADMIN — GABAPENTIN 900 MILLIGRAM(S): 400 CAPSULE ORAL at 13:17

## 2022-01-01 RX ADMIN — LEVETIRACETAM 500 MILLIGRAM(S): 250 TABLET, FILM COATED ORAL at 18:08

## 2022-01-01 RX ADMIN — Medication 3 MILLIGRAM(S): at 05:27

## 2022-01-01 RX ADMIN — METHADONE HYDROCHLORIDE 2 MILLIGRAM(S): 40 TABLET ORAL at 13:11

## 2022-01-01 RX ADMIN — GABAPENTIN 1000 MILLIGRAM(S): 400 CAPSULE ORAL at 05:32

## 2022-01-01 RX ADMIN — Medication 4 MILLIGRAM(S): at 13:33

## 2022-01-01 RX ADMIN — LEVETIRACETAM 400 MILLIGRAM(S): 250 TABLET, FILM COATED ORAL at 05:02

## 2022-01-01 RX ADMIN — Medication 5 MILLILITER(S): at 14:22

## 2022-01-01 RX ADMIN — Medication 20 MILLIEQUIVALENT(S): at 00:53

## 2022-01-01 RX ADMIN — LEVETIRACETAM 500 MILLIGRAM(S): 250 TABLET, FILM COATED ORAL at 06:00

## 2022-01-01 RX ADMIN — MUPIROCIN 1 APPLICATION(S): 20 OINTMENT TOPICAL at 17:23

## 2022-01-01 RX ADMIN — LEVETIRACETAM 400 MILLIGRAM(S): 250 TABLET, FILM COATED ORAL at 05:53

## 2022-01-01 RX ADMIN — METHADONE HYDROCHLORIDE 2.5 MILLIGRAM(S): 40 TABLET ORAL at 21:53

## 2022-01-01 RX ADMIN — Medication 4 MILLIGRAM(S): at 05:02

## 2022-01-01 RX ADMIN — GABAPENTIN 1000 MILLIGRAM(S): 400 CAPSULE ORAL at 13:26

## 2022-01-01 RX ADMIN — OLANZAPINE 2.5 MILLIGRAM(S): 15 TABLET, FILM COATED ORAL at 22:16

## 2022-01-01 RX ADMIN — PANTOPRAZOLE SODIUM 40 MILLIGRAM(S): 20 TABLET, DELAYED RELEASE ORAL at 12:32

## 2022-01-01 RX ADMIN — Medication 4 MILLIGRAM(S): at 23:42

## 2022-01-01 RX ADMIN — Medication 4 MILLIGRAM(S): at 06:01

## 2022-01-01 RX ADMIN — Medication 4 MILLIGRAM(S): at 05:13

## 2022-01-01 RX ADMIN — GABAPENTIN 1000 MILLIGRAM(S): 400 CAPSULE ORAL at 05:15

## 2022-01-01 RX ADMIN — Medication 5 MILLILITER(S): at 21:22

## 2022-01-01 RX ADMIN — GABAPENTIN 1000 MILLIGRAM(S): 400 CAPSULE ORAL at 22:16

## 2022-01-01 RX ADMIN — Medication 2 MILLIGRAM(S): at 18:26

## 2022-01-01 RX ADMIN — OLANZAPINE 5 MILLIGRAM(S): 15 TABLET, FILM COATED ORAL at 21:40

## 2022-01-01 RX ADMIN — ENOXAPARIN SODIUM 30 MILLIGRAM(S): 100 INJECTION SUBCUTANEOUS at 13:18

## 2022-01-01 RX ADMIN — GABAPENTIN 1000 MILLIGRAM(S): 400 CAPSULE ORAL at 13:32

## 2022-01-01 RX ADMIN — PANTOPRAZOLE SODIUM 40 MILLIGRAM(S): 20 TABLET, DELAYED RELEASE ORAL at 14:46

## 2022-01-01 RX ADMIN — GABAPENTIN 1000 MILLIGRAM(S): 400 CAPSULE ORAL at 06:10

## 2022-01-01 RX ADMIN — GABAPENTIN 1000 MILLIGRAM(S): 400 CAPSULE ORAL at 05:59

## 2022-01-01 RX ADMIN — Medication 4 MILLIGRAM(S): at 17:46

## 2022-01-01 RX ADMIN — Medication 4 MILLIGRAM(S): at 05:23

## 2022-01-01 RX ADMIN — Medication 4 MILLIGRAM(S): at 11:43

## 2022-01-01 RX ADMIN — HYDROMORPHONE HYDROCHLORIDE 0.25 MILLIGRAM(S): 2 INJECTION INTRAMUSCULAR; INTRAVENOUS; SUBCUTANEOUS at 01:25

## 2022-01-01 RX ADMIN — POLYETHYLENE GLYCOL 3350 17 GRAM(S): 17 POWDER, FOR SOLUTION ORAL at 05:33

## 2022-01-01 RX ADMIN — METHADONE HYDROCHLORIDE 2.5 MILLIGRAM(S): 40 TABLET ORAL at 14:33

## 2022-01-01 RX ADMIN — CHLORHEXIDINE GLUCONATE 1 APPLICATION(S): 213 SOLUTION TOPICAL at 05:53

## 2022-01-01 RX ADMIN — METHADONE HYDROCHLORIDE 2.5 MILLIGRAM(S): 40 TABLET ORAL at 06:10

## 2022-01-01 RX ADMIN — CHLORHEXIDINE GLUCONATE 1 APPLICATION(S): 213 SOLUTION TOPICAL at 09:29

## 2022-01-01 RX ADMIN — LEVETIRACETAM 500 MILLIGRAM(S): 250 TABLET, FILM COATED ORAL at 05:33

## 2022-01-01 RX ADMIN — GABAPENTIN 1000 MILLIGRAM(S): 400 CAPSULE ORAL at 06:23

## 2022-01-01 RX ADMIN — ENOXAPARIN SODIUM 30 MILLIGRAM(S): 100 INJECTION SUBCUTANEOUS at 12:23

## 2022-01-01 RX ADMIN — METHADONE HYDROCHLORIDE 2.5 MILLIGRAM(S): 40 TABLET ORAL at 13:32

## 2022-01-01 RX ADMIN — Medication 1000 MILLIGRAM(S): at 10:03

## 2022-01-01 RX ADMIN — METHADONE HYDROCHLORIDE 2.5 MILLIGRAM(S): 40 TABLET ORAL at 13:26

## 2022-01-01 RX ADMIN — CHLORHEXIDINE GLUCONATE 1 APPLICATION(S): 213 SOLUTION TOPICAL at 17:28

## 2022-01-01 RX ADMIN — GABAPENTIN 1000 MILLIGRAM(S): 400 CAPSULE ORAL at 20:44

## 2022-01-01 RX ADMIN — METHADONE HYDROCHLORIDE 2 MILLIGRAM(S): 40 TABLET ORAL at 13:03

## 2022-01-01 RX ADMIN — LEVETIRACETAM 500 MILLIGRAM(S): 250 TABLET, FILM COATED ORAL at 17:46

## 2022-01-01 RX ADMIN — METHADONE HYDROCHLORIDE 2.5 MILLIGRAM(S): 40 TABLET ORAL at 14:20

## 2022-01-01 RX ADMIN — GABAPENTIN 1000 MILLIGRAM(S): 400 CAPSULE ORAL at 13:04

## 2022-01-01 RX ADMIN — Medication 40 MILLIEQUIVALENT(S): at 09:49

## 2022-01-01 RX ADMIN — GABAPENTIN 1000 MILLIGRAM(S): 400 CAPSULE ORAL at 21:35

## 2022-01-01 RX ADMIN — METHADONE HYDROCHLORIDE 2.5 MILLIGRAM(S): 40 TABLET ORAL at 22:15

## 2022-01-01 RX ADMIN — POLYETHYLENE GLYCOL 3350 17 GRAM(S): 17 POWDER, FOR SOLUTION ORAL at 05:59

## 2022-01-01 RX ADMIN — Medication 1 SPRAY(S): at 22:27

## 2022-01-01 RX ADMIN — LEVETIRACETAM 500 MILLIGRAM(S): 250 TABLET, FILM COATED ORAL at 19:26

## 2022-01-01 RX ADMIN — SODIUM CHLORIDE 75 MILLILITER(S): 9 INJECTION INTRAMUSCULAR; INTRAVENOUS; SUBCUTANEOUS at 00:59

## 2022-01-01 RX ADMIN — GABAPENTIN 1000 MILLIGRAM(S): 400 CAPSULE ORAL at 14:18

## 2022-01-01 RX ADMIN — GABAPENTIN 1000 MILLIGRAM(S): 400 CAPSULE ORAL at 21:41

## 2022-01-01 RX ADMIN — GABAPENTIN 1000 MILLIGRAM(S): 400 CAPSULE ORAL at 05:25

## 2022-01-01 RX ADMIN — PANTOPRAZOLE SODIUM 40 MILLIGRAM(S): 20 TABLET, DELAYED RELEASE ORAL at 05:22

## 2022-01-01 RX ADMIN — Medication 4 MILLIGRAM(S): at 05:53

## 2022-01-01 RX ADMIN — METHADONE HYDROCHLORIDE 2.5 MILLIGRAM(S): 40 TABLET ORAL at 21:10

## 2022-01-01 RX ADMIN — Medication 4 MILLIGRAM(S): at 13:17

## 2022-01-01 RX ADMIN — ENOXAPARIN SODIUM 40 MILLIGRAM(S): 100 INJECTION SUBCUTANEOUS at 18:58

## 2022-01-01 RX ADMIN — PANTOPRAZOLE SODIUM 40 MILLIGRAM(S): 20 TABLET, DELAYED RELEASE ORAL at 15:26

## 2022-01-01 RX ADMIN — METHADONE HYDROCHLORIDE 2 MILLIGRAM(S): 40 TABLET ORAL at 21:14

## 2022-01-01 RX ADMIN — LEVETIRACETAM 500 MILLIGRAM(S): 250 TABLET, FILM COATED ORAL at 05:13

## 2022-01-01 RX ADMIN — ENOXAPARIN SODIUM 40 MILLIGRAM(S): 100 INJECTION SUBCUTANEOUS at 23:42

## 2022-01-01 RX ADMIN — Medication 5 MILLILITER(S): at 22:36

## 2022-01-01 RX ADMIN — GABAPENTIN 1000 MILLIGRAM(S): 400 CAPSULE ORAL at 22:27

## 2022-01-01 RX ADMIN — METHADONE HYDROCHLORIDE 2 MILLIGRAM(S): 40 TABLET ORAL at 15:32

## 2022-01-01 RX ADMIN — METHADONE HYDROCHLORIDE 2 MILLIGRAM(S): 40 TABLET ORAL at 09:49

## 2022-01-01 RX ADMIN — Medication 1 SPRAY(S): at 06:33

## 2022-01-01 RX ADMIN — Medication 4 MILLIGRAM(S): at 11:41

## 2022-01-01 RX ADMIN — OLANZAPINE 5 MILLIGRAM(S): 15 TABLET, FILM COATED ORAL at 20:44

## 2022-01-01 RX ADMIN — PANTOPRAZOLE SODIUM 40 MILLIGRAM(S): 20 TABLET, DELAYED RELEASE ORAL at 13:10

## 2022-01-01 RX ADMIN — METHADONE HYDROCHLORIDE 2 MILLIGRAM(S): 40 TABLET ORAL at 06:01

## 2022-01-01 RX ADMIN — GABAPENTIN 1000 MILLIGRAM(S): 400 CAPSULE ORAL at 05:12

## 2022-01-01 RX ADMIN — Medication 650 MILLIGRAM(S): at 01:10

## 2022-01-01 RX ADMIN — CHLORHEXIDINE GLUCONATE 1 APPLICATION(S): 213 SOLUTION TOPICAL at 18:16

## 2022-01-01 RX ADMIN — GABAPENTIN 900 MILLIGRAM(S): 400 CAPSULE ORAL at 21:35

## 2022-01-01 RX ADMIN — METHADONE HYDROCHLORIDE 4 MILLIGRAM(S): 40 TABLET ORAL at 00:57

## 2022-01-01 RX ADMIN — METHADONE HYDROCHLORIDE 2 MILLIGRAM(S): 40 TABLET ORAL at 13:55

## 2022-01-01 RX ADMIN — Medication 650 MILLIGRAM(S): at 12:55

## 2022-01-01 RX ADMIN — GABAPENTIN 1000 MILLIGRAM(S): 400 CAPSULE ORAL at 21:47

## 2022-01-01 RX ADMIN — Medication 5 MILLILITER(S): at 22:27

## 2022-01-01 RX ADMIN — GABAPENTIN 900 MILLIGRAM(S): 400 CAPSULE ORAL at 09:49

## 2022-01-01 RX ADMIN — MUPIROCIN 1 APPLICATION(S): 20 OINTMENT TOPICAL at 05:58

## 2022-01-01 RX ADMIN — METHADONE HYDROCHLORIDE 2.5 MILLIGRAM(S): 40 TABLET ORAL at 14:18

## 2022-01-01 RX ADMIN — ENOXAPARIN SODIUM 30 MILLIGRAM(S): 100 INJECTION SUBCUTANEOUS at 13:10

## 2022-01-01 RX ADMIN — Medication 650 MILLIGRAM(S): at 23:17

## 2022-01-01 RX ADMIN — Medication 3 MILLIGRAM(S): at 06:06

## 2022-01-01 RX ADMIN — ENOXAPARIN SODIUM 30 MILLIGRAM(S): 100 INJECTION SUBCUTANEOUS at 13:32

## 2022-01-01 RX ADMIN — METHADONE HYDROCHLORIDE 2.5 MILLIGRAM(S): 40 TABLET ORAL at 05:35

## 2022-01-01 RX ADMIN — CHLORHEXIDINE GLUCONATE 1 APPLICATION(S): 213 SOLUTION TOPICAL at 08:01

## 2022-01-01 RX ADMIN — PANTOPRAZOLE SODIUM 40 MILLIGRAM(S): 20 TABLET, DELAYED RELEASE ORAL at 12:22

## 2022-01-01 RX ADMIN — POLYETHYLENE GLYCOL 3350 17 GRAM(S): 17 POWDER, FOR SOLUTION ORAL at 05:41

## 2022-01-01 RX ADMIN — GABAPENTIN 900 MILLIGRAM(S): 400 CAPSULE ORAL at 21:04

## 2022-01-01 RX ADMIN — SENNA PLUS 2 TABLET(S): 8.6 TABLET ORAL at 21:10

## 2022-01-01 RX ADMIN — OLANZAPINE 5 MILLIGRAM(S): 15 TABLET, FILM COATED ORAL at 21:10

## 2022-01-01 RX ADMIN — GABAPENTIN 1000 MILLIGRAM(S): 400 CAPSULE ORAL at 05:21

## 2022-01-01 RX ADMIN — GABAPENTIN 900 MILLIGRAM(S): 400 CAPSULE ORAL at 05:41

## 2022-01-01 RX ADMIN — METHADONE HYDROCHLORIDE 2.5 MILLIGRAM(S): 40 TABLET ORAL at 15:42

## 2022-01-01 RX ADMIN — Medication 400 MILLIGRAM(S): at 09:48

## 2022-01-01 RX ADMIN — LEVETIRACETAM 500 MILLIGRAM(S): 250 TABLET, FILM COATED ORAL at 06:22

## 2022-01-01 RX ADMIN — Medication 4 MILLIGRAM(S): at 05:14

## 2022-01-01 RX ADMIN — LEVETIRACETAM 400 MILLIGRAM(S): 250 TABLET, FILM COATED ORAL at 05:20

## 2022-01-01 RX ADMIN — Medication 2 MILLIGRAM(S): at 18:09

## 2022-01-01 RX ADMIN — SENNA PLUS 2 TABLET(S): 8.6 TABLET ORAL at 21:14

## 2022-01-01 RX ADMIN — Medication 2 MILLIGRAM(S): at 18:08

## 2022-01-01 RX ADMIN — SENNA PLUS 2 TABLET(S): 8.6 TABLET ORAL at 22:27

## 2022-01-01 RX ADMIN — METHADONE HYDROCHLORIDE 2.5 MILLIGRAM(S): 40 TABLET ORAL at 23:41

## 2022-01-01 RX ADMIN — Medication 1 SPRAY(S): at 13:24

## 2022-01-01 RX ADMIN — GABAPENTIN 1000 MILLIGRAM(S): 400 CAPSULE ORAL at 15:26

## 2022-01-01 RX ADMIN — Medication 2 MILLIGRAM(S): at 17:29

## 2022-01-01 RX ADMIN — Medication 4 MILLIGRAM(S): at 22:29

## 2022-01-01 RX ADMIN — Medication 650 MILLIGRAM(S): at 00:00

## 2022-01-01 RX ADMIN — PANTOPRAZOLE SODIUM 40 MILLIGRAM(S): 20 TABLET, DELAYED RELEASE ORAL at 13:26

## 2022-01-01 RX ADMIN — Medication 2 MILLIGRAM(S): at 06:10

## 2022-01-01 RX ADMIN — SENNA PLUS 2 TABLET(S): 8.6 TABLET ORAL at 21:41

## 2022-01-01 RX ADMIN — ENOXAPARIN SODIUM 40 MILLIGRAM(S): 100 INJECTION SUBCUTANEOUS at 20:58

## 2022-01-01 RX ADMIN — GABAPENTIN 1000 MILLIGRAM(S): 400 CAPSULE ORAL at 06:06

## 2022-01-01 RX ADMIN — METHADONE HYDROCHLORIDE 2.5 MILLIGRAM(S): 40 TABLET ORAL at 21:40

## 2022-01-01 RX ADMIN — METHADONE HYDROCHLORIDE 2 MILLIGRAM(S): 40 TABLET ORAL at 05:02

## 2022-01-01 RX ADMIN — Medication 2 MILLIGRAM(S): at 06:22

## 2022-01-01 RX ADMIN — Medication 2 MILLIGRAM(S): at 05:36

## 2022-01-01 RX ADMIN — POLYETHYLENE GLYCOL 3350 17 GRAM(S): 17 POWDER, FOR SOLUTION ORAL at 17:46

## 2022-01-01 RX ADMIN — OXYCODONE HYDROCHLORIDE 5 MILLIGRAM(S): 5 TABLET ORAL at 03:34

## 2022-01-01 RX ADMIN — MUPIROCIN 1 APPLICATION(S): 20 OINTMENT TOPICAL at 05:21

## 2022-01-01 RX ADMIN — ENOXAPARIN SODIUM 30 MILLIGRAM(S): 100 INJECTION SUBCUTANEOUS at 13:28

## 2022-01-01 RX ADMIN — Medication 1 PACKET(S): at 00:53

## 2022-01-01 RX ADMIN — PANTOPRAZOLE SODIUM 40 MILLIGRAM(S): 20 TABLET, DELAYED RELEASE ORAL at 13:05

## 2022-01-01 RX ADMIN — MUPIROCIN 1 APPLICATION(S): 20 OINTMENT TOPICAL at 08:54

## 2022-01-01 RX ADMIN — ENOXAPARIN SODIUM 30 MILLIGRAM(S): 100 INJECTION SUBCUTANEOUS at 15:27

## 2022-01-01 RX ADMIN — ENOXAPARIN SODIUM 30 MILLIGRAM(S): 100 INJECTION SUBCUTANEOUS at 12:32

## 2022-01-01 RX ADMIN — Medication 4 MILLIGRAM(S): at 13:11

## 2022-01-01 RX ADMIN — LEVETIRACETAM 500 MILLIGRAM(S): 250 TABLET, FILM COATED ORAL at 06:30

## 2022-01-01 RX ADMIN — METHADONE HYDROCHLORIDE 2.5 MILLIGRAM(S): 40 TABLET ORAL at 15:37

## 2022-01-01 RX ADMIN — Medication 650 MILLIGRAM(S): at 02:00

## 2022-01-01 RX ADMIN — Medication 5 MILLILITER(S): at 05:34

## 2022-01-01 RX ADMIN — CHLORHEXIDINE GLUCONATE 1 APPLICATION(S): 213 SOLUTION TOPICAL at 17:44

## 2022-01-01 RX ADMIN — GABAPENTIN 1000 MILLIGRAM(S): 400 CAPSULE ORAL at 13:09

## 2022-01-01 RX ADMIN — PANTOPRAZOLE SODIUM 40 MILLIGRAM(S): 20 TABLET, DELAYED RELEASE ORAL at 06:04

## 2022-01-01 RX ADMIN — GABAPENTIN 1000 MILLIGRAM(S): 400 CAPSULE ORAL at 14:46

## 2022-01-01 RX ADMIN — METHADONE HYDROCHLORIDE 2.5 MILLIGRAM(S): 40 TABLET ORAL at 21:52

## 2022-01-01 RX ADMIN — Medication 1 SPRAY(S): at 13:12

## 2022-01-01 NOTE — PROGRESS NOTE ADULT - ASSESSMENT
MckeonBalwinder  66M Hx Sx for rectal cancer w/ ostomy and reversal 10 years ago, no AC/AP, p/w R sciatica since february and 2 weeks R foot drop. 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   -Adm Medicine, q4h neuro checks  -MRI R-contrast enhancing sacral mass  -CT chest two 2 mm left nodules  -CT-guided biopsy with sheath pending. Did not receive yesterday b/c CT guided equipment was not available. F/u IR for when bx occurring.   -Pain per primary MikeBalwinder  66M Hx Sx for rectal cancer w/ ostomy and reversal 10 years ago, no AC/AP, p/w R sciatica since february and 2 weeks R foot drop. 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   -Adm Medicine, q4h neuro checks  -MRI R-contrast enhancing sacral mass  -CT chest two 2 mm left nodules  -CT-guided biopsy with sheath pending, scheduled for Monday. See IR note from 9/30  -Pain per primary 2022 17:45

## 2022-01-04 NOTE — ASSESSMENT
[______] : HCP: [unfilled] [FreeTextEntry1] : 66yoM with:\par \par 1. Soft tissue sarcoma - On Gemzar/Taxotere; Med Onc follow up. \par \par 2. Pain 2/2 Neoplasm - Is tolerating Methadone re-trial well.  Will increase Methadone to 2mg TID.  He should c/w PRN Oxycodone IR 10mg.  \par - Discussed how medical cannabis may be of benefit for pain.\par \par 3. Nausea - c/w PRN compazine; medical cannabis may be of benefit.\par \par 4. Constipation - C/w Miralax daily. \par \par 5. Encounter for Palliative Care - Emotional support provided.\par HCP on file.\par \par Follow up in 2 weeks, call sooner with questions or issues.

## 2022-01-04 NOTE — HISTORY OF PRESENT ILLNESS
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 Moon.\par \par Patient is Mandarin-speaking and prefers for his daughter Gaby to translate for him. \par \par Patient developed pain in his R hip in 2/2021. It was thought that he was suffering with sciatica and he started PT. This did not make much difference in his pain. He established care with a pain management doctor in [place].  Workup revealed a large RP mass. \par He is s/p 10 fractions of RT to the pelvic mass, completed 11/19. \par \par His pain is localized to the R buttock and extends down his R leg. He endorses tingling along the dorsum of his r foot. The pain is always present  The pain renders him unable to bear weight on his R leg. He uses a walker for assistance with ambulation. \par Currently he rates his pain as 6/10. He states that since the chemotherapy has started the intensity of his pain has lessened. Prior to chemotherapy his pain got up to 10/10 regularly. Nowadays his pain tends to only get to 10/10 at night, and this is \par \par He is currently taking Gabapentin 900mg TID, PRN Oxycodone 10mg, up to four times daily.  \par He was started on methadone two months ago for his pain, he felt restless on it and self-discontinued it.  He was subsequently started on MS ER 15mg BID.  He states that he did not feel well on it and stopped using it. \par \par Interval History (12/30/21):\par Patient seen in treatment room.  He has been tolerating Methadone solution 1mg TID well.  No SEs nor AEs.  Continues to use PRN Oxycodone 10mg, up to four times daily. He has not acquired medical cannabis 1:1 THC:CBD yet as he believes his appetite has improved. \par \par ROS:\par +cold R foot - applies hot packs to it\par +low appetite since starting chemotherapy \par +10lb weight loss in the last 2 months\par +nausea - the smell of some foods spur nausea - has prochlorperazine for PRN use\par +pressure sore on back\par +constipation - uses miralax PRN\par All other ROS as outlined or noncontributory. \par \par He spends majority of the day lying flat in bed. Requires frequent readjustments due to his pain. He spends the day watching TV, talking to his friends. \par \par Patient is , lives with his wife. They have one daughter, Gaby and three grandchildren. \par \par I-Stop Ref#:  [id number]

## 2022-01-04 NOTE — PHYSICAL EXAM
[General Appearance - Alert] : alert [Sclera] : the sclera and conjunctiva were normal [Normal Oral Mucosa] : normal oral mucosa [Neck Appearance] : the appearance of the neck was normal [] : no respiratory distress [Auscultation Breath Sounds / Voice Sounds] : lungs were clear to auscultation bilaterally [Heart Rate And Rhythm] : heart rate was normal and rhythm regular [Heart Sounds] : normal S1 and S2 [Bowel Sounds] : normal bowel sounds [Abdomen Soft] : soft [Abdomen Tenderness] : non-tender [Skin Color & Pigmentation] : normal skin color and pigmentation [Oriented To Time, Place, And Person] : oriented to person, place, and time [General Appearance - In No Acute Distress] : in no acute distress [No Focal Deficits] : no focal deficits [Affect] : the affect was normal [FreeTextEntry1] : +TTP R leg

## 2022-01-14 NOTE — PHYSICAL EXAM
[General Appearance - Alert] : alert [General Appearance - In No Acute Distress] : in no acute distress [Sclera] : the sclera and conjunctiva were normal [Normal Oral Mucosa] : normal oral mucosa [Neck Appearance] : the appearance of the neck was normal [] : no respiratory distress [Auscultation Breath Sounds / Voice Sounds] : lungs were clear to auscultation bilaterally [Heart Rate And Rhythm] : heart rate was normal and rhythm regular [Heart Sounds] : normal S1 and S2 [Bowel Sounds] : normal bowel sounds [Abdomen Soft] : soft [Abdomen Tenderness] : non-tender [Skin Color & Pigmentation] : normal skin color and pigmentation [No Focal Deficits] : no focal deficits [Oriented To Time, Place, And Person] : oriented to person, place, and time [Affect] : the affect was normal [FreeTextEntry1] : +TTP R leg

## 2022-01-14 NOTE — HISTORY OF PRESENT ILLNESS
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 Moon.\par \par Patient is Mandarin-speaking and prefers for his daughter Gaby to translate for him. \par \par Patient developed pain in his R hip in 2/2021. It was thought that he was suffering with sciatica and he started PT. This did not make much difference in his pain. He established care with a pain management doctor in [place].  Workup revealed a large RP mass. \par He is s/p 10 fractions of RT to the pelvic mass, completed 11/19. \par \par His pain is localized to the R buttock and extends down his R leg. He endorses tingling along the dorsum of his r foot. The pain is always present  The pain renders him unable to bear weight on his R leg. He uses a walker for assistance with ambulation. \par Currently he rates his pain as 6/10. He states that since the chemotherapy has started the intensity of his pain has lessened. Prior to chemotherapy his pain got up to 10/10 regularly. Nowadays his pain tends to only get to 10/10 at night, and this is \par \par He is currently taking Gabapentin 900mg TID, PRN Oxycodone 10mg, up to four times daily.  \par He was started on methadone two months ago for his pain, he felt restless on it and self-discontinued it.  He was subsequently started on MS ER 15mg BID.  He states that he did not feel well on it and stopped using it. \par \par Interval History (1/14/22):\par Patient presents for follow-up. \par Hs pain is now well controlled since increasing Methadone solution to 2mg TID.  No AEs.  He has not utilized PRN Oxycodone IR 10mg in over a week.  Right LE tingling/numbness is bothersome but stable.\par \par He has not acquired medical cannabis 1:1 THC:CBD yet as he believes his appetite has improved. \par \par Current pain regimen:\par Methadone 2mg TID\par PRN Oxycodone IR 10mg\par Gabapentin 900mg TID\par \par ROS:\par +cold R foot - applies hot packs to it\par +low appetite since starting chemotherapy \par +10lb weight loss since diagnosis\par +nausea - the smell of some foods spur nausea - has prochlorperazine for PRN use\par +constipation - uses miralax PRN\par All other ROS as outlined or noncontributory. \par \par He spends majority of the day lying flat in bed. Requires frequent readjustments due to his pain. He spends the day watching TV, talking to his friends. \par \par Patient is , lives with his wife. They have one daughter, Gaby and three grandchildren. \par \par I-Stop Ref#:  [id number]

## 2022-01-14 NOTE — PHYSICAL EXAM
[Capable of only limited self care, confined to bed or chair more than 50% of waking hours] : Status 3- Capable of only limited self care, confined to bed or chair more than 50% of waking hours [Thin] : thin [Normal] : affect appropriate [de-identified] : anicteric [de-identified] : no JVD [de-identified] : RRR [de-identified] : + mild non-pitting edema to distal RLE; B/L LE warm and pink; DP and PT pulses palpable and strong B/L [de-identified] : soft NT/ND [de-identified] : RLE weakness [de-identified] : previously noted skin breaks to low back/sacral area resolived; only mild erythema to these sites; + dry, superficial wound to tip of right great toe; no tenderness, erythema or bleeding

## 2022-01-14 NOTE — ASSESSMENT
[______] : HCP: [unfilled] [FreeTextEntry1] : 66yoM with:\par \par 1. Soft tissue sarcoma- On Gemzar/Taxotere; Med Onc follow up. \par \par 2. Pain 2/2 Neoplasm- C/w Methadone 2mg TID.  May c/w PRN Oxycodone IR 10mg.  \par - Discussed how medical cannabis may be of benefit for pain. He wishes to hold off as pain is currently controlled.\par \par 3. Nausea - c/w PRN Compazine; medical cannabis may be of benefit.\par \par 4. Constipation - C/w Miralax daily. \par \par 5. Loss of appetite- May benefit from medical cannabis.\par \par 6. Encounter for Palliative Care - Emotional support provided.\par HCP on file.\par \par Follow up in 2 weeks, call sooner with questions or issues.

## 2022-01-14 NOTE — HISTORY OF PRESENT ILLNESS
[Disease: _____________________] : Disease: [unfilled] [AJCC Stage: ____] : AJCC Stage: [unfilled] [de-identified] : 66 M w/ h/o rectal cancer in 2011 treated with chemotherapy and radiation, presents for evaluation of large RP mass c/w high grade malignancy. \par \par Balwinder reports has been experiencing low back/upper buttock pain since Feb 2021. Pain has been progressive and refractory to conservative treatment with PT and NSAIDs. He was referred by his PCP to Dr Wilmar Oliva, pain management, who directed him to go to [facility] for further evaluation. On Sept 29, 2021 pt went to [facility] ER and underwent a CT A/P \par which showed a 4.6 x 6.9 x 5.7 cm R pelvic sidewall mass with destruction of the sacrum and involvement of multiple nerve roots concerning for primary malignancy, CT Chest with 2 mm two JOURDAN nodules. An MRI Pelvis described this mass as invading hemisacrum, involving nerve roots and vasculature. Biopsy was performed on 10/4/21 and pathology so far only c/w high grade malignancy. Preliminary diagnosis upon discussion with pathologist is that this may be a sarcoma. was seen by Dr Fung initially\par \par Referred to Dr. 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  Patient and daughter report that patient;s overall pain has improved with the pain regimen prescribed by Palliative Care but he is now having frequent episodes of numbness to his distal RLE (knee to toes) that is relieved with standing up and movement (walking with walker).  Patient still spends most of his day lying flat in bed but does get up every ~1 hour day and night to help relieve the numbness.  He reports some constipation but states that it is his baseline bowel function since his previous colon cancer diagnosis/treatment.  Denies HA, dizziness, anorexia, CP, SOB, POST, N/V, abdominal pain, saddle anesthesia, urinary symptoms or incontinence.

## 2022-01-14 NOTE — REVIEW OF SYSTEMS
[Fatigue] : fatigue [Negative] : Allergic/Immunologic [Joint Pain] : joint pain [Muscle Pain] : muscle pain [de-identified] : intermittent numbness/paresthesias to RLE

## 2022-01-26 NOTE — REVIEW OF SYSTEMS
[Fatigue] : fatigue [Joint Pain] : joint pain [Muscle Pain] : muscle pain [Negative] : Allergic/Immunologic [de-identified] : intermittent numbness/paresthesias to RLE

## 2022-01-26 NOTE — PHYSICAL EXAM
[Capable of only limited self care, confined to bed or chair more than 50% of waking hours] : Status 3- Capable of only limited self care, confined to bed or chair more than 50% of waking hours [Thin] : thin [Normal] : affect appropriate [de-identified] : anicteric [de-identified] : no JVD [de-identified] : normal respiratory effort, no audible wheeze [de-identified] : reg rate  [de-identified] : + mild non-pitting edema to distal RLE;  [de-identified] : soft NT/ND [de-identified] : RLE weakness

## 2022-01-26 NOTE — HISTORY OF PRESENT ILLNESS
[Disease: _____________________] : Disease: [unfilled] [AJCC Stage: ____] : AJCC Stage: [unfilled] [de-identified] : 66 M w/ h/o rectal cancer in 2011 treated with chemotherapy and radiation, presents for evaluation of large RP mass c/w high grade malignancy. \par \par Balwinder reports has been experiencing low back/upper buttock pain since Feb 2021. Pain has been progressive and refractory to conservative treatment with PT and NSAIDs. He was referred by his PCP to Dr Wilmar Oliva, pain management, who directed him to go to [facility] for further evaluation. On Sept 29, 2021 pt went to [facility] ER and underwent a CT A/P \par which showed a 4.6 x 6.9 x 5.7 cm R pelvic sidewall mass with destruction of the sacrum and involvement of multiple nerve roots concerning for primary malignancy, CT Chest with 2 mm two JOURDAN nodules. An MRI Pelvis described this mass as invading hemisacrum, involving nerve roots and vasculature. Biopsy was performed on 10/4/21 and pathology so far only c/w high grade malignancy. Preliminary diagnosis upon discussion with pathologist is that this may be a sarcoma. was seen by Dr Fung initially\par \par Referred to Dr. 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

## 2022-01-28 NOTE — HISTORY OF PRESENT ILLNESS
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 Moon.\par \par Patient is Mandarin-speaking and prefers for his daughter Gaby to translate for him. \par \par Patient developed pain in his R hip in 2/2021. It was thought that he was suffering with sciatica and he started PT. This did not make much difference in his pain. He established care with a pain management doctor in [place].  Workup revealed a large RP mass. \par He is s/p 10 fractions of RT to the pelvic mass, completed 11/19. \par \par His pain is localized to the R buttock and extends down his R leg. He endorses tingling along the dorsum of his r foot. The pain is always present  The pain renders him unable to bear weight on his R leg. He uses a walker for assistance with ambulation. \par Currently he rates his pain as 6/10. He states that since the chemotherapy has started the intensity of his pain has lessened. Prior to chemotherapy his pain got up to 10/10 regularly. Nowadays his pain tends to only get to 10/10 at night, and this is \par \par He is currently taking Gabapentin 900mg TID, PRN Oxycodone 10mg, up to four times daily.  \par He was started on methadone two months ago for his pain, he felt restless on it and self-discontinued it.  He was subsequently started on MS ER 15mg BID.  He states that he did not feel well on it and stopped using it. \par \par Interval History (1/28/22):\par Patient presents for follow-up via telemedicine.\par  Hs pain is now well controlled since increasing Methadone solution to 2mg TID, oxycodone 10mg PRN (has been using 1-2/day over past couple of weeks), gabapentin 900mg TID (for LE neuropathy), all of which he has been tolerating without complaints of adverse effects \par \par Current pain regimen:\par Methadone 2mg TID\par PRN Oxycodone IR 10mg\par Gabapentin 900mg TID\par \par ROS: \par + right LE edema – on and off for months, LE U/S negative for DVT x2, no associated fevers or skin changes, will try compression stocking and leg raising \par + reports eating 3 meals a day, reports stable weight around 140, mood has overall been okay, no nausea, no constipation (on miralax qd)\par +cold R foot - applies hot packs to it\par +10lb weight loss since diagnosis\par +nausea - the smell of some foods spur nausea - has prochlorperazine for PRN use\par +constipation - uses miralax PRN\par All other ROS as outlined or noncontributory. \par \par While family is open to medical cannabis to help manage symptoms and keep him comfortable, since symptoms are controlled on current medication regimen, they will hold off on starting cannabis at this time. \par \par Patient is , lives with his wife. They have one daughter, Gaby and three grandchildren. Has been ambulating \par \par I-Stop Ref#:  [id number] [Opioids for treatment of cancer not in remission, and/or  hospice, palliative care] : Opioids for treatment of cancer not in remission, and/or  hospice, palliative care

## 2022-01-28 NOTE — ASSESSMENT
[FreeTextEntry1] : 66yoM with:\par \par 1. Soft tissue sarcoma- On Gemzar/Taxotere; Med Onc follow up. \par \par 2. Pain 2/2 Neoplasm- C/w Methadone 2mg TID.  May c/w PRN Oxycodone IR 10mg.  \par - Discussed how medical cannabis may be of benefit for pain. He wishes to hold off as pain is currently controlled.\par \par 3. Nausea - c/w PRN Compazine; medical cannabis may be of benefit.\par \par 4. Constipation - C/w Miralax daily. \par \par 5. Encounter for Palliative Care - Emotional support provided.\par HCP on file.\par \par Follow up in 1 month, call sooner with questions or issues. [______] : HCP: [unfilled]

## 2022-01-28 NOTE — PHYSICAL EXAM
[General Appearance - Alert] : alert [General Appearance - In No Acute Distress] : in no acute distress [FreeTextEntry1] : lying flat on bed in treatmen room [Bowel Sounds] : normal bowel sounds [Abdomen Soft] : soft [Abdomen Tenderness] : non-tender [Oriented To Time, Place, And Person] : oriented to person, place, and time [Affect] : the affect was normal

## 2022-02-04 NOTE — PHYSICAL EXAM
[Thin] : thin [] : no respiratory distress [Respiration, Rhythm And Depth] : normal respiratory rhythm and effort [Exaggerated Use Of Accessory Muscles For Inspiration] : no accessory muscle use [Oriented To Time, Place, And Person] : oriented to person, place, and time [Not Anxious] : not anxious [de-identified] : limited due to telehealth [de-identified] : R LE edema, +2 [FreeTextEntry1] : Limited by telehelath

## 2022-02-04 NOTE — REVIEW OF SYSTEMS
[Lower Ext Edema] : lower extremity edema [Joint Pain] : joint pain [Difficulty Walking] : difficulty walking [Negative] : Heme/Lymph

## 2022-02-04 NOTE — HISTORY OF PRESENT ILLNESS
[Home] : at home, [unfilled] , at the time of the visit. [Medical Office: ([facility])___] : at the medical office located in  [] :  [Other:____] : [unfilled] [Verbal consent obtained from patient] : the patient, [unfilled] [FreeTextEntry1] : Mandarin -dtr Renetta\par \par 66-year-old male with history of rectal cancer treated with chemoradiation, chemotherapy, and surgery in 2011, now with retroperitoneal/right pelvic malignancy. MR lumbar spine/pelvis 9/29/21 showed destructive right pelvic lesion with infiltration of right iliac/sacrum, involvement of S1/S2 neuroforamina. 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 Patient underwent a course of radiation therapy as outlined below. \par \par Radiation Treatment Summary: Pelvis_RT  3,000 cGy  from 11/08/2021 - 11/19/2021 \par CLINICAL COURSE: Tolerated the treatment well with no adverse effects from the radiation therapy; \par his Neurontin was uptitrated during treatment and his pain improved over the course of therapy.      \par \par At last visit with us 12/2021, he was doing well with no significant sequelae from his radiation therapy, and improved pain control. He was receiving systemic treatment. Imaging with MRI & CT CAP 1/2022 consistent with POD\par \par Today: \par  Continuing on systemic therapy with Dr. Moon and pain management with Dr. Cabral. He reports increased right lower extremity edema that comes and goes. Initially he had pain relief after RT but  now his pelvic and hip pain is worse. Also has sciatica and numbness in his R leg. No shortness of breath, urinary or bowel bother.

## 2022-02-04 NOTE — END OF VISIT
[] : Resident [FreeTextEntry3] : I saw and examined this patient on the date of service with my assigned resident physician, Dr. Jamie Huffman. I was involved in all procedures and radiographic assessments. 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 He has progressive disease in the lungs and now pancreas; while he initially had a good clinical response to his radiation therapy to the right pelvis with necrosis in the treated lesion, his pain is worsening, suggesting progression there as well. \par \par We would not recommend additional radiation therapy at this time to the right pelvic site or his new sites of disease; currently under consideration for antracycline-based chemotherapy, which makes sense. We recommend that he continue to follow with his medical oncologist and palliative care physicians, and we can see him again as needed.

## 2022-02-04 NOTE — DATA REVIEWED
[FreeTextEntry1] : MR pelvis 1/18/22 showed increased right pelvic mass, with necrosis\par \par CT C/A/P 1/31/22 showed new pancreatic tail lesion, multiple bilateral lung nodules c/w metastasis\par Right iliopsoas mass with destructive changes sacrum and infiltrative changes right posterior ilium

## 2022-02-04 NOTE — VITALS
[Maximal Pain Intensity: 6/10] : 6/10 [Opioid] : opioid [60: Requires occasional assistance, but is able to care for most of his/her needs] : 60: Requires occasional assistance, but is able to care for most of his/her needs [ECOG Performance Status: 2 - Ambulatory and capable of all self care but unable to carry out any work activities] : Performance Status: 2 - Ambulatory and capable of all self care but unable to carry out any work activities. Up and about more than 50% of waking hours

## 2022-02-07 NOTE — ASU DISCHARGE PLAN (ADULT/PEDIATRIC) - ASU DC SPECIAL INSTRUCTIONSFT
Chest Port Placement    Discharge Instructions  - You have had a chest port implanted in your chest.   - The port is ready for use.  - You may shower in 48 hours.   - Do not remove steri-strips; they will fall off on their own  - Do not perform any heavy lifting or put tension on the area for the next week or until the site is healed.  - You may resume your normal diet.  - You may resume your normal medications however you should wait 48 hours before restarting aspirin, plavix, or blood thinners.  - It is normal to experience some pain over the site for the next few days. You may take apply ice to the area (20 minutes on, 20 minutes off) and take Tylenol for that pain. Do not take more frequently than every 6 hours and do not exceed more than 3000mg of Tylenol in a 24 hour period.    - You were given conscious sedation which may make you drowsy, therefore you need someone to stay with you until the morning following the procedure.  - Do not drive, engage in heavy lifting or strenuous activity, or drink any alcoholic beverages for the next 24 hours.   - You may resume normal activity in 24 hours.    Notify your primary physician and/or Interventional Radiology IMMEDIATELY if you experience any of the following       - Fever of 100.4F or 38C       - Chills or Rigors/ Shakes       - Swelling and/or Redness in the area around the port       - Worsening Pain       - Blood soaked bandages or worsening bleeding       - Lightheadedness and/or dizziness upon standing       - Chest Pain/ Tightness       - Shortness of Breath       - Difficulty walking    If you have a problem that you believe requires IMMEDIATE attention, please go to your NEAREST Emergency Room. If you believe your problem can safely wait until you speak to a physician, please call Interventional Radiology for any concerns.    During Normal Weekday Business Hours- You can contact the Interventional Radiology department during normal business hours via telephone.  During Evenings and Weekends- If you need to contact Interventional Radiology during off hours, do so by calling the hospital and requesting to be connected to the Interventional Radiologist on call.

## 2022-02-07 NOTE — ASU PATIENT PROFILE, ADULT - FALL HARM RISK - RISK INTERVENTIONS
no Assistance OOB with selected safe patient handling equipment/Communicate Fall Risk and Risk Factors to all staff, patient, and family/Discuss with provider need for PT consult/Monitor gait and stability/Provide patient with walking aids - walker, cane, crutches/Reinforce activity limits and safety measures with patient and family/Visual Cue: Yellow wristband/Bed in lowest position, wheels locked, appropriate side rails in place/Call bell, personal items and telephone in reach/Instruct patient to call for assistance before getting out of bed or chair/Non-slip footwear when patient is out of bed/[place] to call system/Physically safe environment - no spills, clutter or unnecessary equipment/Purposeful Proactive Rounding/Room/bathroom lighting operational, light cord in reach

## 2022-02-07 NOTE — PRE PROCEDURE NOTE - PRE PROCEDURE EVALUATION
Interventional Radiology  HPI: 66y Male with rectal cancer requiring venous access for chemotherapy presents to IR for chest port placement.    Allergies: NKDA    Exam  General: No acute distress  Chest: Non labored breathing  Abdomen: Non-distended  Extremities: No swelling, warm    -WBC 4.19 / HgB 9.7 / Hct 29.8 / Plt 416  -Na 140 / Cl 101 / BUN 9 / Glucose 89  -K 4.1 / CO2 25 / Cr 0.57  -ALT 7 / Alk Phos 49 / T.Bili 0.4    Plan: 66y Male presents for Chest port placement  -Risks/Benefits/alternatives explained with the patient and witnessed informed consent obtained.

## 2022-02-07 NOTE — ASU DISCHARGE PLAN (ADULT/PEDIATRIC) - NURSING INSTRUCTIONS
Please feel free to contact us at [phone] if any problems arise. After 6PM, Monday through Friday, on weekends and on holidays, please call [phone] and ask for the radiology resident on call to be paged.

## 2022-02-07 NOTE — ASU DISCHARGE PLAN (ADULT/PEDIATRIC) - NS MD DC FALL RISK RISK
For information on Fall & Injury Prevention, visit: [email or web address] OR  [email or web address] OR  https://www.cdc.gov/steadi/patient.html

## 2022-02-09 NOTE — CHART NOTE - NSCHARTNOTEFT_GEN_A_CORE
Interventional radiology post procedure follow up:  Patient had right chest wall port placed on 2/7/22 with Dr. Betancourt.  On 2/9/22 at 10am, I spoke with patients daughter, Renetta, over the phone. [phone].  She states patient has bilateral hand and foot numbness and was concerned it was from the procedure. Daughter states he has had this numbness prior to the procedure but only on the right hand and foot, and has been in hospital for previously and had follow up with his oncologist who stated it is likely from nerve compression from his right PRP mass. Patient denies any other symptoms. I explained the new numbness on the left hand and foot is not caused by the right chest port placement and recommended patient should follow-up with his oncologist to notify him of the change.  Daughter to call back IR with any other questions or concerns. Interventional radiology post procedure follow up:  Patient had right chest wall port placed on 2/7/22 with Dr. Betancourt.  On 2/9/22 at 10am, I spoke with patients daughter, Renetta, over the phone. [phone].  She states patient has bilateral hand and foot numbness and was concerned it was from the procedure. Daughter states he has had this numbness prior to the procedure but only on the right hand and foot, and has been in hospital for previously and had follow up with his oncologist who stated it is likely from nerve compression from his right RLQ mass infiltrating into iliac muscle. Patient denies any other symptoms. I explained the new numbness on the left hand and foot is not caused by the right chest port placement and recommended patient should follow-up with his oncologist to notify him of the change.  Daughter to call back IR with any other questions or concerns.

## 2022-02-28 NOTE — HISTORY OF PRESENT ILLNESS
[Disease: _____________________] : Disease: [unfilled] [AJCC Stage: ____] : AJCC Stage: [unfilled] [de-identified] : 66 M w/ h/o rectal cancer in 2011 treated with chemotherapy and radiation, presents for evaluation of large RP mass c/w high grade malignancy. \par \par Balwinder reports has been experiencing low back/upper buttock pain since Feb 2021. Pain has been progressive and refractory to conservative treatment with PT and NSAIDs. He was referred by his PCP to Dr Wilmar Oliva, pain management, who directed him to go to [facility] for further evaluation. On Sept 29, 2021 pt went to [facility] ER and underwent a CT A/P \par which showed a 4.6 x 6.9 x 5.7 cm R pelvic sidewall mass with destruction of the sacrum and involvement of multiple nerve roots concerning for primary malignancy, CT Chest with 2 mm two JOURDAN nodules. An MRI Pelvis described this mass as invading hemisacrum, involving nerve roots and vasculature. Biopsy was performed on 10/4/21 and pathology so far only c/w high grade malignancy. Preliminary diagnosis upon discussion with pathologist is that this may be a sarcoma. was seen by Dr Fung initially\par \par Referred to Dr. 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  Patient reports persistent swelling to his RLE and is no longer able to use compression stockings because his leg is too swollen.  He has been wrapping the leg with ACE bandages and his wife has been massaging the leg multiple times a day.  He reports increased pain in his right hip joint and states that he can no longer lie on his right side due to this pain.  He has continued to take Methadone as prescribed but has not increased the frequency of his breakthrough Oxycodone.  He reports continued episodes of paresthesias to the RLE that improves when he gets up to ambulate every hour day and night.  He reports new increased nocturia (4x/night) in the last 2 months.  Denies dysuria, hematuria, urinary urgency, urinary incontinence, incomplete void or weak stream.  Denies abdominal pain, vomiting, change in bowel function.

## 2022-02-28 NOTE — PHYSICAL EXAM
[Capable of only limited self care, confined to bed or chair more than 50% of waking hours] : Status 3- Capable of only limited self care, confined to bed or chair more than 50% of waking hours [Thin] : thin [Normal] : grossly intact [de-identified] : anicteric [de-identified] : no JVD [de-identified] : normal respiratory effort, no audible wheeze [de-identified] : reg rate  [de-identified] : RLE wrapped in ACE wrap; right ankle and foot with mild non-pitting edema; foot warm wth capillary refill intact; DP palpable and intact; LLE without swelling [de-identified] : soft NT/ND [de-identified] : RLE weakness

## 2022-02-28 NOTE — REVIEW OF SYSTEMS
[Fatigue] : fatigue [Joint Pain] : joint pain [Muscle Pain] : muscle pain [Negative] : Allergic/Immunologic [Lower Ext Edema] : lower extremity edema [Dysuria] : no dysuria [Incontinence] : no incontinence [FreeTextEntry5] : RLE edema [FreeTextEntry8] : increased nocturia [de-identified] : intermittent numbness/paresthesias to RLE

## 2022-03-03 NOTE — HISTORY OF PRESENT ILLNESS
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 Moon.\par \par Patient is Mandarin-speaking and prefers for his daughter Gaby to translate for him. \par \par Patient developed pain in his R hip in 2/2021. It was thought that he was suffering with sciatica and he started PT. This did not make much difference in his pain. He established care with a pain management doctor in [place].  Workup revealed a large RP mass. \par He is s/p 10 fractions of RT to the pelvic mass, completed 11/19. \par \par His pain is localized to the R buttock and extends down his R leg. He endorses tingling along the dorsum of his r foot. The pain is always present  The pain renders him unable to bear weight on his R leg. He uses a walker for assistance with ambulation. \par Currently he rates his pain as 6/10. He states that since the chemotherapy has started the intensity of his pain has lessened. Prior to chemotherapy his pain got up to 10/10 regularly. Nowadays his pain tends to only get to 10/10 at night, and this is \par \par He is currently taking Gabapentin 900mg TID, PRN Oxycodone 10mg, up to four times daily.  \par He was started on methadone two months ago for his pain, he felt restless on it and self-discontinued it.  He was subsequently started on MS ER 15mg BID.  He states that he did not feel well on it and stopped using it. \par \par Interval History:\par Patient presents for follow-up via telemedicine.\par Pain has increased in recent weeks. Remains on Methadone solution to 2mg TID, oxycodone 10mg PRN (has been using 2/day over past couple of weeks), gabapentin 900mg TID (for LE neuropathy), all of which he has been tolerating without complaints of adverse effects. His daughter is in the process of obtaining medical cannabis for him. \par \par Current pain regimen:\par Methadone 2mg TID\par PRN Oxycodone IR 10mg\par Gabapentin 900mg TID\par \par ROS: \par + right LE edema – on and off for months, LE U/S negative for DVT x2, no associated fevers or skin changes, will try compression stocking and leg raising \par + reports eating 3 meals a day, reports stable weight around 140, mood has overall been okay, no nausea, no constipation (on miralax qd)\par +cold R foot - applies hot packs to it\par +10lb weight loss since diagnosis\par +nausea - the smell of some foods spur nausea - has prochlorperazine for PRN use\par +constipation - uses miralax PRN\par All other ROS as outlined or noncontributory. \par \par While family is open to medical cannabis to help manage symptoms and keep him comfortable, since symptoms are controlled on current medication regimen, they will hold off on starting cannabis at this time. \par \par Patient is , lives with his wife. They have one daughter, Gaby and three grandchildren. Has been ambulating.\par \par I-Stop Ref#: [id number]

## 2022-03-03 NOTE — ASSESSMENT
[______] : HCP: [unfilled] [FreeTextEntry1] : 66yoM with:\par \par 1. Soft tissue sarcoma- On Gemzar/Taxotere; Med Onc follow up. \par \par 2. Pain 2/2 Neoplasm- Increase Methadone to  2/2/3mg.  May c/w PRN Oxycodone IR 10mg.  \par - Discussed how medical cannabis may be of benefit for pain. His daughter will obtain ASAP.  \par \par 3. Nausea - c/w PRN Compazine; medical cannabis may be of benefit.\par \par 4. Constipation - C/w Miralax daily. \par \par 5. Encounter for Palliative Care - Emotional support provided.\par HCP on file.\par \par Follow up in 2 weeks, call sooner with questions or issues.

## 2022-03-17 NOTE — ASSESSMENT
[Palliative Care Plan] : Patient was apprised of incurable nature of disease.  Hospice and Palliative care options discussed.

## 2022-03-17 NOTE — REASON FOR VISIT
[Home] : at home, [unfilled] , at the time of the visit. [Medical Office: ([facility])___] : at the medical office located in  [Follow-Up Visit] : a follow-up [Family Member] : family member [FreeTextEntry2] : sarcoma

## 2022-03-17 NOTE — REVIEW OF SYSTEMS
[Fatigue] : fatigue [Lower Ext Edema] : lower extremity edema [Joint Pain] : joint pain [Muscle Pain] : muscle pain [Negative] : Allergic/Immunologic [Dysuria] : no dysuria [Incontinence] : no incontinence [FreeTextEntry5] : RLE edema [FreeTextEntry8] : increased nocturia [de-identified] : intermittent numbness/paresthesias to RLE

## 2022-03-17 NOTE — HISTORY OF PRESENT ILLNESS
[Disease: _____________________] : Disease: [unfilled] [AJCC Stage: ____] : AJCC Stage: [unfilled] [de-identified] : SOMMER, TMB low \par FGFR1 amplification\par BCL2L2 amplification - equivocal†\par CDKN2A/B CDKN2A loss, CDKN2B loss\par LRP1B * , MYST3 amplification , TP53 loss [de-identified] : 66 M w/ h/o rectal cancer in 2011 treated with chemotherapy and radiation, presents for evaluation of large RP mass c/w high grade malignancy. \par \par Balwinder reports has been experiencing low back/upper buttock pain since Feb 2021. Pain has been progressive and refractory to conservative treatment with PT and NSAIDs. He was referred by his PCP to Dr Wilmar Oliva, pain management, who directed him to go to [facility] for further evaluation. On Sept 29, 2021 pt went to [facility] ER and underwent a CT A/P \par which showed a 4.6 x 6.9 x 5.7 cm R pelvic sidewall mass with destruction of the sacrum and involvement of multiple nerve roots concerning for primary malignancy, CT Chest with 2 mm two JOURDAN nodules. An MRI Pelvis described this mass as invading hemisacrum, involving nerve roots and vasculature. Biopsy was performed on 10/4/21 and pathology so far only c/w high grade malignancy. Preliminary diagnosis upon discussion with pathologist is that this may be a sarcoma. was seen by Dr Fung initially\par \par Referred to Dr. 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

## 2022-03-22 NOTE — ASSESSMENT
[______] : HCP: [unfilled] [FreeTextEntry1] : 66yoM with:\par \par 1. Soft tissue sarcoma- On Gemzar/Taxotere; Med Onc follow up. \par \par 2. Pain 2/2 Neoplasm- Increase Methadone to 3mg TID.  May c/w PRN Oxycodone IR 10mg.  \par - Discussed how medical cannabis may be of benefit for pain. His daughter will obtain ASAP.  Patient's daughter counseled that recreational cannabis with unknown THC dosing is not recommended.\par \par 3. Nausea - c/w PRN Compazine; medical cannabis may be of benefit.\par \par 4. Constipation - C/w Miralax daily. \par \par 5. Encounter for Palliative Care - Emotional support provided.\par HCP on file.\par \par Follow up in 2-3 weeks, call sooner with questions or issues.

## 2022-03-22 NOTE — HISTORY OF PRESENT ILLNESS
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 Moon.\par \par Patient is Mandarin-speaking and prefers for his daughter Gaby to translate for him. \par \par Patient developed pain in his R hip in 2/2021. It was thought that he was suffering with sciatica and he started PT. This did not make much difference in his pain. He established care with a pain management doctor in [place].  Workup revealed a large RP mass. \par He is s/p 10 fractions of RT to the pelvic mass, completed 11/19. \par \par His pain is localized to the R buttock and extends down his R leg. He endorses tingling along the dorsum of his r foot. The pain is always present  The pain renders him unable to bear weight on his R leg. He uses a walker for assistance with ambulation. \par Currently he rates his pain as 6/10. He states that since the chemotherapy has started the intensity of his pain has lessened. Prior to chemotherapy his pain got up to 10/10 regularly. Nowadays his pain tends to only get to 10/10 at night, and this is \par \par He is currently taking Gabapentin 900mg TID, PRN Oxycodone 10mg, up to four times daily.  \par He was started on methadone two months ago for his pain, he felt restless on it and self-discontinued it.  He was subsequently started on MS ER 15mg BID.  He states that he did not feel well on it and stopped using it. \par \par Interval History:\par Patient presents for follow-up, seen in treatment room.  Interval imaging demonstrated POD. He is starting single agent Doxorubicin today.  \par Pain is controlled on current regimen, all of which he has been tolerating without complaints of adverse effects. His daughter obtained recreational cannabis high THC. Patient tried this at unknown dose and felt out of it.  His daughter is in the process of obtaining medical cannabis for him. \par \par Current pain regimen:\par Methadone 2/2/3mg \par PRN Oxycodone IR 10mg (has been using 2/day over past couple of weeks)\par Gabapentin 900mg TID\par \par ROS: \par + right LE edema – on and off for months, LE U/S negative for DVT x2, no associated fevers or skin changes, will try compression stocking and leg raising \par + reports eating 3 meals a day, reports stable weight around 140, mood has overall been okay, no nausea, no constipation (on miralax qd)\par +cold R foot - applies hot packs to it\par +10lb weight loss since diagnosis\par +nausea - the smell of some foods spur nausea - has prochlorperazine for PRN use\par +constipation - uses miralax PRN\par All other ROS as outlined or noncontributory. \par \par While family is open to medical cannabis to help manage symptoms and keep him comfortable, since symptoms are controlled on current medication regimen, they will hold off on starting cannabis at this time. \par \par Patient is , lives with his wife. They have one daughter, Gaby and three grandchildren. Has been ambulating.\par \par I-Stop Ref#: [id number]

## 2022-03-22 NOTE — PHYSICAL EXAM
[General Appearance - Alert] : alert [General Appearance - In No Acute Distress] : in no acute distress [Oriented To Time, Place, And Person] : oriented to person, place, and time [Affect] : the affect was normal [Sclera] : the sclera and conjunctiva were normal [Normal Oral Mucosa] : normal oral mucosa [Neck Appearance] : the appearance of the neck was normal [] : no respiratory distress [Heart Rate And Rhythm] : heart rate was normal and rhythm regular [Heart Sounds] : normal S1 and S2 [Bowel Sounds] : normal bowel sounds [Abdomen Soft] : soft [Skin Color & Pigmentation] : normal skin color and pigmentation [No Focal Deficits] : no focal deficits [FreeTextEntry1] : P

## 2022-03-22 NOTE — DATA REVIEWED
[FreeTextEntry1] : CT C/A/P 03/06/2022\par \par CHEST:\par LUNGS AND LARGE AIRWAYS: Patent central airways. Multiple bilateral pulmonary nodules are again noted where a few have increased in size. On the left, a 8 mm nodule (6, 103) in the left lower lobe, previously 3 mm. Another in the left lower lobe measuring 6 mm (6, 109) previously 3 mm. Another in the left upper lobe (6, 53) measuring 2 cm, previously 7 mm. There is also new tree-in-bud opacities in this region in the left upper lobe. On the right, a 8mm nodule in the right lower lobe (6, 114), previously 3 mm..\par PLEURA: No pleural effusion.\par VESSELS: Atherosclerotic changes of the aorta.\par HEART: Heart size is normal. No pericardial effusion.\par MEDIASTINUM AND MONSE: No lymphadenopathy. Right Mediport catheter with tip in distal SVC.\par CHEST WALL AND LOWER NECK: Within normal limits.\par \par ABDOMEN AND PELVIS:\par LIVER: Within normal limits.\par BILE DUCTS: Normal caliber.\par GALLBLADDER: Within normal limits.\par SPLEEN: Within normal limits.\par PANCREAS: Interval increase in size of heterogeneous pancreatic tail mass which now measures 2.8 x 3.3 cm versus 2.8 x 2.6 cm, when remeasured.\par ADRENALS: Within normal limits.\par KIDNEYS/URETERS: Within normal limits.\par \par BLADDER: Within normal limits.\par REPRODUCTIVE ORGANS: Prostate within normal limits.\par \par BOWEL: No bowel obstruction. Postsurgical changes in the bowel. Moderate amount of stool.\par PERITONEUM: No ascites.\par VESSELS: Atherosclerotic changes.\par RETROPERITONEUM/LYMPH NODES: Heterogeneous mass associated with the right iliacus muscle is without significant change. No significant change in associated encasement of the iliac vessels.\par ABDOMINAL WALL: Anasarca.\par BONES: Degenerative changes. Destructive changes in the right hemisacrum and iliac bone are unchanged.\par \par IMPRESSION:\par Multiple bilateral pulmonary nodules, a few of which demonstrate slight interval increase in size.\par Perhaps slight interval increase in pancreatic tail mass.\par No significant change in the heterogeneous mass in the right iliacus muscle.

## 2022-04-04 NOTE — REVIEW OF SYSTEMS
[Fatigue] : fatigue [Lower Ext Edema] : lower extremity edema [Joint Pain] : joint pain [Muscle Pain] : muscle pain [Negative] : Respiratory [Fever] : no fever [Chills] : no chills [Night Sweats] : no night sweats [Recent Change In Weight] : ~T no recent weight change [Abdominal Pain] : no abdominal pain [Constipation] : no constipation [Vomiting] : no vomiting [Diarrhea] : no diarrhea [Dysuria] : no dysuria [Incontinence] : no incontinence [FreeTextEntry5] : RLE edema [FreeTextEntry7] : + nausea [FreeTextEntry8] : increased nocturia [de-identified] : intermittent numbness/paresthesias to RLE

## 2022-04-04 NOTE — HISTORY OF PRESENT ILLNESS
[Disease: _____________________] : Disease: [unfilled] [AJCC Stage: ____] : AJCC Stage: [unfilled] [de-identified] : 66 M w/ h/o rectal cancer in 2011 treated with chemotherapy and radiation, presents for evaluation of large RP mass c/w high grade malignancy. \par \par Balwinder reports has been experiencing low back/upper buttock pain since Feb 2021. Pain has been progressive and refractory to conservative treatment with PT and NSAIDs. He was referred by his PCP to Dr Wilmar Oliva, pain management, who directed him to go to [facility] for further evaluation. On Sept 29, 2021 pt went to [facility] ER and underwent a CT A/P \par which showed a 4.6 x 6.9 x 5.7 cm R pelvic sidewall mass with destruction of the sacrum and involvement of multiple nerve roots concerning for primary malignancy, CT Chest with 2 mm two JOURDAN nodules. An MRI Pelvis described this mass as invading hemisacrum, involving nerve roots and vasculature. Biopsy was performed on 10/4/21 and pathology so far only c/w high grade malignancy. Preliminary diagnosis upon discussion with pathologist is that this may be a sarcoma. was seen by Dr Fung initially\par \par Referred to Dr. Alvarado for RT and received palliative RT \par \par path review at 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  He is s/p C1 of Doxorubicin and scheduled for C2 in 1 week.  He reports that with the first treatment he had some nausea and fatigue for the first week but was still able to eat and drink at his baseline.  He took antiemetics prescribed with some relief. Denies weight loss, vomiting, abdominal pain or diarrhea.  He has started using a compression stocking to his right leg to just above the knee which has helped with the swelling below the knee but has caused intermittent worsening swelling to the thigh and into the groin and scrotum.  He reports that upper leg swelling is worse in the afternoon/evening but resolves over night when he removes the stocking.  He noted some mild LLE swelling in the first week after treatment that resolved with use of a compression stocking on the left leg.  He reports that his pain is controlled on the current regimen by Palliative Care but has also started using recreational marijuana daily.  Daughter reports that she is waiting on her card from the state and then she will start purchasing medical marijuana for the patient.

## 2022-04-04 NOTE — PHYSICAL EXAM
[Capable of only limited self care, confined to bed or chair more than 50% of waking hours] : Status 3- Capable of only limited self care, confined to bed or chair more than 50% of waking hours [Normal Male] : prostate smooth, symmetric with no modularity or induration [Normal] : affect appropriate [de-identified] : anicteric [de-identified] : no JVD [de-identified] : regular respirations, no audible wheeze [de-identified] : regular rate [de-identified] : compression stocking in place to right lower leg; + non-pitting edema to mid and upper right thigh; no edema to the LLE [de-identified] : soft NT/ND [de-identified] : no edema to scrotum or penis [de-identified] : limited ROM RLE due to pain

## 2022-04-08 PROBLEM — K59.03 DRUG-INDUCED CONSTIPATION: Status: ACTIVE | Noted: 2021-01-01

## 2022-04-08 NOTE — REASON FOR VISIT
[Follow-Up] : a follow-up visit [Other: _____] : [unfilled] [Home] : at home, [unfilled] , at the time of the visit. [Medical Office: ([facility])___] : at the medical office located in  [Verbal consent obtained from patient] : the patient, [unfilled]

## 2022-04-12 NOTE — HISTORY OF PRESENT ILLNESS
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 Moon.\par \par Patient is Mandarin-speaking and prefers for his daughter Gaby to translate for him. \par \par Patient developed pain in his R hip in 2/2021. It was thought that he was suffering with sciatica and he started PT. This did not make much difference in his pain. He established care with a pain management doctor in [place].  Workup revealed a large RP mass. \par He is s/p 10 fractions of RT to the pelvic mass, completed 11/19. \par \par His pain is localized to the R buttock and extends down his R leg. He endorses tingling along the dorsum of his r foot. The pain is always present  The pain renders him unable to bear weight on his R leg. He uses a walker for assistance with ambulation. \par Currently he rates his pain as 6/10. He states that since the chemotherapy has started the intensity of his pain has lessened. Prior to chemotherapy his pain got up to 10/10 regularly. Nowadays his pain tends to only get to 10/10 at night, and this is \par \par He is currently taking Gabapentin 900mg TID, PRN Oxycodone 10mg, up to four times daily.  \par He was started on methadone two months ago for his pain, he felt restless on it and self-discontinued it.  He was subsequently started on MS ER 15mg BID.  He states that he did not feel well on it and stopped using it. \par \par Interval History:\par Patient presents for follow-up via TeleMedicine.  He is s/p C1 of single agent Doxorubicin on 3/18/22.  He experienced some treatment-related nausea and fatigue.  He was scheduled to establish care with PM&R on 3/22 but did not due to fatigue. LLE swelling has improved a bit with compression stocking.  \par \par Pain is now controlled with increase in QHS dose of Methadone.   He has been tolerating regimen without complaints of adverse effects. His daughter obtained recreational cannabis high THC. Patient tried this at unknown dose and felt out of it.  His daughter is in the process of obtaining medical cannabis for him. \par \par Current pain regimen:\par Methadone 2/2/3mg \par PRN Oxycodone IR 10mg (has been using 2/day over past couple of weeks)\par Gabapentin 900mg TID\par \par ROS: \par + right LE edema – on and off for months, LE U/S negative for DVT x2, no associated fevers or skin changes, will try compression stocking and leg raising \par + reports eating 3 meals a day, reports stable weight around 140, mood has overall been okay, no nausea, no constipation (on miralax qd)\par +cold R foot - applies hot packs to it\par +10lb weight loss since diagnosis\par +nausea - the smell of some foods spur nausea - has prochlorperazine for PRN use\par +constipation - uses miralax PRN\par All other ROS as outlined or noncontributory. \par \par While family is open to medical cannabis to help manage symptoms and keep him comfortable, since symptoms are controlled on current medication regimen, they will hold off on starting cannabis at this time. \par \par Patient is , lives with his wife. They have one daughter, Gaby and three grandchildren. Has been ambulating.\par \par I-Stop Ref#: [id number]

## 2022-04-12 NOTE — ASSESSMENT
[______] : HCP: [unfilled] [FreeTextEntry1] : 66yoM with:\par \par 1. Soft tissue sarcoma- On Doxorubicin.  Med Onc follow up. \par \par 2. Pain 2/2 Neoplasm - C/w Methadone 2/2/3mg.  May c/w PRN Oxycodone IR 10mg.  \par - Discussed how medical cannabis may be of benefit for pain. His daughter will obtain ASAP.  Patient's daughter counseled that recreational cannabis with unknown THC dosing is not recommended.\par \par 3. Nausea - c/w PRN Compazine; medical cannabis may be of benefit.\par \par 4. Constipation - C/w Miralax daily. \par \par 5. Encounter for Palliative Care - Emotional support provided.\par - HCP on file.\par \par Follow up in 3 weeks, call sooner with questions or issues.

## 2022-04-25 NOTE — REASON FOR VISIT
[Follow-Up] : a follow-up visit [Other: _____] : [unfilled] [Home] : at home, [unfilled] , at the time of the visit. [Medical Office: ([facility])___] : at the medical office located in  [Verbal consent obtained from patient] : the patient, [unfilled]

## 2022-04-26 NOTE — HISTORY OF PRESENT ILLNESS
[Opioids for treatment of cancer not in remission, and/or  hospice, palliative care] : Opioids for treatment of cancer not in remission, and/or  hospice, palliative care [Home] : at home, [unfilled] , at the time of the visit. 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 Moon.\par \par Patient is Mandarin-speaking and prefers for his daughter Gaby to translate for him. \par \par Patient developed pain in his R hip in 2/2021. It was thought that he was suffering with sciatica and he started PT. This did not make much difference in his pain. He established care with a pain management doctor in [place].  Workup revealed a large RP mass. \par He is s/p 10 fractions of RT to the pelvic mass, completed 11/19. \par \par His pain is localized to the R buttock and extends down his R leg. He endorses tingling along the dorsum of his r foot. The pain is always present  The pain renders him unable to bear weight on his R leg. He uses a walker for assistance with ambulation. \par Currently he rates his pain as 6/10. He states that since the chemotherapy has started the intensity of his pain has lessened. Prior to chemotherapy his pain got up to 10/10 regularly. Nowadays his pain tends to only get to 10/10 at night, and this is \par \par He is currently taking Gabapentin 900mg TID, PRN Oxycodone 10mg, up to four times daily.  \par He was started on methadone two months ago for his pain, he felt restless on it and self-discontinued it.  He was subsequently started on MS ER 15mg BID.  He states that he did not feel well on it and stopped using it. \par \par Interval History (4/25/22):\par Patient presents for follow-up via telephone. He is on Doxorubicin, s/p 2 cycles to date.\par \par He reports recent onset of tingling pain in his lower extremities, R>L. Typically walking around helped relieve this pain/discomfort but lately he finds that this isn't helping relieve the pain. He has not been using oxycodone recently. He uses Tylenol 500mg and uses cannabis gummy (obtained outside dispensary program) is helping. \par \par Current pain regimen:\par Methadone 2/2/3mg \par Gabapentin 900mg TID \par \par ROS: \par + right LE edema \par +appetite is lower since cycle 2 of Doxorubicin \par +nausea - uses prochlorperazine PRN which helps \par +cold R foot - applies hot packs to it \par +10lb weight loss since diagnosis \par +constipation - uses miralax PRN \par +nocturia\par \par All other ROS as outlined or noncontributory. \par \par Patient is , lives with his wife. They have one daughter, Gaby and three grandchildren. Has been ambulating.\par \par I-Stop Ref#: [id number]

## 2022-04-26 NOTE — ASSESSMENT
[______] : HCP: [unfilled] [FreeTextEntry1] : 66yoM with:\par \par 1. Soft tissue sarcoma- On Doxorubicin.  Med Onc follow up. \par \par 2. Pain 2/2 Neoplasm - C/w Methadone 2/2/3mg.  May c/w PRN Oxycodone IR 10mg for mod-severe pain. Should consider using at nighttime to aid with his sleep.  c/w PRN Tylenol. \par \par 3. Nausea - c/w PRN Compazine; medical cannabis may be of benefit, recommended obtaining from dispensary. \par \par 4. LE edema and nocturia - recommended elevation of legs during daytime when possible as this will help keep fluid accumulation at bay as the venous return at night is causing excessive nocturia. Communicated with Oncology re: consideration of furosemide.\par \par 5. Encounter for Palliative Care - Emotional support provided.\par - HCP on file.\par \par Follow up in 4 weeks, call sooner with questions or issues.

## 2022-05-10 NOTE — HISTORY OF PRESENT ILLNESS
[Disease: _____________________] : Disease: [unfilled] [AJCC Stage: ____] : AJCC Stage: [unfilled] [de-identified] : 66 M w/ h/o rectal cancer in 2011 treated with chemotherapy and radiation, presents for evaluation of large RP mass c/w high grade malignancy. \par \par Balwinder reports has been experiencing low back/upper buttock pain since Feb 2021. Pain has been progressive and refractory to conservative treatment with PT and NSAIDs. He was referred by his PCP to Dr Wilmar Oliva, pain management, who directed him to go to [facility] for further evaluation. On Sept 29, 2021 pt went to [facility] ER and underwent a CT A/P \par which showed a 4.6 x 6.9 x 5.7 cm R pelvic sidewall mass with destruction of the sacrum and involvement of multiple nerve roots concerning for primary malignancy, CT Chest with 2 mm two JOURDAN nodules. An MRI Pelvis described this mass as invading hemisacrum, involving nerve roots and vasculature. Biopsy was performed on 10/4/21 and pathology so far only c/w high grade malignancy. Preliminary diagnosis upon discussion with pathologist is that this may be a sarcoma. was seen by Dr Fung initially\par \par Referred to Dr. 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

## 2022-05-10 NOTE — REVIEW OF SYSTEMS
[Fatigue] : fatigue [Lower Ext Edema] : lower extremity edema [Joint Pain] : joint pain [Muscle Pain] : muscle pain [Negative] : Genitourinary [Fever] : no fever [Chills] : no chills [Night Sweats] : no night sweats [Recent Change In Weight] : ~T no recent weight change [Abdominal Pain] : no abdominal pain [Vomiting] : no vomiting [Constipation] : no constipation [Diarrhea] : no diarrhea [Dysuria] : no dysuria [Incontinence] : no incontinence [FreeTextEntry5] : RLE edema [de-identified] : intermittent numbness/paresthesias to RLE

## 2022-05-10 NOTE — PHYSICAL EXAM
Pt is alert and oriented x4 with some times of confusion. Pt states pain in neck an scheduled medication given.  Pt able to rest comfortably, plan for possible d/c, will continue to monitor     Impaired Activities of Daily Living    • Achieve highest/safest [Restricted in physically strenuous activity but ambulatory and able to carry out work of a light or sedentary nature] : Status 1- Restricted in physically strenuous activity but ambulatory and able to carry out work of a light or sedentary nature, e.g., light house work, office work [Normal] : affect appropriate [de-identified] : anicteric  [de-identified] : normal respiratory effort, no audible wheeze [de-identified] : reg rate  [de-identified] : dec RLE edema  [de-identified] : dry skin

## 2022-05-23 NOTE — PHYSICAL EXAM
[Sclera] : the sclera and conjunctiva were normal [Normal Oral Mucosa] : normal oral mucosa [Neck Appearance] : the appearance of the neck was normal [] : no respiratory distress [Auscultation Breath Sounds / Voice Sounds] : lungs were clear to auscultation bilaterally [Heart Rate And Rhythm] : heart rate was normal and rhythm regular [Bowel Sounds] : normal bowel sounds [Abdomen Soft] : soft [No Focal Deficits] : no focal deficits [FreeTextEntry1] : Pallor

## 2022-05-23 NOTE — HISTORY OF PRESENT ILLNESS
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 Moon.\par \par Patient is Mandarin-speaking and prefers for his daughter Gaby to translate for him. \par \par Patient developed pain in his R hip in 2/2021. It was thought that he was suffering with sciatica and he started PT. This did not make much difference in his pain. He established care with a pain management doctor in [place].  Workup revealed a large RP mass. \par He is s/p 10 fractions of RT to the pelvic mass, completed 11/19. \par \par His pain is localized to the R buttock and extends down his R leg. He endorses tingling along the dorsum of his r foot. The pain is always present  The pain renders him unable to bear weight on his R leg. He uses a walker for assistance with ambulation. \par Currently he rates his pain as 6/10. He states that since the chemotherapy has started the intensity of his pain has lessened. Prior to chemotherapy his pain got up to 10/10 regularly. Nowadays his pain tends to only get to 10/10 at night, and this is \par \par He is currently taking Gabapentin 900mg TID, PRN Oxycodone 10mg, up to four times daily.  \par He was started on methadone two months ago for his pain, he felt restless on it and self-discontinued it.  He was subsequently started on MS ER 15mg BID.  He states that he did not feel well on it and stopped using it. \par \par Interval History:\par Patient remains on Doxorubicin. Scheduled for scans today. Right LE edema is improving.\par He continues to experience tingling pain in his lower extremities, R>L. Typically walking around helped relieve this pain/discomfort but lately he finds that this isn't helping relieve the pain. He has not been using oxycodone recently. He uses Tylenol 500mg and uses cannabis gummy (obtained outside dispensary program) is helping. \par \par Current pain regimen:\par Methadone 2/2/3mg \par PRN Oxycodone IR 10mg (has not been using)\par Gabapentin 900mg TID \par \par ROS: \par +right LE edema (much improved)\par +appetite is lower since cycle 2 of Doxorubicin \par +nausea - uses prochlorperazine PRN which helps \par +10lb weight loss since diagnosis \par +constipation - uses miralax PRN \par +nocturia (improved)\par All other ROS as outlined or noncontributory. \par \par Patient is , lives with his wife. They have one daughter, Gaby and three grandchildren. Has been ambulating.\par \par I-Stop Ref#:[id number]

## 2022-05-23 NOTE — ASSESSMENT
[FreeTextEntry1] : 66yoM with:\par \par 1. Soft tissue sarcoma- On Doxorubicin.  Scans pending. Med Onc follow up. \par \par 2. Pain 2/2 Neoplasm - C/w Methadone 2/2/3mg.  May c/w PRN Oxycodone IR 10mg for mod-severe pain. Should consider using at nighttime to aid with his sleep.  c/w PRN Tylenol. \par \par 3. Nausea - c/w PRN Compazine; medical cannabis may be of benefit, recommended obtaining from dispensary. \par \par 4. LE edema and nocturia - recommended elevation of legs during daytime when possible as this will help keep fluid accumulation at bay as the venous return at night is causing excessive nocturia. Patient would benefit from PM&R. Referral placed. Daughter also provided with contact information for Dr. Jacobson's office\par \par 5. Encounter for Palliative Care - Emotional support provided.\par - HCP on file.\par \par Follow up in 1 month, call sooner with questions or issues.

## 2022-05-31 NOTE — REVIEW OF SYSTEMS
[Fatigue] : fatigue [Lower Ext Edema] : lower extremity edema [Joint Pain] : joint pain [Muscle Pain] : muscle pain [Negative] : Allergic/Immunologic [Fever] : no fever [Chills] : no chills [Night Sweats] : no night sweats [Recent Change In Weight] : ~T no recent weight change [Abdominal Pain] : no abdominal pain [Vomiting] : no vomiting [Constipation] : no constipation [Diarrhea] : no diarrhea [Dysuria] : no dysuria [Incontinence] : no incontinence [FreeTextEntry5] : RLE edema [de-identified] : intermittent numbness/paresthesias to RLE

## 2022-05-31 NOTE — HISTORY OF PRESENT ILLNESS
[Disease: _____________________] : Disease: [unfilled] [AJCC Stage: ____] : AJCC Stage: [unfilled] [de-identified] : 66 M w/ h/o rectal cancer in 2011 treated with chemotherapy and radiation, presents for evaluation of large RP mass c/w high grade malignancy. \par \par Balwinder walker has been experiencing low back/upper buttock pain since Feb 2021. Pain has been progressive and refractory to conservative treatment with PT and NSAIDs. He was referred by his PCP to Dr Wilmar Oliva, pain management, who directed him to go to [facility] for further evaluation. On Sept 29, 2021 pt went to [facility] ER and underwent a CT A/P \par which showed a 4.6 x 6.9 x 5.7 cm R pelvic sidewall mass with destruction of the sacrum and involvement of multiple nerve roots concerning for primary malignancy, CT Chest with 2 mm two JOURDAN nodules. An MRI Pelvis described this mass as invading hemisacrum, involving nerve roots and vasculature. Biopsy was performed on 10/4/21 and pathology so far only c/w high grade malignancy. Preliminary diagnosis upon discussion with pathologist is that this may be a sarcoma. was seen by Dr Fung initially\par \par Referred to Dr. 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

## 2022-05-31 NOTE — REASON FOR VISIT
[Home] : at home, [unfilled] , at the time of the visit. [Medical Office: ([facility])___] : at the medical office located in  [Verbal consent obtained from patient] : the patient, [unfilled] [Follow-Up Visit] : a follow-up [Family Member] : family member [FreeTextEntry2] : sarcoma

## 2022-06-01 NOTE — PHYSICAL EXAM
[FreeTextEntry1] : GEN: AAOx3, NAD. speaks fujanese\par PSYCH: Normal mood and affect. Responds appropriately to commands.\par EYES: Sclerae Anicteric. No discharge. EOMI.\par RESP: Breathing unlabored. \par EXT: + RLE edema, wearing compression garment +RLE firm to palpation with TTP throughout\par RLE at 10 cm below patella : 32 cm  at 10 cm above patella 37.5 cm\par LLE at 10 cm below patella : 32 cm  at 10 cm above patella 37 cm \par SKIN: No lesions noted.\par STRENGTH: RLE 1+/5 hip flexion (limited by pain), 2/5 RLE, 1/5 df/pf   LLE 5/5\par TONE: Normal, No clonus. \par SENS: Grossly intact to light touch bilateral lower extremities. \par STANCE: No Trendelenburg with single leg stance.\par GAIT: sitting in wheelchair\par PALP: + ttp RLE  + TTP R buttock\par SPECIAL:   Slump test negative bilaterally \par \par

## 2022-06-01 NOTE — ASSESSMENT
[FreeTextEntry1] : Balwinder Mckeon is a 65 yo m with high grade sarcoma with R sided back and RLE pain, with RLE swelling.\par -reports swelling improved with treatment however with firmness RLE\par -will recheck dopplers due to tenderness and firmness, however prior dopplers have been negative\par -start lymphedema therapy for RLE once dopplers negative\par -follow up in 6 weeks\par -may benefit from PT for strengthening vs cancer rehab program once completes lymphedema therapy\par -can consider EMG however symptoms of pain and weakness likely from disease itself, worsened by lymphedema\par I spent a total of 60 minutes on this encounter including documentation, face to face time, care coordination and reviewing prior records.\par \par

## 2022-06-01 NOTE — HISTORY OF PRESENT ILLNESS
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  He has been treated with RT.  He presents today with RLE edema and RLE pain. Referred by Dr. Nghia Osman.  Pain began Feb 2021, prior to diagnosis in September 2021.  He tried PT, ultimately had MRI which led to diagnosis. \par Reports back and leg pain. accompanied by his daughter today who assists with interpretation.  Presents in wheelchair, reports he walks around the house with rolling walker, also uses cane at times. Has shower chair for bathing.  Denies falls.  He has HHA 5 hrs/day 7 days a week. \par Denies falls but feels weak with ambulation.  Pain ranges from 5-10/10, described as achy pain.   Worse with walking, improves with massage, medication. He presents in wheelchair today, wearing RLE compression garment\par on pain medications per Dr. Huber Seymour. On methadone, gabapentin, has Oxy ir but states he doesn't usually take it.   Family reports prior dopplers (x 3) all negative. \par \par Sensitive to shoes R foot. \par social: lives with wife with 2 stairs to enter. \par prior smoker, occasional alcohol in the past.\par \par Family history: father with GI throat cancer\par \par \par NKDA

## 2022-06-02 NOTE — REVIEW OF SYSTEMS
[Fatigue] : fatigue [Lower Ext Edema] : lower extremity edema [Joint Pain] : joint pain [Negative] : Allergic/Immunologic [Fever] : no fever [Chills] : no chills [Night Sweats] : no night sweats [Recent Change In Weight] : ~T no recent weight change [Abdominal Pain] : no abdominal pain [Vomiting] : no vomiting [Constipation] : no constipation [Diarrhea] : no diarrhea [Dysuria] : no dysuria [Incontinence] : no incontinence [FreeTextEntry5] : RLE edema [de-identified] : intermittent numbness/paresthesias to RLE

## 2022-06-02 NOTE — HISTORY OF PRESENT ILLNESS
[Disease: _____________________] : Disease: [unfilled] [AJCC Stage: ____] : AJCC Stage: [unfilled] [de-identified] : 66 M w/ h/o rectal cancer in 2011 treated with chemotherapy and radiation, presents for evaluation of large RP mass c/w high grade malignancy. \par \par Balwinder walker has been experiencing low back/upper buttock pain since Feb 2021. Pain has been progressive and refractory to conservative treatment with PT and NSAIDs. He was referred by his PCP to Dr Wilmar Oliva, pain management, who directed him to go to [facility] for further evaluation. On Sept 29, 2021 pt went to [facility] ER and underwent a CT A/P \par which showed a 4.6 x 6.9 x 5.7 cm R pelvic sidewall mass with destruction of the sacrum and involvement of multiple nerve roots concerning for primary malignancy, CT Chest with 2 mm two JOURDAN nodules. An MRI Pelvis described this mass as invading hemisacrum, involving nerve roots and vasculature. Biopsy was performed on 10/4/21 and pathology so far only c/w high grade malignancy. Preliminary diagnosis upon discussion with pathologist is that this may be a sarcoma. was seen by Dr Fung initially\par \par Referred to Dr. 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 \par \par [place]/Taxotere started Dec 2021 \par doxorubicin March 2022 due to POD \par May 2022 POD \par June starting pazopanib  [de-identified] : SOMMER, TMB low \par FGFR1 amplification\par BCL2L2 amplification - equivocal†\par CDKN2A/B CDKN2A loss, CDKN2B loss\par LRP1B * , MYST3 amplification , TP53 loss [FreeTextEntry1] : [place]/Taxotere - day 1 and day 8 every 21 days, starting 12/3/21 --> POD 3/2022 ---> changed to Doxorubicin q3 weeks 3/18/22 [de-identified] : here for follow up , planned to start pazopanib \par no fever or chills\par seen by PMR earlier today \par pain controlled with pall care

## 2022-06-02 NOTE — PHYSICAL EXAM
[Restricted in physically strenuous activity but ambulatory and able to carry out work of a light or sedentary nature] : Status 1- Restricted in physically strenuous activity but ambulatory and able to carry out work of a light or sedentary nature, e.g., light house work, office work [Normal] : affect appropriate [de-identified] : lying flat  [de-identified] : anicteric  [de-identified] : no jvd  [de-identified] : normal respiratory effort, no audible wheeze [de-identified] : reg rate  [de-identified] : right leg edema

## 2022-06-23 NOTE — REVIEW OF SYSTEMS
[Fatigue] : fatigue [Lower Ext Edema] : lower extremity edema [Joint Pain] : joint pain [Negative] : Allergic/Immunologic [Joint Stiffness] : joint stiffness [Fever] : no fever [Chills] : no chills [Night Sweats] : no night sweats [Recent Change In Weight] : ~T no recent weight change [Abdominal Pain] : no abdominal pain [Vomiting] : no vomiting [Constipation] : no constipation [Diarrhea] : no diarrhea [Dysuria] : no dysuria [Incontinence] : no incontinence [FreeTextEntry5] : mild RLE edema [FreeTextEntry9] : right posterior hip pain worse with ROM [de-identified] : intermittent numbness/paresthesias to RLE

## 2022-06-23 NOTE — HISTORY OF PRESENT ILLNESS
[Disease: _____________________] : Disease: [unfilled] [AJCC Stage: ____] : AJCC Stage: [unfilled] [de-identified] : 66 M w/ h/o rectal cancer in 2011 treated with chemotherapy and radiation, presents for evaluation of large RP mass c/w high grade malignancy. \par \par Balwinder walker has been experiencing low back/upper buttock pain since Feb 2021. Pain has been progressive and refractory to conservative treatment with PT and NSAIDs. He was referred by his PCP to Dr Wilmar Oliva, pain management, who directed him to go to [facility] for further evaluation. On Sept 29, 2021 pt went to [facility] ER and underwent a CT A/P \par which showed a 4.6 x 6.9 x 5.7 cm R pelvic sidewall mass with destruction of the sacrum and involvement of multiple nerve roots concerning for primary malignancy, CT Chest with 2 mm two JOURDAN nodules. An MRI Pelvis described this mass as invading hemisacrum, involving nerve roots and vasculature. Biopsy was performed on 10/4/21 and pathology so far only c/w high grade malignancy. Preliminary diagnosis upon discussion with pathologist is that this may be a sarcoma. was seen by Dr Fung initially\par \par Referred to Dr. 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  Patient is reporting increased right hip pain with ROM and is overall ability to ambulate and complete his ADL's.  He continues taking Methadone and has taken Oxycodone for break through pain rarely.  He denies HA, dizziness, N/V, abdominal pain, change in bowel habits.  His LE edema has improved.  He has started follow up with PM&R and is scheduled for lymphedema treatment to start next month.

## 2022-06-23 NOTE — PHYSICAL EXAM
[General Appearance - Alert] : alert [General Appearance - In No Acute Distress] : in no acute distress [Sclera] : the sclera and conjunctiva were normal [Normal Oral Mucosa] : normal oral mucosa [Neck Appearance] : the appearance of the neck was normal [] : no respiratory distress [Abdomen Soft] : soft [No Focal Deficits] : no focal deficits [Oriented To Time, Place, And Person] : oriented to person, place, and time [Affect] : the affect was normal [FreeTextEntry1] : Pallor

## 2022-06-23 NOTE — DATA REVIEWED
[FreeTextEntry1] : CT C/A/P 05/20/2022\par \par CHEST:\par LUNGS AND LARGE AIRWAYS: Patent central airways. Multiple bilateral pulmonary nodules, overall increased in size since 3/6/2022. Reference nodules are as follows:\par Left lower lobe (2:69) measures 2.4 x 2.2 cm, previously 0.8 cm.\par Left lower lobe (2:74) measures 2 x 1.9 cm, previously 0.6 cm.\par Left upper lobe (2:36) measures 3 x 2.9 cm, previously 2 cm.\par Right lower lobe (2:75) measures 2.4 x 2.3 cm, previously 0.8 cm..\par PLEURA: Small left pleural effusion, new since 3/6/2022..\par VESSELS: Atherosclerotic changes of the aorta and coronary arteries. Mediport, with its tip in the superior vena cava.\par HEART: Heart size is normal. No pericardial effusion.\par MEDIASTINUM AND MONSE: No lymphadenopathy. Calcified left hilar lymph nodes, unchanged.\par CHEST WALL AND LOWER NECK: Within normal limits.\par \par ABDOMEN AND PELVIS:\par LIVER: Within normal limits.\par BILE DUCTS: Normal caliber.\par GALLBLADDER: Within normal limits.\par SPLEEN: Within normal limits.\par PANCREAS: A heterogeneous mass in the pancreatic tail has increased in size, currently measuring 3.9 x 3.3 cm (2:92), previously 3.3 x 2.8 cm.\par ADRENALS: Thickened left adrenal gland, unchanged.\par KIDNEYS/URETERS: Mild fullness of the right renal collecting system.\par \par BLADDER: Minimally distended, limiting evaluation. However, there is apparent wall thickening along the right lateral wall of the bladder and mild infiltration of the perivesical fat.\par REPRODUCTIVE ORGANS: Prostate gland is within normal limits.\par \par BOWEL: No bowel obstruction. Rectal and right lower quadrant anastomoses.\par PERITONEUM: No ascites.\par VESSELS: Atherosclerotic changes.\par RETROPERITONEUM/LYMPH NODES: No lymphadenopathy.\par ABDOMINAL WALL/BONES: A large heterogeneous mass is again noted involving the right iliopsoas and iliacus musculature measuring approximately 9.5 x 5.4 cm, similar in size since 3/6/2022 but increased in size since 9/28/2021. There are associated\par destructive changes involving the right hemisacrum and right iliac bone again noted. Again noted is involvement of the right neural foramina at L5-S1 and within the sacrum. Subcutaneous edema is noted in particularly in the soft tissues of the right pelvis.\par \par IMPRESSION:\par Bilateral pulmonary nodules, increased in size 3/6/2022.\par \par New small left pleural effusion.\par \par Heterogeneous mass in the region of the tail of the pancreas, increased in size since 3/6/2022.\par \par Heterogeneous mass involving the right iliacus and iliopsoas musculature with associated destruction of the right hemisacrum and right iliac bone, similar in appearance to 3/6/2022.

## 2022-06-23 NOTE — HISTORY OF PRESENT ILLNESS
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 Moon.\par \par Patient is Mandarin-speaking and prefers for his daughter Gaby to translate for him. \par \par Patient developed pain in his R hip in 2/2021. It was thought that he was suffering with sciatica and he started PT. This did not make much difference in his pain. He established care with a pain management doctor in [place].  Workup revealed a large RP mass. \par He is s/p 10 fractions of RT to the pelvic mass, completed 11/19. \par \par His pain is localized to the R buttock and extends down his R leg. He endorses tingling along the dorsum of his r foot. The pain is always present  The pain renders him unable to bear weight on his R leg. He uses a walker for assistance with ambulation. \par Currently he rates his pain as 6/10. He states that since the chemotherapy has started the intensity of his pain has lessened. Prior to chemotherapy his pain got up to 10/10 regularly. Nowadays his pain tends to only get to 10/10 at night, and this is \par \par He is currently taking Gabapentin 900mg TID, PRN Oxycodone 10mg, up to four times daily.  \par He was started on methadone two months ago for his pain, he felt restless on it and self-discontinued it.  He was subsequently started on MS ER 15mg BID.  He states that he did not feel well on it and stopped using it. \par \par Interval History:\par Interval scans demonstrated POD.  Is now on Votrient. \par Right leg pain and swelling have increased over the past week.  He remains on methadone 2/2/3mg.  He has used PRN Oxycodone IR 10mg approximately twice on QHS basis over the past week.  His ambulation has decreased as a result.  \par He continues to experience tingling pain in his lower extremities, R>L.  He has not been using oxycodone recently. He uses Tylenol 500mg and uses cannabis gummy (obtained outside dispensary program) is helping. \par He has established care with PM&R and is planned for lymphedema therapy early July. \par \par Current pain regimen:\par Methadone 2/2/3mg \par PRN Oxycodone IR 10mg \par Gabapentin 900mg TID \par \par ROS: \par + right LE edema \par + fatigue\par + appetite has improved - is eating soft consistencies\par + nausea - uses prochlorperazine PRN which helps \par +~10lb weight loss since diagnosis \par + constipation - uses miralax PRN \par + nocturia (improved)  \par All other ROS as outlined or noncontributory. \par \par Patient is , lives with his wife. They have one daughter, Gaby and three grandchildren. Has been ambulating.\par \par I-Stop Ref#: [id number]

## 2022-06-23 NOTE — PHYSICAL EXAM
[Ambulatory and capable of all self care but unable to carry out any work activities] : Status 2- Ambulatory and capable of all self care but unable to carry out any work activities. Up and about more than 50% of waking hours [Normal] : normal appearance, no rash, nodules, vesicles, ulcers, erythema [de-identified] : lying flat  [de-identified] : anicteric  [de-identified] : no jvd  [de-identified] : normal respiratory effort, no audible wheeze [de-identified] : reg rate  [de-identified] : mild edema to right upper leg; no distal LE edema B/L; compression stocking to RLE [de-identified] : soft, non-distended [de-identified] : right posterior hip joint tenderness; pain with ROM [de-identified] : RLE neuropathy

## 2022-06-23 NOTE — ASSESSMENT
[______] : HCP: [unfilled] [FreeTextEntry1] : 66yoM with:\par \par 1. Soft tissue sarcoma - On Votrient. Med Onc follow up. \par \par 2. Pain 2/2 Neoplasm - Given potential interaction between Methadone and Votrient, ECG checked today. QTc 405ms.   May increase Methadone to 3/2/3mg.  May c/w PRN Oxycodone IR 10mg for mod-severe pain. Should consider using at nighttime to aid with his sleep.  C/w PRN Tylenol. \par \par 3. Nausea - c/w PRN Compazine; medical cannabis may be of benefit, recommended obtaining from dispensary. \par \par 4. LE edema and nocturia - Recommended elevation of legs during daytime when possible as this will help keep fluid accumulation at bay as the venous return at night is causing excessive nocturia. \par - Patient established with PM&R, will be starting lymphedema therapy.\par \par 5. Encounter for Palliative Care - Emotional support provided.\par - HCP on file.\par \par Follow up in 2 weeks, call sooner with questions or issues.

## 2022-07-06 PROBLEM — C49.5: Status: ACTIVE | Noted: 2021-01-01

## 2022-07-06 NOTE — REVIEW OF SYSTEMS
[Fatigue] : fatigue [Lower Ext Edema] : lower extremity edema [Joint Pain] : joint pain [Joint Stiffness] : joint stiffness [Negative] : Allergic/Immunologic [Muscle Pain] : muscle pain [Fever] : no fever [Chills] : no chills [Night Sweats] : no night sweats [Recent Change In Weight] : ~T no recent weight change [Abdominal Pain] : no abdominal pain [Vomiting] : no vomiting [Constipation] : no constipation [Diarrhea] : no diarrhea [Dysuria] : no dysuria [Incontinence] : no incontinence [FreeTextEntry5] : mild RLE edema [FreeTextEntry9] : right posterior hip pain worse with ROM [de-identified] : intermittent numbness/paresthesias to RLE

## 2022-07-06 NOTE — HISTORY OF PRESENT ILLNESS
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 Moon.\par \par Patient is Mandarin-speaking and prefers for his daughter Gaby to translate for him. \par \par Patient developed pain in his R hip in 2/2021. It was thought that he was suffering with sciatica and he started PT. This did not make much difference in his pain. He established care with a pain management doctor in [place].  Workup revealed a large RP mass. \par He is s/p 10 fractions of RT to the pelvic mass, completed 11/19. \par \par His pain is localized to the R buttock and extends down his R leg. He endorses tingling along the dorsum of his r foot. The pain is always present  The pain renders him unable to bear weight on his R leg. He uses a walker for assistance with ambulation. \par Currently he rates his pain as 6/10. He states that since the chemotherapy has started the intensity of his pain has lessened. Prior to chemotherapy his pain got up to 10/10 regularly. Nowadays his pain tends to only get to 10/10 at night, and this is \par \par He is currently taking Gabapentin 900mg TID, PRN Oxycodone 10mg, up to four times daily.  \par He was started on methadone two months ago for his pain, he felt restless on it and self-discontinued it.  He was subsequently started on MS ER 15mg BID.  He states that he did not feel well on it and stopped using it. \par \par Interval History:\par He remains on Votrient.  Right leg pain and swelling persists. US of RLE (6/23/22) was negative.  He will be starting lymphedema therapy.  His pain regimen consists of Methadone 3/2/3mg. He does not appreciate a difference in his pain level following the methadone increase last visit. Currently rates his pain 5/10. Pain is worse at night and notes improvement with massaging. He does not find PRN Oxycodone provides sufficient relief and therefore does not utilize often. He notes he is not as ambulatory as he used to be related to the pain. \par \par Current pain regimen:\par Methadone 3/2/3mg \par PRN Oxycodone IR 10mg \par Gabapentin 900mg TID \par \par ROS: \par + right LE edema \par + fatigue\par + nausea - uses prochlorperazine PRN which helps \par +~10lb weight loss since diagnosis \par + constipation - uses miralax PRN \par + nocturia (improved)  \par All other ROS as outlined or noncontributory. \par \par Patient is , lives with his wife. They have one daughter, Gaby and three grandchildren. Has been ambulating.\par \par I-Stop Ref#: [id number]

## 2022-07-06 NOTE — HISTORY OF PRESENT ILLNESS
[Disease: _____________________] : Disease: [unfilled] [AJCC Stage: ____] : AJCC Stage: [unfilled] [de-identified] : 66 M w/ h/o rectal cancer in 2011 treated with chemotherapy and radiation, presents for evaluation of large RP mass c/w high grade malignancy. \par \par Balwinder walker has been experiencing low back/upper buttock pain since Feb 2021. Pain has been progressive and refractory to conservative treatment with PT and NSAIDs. He was referred by his PCP to Dr Wilmar Oliva, pain management, who directed him to go to [facility] for further evaluation. On Sept 29, 2021 pt went to [facility] ER and underwent a CT A/P \par which showed a 4.6 x 6.9 x 5.7 cm R pelvic sidewall mass with destruction of the sacrum and involvement of multiple nerve roots concerning for primary malignancy, CT Chest with 2 mm two JOURDAN nodules. An MRI Pelvis described this mass as invading hemisacrum, involving nerve roots and vasculature. Biopsy was performed on 10/4/21 and pathology so far only c/w high grade malignancy. Preliminary diagnosis upon discussion with pathologist is that this may be a sarcoma. was seen by Dr Fung initially\par \par Referred to Dr. 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  Patient is reporting increased right thigh pain and tightness making it difficult to ambulate for extended periods of time.  He reports overall fatigue with treatment but his overall activity is decreased more due to pain than fatigue.  He continues to follow with Palliative Care to manage his pain.  He reports that pain in his leg improves with massage to the muscle in his thigh.  He denies anorexia, abdominal pain, N/V, change in bowel or bladder habits, worsening swelling in legs.  He is scheduled to start lymphedema treatment in 2 days. [FreeTextEntry1] : Gemzar/Taxotere -starting 12/3/21 --> POD 3/2022 ---> changed to Doxorubicin q3 weeks 3/18/22 --> POD 5/2022  -->  changed to Votrient 6/2022

## 2022-07-06 NOTE — ASSESSMENT
[______] : HCP: [unfilled] [FreeTextEntry1] : 66yoM with:\par \par 1. Soft tissue sarcoma - On Votrient. Med Onc follow up. \par \par 2. Pain 2/2 Neoplasm - Given potential interaction between Methadone and Votrient, ECG monitored today. QTc 428ms in office today.  May increase Methadone to 3/3/4mg.  Discussed alternatives to PRN Oxycodone IR 10mg as patient reports insufficient relief such as PRN Hydromorphone.  He defers alternative medications and will continue with PRN Oxycodone. He may require increase of PRN Oxycodone to IR 10-20mg for mod-severe pain. Should consider using at nighttime to aid with his sleep.  C/w PRN Tylenol and Gabapentin. \par \par 3. Nausea - c/w PRN Compazine; medical cannabis may be of benefit, recommended obtaining from dispensary. \par \par 4. LE edema and nocturia - Recommended elevation of legs during daytime when possible as this will help keep fluid accumulation at bay as the venous return at night is causing excessive nocturia. \par - Patient established with PM&R, c/w lymphedema therapy.\par \par 5. Encounter for Palliative Care - Emotional support provided.\par - HCP on file.\par \par Follow up in 2 weeks, call sooner with questions or issues.

## 2022-07-06 NOTE — PHYSICAL EXAM
[General Appearance - Alert] : alert [General Appearance - In No Acute Distress] : in no acute distress [Sclera] : the sclera and conjunctiva were normal [Normal Oral Mucosa] : normal oral mucosa [Neck Appearance] : the appearance of the neck was normal [] : no respiratory distress [No Focal Deficits] : no focal deficits [Oriented To Time, Place, And Person] : oriented to person, place, and time [Affect] : the affect was normal [FreeTextEntry1] : Pallor

## 2022-07-06 NOTE — PHYSICAL EXAM
[Ambulatory and capable of all self care but unable to carry out any work activities] : Status 2- Ambulatory and capable of all self care but unable to carry out any work activities. Up and about more than 50% of waking hours [Normal] : affect appropriate [de-identified] : lying flat  [de-identified] : anicteric  [de-identified] : no jvd  [de-identified] : normal respiratory effort, no audible wheeze [de-identified] : reg rate  [de-identified] : mild edema to right upper leg; no distal LE edema B/L; compression stocking to RLE [de-identified] : soft, non-distended [de-identified] : right posterior hip joint tenderness; pain with ROM [de-identified] : RLE neuropathy

## 2022-07-20 NOTE — ASSESSMENT
[______] : HCP: [unfilled] [FreeTextEntry1] : 66yoM with:\par \par 1. Soft tissue sarcoma - Votrient on hold pending LFTs. Scans pending. Med Onc follow up. \par \par 2. Pain 2/2 Neoplasm - Will continue with current regimen.  C/w Methadone 3/3/4mg.  Given potential interaction between Methadone and Votrient, ECG completed. QTc interval unchanged. Discussed alternatives to PRN Oxycodone IR 10mg as patient reports insufficient relief such as PRN Hydromorphone.  He defers alternative medications and will continue with PRN Oxycodone. He may require increase of PRN Oxycodone to IR 10-20mg for mod-severe pain. Should consider using at nighttime to aid with his sleep.  C/w PRN Tylenol and Gabapentin. \par \par 3. Nausea - c/w PRN Compazine; medical cannabis may be of benefit. Suggest obtaining from medical dispensary.\par \par 4. LE edema and nocturia - Recommended elevation of legs during daytime when possible as this will help keep fluid accumulation at bay as the venous return at night is causing excessive nocturia. \par - Patient established with PM&R, c/w lymphedema therapy.\par \par 5. Encounter for Palliative Care - Emotional support provided.\par - HCP on file.\par \par Follow up in 2-3 weeks, call sooner with questions or issues.

## 2022-07-20 NOTE — HISTORY OF PRESENT ILLNESS
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 Moon.\par \par Patient is Mandarin-speaking and prefers for his daughter Gaby to translate for him. \par \par Patient developed pain in his R hip in 2/2021. It was thought that he was suffering with sciatica and he started PT. This did not make much difference in his pain. He established care with a pain management doctor in [place].  Workup revealed a large RP mass. \par He is s/p 10 fractions of RT to the pelvic mass, completed 11/19. \par \par His pain is localized to the R buttock and extends down his R leg. He endorses tingling along the dorsum of his r foot. The pain is always present  The pain renders him unable to bear weight on his R leg. He uses a walker for assistance with ambulation. \par Currently he rates his pain as 6/10. He states that since the chemotherapy has started the intensity of his pain has lessened. Prior to chemotherapy his pain got up to 10/10 regularly. Nowadays his pain tends to only get to 10/10 at night, and this is \par \par He is currently taking Gabapentin 900mg TID, PRN Oxycodone 10mg, up to four times daily.  \par He was started on methadone two months ago for his pain, he felt restless on it and self-discontinued it.  He was subsequently started on MS ER 15mg BID.  He states that he did not feel well on it and stopped using it. \par \par Interval History:\par Patient is seen for follow-up.\par Votrient is on hold due to elevated LFTs. He is due for upcoming scans. Pain to his right leg pain is stable. Continues with Methadone 3/3/4mg. He does not appreciate significant pain relief with the last methadone dose increase. Finds relief with cannabis (obtained outside of [facility] dispensary). Denies AEs.  He attributes the pain to the right leg to stiffness, massages help. Swelling to the right leg remains, he is due to start lymphedema therapy in August. \par \par Current pain regimen:\par Methadone 3/3/4mg \par PRN Oxycodone IR 10mg (uses it sparingly)\par Gabapentin 900mg TID \par \par ROS: \par + right LE edema \par + fatigue\par + nausea - improved\par +~10lb weight loss since diagnosis \par + constipation - uses miralax PRN \par + nocturia (improved)  \par All other ROS as outlined or noncontributory. \par \par Patient is , lives with his wife. They have one daughter, Gaby and three grandchildren. Has been ambulating.\par \par I-Stop Ref#:  [id number]

## 2022-07-20 NOTE — PHYSICAL EXAM
[General Appearance - Alert] : alert [General Appearance - In No Acute Distress] : in no acute distress [Normal Oral Mucosa] : normal oral mucosa [Sclera] : the sclera and conjunctiva were normal [Neck Appearance] : the appearance of the neck was normal [] : no respiratory distress [No Focal Deficits] : no focal deficits [Oriented To Time, Place, And Person] : oriented to person, place, and time [Affect] : the affect was normal [FreeTextEntry1] : Pallor

## 2022-07-22 NOTE — HISTORY OF PRESENT ILLNESS
[Disease: _____________________] : Disease: [unfilled] [AJCC Stage: ____] : AJCC Stage: [unfilled] [de-identified] : 66 M w/ h/o rectal cancer in 2011 treated with chemotherapy and radiation, presents for evaluation of large RP mass c/w high grade malignancy. \par \par Balwinder walker has been experiencing low back/upper buttock pain since Feb 2021. Pain has been progressive and refractory to conservative treatment with PT and NSAIDs. He was referred by his PCP to Dr Wilmar Oliva, pain management, who directed him to go to [facility] for further evaluation. On Sept 29, 2021 pt went to [facility] ER and underwent a CT A/P \par which showed a 4.6 x 6.9 x 5.7 cm R pelvic sidewall mass with destruction of the sacrum and involvement of multiple nerve roots concerning for primary malignancy, CT Chest with 2 mm two JOURDAN nodules. An MRI Pelvis described this mass as invading hemisacrum, involving nerve roots and vasculature. Biopsy was performed on 10/4/21 and pathology so far only c/w high grade malignancy. Preliminary diagnosis upon discussion with pathologist is that this may be a sarcoma. was seen by Dr Fung initially\par \par Referred to Dr. 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  Patient had been called to stop taking Votrient on 7/14 due to persistently elevated LFTs.  He reports chronic right hip and thigh pain and muscle tightness for which massage helps.  He admits to some distal neuropathy that improves with movement.  Denies worsening fatigue, N/V, appetite change, abdominal pain, change in bowel habits.  He had lymphedema evaluation by PM&R last week and is scheduled to start therapy first week in August.

## 2022-07-22 NOTE — PHYSICAL EXAM
[Ambulatory and capable of all self care but unable to carry out any work activities] : Status 2- Ambulatory and capable of all self care but unable to carry out any work activities. Up and about more than 50% of waking hours [Normal] : affect appropriate [de-identified] : lying flat  [de-identified] : anicteric  [de-identified] : no jvd  [de-identified] : normal respiratory effort, no audible wheeze [de-identified] : reg rate  [de-identified] : mild non-pitting edems to right upper leg; no distal LE edema B/L; compression stocking to RLE to mid thigh [de-identified] : soft, non-distended [de-identified] : right posterior hip joint tenderness; pain with ROM [de-identified] : RLE neuropathy

## 2022-07-22 NOTE — REVIEW OF SYSTEMS
[Fatigue] : fatigue [Lower Ext Edema] : lower extremity edema [Joint Pain] : joint pain [Joint Stiffness] : joint stiffness [Muscle Pain] : muscle pain [Negative] : Allergic/Immunologic [Fever] : no fever [Chills] : no chills [Night Sweats] : no night sweats [Recent Change In Weight] : ~T no recent weight change [Abdominal Pain] : no abdominal pain [Vomiting] : no vomiting [Constipation] : no constipation [Diarrhea] : no diarrhea [Dysuria] : no dysuria [Incontinence] : no incontinence [FreeTextEntry5] : mild RLE edema [FreeTextEntry9] : right posterior hip pain worse with ROM [de-identified] : intermittent numbness/paresthesias to RLE

## 2022-08-11 PROBLEM — E88.89 UGT1A1 INTERMEDIATE METABOLIZER: Status: ACTIVE | Noted: 2022-01-01

## 2022-08-11 NOTE — REVIEW OF SYSTEMS
[Fatigue] : fatigue [Lower Ext Edema] : lower extremity edema [Joint Pain] : joint pain [Joint Stiffness] : joint stiffness [Muscle Pain] : muscle pain [Negative] : Allergic/Immunologic [Fever] : no fever [Chills] : no chills [Night Sweats] : no night sweats [Recent Change In Weight] : ~T no recent weight change [Abdominal Pain] : no abdominal pain [Vomiting] : no vomiting [Constipation] : no constipation [Diarrhea] : no diarrhea [Dysuria] : no dysuria [Incontinence] : no incontinence [FreeTextEntry5] : mild RLE edema [FreeTextEntry9] : right posterior hip pain worse with ROM [de-identified] : intermittent numbness/paresthesias to RLE

## 2022-08-11 NOTE — HISTORY OF PRESENT ILLNESS
[Disease: _____________________] : Disease: [unfilled] [AJCC Stage: ____] : AJCC Stage: [unfilled] [de-identified] : 66 M w/ h/o rectal cancer in 2011 treated with chemotherapy and radiation, presents for evaluation of large RP mass c/w high grade malignancy. \par \par Balwinder walker has been experiencing low back/upper buttock pain since Feb 2021. Pain has been progressive and refractory to conservative treatment with PT and NSAIDs. He was referred by his PCP to Dr Wilmar Oliva, pain management, who directed him to go to [facility] for further evaluation. On Sept 29, 2021 pt went to [facility] ER and underwent a CT A/P \par which showed a 4.6 x 6.9 x 5.7 cm R pelvic sidewall mass with destruction of the sacrum and involvement of multiple nerve roots concerning for primary malignancy, CT Chest with 2 mm two JOURDAN nodules. An MRI Pelvis described this mass as invading hemisacrum, involving nerve roots and vasculature. Biopsy was performed on 10/4/21 and pathology so far only c/w high grade malignancy. Preliminary diagnosis upon discussion with pathologist is that this may be a sarcoma. was seen by Dr Fung initially\par \par Referred to Dr. Alvarado for RT and received palliative RT \par \par path review at 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 \par \par [place]/Taxotere started Dec 2021 \par doxorubicin March 2022 due to POD \par May 2022 POD \par June starting pazopanib  [de-identified] : SOMMER, TMB low \par FGFR1 amplification\par BCL2L2 amplification - equivocal†\par CDKN2A/B CDKN2A loss, CDKN2B loss\par LRP1B * , MYST3 amplification , TP53 loss [FreeTextEntry1] : Gemzar/Taxotere -starting 12/3/21 --> POD 3/2022 ---> changed to Doxorubicin q3 weeks 3/18/22 --> POD 5/2022  -->  changed to Votrient 6/2022 [de-identified] : presents with daughter to review recent imaging to assess response on pazopanib \par LFTs improved recently and restarted pazopanib at lower dose \par pain controlled  with pall care regimen \par getting lymphedema therapy \par

## 2022-08-11 NOTE — PHYSICAL EXAM
[Ambulatory and capable of all self care but unable to carry out any work activities] : Status 2- Ambulatory and capable of all self care but unable to carry out any work activities. Up and about more than 50% of waking hours [Normal] : affect appropriate [de-identified] : lying flat  [de-identified] : anicteric  [de-identified] : no jvd  [de-identified] : normal respiratory effort, no audible wheeze [de-identified] : reg rate  [de-identified] : mild non-pitting edems to right upper leg; no distal LE edema B/L; compression stocking to RLE to mid thigh [de-identified] : soft, non-distended [de-identified] : right posterior hip joint tenderness; pain with ROM [de-identified] : RLE neuropathy

## 2022-08-18 NOTE — PHYSICAL EXAM
[Ambulatory and capable of all self care but unable to carry out any work activities] : Status 2- Ambulatory and capable of all self care but unable to carry out any work activities. Up and about more than 50% of waking hours [Normal] : grossly intact [de-identified] : lying flat  [de-identified] : anicteric  [de-identified] : no jvd  [de-identified] : normal respiratory effort, no audible wheeze [de-identified] : reg rate  [de-identified] : mild non-pitting edems to right upper leg (improved from previous exam); no distal LE edema B/L; compression stocking to RLE to mid thigh [de-identified] : soft, non-distended [de-identified] : right posterior hip joint tenderness; pain with ROM (improved from previous)

## 2022-08-18 NOTE — HISTORY OF PRESENT ILLNESS
[Disease: _____________________] : Disease: [unfilled] [AJCC Stage: ____] : AJCC Stage: [unfilled] [de-identified] : 66 M w/ h/o rectal cancer in 2011 treated with chemotherapy and radiation, presents for evaluation of large RP mass c/w high grade malignancy. \par \par Balwinder walker has been experiencing low back/upper buttock pain since Feb 2021. Pain has been progressive and refractory to conservative treatment with PT and NSAIDs. He was referred by his PCP to Dr Wilmar Oliva, pain management, who directed him to go to [facility] for further evaluation. On Sept 29, 2021 pt went to [facility] ER and underwent a CT A/P \par which showed a 4.6 x 6.9 x 5.7 cm R pelvic sidewall mass with destruction of the sacrum and involvement of multiple nerve roots concerning for primary malignancy, CT Chest with 2 mm two JOURDAN nodules. An MRI Pelvis described this mass as invading hemisacrum, involving nerve roots and vasculature. Biopsy was performed on 10/4/21 and pathology so far only c/w high grade malignancy. Preliminary diagnosis upon discussion with pathologist is that this may be a sarcoma. was seen by Dr Fung initially\par \par Referred to Dr. 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 \par \par [place]/Taxotere started Dec 2021 \par doxorubicin March 2022 due to POD \par May 2022 POD \par June starting pazopanib  [de-identified] : SOMMER, TMB low \par FGFR1 amplification\par BCL2L2 amplification - equivocal†\par CDKN2A/B CDKN2A loss, CDKN2B loss\par LRP1B * , MYST3 amplification , TP53 loss [FreeTextEntry1] : Gemzar/Taxotere -starting 12/3/21 --> POD 3/2022 ---> changed to Doxorubicin q3 weeks 3/18/22 --> POD 5/2022  -->  changed to Votrient 6/2022 [de-identified] : Patient presents for follow up with his daughter Renetta who is providing translation at the patient's request.  He reports undergoing lymphedema therapy this month 2x a week for a total of 6 sessions (he has 2 more left) and feels that the swelling and tightness in his leg has improved.  He was also prescribed a new compression stocking that has been helping.  He reports that the previous episodes of distal neuropathy in the RLE has improved and is occurring less frequently.  He has restarted the Votrient at 200 mg daily and is tolerating treatment.  Denies worsening fatigue, abdominal pain, N/V, change in bowel habits.

## 2022-08-18 NOTE — REVIEW OF SYSTEMS
[Fatigue] : fatigue [Lower Ext Edema] : lower extremity edema [Joint Pain] : joint pain [Joint Stiffness] : joint stiffness [Muscle Pain] : muscle pain [Negative] : Allergic/Immunologic [Fever] : no fever [Chills] : no chills [Night Sweats] : no night sweats [Recent Change In Weight] : ~T no recent weight change [Abdominal Pain] : no abdominal pain [Vomiting] : no vomiting [Constipation] : no constipation [Diarrhea] : no diarrhea [Dysuria] : no dysuria [Incontinence] : no incontinence [FreeTextEntry5] : mild RLE edema [FreeTextEntry9] : right posterior hip pain worse with ROM [de-identified] : intermittent numbness/paresthesias to RLE (improved)

## 2022-08-31 PROBLEM — Z51.11 ENCOUNTER FOR CHEMOTHERAPY MANAGEMENT: Status: ACTIVE | Noted: 2022-01-01

## 2022-08-31 PROBLEM — R60.0 LEG EDEMA, RIGHT: Status: ACTIVE | Noted: 2021-10-14

## 2022-08-31 NOTE — HISTORY OF PRESENT ILLNESS
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 Moon.\par Patient is Mandarin-speaking and prefers for his daughter Gaby to translate for him. \par \par Patient developed pain in his R hip in 2/2021. It was thought that he was suffering with sciatica and he started PT. This did not make much difference in his pain. He established care with a pain management doctor in [place].  Workup revealed a large RP mass. \par He is s/p 10 fractions of RT to the pelvic mass, completed 11/19. \par \par His pain is localized to the R buttock and extends down his R leg. He endorses tingling along the dorsum of his r foot. The pain is always present  The pain renders him unable to bear weight on his R leg. He uses a walker for assistance with ambulation. \par Currently he rates his pain as 6/10. He states that since the chemotherapy has started the intensity of his pain has lessened. Prior to chemotherapy his pain got up to 10/10 regularly. Nowadays his pain tends to only get to 10/10 at night, and this is \par \par He is currently taking Gabapentin 900mg TID, PRN Oxycodone 10mg, up to four times daily.  \par He was started on methadone two months ago for his pain, he felt restless on it and self-discontinued it.  He was subsequently started on MS ER 15mg BID.  He states that he did not feel well on it and stopped using it. \par \par Interval History:\par Patient is seen for follow-up.\par He has completed lymphedema therapy with significant improvement in swelling and mobility of right leg. \par Pain has improved.  He continues to experience increased pain at night.    Described as tight, numbness/stiffness which improves with massage and ROM.  He remains on Methadone 3/3/4mg. He did not appreciate significant pain relief with the last methadone dose increase. Finds relief with cannabis (obtained outside of [facility] dispensary). Denies AEs.  \par \par Current pain regimen:\par Methadone 3/3/4mg \par PRN Oxycodone IR 10mg (no recent use)\par Gabapentin 900mg TID \par \par ROS: \par + fatigue\par + right LE edema (improved)\par +~10lb weight loss since diagnosis \par + constipation - uses miralax PRN \par + nocturia (improved)  \par Denies nausea, vomiting, diarrhea. \par All other ROS as outlined or noncontributory. \par \par Patient is , lives with his wife. They have one daughter, Gaby and three grandchildren. Has been ambulating.\par \par [employer]-Stop Ref#:  [id number]

## 2022-08-31 NOTE — DATA REVIEWED
[FreeTextEntry1] : CT C/A/P  (07/27/2022): \par COMPARISON: CT 5/20/2022\par \par CONTRAST/COMPLICATIONS:\par IV Contrast: Omnipaque 350  90 cc administered   10 cc discarded\par Oral Contrast: Smoothie Readi-Cat 2\par Complications: None reported at time of study completion\par \par PROCEDURE:\par CT of the Chest, Abdomen and Pelvis was performed.\par Sagittal and coronal reformats were performed.\par \par FINDINGS:\par CHEST:\par LUNGS AND LARGE AIRWAYS: Patent central airways. Most bilateral pulmonary metastases are decreased in size, although at least one right lower lobe mass has enlarged and another is unchanged. Representative nodules as follows:\par \par Right lower lobe 1.9 x 1.9 cm (2, 74), previously 2.4 x 2.3 cm.\par Left lower lobe 1.6 x 1.3 cm (2, 77) previously 2.1 x 2.1 cm.\par Left lower lobe 1.3 x 1.2 cm (2, 72), previously 2.0 x 1.9 cm.\par \par Medial right lower lobe mass 3.6 x 2.6 cm (2, 69), enlarged from 2.2 x 1.9 cm.\par Medial right lower lobe 2.2 x 2.1 cm (2, 57), unchanged.\par PLEURA: No pleural effusion. Previous left effusion has resolved.\par VESSELS: Atherosclerotic changes of the aorta.\par HEART: Heart size is normal. No pericardial effusion.\par MEDIASTINUM AND MONSE: No lymphadenopathy.\par CHEST WALL AND LOWER NECK: Right chest wall port with catheter tip in the SVC.\par \par ABDOMEN AND PELVIS:\par LIVER: Within normal limits.\par BILE DUCTS: Normal caliber.\par GALLBLADDER: Within normal limits.\par SPLEEN: Within normal limits.\par PANCREAS: Low-density 4.2 x 3.9 cm pancreatic tail mass at the splenic hilum is slightly enlarged (2, 89), previously 3.9 x 3.3 cm.\par ADRENALS: Unchanged mild left adrenal thickening.\par KIDNEYS/URETERS: Within normal limits.\par \par BLADDER: Thick-walled urinary bladder and perivesicular inflammatory changes, question cystitis.\par REPRODUCTIVE ORGANS: Prostate within normal limits.\par \par BOWEL: Rectal and right lower quadrant small bowel anastomoses. No bowel obstruction. Appendix is normal.\par PERITONEUM: No ascites.\par VESSELS: Within normal limits.\par RETROPERITONEUM/LYMPH NODES: No lymphadenopathy.\par ABDOMINAL WALL: Within normal limits.\par BONES: Large heterogeneous soft tissue mass involving the right iliac/iliopsoas muscle is unchanged, measuring approximately 10 x 5 cm (2, 140), with associated destructive bony changes involving the sacrum and iliac bone. Again noted is involvement of the right neural foramina at L5-S1 and in the sacrum.\par \par IMPRESSION:\par \par New diffuse bladder wall thickening and surrounding, question cystitis. Correlate with urinalysis.\par \par Large pelvic/retroperitoneal mass is unchanged since 5/20/2022, as above.\par \par Numerous pulmonary metastases are largely decreased, although a few are either enlarged or stable.\par \par Pancreatic tail mass is slightly enlarged since prior.

## 2022-08-31 NOTE — ASSESSMENT
[______] : HCP: [unfilled] [FreeTextEntry1] : 66yoM with:\par \par 1. Soft tissue sarcoma - on Votrient. Med Onc follow up. \par \par 2. Pain 2/2 Neoplasm - Discussed option to increase QHS dose of Methadone.  Patient defers and wishes to continue with current regimen.  C/w Methadone 3/3/4mg.  Given potential interaction between Methadone and Votrient, ECG completed. QTc interval 420ms today in office. Discussed alternatives to PRN Oxycodone IR 10mg as patient reports insufficient relief.  He defers alternative medications and will continue with PRN Oxycodone 10mg. C/w PRN Tylenol and Gabapentin. \par \par 3. Nausea - C/w PRN Compazine; medical cannabis may be of benefit. Suggest obtaining from medical dispensary.\par \par 4. LE edema and nocturia - Recommended elevation of legs during daytime when possible as this will help keep fluid accumulation at bay as the venous return at night is causing excessive nocturia. \par - PM&R follow-up. \par \par 5. Encounter for Palliative Care - Emotional support provided.\par - HCP on file.\par \par Follow up in 3-4 weeks, call sooner with questions or issues.

## 2022-08-31 NOTE — END OF VISIT
[FreeTextEntry3] : Agree with NP assessment and plan as outlined above.  [Time Spent: ___ minutes] : I have spent [unfilled] minutes of time on the encounter.

## 2022-09-01 NOTE — HISTORY OF PRESENT ILLNESS
[Disease: _____________________] : Disease: [unfilled] [AJCC Stage: ____] : AJCC Stage: [unfilled] [de-identified] : 66 M w/ h/o rectal cancer in 2011 treated with chemotherapy and radiation, presents for evaluation of large RP mass c/w high grade malignancy. \par \par Balwinder walker has been experiencing low back/upper buttock pain since Feb 2021. Pain has been progressive and refractory to conservative treatment with PT and NSAIDs. He was referred by his PCP to Dr Wilmar Oliva, pain management, who directed him to go to [facility] for further evaluation. On Sept 29, 2021 pt went to [facility] ER and underwent a CT A/P \par which showed a 4.6 x 6.9 x 5.7 cm R pelvic sidewall mass with destruction of the sacrum and involvement of multiple nerve roots concerning for primary malignancy, CT Chest with 2 mm two JOURDAN nodules. An MRI Pelvis described this mass as invading hemisacrum, involving nerve roots and vasculature. Biopsy was performed on 10/4/21 and pathology so far only c/w high grade malignancy. Preliminary diagnosis upon discussion with pathologist is that this may be a sarcoma. was seen by Dr Fung initially\par \par Referred to Dr. 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 \par \par [place]/Taxotere started Dec 2021 \par doxorubicin March 2022 due to POD \par May 2022 POD \par June starting pazopanib  [de-identified] : SOMMER, TMB low \par FGFR1 amplification\par BCL2L2 amplification - equivocal†\par CDKN2A/B CDKN2A loss, CDKN2B loss\par LRP1B * , MYST3 amplification , TP53 loss [FreeTextEntry1] : [place]/Taxotere -starting 12/3/21 --> POD 3/2022 ---> changed to Doxorubicin q3 weeks 3/18/22 --> POD 5/2022  -->  changed to Votrient 6/2022 [de-identified] : Patient presents for follow up with his daughter, Renetta, who is providing translation at the patient's request.  He had his last session of lymphedema therapy today and reports that leg swelling, ROM and numbness have all significantly improved. His pain is being managed by his current pain regimen.  He denies anorexia, change in weight, dizziness, abdominal pain, N/V, change in bowel habits.

## 2022-09-01 NOTE — REVIEW OF SYSTEMS
[Lower Ext Edema] : lower extremity edema [Joint Pain] : joint pain [Muscle Pain] : muscle pain [Negative] : Constitutional [Fever] : no fever [Chills] : no chills [Night Sweats] : no night sweats [Fatigue] : no fatigue [Recent Change In Weight] : ~T no recent weight change [Abdominal Pain] : no abdominal pain [Vomiting] : no vomiting [Constipation] : no constipation [Diarrhea] : no diarrhea [Dysuria] : no dysuria [Incontinence] : no incontinence [Joint Stiffness] : no joint stiffness [FreeTextEntry5] : mild RLE edema proximally; improved from last visit [FreeTextEntry9] : right posterior hip pain worse with ROM [de-identified] : intermittent numbness/paresthesias to RLE (improved)

## 2022-09-01 NOTE — PHYSICAL EXAM
[Ambulatory and capable of all self care but unable to carry out any work activities] : Status 2- Ambulatory and capable of all self care but unable to carry out any work activities. Up and about more than 50% of waking hours [Normal] : affect appropriate [de-identified] : lying flat  [de-identified] : anicteric  [de-identified] : no jvd  [de-identified] : normal respiratory effort, no audible wheeze [de-identified] : reg rate  [de-identified] : mild non-pitting edems to right upper leg (improved from previous exam); no distal LE edema B/L; compression stocking to RLE to mid thigh [de-identified] : soft, non-distended [de-identified] : right posterior hip joint tenderness; pain with ROM (improved from previous)

## 2022-09-12 NOTE — ED PROVIDER NOTE - PHYSICAL EXAMINATION
GEN: Pt chronically ill appearing in NAD, alert, following commands.  PSYCH: Affect and mood appropriate.  EYES: Sclera white w/o injection, EOMI, PERRLA.   ENT: Head NCAT. MMM. Neck supple FROM. Airway patent.  RESP: CTA b/l, no wheezes, rales, or rhonchi.   CARDIAC: RRR, clear distinct S1, S2.  ABD: Abdomen soft, non-tender. No CVAT b/l.  MSK: Moving all extremities.  NEURO: No facial droop. Equal tongue protrusion. Decreased sensation LUE and LLE compared to R. +LUE drift, strength 4/5. RUE 5/5. RLE 4/5 (chronic). LLE 5/5.  VASC: Radial and dorsalis pedis pulses 2+ b/l. No edema or calf tenderness.  SKIN: No rashes or lesions.

## 2022-09-12 NOTE — H&P ADULT - NSHPPHYSICALEXAM_GEN_ALL_CORE
Exam: Mandardin speaking, AO3, PERRL, EOMI, L droop and drift, L hand  4-/5, L bi/tri 4+/5, otherwise intact (although would not fully comply with df/pf testing)

## 2022-09-12 NOTE — H&P ADULT - HISTORY OF PRESENT ILLNESS
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 CT head shows large 4cm mass in the R frontal lobe with mild associated edema, no midline shift. Exam: Mandardin speaking, AO3, PERRL, EOMI, L droop and drift, L hand  4-/5, L bi/tri 4+/5, otherwise intact (although would not fully comply with df/pf testing)

## 2022-09-12 NOTE — CONSULT NOTE ADULT - ASSESSMENT
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 Moon at [facility] [place]) presenting with L side weakness and change in speech over the weekend.       #Metastatic high grade sarcoma   -experienced low back/upper buttock pain since Feb 2021. Pain has been progressive and refractory to conservative treatment.   -On Sept 29, 2021 pt went to ER and CT A/P which showed a 4.6 x 6.9 x 5.7 cm R pelvic sidewall mass with destruction of the sacrum and involvement of multiple nerve roots concerning for primary malignancy, CT Chest with 2 mm two JOURDAN nodules. An MRI Pelvis described this mass as invading hemisacrum, involving nerve roots and vasculature.   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   -S/P Palliative RT with Dr. Alvarado  -Disease: high grade sarcoma    -Current Treatment Status:. Gemzar/Taxotere -starting 12/3/21 --> POD 3/2022 ---> changed to Doxorubicin q3 weeks 3/18/22 --> POD 5/2022 --> changed to Votrient 6/2022.       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 Moon at [facility] [place]) presenting with L side weakness and change in speech over the weekend.       #Metastatic high grade sarcoma   -experienced low back/upper buttock pain since Feb 2021. Pain has been progressive and refractory to conservative treatment.   -On Sept 29, 2021 pt went to ER and CT A/P which showed a 4.6 x 6.9 x 5.7 cm R pelvic sidewall mass with destruction of the sacrum and involvement of multiple nerve roots concerning for primary malignancy, CT Chest with 2 mm two JOURDAN nodules. An MRI Pelvis described this mass as invading hemisacrum, involving nerve roots and vasculature.   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   -S/P Palliative RT with Dr. Alvarado  -Disease: high grade sarcoma    -Current Treatment Status:. Gemzar/Taxotere -starting 12/3/21 --> POD 3/2022 ---> changed to Doxorubicin q3 weeks 3/18/22 --> POD 5/2022 --> changed to Votrient 6/2022.       Recommendations   -CT head showed  Large right frontal cystic mass likely representing neoplasm likely represents a metastasis. Mass effect lateral ventricle and midline shift to the left.  -MRI brain with and without contrast   -f/u neurology and neurosurgery; pt will be admitted to Neurosurgery team per HPI   -steroid Decadron 4mg q6hr and anti-seizure med per primary team and neuro/neurosurgery   -GI ppx; aspiration precaution, speech/swallow eval if necessary   -RadOnc consult if necessary   -Discussed with pt and his daughter at the bedside about CT head findings and further eval/treatment plan including MRI brain, starting steroid, and further f/u Neurosurgery (and radOnc if necessary). pt and his daughter agree with the plan.            Temo Boo PGY5   Hem & Onc fellow [id number]

## 2022-09-12 NOTE — ED PROVIDER NOTE - ATTENDING APP SHARED VISIT CONTRIBUTION OF CARE
RGUJRAL 68yo male hx listed BIB daughter for 1 week of intermittent AMS. Daughter translating for patient, states she spoke to him earlier in the week and thought he had slurred speech and now with LUE and possible B/L LE weakness. No cough, uri, f/c. No cp, palp, sob, abd pain. No falls. Pt also complains of R hip pain.   On exam, Patient is awake, alert and oriented x 3.  Patient is well appearing and in no acute distress.  NCAT, PERRL  Neck is supple  Lungs are CTA B/L,+S1S2  Abdomen:Soft nd/nt+bs no rebound or guarding.  Extremity no edema or calf tender.  Skin with no rash.  slight left facial droop, LUE 3/5, LLE 2/5, RUE 5/5, RLE 1/5. 2 +DP.   Check labs, CT/CT spine, and pelvis to eval for symptoms.

## 2022-09-12 NOTE — ED PROVIDER NOTE - NS ED ATTENDING STATEMENT MOD
This was a shared visit with the NOLVIA. I reviewed and verified the documentation and independently performed the documented:

## 2022-09-12 NOTE — CONSULT NOTE ADULT - SUBJECTIVE AND OBJECTIVE BOX
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 Moon at [facility] [place]) presenting with L side weakness and change in speech. As per clinic note, patient's daughter called 9/12 morning reporting that the patient has new LEFT sided weakness (upper and lower extremity) with change in speech that developed over the weekend (more than 24 hours old).  Due to concern for CVA, EMS was called.       Oncology history     -experienced low back/upper buttock pain since Feb 2021. Pain has been progressive and refractory to conservative treatment with PT and NSAIDs. On Sept 29, 2021 pt went to [facility] ER and underwent a CT A/P which showed a 4.6 x 6.9 x 5.7 cm R pelvic sidewall mass with destruction of the sacrum and involvement of multiple nerve roots concerning for primary malignancy, CT Chest with 2 mm two JOURDAN nodules. An MRI Pelvis described this mass as invading hemisacrum, involving nerve roots and vasculature. 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   -Referred to Dr. Alvarado for RT and received palliative RT     Disease: high grade sarcoma    AJCC Stage: IV   SOMMER, TMB low   FGFR1 amplification  BCL2L2 amplification - equivocal†  CDKN2A/B CDKN2A loss, CDKN2B loss  LRP1B * , MYST3 amplification , TP53 loss     Current Treatment Status:. Gemzar/Taxotere -starting 12/3/21 --> POD 3/2022 ---> changed to Doxorubicin q3 weeks 3/18/22 --> POD 5/2022 --> changed to Votrient 6/2022.       PAST MEDICAL & SURGICAL HISTORY:  Abdominal tumor      Back pain      Rectal cancer      No significant past surgical history          Allergies    No Known Allergies    Intolerances        MEDICATIONS  (STANDING):    MEDICATIONS  (PRN):      FAMILY HISTORY:      SOCIAL HISTORY: No EtOH, no tobacco    REVIEW OF SYSTEMS:    CONSTITUTIONAL: No weakness, fevers or chills  EYES/ENT: No visual changes;  No vertigo or throat pain   NECK: No pain or stiffness  RESPIRATORY: No cough, wheezing, hemoptysis; No shortness of breath  CARDIOVASCULAR: No chest pain or palpitations  GASTROINTESTINAL: No abdominal or epigastric pain. No nausea, vomiting, or hematemesis; No diarrhea or constipation. No melena or hematochezia.  GENITOURINARY: No dysuria, frequency or hematuria  NEUROLOGICAL: No numbness or weakness  SKIN: No itching, burning, rashes, or lesions   All other review of systems is negative unless indicated above.    Height (cm): 170.2 (09-12 @ 12:14)    T(F): 97.7 (09-12-22 @ 12:14), Max: 97.7 (09-12-22 @ 12:14)  HR: 64 (09-12-22 @ 12:14)  BP: 130/75 (09-12-22 @ 12:14)  RR: 16 (09-12-22 @ 12:14)  SpO2: 97% (09-12-22 @ 12:14)  Wt(kg): --    GENERAL: NAD, well-developed  HEAD:  Atraumatic, Normocephalic  EYES: EOMI, PERRLA, conjunctiva and sclera clear  NECK: Supple, No JVD  CHEST/LUNG: Clear to auscultation bilaterally; No wheeze  HEART: Regular rate and rhythm; No murmurs, rubs, or gallops  ABDOMEN: Soft, Nontender, Nondistended; Bowel sounds present  EXTREMITIES:  2+ Peripheral Pulses, No clubbing, cyanosis, or edema  NEUROLOGY: non-focal  SKIN: No rashes or lesions                          13.1   3.06  )-----------( 147      ( 12 Sep 2022 13:11 )             40.6                   PT/INR - ( 12 Sep 2022 13:11 )   PT: 11.4 sec;   INR: 0.99 ratio         PTT - ( 12 Sep 2022 13:11 )  PTT:37.3 sec     HPI:    66 yo M w PMHx rectal cancer in 2011 treated with chemotherapy and radiation, 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 [facility] [place] [facility] [place] with L side weakness and change in speech. As per clinic note, patient's daughter called 9/12 morning reporting that the patient has new LEFT sided weakness (upper and lower extremity) with change in speech that developed over the weekend (more than 24 hours old).  Due to concern for CVA, EMS was called and pt sent to ER. Pt and daughter at the bedside reported that he has weakness and decreased sensation at left Ex, denied blurry vision, hearing changes, N/V, severe headache, cough, SOB, chest pain, lost control of urination/BMs.     After admission, CT head and angio head/neck : Large right frontal cystic mass likely representing neoplasm likely represents a metastasis. Mass effect lateral ventricle and midline shift to the left.  Normal CTA of the head and neck except for mild inferior displacement right middle cerebral arteries by the right frontal mass.      Oncology history     -experienced low back/upper buttock pain since Feb 2021. Pain has been progressive and refractory to conservative treatment with PT and NSAIDs. On Sept 29, 2021 pt went to [facility] ER and underwent a CT A/P which showed a 4.6 x 6.9 x 5.7 cm R pelvic sidewall mass with destruction of the sacrum and involvement of multiple nerve roots concerning for primary malignancy, CT Chest with 2 mm two JOURDAN nodules. An MRI Pelvis described this mass as invading hemisacrum, involving nerve roots and vasculature. 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   -Referred to Dr. Alvarado for RT and received palliative RT     Disease: high grade sarcoma    AJCC Stage: IV   SOMMER, TMB low   FGFR1 amplification  BCL2L2 amplification - equivocal†  CDKN2A/B CDKN2A loss, CDKN2B loss  LRP1B * , MYST3 amplification , TP53 loss     Current Treatment Status:. Gemzar/Taxotere -starting 12/3/21 --> POD 3/2022 ---> changed to Doxorubicin q3 weeks 3/18/22 --> POD 5/2022 --> changed to Votrient 6/2022.       PAST MEDICAL & SURGICAL HISTORY:  Abdominal tumor      Back pain      Rectal cancer      No significant past surgical history          Allergies    No Known Allergies    Intolerances        MEDICATIONS  (STANDING):    MEDICATIONS  (PRN):      FAMILY HISTORY:      SOCIAL HISTORY: No EtOH, no tobacco    REVIEW OF SYSTEMS:    See HPI   Height (cm): 170.2 (09-12 @ 12:14)    T(F): 97.7 (09-12-22 @ 12:14), Max: 97.7 (09-12-22 @ 12:14)  HR: 64 (09-12-22 @ 12:14)  BP: 130/75 (09-12-22 @ 12:14)  RR: 16 (09-12-22 @ 12:14)  SpO2: 97% (09-12-22 @ 12:14)  Wt(kg): --    GENERAL: NAD, well-developed; AAOX3, responded verbally well with slow speech  HEAD:  Atraumatic, Normocephalic  EYES: EOMI, PERRLA, conjunctiva and sclera clear  NECK: Supple, No JVD  CHEST/LUNG: Clear to auscultation bilaterally; No wheeze  HEART: Regular rate and rhythm; No murmurs, rubs, or gallops  ABDOMEN: Soft, Nontender, Nondistended; Bowel sounds present  EXTREMITIES:  2+ Peripheral Pulses, No clubbing, cyanosis, or edema  NEUROLOGY: decreased touch sensation on LUEx and LLEx; muscle strength on Left Ex 4-5/5.   SKIN: No rashes or lesions                          13.1   3.06  )-----------( 147      ( 12 Sep 2022 13:11 )             40.6                   PT/INR - ( 12 Sep 2022 13:11 )   PT: 11.4 sec;   INR: 0.99 ratio         PTT - ( 12 Sep 2022 13:11 )  PTT:37.3 sec

## 2022-09-12 NOTE — ED PROVIDER NOTE - OBJECTIVE STATEMENT
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 Nghia Moon, presents to ED accompanied by daughter at bedside providing translation and ancillary history, c/o L sided weakness and slurred speech x 5 days. Speech normal today. Daughter reports pt has had trouble using his L hand. Reports over last few days pt has been weak and looks unwell from baseline. Tolerating PO. Denies f/c, chest pain, back pain, sob, cough, abd pan, nvd, dysuria. 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 Nghia Moon, presents to ED accompanied by daughter at bedside providing translation and ancillary history, c/o L sided weakness and slurred speech x 5 days. Speech normal today. Daughter reports pt has had trouble using his L hand. Reports over last few days pt has been weak and looks unwell from baseline. Tolerating PO. Denies f/c, chest pain, back pain, sob, cough, abd pan, nvd, dysuria.

## 2022-09-12 NOTE — ED ADULT NURSE NOTE - NSIMPLEMENTINTERV_GEN_ALL_ED
Implemented All Fall with Harm Risk Interventions:  [place] to call system. Call bell, personal items and telephone within reach. Instruct patient to call for assistance. Room bathroom lighting operational. Non-slip footwear when patient is off stretcher. Physically safe environment: no spills, clutter or unnecessary equipment. Stretcher in lowest position, wheels locked, appropriate side rails in place. Provide visual cue, wrist band, yellow gown, etc. Monitor gait and stability. Monitor for mental status changes and reorient to person, place, and time. Review medications for side effects contributing to fall risk. Reinforce activity limits and safety measures with patient and family. Provide visual clues: red socks.

## 2022-09-12 NOTE — CONSULT NOTE ADULT - ATTENDING COMMENTS
I had a full discussion with fellow regarding assessment and plan  will need MRI head with and without contrast   start dex 4mg q6hrs  consult neurosurgery and radiation oncology  will follow up

## 2022-09-12 NOTE — H&P ADULT - ASSESSMENT
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 CT head shows large 4cm mass in the R frontal lobe with mild associated edema, no midline shift. Exam: [place] speaking, AO3, PERRL, EOMI, L droop and drift, L hand  4-/5, L bi/tri 4+/5, otherwise intact (although would not fully comply with df/pf testing)    - Admit to neurosurgery under Dr. Day  - MRI w/wo thin cuts with Synaptive DTI  - CT CAP to eval for underlying neoplasm  - Dex4q6  - Keppra 500 BID

## 2022-09-12 NOTE — ED ADULT NURSE NOTE - OBJECTIVE STATEMENT
67M bibems from home accompanied by daughter with c/o left sided weakness and slurred speech for a week now. Patient aaox3, usually ambulatory as per daughter but for the past week has not been able to walk. Patient called daughter today(daughter doesn't live with father), and reports wanted to go to hospital because of the weakness. In the ED, patient follows commands, moves upper extremities with pronator drift in the left arm, unable to establish slurred speech because patient speaks chinese but as per daughter patient voice is slurred. Patient able to lift left leg but unable to lift rt leg. Denies any chills or fever, sob, chest pain nausea or vomiting. Currently on oral chemotherapeutic agent for Pelvic Sarcoma. RT arm 18g IV access inserted, labs  drawn, patient pending to be seen by MD.

## 2022-09-13 NOTE — PROVIDER CONTACT NOTE (OTHER) - ACTION/TREATMENT ORDERED:
No new orders at this time. Daughter educated not to place any hot water bottles on patients body. Dopplers ordered as per provider.

## 2022-09-13 NOTE — CONSULT NOTE ADULT - PROBLEM SELECTOR RECOMMENDATION 3
[place] [facility] [place] consulted this admission, imaging work up in process

## 2022-09-13 NOTE — CONSULT NOTE ADULT - PROBLEM SELECTOR RECOMMENDATION 9
Home regimen: methadone 3-3-4 mg TID, gabapentin 900 TID, oxycodone 10 mg prn  - Pt reports only taking methadone 2 mg TID (1mL) for the past month, along with gabapentin 600 TID  - Will resume gabapentin at 900 TID, continue oxyIR 5-10 mg q4 prn mod-severe pain  - Reduce methadone to 2 mg TID given reported good pain control at home

## 2022-09-13 NOTE — CHART NOTE - NSCHARTNOTEFT_GEN_A_CORE
This report was requested by: Yessenia Watts | Reference #: [id number]    Others' Prescriptions  Patient Name: Balwinder Pete Date: 1955  Address: [address] [place] [id number]: Male  Rx Written	Rx Dispensed	Drug	Quantity	Days Supply	Prescriber Name  08/31/2022	08/31/2022	methadone 10 mg/5 ml solution	150ml	30	Padrone, Jenni Shaver  07/18/2022	07/27/2022	methadone 10 mg/5 ml solution	150ml	30	Mcaleese, Manju  06/22/2022	07/06/2022	methadone 10 mg/5 ml solution	120ml	30	Padrone, Jenni Shaver  06/07/2022	06/13/2022	methadone 10 mg/5 ml solution	105ml	30	Padrone, Jenni Shaver  05/09/2022	05/12/2022	methadone 10 mg/5 ml solution	105ml	30	Padrone, Jenni Shaver  04/07/2022	04/15/2022	oxycodone hcl (ir) 10 mg tab	80	14	Padrone, Jenni Shaver  04/07/2022	04/15/2022	methadone 10 mg/5 ml solution	105ml	30	Padrone, Jenni Shaver  03/11/2022	03/18/2022	methadone 10 mg/5 ml solution	105ml	30	Padrone, Jenni Shaver  02/23/2022	02/28/2022	methadone 10 mg/5 ml solution	90ml	30	Padrone, Jenni Shaver  01/25/2022	01/31/2022	oxycodone hcl (ir) 10 mg tab	80	14	Padrone, Jenni Shaver  01/28/2022	01/31/2022	methadone 10 mg/5 ml solution	90ml	30	Cora Cabral)  12/30/2021	01/04/2022	methadone 10 mg/5 ml solution	90ml	30	Padrone, Jenni Shaver  12/16/2021	12/16/2021	methadone 10 mg/5 ml solution	45ml	30	Cora Cabral)  11/29/2021	11/29/2021	oxycodone hcl (ir) 10 mg tab	80	14	Gilda Márquez  10/29/2021	11/04/2021	morphine sulf er 15 mg tablet	30	15	Berhane Walters (MS)  10/28/2021	10/28/2021	oxycodone hcl 10 mg tablet	80	14	Berhane Walters (MS)  10/14/2021	10/18/2021	oxycodone hcl 5 mg tablet	90	15	Janay Fung  10/14/2021	10/14/2021	methadone hcl 5 mg tablet	30	15	Janay Fung

## 2022-09-13 NOTE — CHART NOTE - NSCHARTNOTEFT_GEN_A_CORE
CAPRINI SCORE [CLOT] Score on Admission for     AGE RELATED RISK FACTORS                                                       MOBILITY RELATED FACTORS  [ ] Age 41-60 years                                            (1 Point)                  [ ] Bed rest                                                        (1 Point)  [x ] Age: 61-74 years                                           (2 Points)                 [ ] Plaster cast                                                   (2 Points)  [ ] Age= 75 years                                              (3 Points)                 [ ] Bed bound for more than 72 hours                 (2 Points)    DISEASE RELATED RISK FACTORS                                               GENDER SPECIFIC FACTORS  [ ] Edema in the lower extremities                       (1 Point)                  [ ] Pregnancy                                                     (1 Point)  [ ] Varicose veins                                               (1 Point)                  [ ] Post-partum < 6 weeks                                   (1 Point)             [ ] BMI > 25 Kg/m2                                            (1 Point)                  [ ] Hormonal therapy  or oral contraception          (1 Point)                 [ ] Sepsis (in the previous month)                        (1 Point)                  [ ] History of pregnancy complications                 (1 point)  [ ] Pneumonia or serious lung disease                                               [ ] Unexplained or recurrent                     (1 Point)           (in the previous month)                               (1 Point)  [ ] Abnormal pulmonary function test                     (1 Point)                 SURGERY RELATED RISK FACTORS (include planned surgeries)  [ ] Acute myocardial infarction                              (1 Point)                 [ ]  Section                                             (1 Point)  [ ] Congestive heart failure (in the previous month)  (1 Point)               [ ] Minor surgery                                                  (1 Point)   [ ] Inflammatory bowel disease                             (1 Point)                 [ ] Arthroscopic surgery                                        (2 Points)  [ ] Central venous access                                      (2 Points)                [ ] General surgery lasting more than 45 minutes   (2 Points)       [ ] Stroke (in the previous month)                          (5 Points)               [ ] Elective arthroplasty                                         (5 Points)            [x ] Current or past malignancy                                (2 Points)                                                                                                     HEMATOLOGY RELATED FACTORS                                                 TRAUMA RELATED RISK FACTORS  [ ] Prior episodes of VTE                                     (3 Points)                [ ] Fracture of the hip, pelvis, or leg                       (5 Points)  [ ] Positive family history for VTE                         (3 Points)                 [ ] Acute spinal cord injury (in the previous month)  (5 Points)  [ ] Prothrombin 68675 A                                     (3 Points)                 [ ] Paralysis  (less than 1 month)                             (5 Points)  [ ] Factor V Leiden                                             (3 Points)                  [ ] Multiple Trauma within 1 month                        (5 Points)  [ ] Lupus anticoagulants                                     (3 Points)                                                           [ ] Anticardiolipin antibodies                               (3 Points)                                                       [ ] High homocysteine in the blood                      (3 Points)                                             [ ] Other congenital or acquired thrombophilia      (3 Points)                                                [ ] Heparin induced thrombocytopenia                  (3 Points)                                          Total Score [   4       ]    Risk:  Very low 0   Low 1 to 2   Moderate 3 to 4   High =5       VTE Prophylasix Recommednations:  [x ] mechanical pneumatic compression devices                                      [ ] contraindicated: _____________________  [ ] chemo prophylasix                                                                                   [ ] contraindicated _____________________    **** HIGH LIKELIHOOD DVT PRESENT ON ADMISSION  [x ] (please order LE dopplers within 24 hours of admission)

## 2022-09-13 NOTE — PROVIDER CONTACT NOTE (OTHER) - ASSESSMENT
Pt is alert x2, disoriented to time. redness noted to right lower leg. Pt denies pain at this time. Left leg noted with drift. B/P 115/73 HR 67 97% on room air. Temp 97.4 F Pt is alert x2, disoriented to time. redness noted to right lower leg. Pt denies pain at this time. Left leg noted with drift. B/P 115/73 HR 67 97% on room air. Temp 98..4 F

## 2022-09-13 NOTE — PROVIDER CONTACT NOTE (OTHER) - SITUATION
Pt noted with redness to right lower leg, warm to touch. As per daughter they have been placing hot water bottle on patients leg stated " He is sensitive to cold to make pt comfortable"

## 2022-09-13 NOTE — CONSULT NOTE ADULT - ASSESSMENT
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 CT head shows large 4cm mass in the R frontal lobe with mild associated edema, no midline shift. Pt is admitted for work up of concerning new metastasis. Geriatrics and Palliative Medicine Team is consulted for continuity of care, pain management and GOC when indicated

## 2022-09-13 NOTE — CONSULT NOTE ADULT - SUBJECTIVE AND OBJECTIVE BOX
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 CT head shows large 4cm mass in the R frontal lobe with mild associated edema, no midline shift. Pt is admitted for work up of concerning new metastasis. Geriatrics and Palliative Medicine Team is consulted for continuity of care, pain management and GOC when indicated     Pt seen this morning with dtr and wife visiting at bedside. He is awake, alert with dysarthric speech, conversant in Mandarin. He reports weakness in his L arm for the past few days leading to difficulty moving around. In terms of pain, typically located in R hip, but well managed with home regimen of methadone and gabapentin. Pt admits to taking less than what's prescribed for the past month. Instead of 3mg-3mg-4mg TID of methadone, he was taking 2 mg TID at home. Instead of gabapentin 900 TID, he was taking 600 TID at home. Family reports his pain is well controlled without need for breakthrough OxyIR. Pt is able to have daily BM without using laxatives by eating a lot of vegetables.     Advised family that due to neurological work up, I will reduce his methadone dosing to what he was taking at home (2 mg TID) given reported good pain control. Maintain gabapentin at 900 TID. OxyIR 5-10 mg prn is available should he have breakthrough pain. Recommend to continue with miralax/senna daily while in house as he's less likely to eat as much fiber as he does at home. Team will continue to follow intermittently pending hospital course.     PERTINENT PM/SXH:   Abdominal tumor    Back pain    Rectal cancer      No significant past surgical history      FAMILY HISTORY:    Family Hx substance abuse [ ]yes [ x]no  ITEMS NOT CHECKED ARE NOT PRESENT    SOCIAL HISTORY:   Significant other/partner[ x]  Children[x ]  Episcopal/Spirituality:  Substance hx:  [ ]   Tobacco hx:  [ ]   Alcohol hx: [ ]   Home Opioid hx:  [ x] I-Stop Reference No: see chart   Living Situation: [ x]Home  [ ]Long term care  [ ]Rehab [ ]Other    ADVANCE DIRECTIVES:    DNR/MOLST  [ ]  Living Will  [ ]   DECISION MAKER(s):  [ ] Health Care Proxy(s)  [x ] Surrogate(s)  [ ] Guardian           Name(s): Phone Number(s): wife    BASELINE (I)ADL(s) (prior to admission):  [place]: [ x]Total  [ ] Moderate [ ]Dependent    Allergies    No Known Allergies    Intolerances    MEDICATIONS  (STANDING):  dexAMETHasone  Injectable 4 milliGRAM(s) IV Push every 6 hours  gabapentin 900 milliGRAM(s) Oral three times a day  lactated ringers. 1000 milliLiter(s) (75 mL/Hr) IV Continuous <Continuous>  levETIRAcetam  IVPB 500 milliGRAM(s) IV Intermittent every 12 hours  methadone   Solution 2 milliGRAM(s) Oral three times a day  pazopanib 200 milliGRAM(s) Oral daily  polyethylene glycol 3350 17 Gram(s) Oral daily  senna 2 Tablet(s) Oral at bedtime    MEDICATIONS  (PRN):  acetaminophen     Tablet .. 650 milliGRAM(s) Oral every 6 hours PRN Temp greater or equal to 38C (100.4F), Mild Pain (1 - 3)  calcium carbonate    500 mG (Tums) Chewable 2 Tablet(s) Chew every 6 hours PRN Upset Stomach  ondansetron Injectable 4 milliGRAM(s) IV Push every 6 hours PRN Nausea and/or Vomiting  oxyCODONE    IR 5 milliGRAM(s) Oral every 4 hours PRN Moderate Pain (4 - 6)  oxyCODONE    IR 10 milliGRAM(s) Oral every 4 hours PRN Severe Pain (7 - 10)    PRESENT SYMPTOMS: [ ]Unable to self-report  [ ] CPOT [ ] PAINADs [ ] RDOS  Source if other than patient:  [ ]Family   [ ]Team     Pain: [ ]yes [ x]no  QOL impact -   Location -                    Aggravating factors -  Quality -  Radiation -  Timing-  Severity (0-10 scale):  Minimal acceptable level (0-10 scale):     Dyspnea:                           [ ]Mild [ ]Moderate [ ]Severe  Anxiety:                             [ ]Mild [ ]Moderate [ ]Severe  Fatigue:                             [ ]Mild [ ]Moderate [ ]Severe  Nausea:                             [ ]Mild [ ]Moderate [ ]Severe  Loss of appetite:              [ ]Mild [ ]Moderate [ ]Severe  Constipation:                    [ ]Mild [ ]Moderate [ ]Severe    PCSSQ[Palliative Care Spiritual Screening Question]   Severity (0-10):  Score of 4 or > indicate consideration of Chaplaincy referral.  Chaplaincy Referral: [ ] yes [ ] refused [ ] following [x ] Deferred     Caregiver [place]? : [ ] yes [ x] no [ ] Deferred [ ] Declined             Social work referral [ ] Patient & Family Centered Care Referral [ ]     Anticipatory Grief present?:  [ ] yes [ ] no  [ x] Deferred                  Social work referral [ ] Chaplaincy Referral[ ]      Other Symptoms:  [ x]All other review of systems negative aside from L arm weakness and dysarthric speech     Palliative Performance Status Version 2:     50    %    http://npcrc.org/files/news/palliative_performance_scale_ppsv2.pdf    PHYSICAL EXAM:  Vital Signs Last 24 Hrs  T(C): 36.6 (13 Sep 2022 09:53), Max: 36.7 (12 Sep 2022 22:05)  T(F): 97.9 (13 Sep 2022 09:53), Max: 98.1 (12 Sep 2022 22:05)  HR: 58 (13 Sep 2022 09:53) (58 - 87)  BP: 118/75 (13 Sep 2022 09:53) (110/66 - 135/76)  BP(mean): --  RR: 18 (13 Sep 2022 09:53) (18 - 20)  SpO2: 97% (13 Sep 2022 09:53) (95% - 98%)    Parameters below as of 13 Sep 2022 09:53  Patient On (Oxygen Delivery Method): room air     I&O's Summary    12 Sep 2022 07:01  -  13 Sep 2022 07:00  --------------------------------------------------------  IN: 0 mL / OUT: 300 mL / NET: -300 mL      GENERAL: [ ]Cachexia    [ ]Alert  [ ]Oriented x   [ ]Lethargic  [ ]Unarousable  [ ]Verbal  [ ]Non-Verbal  Behavioral:   [ ] Anxiety  [ ] Delirium [ ] Agitation [ ] Other  HEENT:  [ ]Normal   [ ]Dry mouth   [ ]ET Tube/Trach  [ ]Oral lesions  PULMONARY:   [ ]Clear [ ]Tachypnea  [ ]Audible excessive secretions   [ ]Rhonchi        [ ]Right [ ]Left [ ]Bilateral  [ ]Crackles        [ ]Right [ ]Left [ ]Bilateral  [ ]Wheezing     [ ]Right [ ]Left [ ]Bilateral  [ ]Diminished breath sounds [ ]right [ ]left [ ]bilateral  CARDIOVASCULAR:    [ ]Regular [ ]Irregular [ ]Tachy  [ ]Teja [ ]Murmur [ ]Other  GASTROINTESTINAL:  [ ]Soft  [ ]Distended   [ ]+BS  [ ]Non tender [ ]Tender  [ ]Other [ ]PEG [ ]OGT/ NGT  Last BM:  GENITOURINARY:  [ ]Normal [ ] Incontinent   [ ]Oliguria/Anuria   [ ]Mcgraw  MUSCULOSKELETAL:   [ ]Normal   [ ]Weakness  [ ]Bed/Wheelchair bound [ ]Edema  NEUROLOGIC:   [ ]No focal deficits  [ ]Cognitive impairment  [ ]Dysphagia [ ]Dysarthria [ ]Paresis [ ]Other   SKIN:   [ ]Normal  [ ]Rash  [ ]Other  [ ]Pressure ulcer(s)       Present on admission [ ]y [ ]n    CRITICAL CARE:  [ ] Shock Present  [ ]Septic [ ]Cardiogenic [ ]Neurologic [ ]Hypovolemic  [ ]  Vasopressors [ ]  Inotropes   [ ]Respiratory failure present [ ]Mechanical ventilation [ ]Non-invasive ventilatory support [ ]High flow    [ ]Acute  [ ]Chronic [ ]Hypoxic  [ ]Hypercarbic [ ]Other  [ ]Other organ failure     LABS:                        13.1   3.06  )-----------( 147      ( 12 Sep 2022 13:11 )             40.6       139  |  100  |  11  ----------------------------<  151<H>  4.0   |  28  |  0.52    Ca    9.5      12 Sep 2022 13:11  Phos  3.4       Mg     2.3         TPro  7.4  /  Alb  4.2  /  TBili  0.7  /  DBili  x   /  AST  28  /  ALT  18  /  AlkPhos  159<H>  12  PT/INR - ( 12 Sep 2022 13:11 )   PT: 11.4 sec;   INR: 0.99 ratio         PTT - ( 12 Sep 2022 13:11 )  PTT:37.3 sec    Urinalysis Basic - ( 12 Sep 2022 20:22 )    Color: Yellow / Appearance: Clear / S.051 / pH: x  Gluc: x / Ketone: Negative  / Bili: Negative / Urobili: 2 mg/dL   Blood: x / Protein: Trace / Nitrite: Negative   Leuk Esterase: Negative / RBC: 1 /hpf / WBC 0 /HPF   Sq Epi: x / Non Sq Epi: 0 /hpf / Bacteria: Negative      RADIOLOGY & ADDITIONAL STUDIES:    PROTEIN CALORIE MALNUTRITION PRESENT: [ ]mild [ ]moderate [ ]severe [ ]underweight [ ]morbid obesity  https://www.andeal.org/vault/2440/web/files/ONC/Table_Clinical%20Characteristics%20to%20Document%20Malnutrition-White%20JV%20et%20al%270106.pdf    Height (cm): 170.2 (22 @ 12:14), 170.2 (22 @ 16:00), 170.2 (22 @ 10:35)  Weight (kg): 62.367116228042640 (22 @ 12:00), 63.5 (22 @ 10:35), 69.2 (10-03-21 @ 21:47)  BMI (kg/m2): 21.6 (22 @ 12:14), 21.6 (22 @ 12:00), 21.9 (22 @ 16:00)    [ ]PPSV2 < or = to 30% [ ]significant weight loss  [ ]poor nutritional intake  [ ]anasarca[ ]Artificial Nutrition      Other REFERRALS:  [ ]Hospice  [ ]Child Life  [ ]Social Work  [ ]Case management [ ]Holistic Therapy  HPI:  67M R handed, pmh rectal CA (2011, s/p radiation/chemo) and 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 CT head shows large 4cm mass in the R frontal lobe with mild associated edema, no midline shift. Pt is admitted for work up of concerning new metastasis. Geriatrics and Palliative Medicine Team is consulted for continuity of care, pain management and GOC when indicated     Pt seen this morning with dtr and wife visiting at bedside. He is awake, alert with dysarthric speech, conversant in Mandarin. He reports weakness in his L arm for the past few days leading to difficulty moving around. In terms of pain, he denies having any at this time. It is typically located in R hip, but well managed with home regimen of methadone and gabapentin. Pt admits to taking less than what's prescribed for the past month. Instead of 3mg-3mg-4mg TID of methadone, he was taking 2 mg TID at home. Instead of gabapentin 900 TID, he was taking 600 TID at home. Family reports his pain is well controlled without need for breakthrough OxyIR. Pt is able to have daily BM without using laxatives by eating a lot of vegetables.     Advised family that due to neurological work up, I will reduce his methadone dosing to what he was taking at home (2 mg TID) given reported good pain control. Maintain gabapentin at 900 TID. OxyIR 5-10 mg prn is available should he have breakthrough pain. Recommend to continue with miralax/senna daily while in house as he's less likely to eat as much fiber as he does at home. Team will continue to follow intermittently pending hospital course.     PERTINENT PM/SXH:   Abdominal tumor    Back pain    Rectal cancer      No significant past surgical history      FAMILY HISTORY:    Family Hx substance abuse [ ]yes [ x]no  ITEMS NOT CHECKED ARE NOT PRESENT    SOCIAL HISTORY:   Significant other/partner[ x]  Children[x ]  Mormon/Spirituality:  Substance hx:  [ ]   Tobacco hx:  [ ]   Alcohol hx: [ ]   Home Opioid hx:  [ x] I-Stop Reference No: see chart   Living Situation: [ x]Home  [ ]Long term care  [ ]Rehab [ ]Other    ADVANCE DIRECTIVES:    DNR/MOLST  [ ]  Living Will  [ ]   DECISION MAKER(s):  [ ] Health Care Proxy(s)  [x ] Surrogate(s)  [ ] Guardian           Name(s): Phone Number(s): wife    BASELINE (I)ADL(s) (prior to admission):  [place]: [ x]Total  [ ] Moderate [ ]Dependent    Allergies    No Known Allergies    Intolerances    MEDICATIONS  (STANDING):  dexAMETHasone  Injectable 4 milliGRAM(s) IV Push every 6 hours  gabapentin 900 milliGRAM(s) Oral three times a day  lactated ringers. 1000 milliLiter(s) (75 mL/Hr) IV Continuous <Continuous>  levETIRAcetam  IVPB 500 milliGRAM(s) IV Intermittent every 12 hours  methadone   Solution 2 milliGRAM(s) Oral three times a day  pazopanib 200 milliGRAM(s) Oral daily  polyethylene glycol 3350 17 Gram(s) Oral daily  senna 2 Tablet(s) Oral at bedtime    MEDICATIONS  (PRN):  acetaminophen     Tablet .. 650 milliGRAM(s) Oral every 6 hours PRN Temp greater or equal to 38C (100.4F), Mild Pain (1 - 3)  calcium carbonate    500 mG (Tums) Chewable 2 Tablet(s) Chew every 6 hours PRN Upset Stomach  ondansetron Injectable 4 milliGRAM(s) IV Push every 6 hours PRN Nausea and/or Vomiting  oxyCODONE    IR 5 milliGRAM(s) Oral every 4 hours PRN Moderate Pain (4 - 6)  oxyCODONE    IR 10 milliGRAM(s) Oral every 4 hours PRN Severe Pain (7 - 10)    PRESENT SYMPTOMS: [ ]Unable to self-report  [ ] CPOT [ ] PAINADs [ ] RDOS  Source if other than patient:  [ ]Family   [ ]Team     Pain: [ ]yes [ x]no  QOL impact -   Location -                    Aggravating factors -  Quality -  Radiation -  Timing-  Severity (0-10 scale):  Minimal acceptable level (0-10 scale):     Dyspnea:                           [ ]Mild [ ]Moderate [ ]Severe  Anxiety:                             [ ]Mild [ ]Moderate [ ]Severe  Fatigue:                             [ ]Mild [ ]Moderate [ ]Severe  Nausea:                             [ ]Mild [ ]Moderate [ ]Severe  Loss of appetite:              [ ]Mild [ ]Moderate [ ]Severe  Constipation:                    [ ]Mild [ ]Moderate [ ]Severe    PCSSQ[Palliative Care Spiritual Screening Question]   Severity (0-10):  Score of 4 or > indicate consideration of Chaplaincy referral.  Chaplaincy Referral: [ ] yes [ ] refused [ ] following [x ] Deferred     Caregiver [place]? : [ ] yes [ x] no [ ] Deferred [ ] Declined             Social work referral [ ] Patient & Family Centered Care Referral [ ]     Anticipatory Grief present?:  [ ] yes [ ] no  [ x] Deferred                  Social work referral [ ] Chaplaincy Referral[ ]      Other Symptoms:  [ x]All other review of systems negative aside from L arm weakness and dysarthric speech     Palliative Performance Status Version 2:     50    %    http://npcrc.org/files/news/palliative_performance_scale_[email or web address].pdf    PHYSICAL EXAM:  Vital Signs Last 24 Hrs  T(C): 36.6 (13 Sep 2022 09:53), Max: 36.7 (12 Sep 2022 22:05)  T(F): 97.9 (13 Sep 2022 09:53), Max: 98.1 (12 Sep 2022 22:05)  HR: 58 (13 Sep 2022 09:53) (58 - 87)  BP: 118/75 (13 Sep 2022 09:53) (110/66 - 135/76)  BP(mean): --  RR: 18 (13 Sep 2022 09:53) (18 - 20)  SpO2: 97% (13 Sep 2022 09:53) (95% - 98%)    Parameters below as of 13 Sep 2022 09:53  Patient On (Oxygen Delivery Method): room air     I&O's Summary    12 Sep 2022 07:01  -  13 Sep 2022 07:00  --------------------------------------------------------  IN: 0 mL / OUT: 300 mL / NET: -300 mL      GENERAL: [ ]Cachexia    [x ]Alert  [x ]Oriented x3   [ ]Lethargic  [ ]Unarousable  [ x]Verbal  [ ]Non-Verbal  Behavioral:   [ ] Anxiety  [ ] Delirium [ ] Agitation [ ] Other  HEENT:  [x ]Normal   [ ]Dry mouth   [ ]ET Tube/Trach  [ ]Oral lesions  PULMONARY:   [ x]Clear [ ]Tachypnea  [ ]Audible excessive secretions   [ ]Rhonchi        [ ]Right [ ]Left [ ]Bilateral  [ ]Crackles        [ ]Right [ ]Left [ ]Bilateral  [ ]Wheezing     [ ]Right [ ]Left [ ]Bilateral  [ ]Diminished breath sounds [ ]right [ ]left [ ]bilateral  CARDIOVASCULAR:    [ x]Regular [ ]Irregular [ ]Tachy  [ ]Teja [ ]Murmur [ ]Other  GASTROINTESTINAL:  [ x]Soft  [ ]Distended   [ x]+BS  [x ]Non tender [ ]Tender  [ ]Other [ ]PEG [ ]OGT/ NGT  Last BM:  GENITOURINARY:  [ x]Normal [ ] Incontinent   [ ]Oliguria/Anuria   [ ]Mcgraw  MUSCULOSKELETAL:   [ ]Normal   [x ]Weakness  [ ]Bed/Wheelchair bound [ ]Edema  NEUROLOGIC:   [ ]No focal deficits  [ ]Cognitive impairment  [ ]Dysphagia [ x]Dysarthria [ ]Paresis [x ]Other LUE weakness  SKIN:   [x ]Normal  [ ]Rash  [ ]Other  [ ]Pressure ulcer(s)       Present on admission [ ]y [ ]n    CRITICAL CARE:  [ ] Shock Present  [ ]Septic [ ]Cardiogenic [ ]Neurologic [ ]Hypovolemic  [ ]  Vasopressors [ ]  Inotropes   [ ]Respiratory failure present [ ]Mechanical ventilation [ ]Non-invasive ventilatory support [ ]High flow    [ ]Acute  [ ]Chronic [ ]Hypoxic  [ ]Hypercarbic [ ]Other  [ ]Other organ failure     LABS:                        13.1   3.06  )-----------( 147      ( 12 Sep 2022 13:11 )             40.6   09-12    139  |  100  |  11  ----------------------------<  151<H>  4.0   |  28  |  0.52    Ca    9.5      12 Sep 2022 13:11  Phos  3.4     09  Mg     2.3     -12    TPro  7.4  /  Alb  4.2  /  TBili  0.7  /  DBili  x   /  AST  28  /  ALT  18  /  AlkPhos  159<H>  09-12  PT/INR - ( 12 Sep 2022 13:11 )   PT: 11.4 sec;   INR: 0.99 ratio         PTT - ( 12 Sep 2022 13:11 )  PTT:37.3 sec    Urinalysis Basic - ( 12 Sep 2022 20:22 )    Color: Yellow / Appearance: Clear / S.051 / pH: x  Gluc: x / Ketone: Negative  / Bili: Negative / Urobili: 2 mg/dL   Blood: x / Protein: Trace / Nitrite: Negative   Leuk Esterase: Negative / RBC: 1 /hpf / WBC 0 /HPF   Sq Epi: x / Non Sq Epi: 0 /hpf / Bacteria: Negative      RADIOLOGY & ADDITIONAL STUDIES:    < from: CT Abdomen and Pelvis w/ IV Cont (22 @ 17:40) >  FINDINGS: Motion artifacts degrade some of the imaging. Some images are   degraded by artifacts from the patient's arms within the scanning field   of view.    CHEST:  LUNGS AND LARGE AIRWAYS: Respiratory motion artifact. Airway wall   thickening. Calcified granulomata. Biapical scarring. Bilateral lower   lobe prominent intralobular septal markings with groundglass density   suggestive for pulmonary interstitial with alveolar edema.  Increased size of several scattered bilateral pulmonary and pleural   metastases.  Increased size of the approximately 1 cm cavitary nodule posteriorly   within the right upper lobe near major fissure.  PLEURA: Dominant pleural metastasis within the right mid thorax now   measures 4.2 x 2.1 cm, previously subcentimeter. Trace pleural effusions  VESSELS: Dilated ascending aorta measures up to 3.9 cm.  HEART: Heart size is normal. No pericardial effusion. Coronary artery   calcification  MEDIASTINUM AND MONSE: No discrete lymphadenopathy.  CHEST WALL AND LOWER NECK: Right chest Mediport catheter tip terminates   at the superior cavoatrial junction. Bilateral gynecomastia    ABDOMEN AND PELVIS:  LIVER: Within normal limits.  BILE DUCTS: Normal caliber.  GALLBLADDER: Nondistended  SPLEEN: Within normal size limits. Splenic hilar and perigastric varices  PANCREAS: Increased size of the pancreatic tail tumor with greater   extension to the splenic hilus and gastric greater curvature also abuts   the left upper renal pole. Tumor approximately measures 5.3 x 3.3 cm   transversely as compared with previously 4.2 x 3.9 cm  ADRENALS: Unchanged left adrenal nodular thickening  KIDNEYS/URETERS: Symmetric renal enhancement without hydronephrosis.    BLADDER: Asymmetric right lateral bladder wall thickening and   perivesicular inflammation. Bilateral ureteral jets are visualized.  REPRODUCTIVE ORGANS: The prostate is within normal size limits however   incompletely included within the scanning field of view    BOWEL: No bowel obstruction. LAR. Presacral edema. Taken down ileostomy.  PERITONEUM: No ascites.  VESSELS: Atherosclerotic changes.  RETROPERITONEUM/LYMPH NODES: No lymphadenopathy.  ABDOMINAL WALL: No significant acute abnormality.  BONES: Similar right iliopsoas tumor with adjacent bony destruction and   extension along the right pelvic sidewall with displacement of the right   internal and external iliac vasculature. Similar mottled destruction of   sacrum  Please see same day dedicated [facility] bone pelvis report for further detail  Multilevel degenerative changes throughout the spine.    IMPRESSION:    Increased size of locally advanced pancreatic tail tumor    Progressive pulmonary and pleural metastases    Similar right iliopsoas tumor as described    Asymmetrically greater right lateral bladder wall thickening and   perivesicular inflammation which could represent treatment-related   change. Differential considerations include cystitis. Consider   correlation with urinalysis      < end of copied text >      PROTEIN CALORIE MALNUTRITION PRESENT: [ ]mild [ ]moderate [ ]severe [ ]underweight [ ]morbid obesity  https://www.andeal.org/vault/2440/web/files/ONC/Table_Clinical%20Characteristics%20to%20Document%20Malnutrition-White%20JV%20et%20al%853514.pdf    Height (cm): 170.2 (22 @ 12:14), 170.2 (22 @ 16:00), 170.2 (22 @ 10:35)  Weight (kg): 62.632566048149667 (22 @ 12:00), 63.5 (22 @ 10:35), 69.2 (10-03-21 @ 21:47)  BMI (kg/m2): 21.6 (22 @ 12:14), 21.6 (22 @ 12:00), 21.9 (22 @ 16:00)    [ ]PPSV2 < or = to 30% [ ]significant weight loss  [ ]poor nutritional intake  [ ]anasarca[ ]Artificial Nutrition      Other REFERRALS:  [ ]Hospice  [ ]Child Life  [ ]Social Work  [ ]Case management [ ]Holistic Therapy  HPI:  67M R handed, pmh rectal CA (, s/p radiation/chemo) and 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 CT head shows large 4cm mass in the R frontal lobe with mild associated edema, no midline shift. Pt is admitted for work up of concerning new metastasis. Geriatrics and Palliative Medicine Team is consulted for continuity of care, pain management and GOC when indicated     Pt seen this morning with dtr and wife visiting at bedside. He is awake, alert with dysarthric speech, conversant in Mandarin. He reports weakness in his L arm for the past few days leading to difficulty moving around. In terms of pain, he denies having any at this time. It is typically located in R hip, but well managed with home regimen of methadone and gabapentin. Pt admits to taking less than what's prescribed for the past month. Instead of 3mg (1.5mL)-3mg (1.5mL)-4mg (2mL) TID of methadone, he was actually taking 2 mg (1mL) TID at home. Instead of gabapentin 900 TID, he was actually taking 600 TID at home. Family reports his pain is well controlled without need for breakthrough OxyIR. Pt is able to have daily BM without using laxatives by eating a lot of vegetables.     Advised family that due to neurological work up, I will reduce his methadone dosing to what he was taking at home (2 mg (1mL) TID) given reported good pain control. Maintain gabapentin at 900 TID. OxyIR 5-10 mg prn is available should he have breakthrough pain. Recommend to continue with miralax/senna daily while in house as he's less likely to eat as much fiber as he does at home. Team will continue to follow intermittently pending hospital course.     PERTINENT PM/SXH:   Abdominal tumor    Back pain    Rectal cancer      No significant past surgical history      FAMILY HISTORY:    Family Hx substance abuse [ ]yes [ x]no  ITEMS NOT CHECKED ARE NOT PRESENT    SOCIAL HISTORY:   Significant other/partner[ x]  Children[x ]  Judaism/Spirituality:  Substance hx:  [ ]   Tobacco hx:  [ ]   Alcohol hx: [ ]   Home Opioid hx:  [ x] I-Stop Reference No: see chart   Living Situation: [ x]Home  [ ]Long term care  [ ]Rehab [ ]Other    ADVANCE DIRECTIVES:    DNR/MOLST  [ ]  Living Will  [ ]   DECISION MAKER(s):  [ ] Health Care Proxy(s)  [x ] Surrogate(s)  [ ] Guardian           Name(s): Phone Number(s): wife    BASELINE (I)ADL(s) (prior to admission):  [place]: [ x]Total  [ ] Moderate [ ]Dependent    Allergies    No Known Allergies    Intolerances    MEDICATIONS  (STANDING):  dexAMETHasone  Injectable 4 milliGRAM(s) IV Push every 6 hours  gabapentin 900 milliGRAM(s) Oral three times a day  lactated ringers. 1000 milliLiter(s) (75 mL/Hr) IV Continuous <Continuous>  levETIRAcetam  IVPB 500 milliGRAM(s) IV Intermittent every 12 hours  methadone   Solution 2 milliGRAM(s) Oral three times a day  pazopanib 200 milliGRAM(s) Oral daily  polyethylene glycol 3350 17 Gram(s) Oral daily  senna 2 Tablet(s) Oral at bedtime    MEDICATIONS  (PRN):  acetaminophen     Tablet .. 650 milliGRAM(s) Oral every 6 hours PRN Temp greater or equal to 38C (100.4F), Mild Pain (1 - 3)  calcium carbonate    500 mG (Tums) Chewable 2 Tablet(s) Chew every 6 hours PRN Upset Stomach  ondansetron Injectable 4 milliGRAM(s) IV Push every 6 hours PRN Nausea and/or Vomiting  oxyCODONE    IR 5 milliGRAM(s) Oral every 4 hours PRN Moderate Pain (4 - 6)  oxyCODONE    IR 10 milliGRAM(s) Oral every 4 hours PRN Severe Pain (7 - 10)    PRESENT SYMPTOMS: [ ]Unable to self-report  [ ] CPOT [ ] PAINADs [ ] RDOS  Source if other than patient:  [ ]Family   [ ]Team     Pain: [ ]yes [ x]no  QOL impact -   Location -                    Aggravating factors -  Quality -  Radiation -  Timing-  Severity (0-10 scale):  Minimal acceptable level (0-10 scale):     Dyspnea:                           [ ]Mild [ ]Moderate [ ]Severe  Anxiety:                             [ ]Mild [ ]Moderate [ ]Severe  Fatigue:                             [ ]Mild [ ]Moderate [ ]Severe  Nausea:                             [ ]Mild [ ]Moderate [ ]Severe  Loss of appetite:              [ ]Mild [ ]Moderate [ ]Severe  Constipation:                    [ ]Mild [ ]Moderate [ ]Severe    PCSSQ[Palliative Care Spiritual Screening Question]   Severity (0-10):  Score of 4 or > indicate consideration of Chaplaincy referral.  Chaplaincy Referral: [ ] yes [ ] refused [ ] following [x ] Deferred     Caregiver [place]? : [ ] yes [ x] no [ ] Deferred [ ] Declined             Social work referral [ ] Patient & Family Centered Care Referral [ ]     Anticipatory Grief present?:  [ ] yes [ ] no  [ x] Deferred                  Social work referral [ ] Chaplaincy Referral[ ]      Other Symptoms:  [ x]All other review of systems negative aside from L arm weakness and dysarthric speech     Palliative Performance Status Version 2:     50    %    http://npcrc.org/files/news/palliative_performance_scale_ppsv2.pdf    PHYSICAL EXAM:  Vital Signs Last 24 Hrs  T(C): 36.6 (13 Sep 2022 09:53), Max: 36.7 (12 Sep 2022 22:05)  T(F): 97.9 (13 Sep 2022 09:53), Max: 98.1 (12 Sep 2022 22:05)  HR: 58 (13 Sep 2022 09:53) (58 - 87)  BP: 118/75 (13 Sep 2022 09:53) (110/66 - 135/76)  BP(mean): --  RR: 18 (13 Sep 2022 09:53) (18 - 20)  SpO2: 97% (13 Sep 2022 09:53) (95% - 98%)    Parameters below as of 13 Sep 2022 09:53  Patient On (Oxygen Delivery Method): room air     I&O's Summary    12 Sep 2022 07:01  -  13 Sep 2022 07:00  --------------------------------------------------------  IN: 0 mL / OUT: 300 mL / NET: -300 mL      GENERAL: [ ]Cachexia    [x ]Alert  [x ]Oriented x3   [ ]Lethargic  [ ]Unarousable  [ x]Verbal  [ ]Non-Verbal  Behavioral:   [ ] Anxiety  [ ] Delirium [ ] Agitation [ ] Other  HEENT:  [x ]Normal   [ ]Dry mouth   [ ]ET Tube/Trach  [ ]Oral lesions  PULMONARY:   [ x]Clear [ ]Tachypnea  [ ]Audible excessive secretions   [ ]Rhonchi        [ ]Right [ ]Left [ ]Bilateral  [ ]Crackles        [ ]Right [ ]Left [ ]Bilateral  [ ]Wheezing     [ ]Right [ ]Left [ ]Bilateral  [ ]Diminished breath sounds [ ]right [ ]left [ ]bilateral  CARDIOVASCULAR:    [ x]Regular [ ]Irregular [ ]Tachy  [ ]Teja [ ]Murmur [ ]Other  GASTROINTESTINAL:  [ x]Soft  [ ]Distended   [ x]+BS  [x ]Non tender [ ]Tender  [ ]Other [ ]PEG [ ]OGT/ NGT  Last BM:  GENITOURINARY:  [ x]Normal [ ] Incontinent   [ ]Oliguria/Anuria   [ ][place]  MUSCULOSKELETAL:   [ ]Normal   [x ]Weakness  [ ]Bed/Wheelchair bound [ ]Edema  NEUROLOGIC:   [ ]No focal deficits  [ ]Cognitive impairment  [ ]Dysphagia [ x]Dysarthria [ ]Paresis [x ]Other LUE weakness  SKIN:   [x ]Normal  [ ]Rash  [ ]Other  [ ]Pressure ulcer(s)       Present on admission [ ]y [ ]n    CRITICAL CARE:  [ ] Shock Present  [ ]Septic [ ]Cardiogenic [ ]Neurologic [ ]Hypovolemic  [ ]  Vasopressors [ ]  Inotropes   [ ]Respiratory failure present [ ]Mechanical ventilation [ ]Non-invasive ventilatory support [ ]High flow    [ ]Acute  [ ]Chronic [ ]Hypoxic  [ ]Hypercarbic [ ]Other  [ ]Other organ failure     LABS:                        13.1   3.06  )-----------( 147      ( 12 Sep 2022 13:11 )             40.6       139  |  100  |  11  ----------------------------<  151<H>  4.0   |  28  |  0.52    Ca    9.5      12 Sep 2022 13:11  Phos  3.4       Mg     2.3         TPro  7.4  /  Alb  4.2  /  TBili  0.7  /  DBili  x   /  AST  28  /  ALT  18  /  AlkPhos  159<H>    PT/INR - ( 12 Sep 2022 13:11 )   PT: 11.4 sec;   INR: 0.99 ratio         PTT - ( 12 Sep 2022 13:11 )  PTT:37.3 sec    Urinalysis Basic - ( 12 Sep 2022 20:22 )    Color: Yellow / Appearance: Clear / S.051 / pH: x  Gluc: x / Ketone: Negative  / Bili: Negative / Urobili: 2 mg/dL   Blood: x / Protein: Trace / Nitrite: Negative   Leuk Esterase: Negative / RBC: 1 /hpf / WBC 0 /HPF   Sq Epi: x / Non Sq Epi: 0 /hpf / Bacteria: Negative      RADIOLOGY & ADDITIONAL STUDIES:    < from: CT Abdomen and Pelvis w/ IV Cont (22 @ 17:40) >  FINDINGS: Motion artifacts degrade some of the imaging. Some images are   degraded by artifacts from the patient's arms within the scanning field   of view.    CHEST:  LUNGS AND LARGE AIRWAYS: Respiratory motion artifact. Airway wall   thickening. Calcified granulomata. Biapical scarring. Bilateral lower   lobe prominent intralobular septal markings with groundglass density   suggestive for pulmonary interstitial with alveolar edema.  Increased size of several scattered bilateral pulmonary and pleural   metastases.  Increased size of the approximately 1 cm cavitary nodule posteriorly   within the right upper lobe near major fissure.  PLEURA: Dominant pleural metastasis within the right mid thorax now   measures 4.2 x 2.1 cm, previously subcentimeter. Trace pleural effusions  VESSELS: Dilated ascending aorta measures up to 3.9 cm.  HEART: Heart size is normal. No pericardial effusion. Coronary artery   calcification  MEDIASTINUM AND MONSE: No discrete lymphadenopathy.  CHEST WALL AND LOWER NECK: Right chest Mediport catheter tip terminates   at the superior cavoatrial junction. Bilateral gynecomastia    ABDOMEN AND PELVIS:  LIVER: Within normal limits.  BILE DUCTS: Normal caliber.  GALLBLADDER: Nondistended  SPLEEN: Within normal size limits. Splenic hilar and perigastric varices  PANCREAS: Increased size of the pancreatic tail tumor with greater   extension to the splenic hilus and gastric greater curvature also abuts   the left upper renal pole. Tumor approximately measures 5.3 x 3.3 cm   transversely as compared with previously 4.2 x 3.9 cm  ADRENALS: Unchanged left adrenal nodular thickening  KIDNEYS/URETERS: Symmetric renal enhancement without hydronephrosis.    BLADDER: Asymmetric right lateral bladder wall thickening and   perivesicular inflammation. Bilateral ureteral jets are visualized.  REPRODUCTIVE ORGANS: The prostate is within normal size limits however   incompletely included within the scanning field of view    BOWEL: No bowel obstruction. LAR. Presacral edema. Taken down ileostomy.  PERITONEUM: No ascites.  VESSELS: Atherosclerotic changes.  RETROPERITONEUM/LYMPH NODES: No lymphadenopathy.  ABDOMINAL WALL: No significant acute abnormality.  BONES: Similar right iliopsoas tumor with adjacent bony destruction and   extension along the right pelvic sidewall with displacement of the right   internal and external iliac vasculature. Similar mottled destruction of   sacrum  Please see same day dedicated [facility] bone pelvis report for further detail  Multilevel degenerative changes throughout the spine.    IMPRESSION:    Increased size of locally advanced pancreatic tail tumor    Progressive pulmonary and pleural metastases    Similar right iliopsoas tumor as described    Asymmetrically greater right lateral bladder wall thickening and   perivesicular inflammation which could represent treatment-related   change. Differential considerations include cystitis. Consider   correlation with urinalysis      < end of copied text >      PROTEIN CALORIE MALNUTRITION PRESENT: [ ]mild [ ]moderate [ ]severe [ ]underweight [ ]morbid obesity  https://www.andeal.org/vault/2440/web/files/ONC/Table_Clinical%20Characteristics%20to%20Document%20Malnutrition-White%20JV%20et%20al%218286.pdf    Height (cm): 170.2 (22 @ 12:14), 170.2 (22 @ 16:00), 170.2 (22 @ 10:35)  Weight (kg): [id number] (22 @ 12:00), 63.5 (22 @ 10:35), 69.2 (10-03-21 @ 21:47)  BMI (kg/m2): 21.6 (22 @ 12:14), 21.6 (22 @ 12:00), 21.9 (22 @ 16:00)    [ ]PPSV2 < or = to 30% [ ]significant weight loss  [ ]poor nutritional intake  [ ]anasarca[ ]Artificial Nutrition      Other REFERRALS:  [ ]Hospice  [ ]Child Life  [ ]Social Work  [ ]Case management [ ]Holistic Therapy

## 2022-09-13 NOTE — PROGRESS NOTE ADULT - ASSESSMENT
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 CT head shows large 4cm mass in the R frontal lobe with mild associated edema, no midline shift.    NEURO:   - Continue neuro checks q 4  - Pre-op for right craniotomy for tumor resection in am (pending medical clearance, Medicine called this am)  - MRI Brain stereotactic today for pre-op  - Continue Decadron 4q6 for cerebral edema  - Continue Keppra 500 BID for seizures ppx  - Continue Gabapentin for neuropathic pain  - Continue pain control with tylenol prn and oxycodone prn  - Palliative following for pain management  - Continue Methadone  - PT/OT - TBD until post-op    PULM:   - On room air, O2Sat>95%  - Incentive spirometry  - CT Chest - progressive pulmonary and pleural metastases     CV:  - SBP   - TTE - P for pre-op planning    ENDO:   - Goal euglycemia  - Monitor glucose while on steroids    HEME/ONC:             Appreciate Oncology following the patient - high grade sarcoma, received Palliative RT, continue Votrient, monitor leukopenia. CT CAP: incr size of locally advance pancreatic tail tumor, progressive pulmonary / pleural metastases, right iliopsoas tumor, right lateral bladder wall thickening - ?cystitis         DVT ppx: SCDs, hold DVT ppx in the setting of OR in am, [place] - P    RENAL:   - LR@75  - BMP stable 9/12  - CT CAP: ?cystitis due to right lateral bladder wall thickening, UA done - negative    ID:   - Afebrile  - COVID neg 9/12    GI:    - Continue oral diet, NPO after midnight for OR in am  - Pancreatic tail tumor incr in size in CT CAP  - Senna and Miralax for bowel regimen, last BM this morning 9/13  - Continue Tums    DISCHARGE PLANNING:   PT/OT - TBD after OR in am    Plan to be discussed with Dr. Day  [id number]

## 2022-09-13 NOTE — CONSULT NOTE ADULT - PROBLEM SELECTOR RECOMMENDATION 4
- Geriatrics and Palliative Medicine Team will continue to follow intermittently to ensure pain regimen is adequate  - We are available to discuss GOC pending work up and clinical course     Yessenia Watts MD  [facility] Team Consults  Please call if we can be of assistance, [phone]

## 2022-09-13 NOTE — CONSULT NOTE ADULT - ASSESSMENT
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 CT head shows large 4cm mass in the R frontal lobe with mild associated edema, no midline shift.  NS planning for resection of brain mass.    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 CT head shows large 4cm mass in the R frontal lobe with mild associated edema, no midline shift.  NS planning for resection of brain mass.    brain mass  - planning for resection  - brain MR  - decadron  - keppra for px  - echo done  - pt has no medical contraindications for the planned procedure    pain  - cont oxycodone  - cont Methadone  - con gabapentin    constipation  - senna and miralax    dvt px  - lovenox

## 2022-09-13 NOTE — CONSULT NOTE ADULT - PROBLEM SELECTOR RECOMMENDATION 2
Pt reports not using laxatives at home, dietary modifications alone produces daily BM  - Agree with miralax/senna daily given dietary changes anticipated this hospitalization

## 2022-09-13 NOTE — PROGRESS NOTE ADULT - SUBJECTIVE AND OBJECTIVE BOX
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 CT head shows large 4cm mass in the R frontal lobe with mild associated edema, no midline shift. Exam: [place] speaking, AO3, PERRL, EOMI, L droop and drift, L hand  4-/5, L bi/tri 4+/5, otherwise intact (although would not fully comply with df/pf testing)   (12 Sep 2022 18:02)      OVERNIGHT EVENTS: Patient admitted yesterday evening found to have large right frontal lobe mass. Patient seen and evaluated at bedside with his daughter Renetta and wife at bedside, [place]  used [id number].    Vital Signs Last 24 Hrs  T(C): 36.6 (13 Sep 2022 09:53), Max: 36.7 (12 Sep 2022 22:05)  T(F): 97.9 (13 Sep 2022 09:53), Max: 98.1 (12 Sep 2022 22:05)  HR: 58 (13 Sep 2022 09:53) (58 - 87)  BP: 118/75 (13 Sep 2022 09:53) (110/66 - 135/76)  BP(mean): --  RR: 18 (13 Sep 2022 09:53) (18 - 20)  SpO2: 97% (13 Sep 2022 09:53) (95% - 98%)    Parameters below as of 13 Sep 2022 09:53  Patient On (Oxygen Delivery Method): room air        PHYSICAL EXAM:    General: No Acute Distress     Neurological: Awake, alert, oriented x 3 (name, place, date), PERRL, EOMI, no word finding difficulties, LUE drift, LUE: delts 4+/5, bicep/tricep 4/5 with HG 3/5, RUE 5/5, LLE 5/5, RLE KF/KE 4+/5 but unable to PF/DF - ?foot drop - patient had difficulties understanding this part of the exam despite using     Pulmonary: Clear to Auscultation, No Rales, No Rhonchi, No Wheezes     Cardiovascular: S1, S2, Regular Rate and Rhythm     Gastrointestinal: Soft, Nontender, Nondistended     Incision: None    LABS:                        13.1   3.06  )-----------( 147      ( 12 Sep 2022 13:11 )             40.6    09-12    139  |  100  |  11  ----------------------------<  151<H>  4.0   |  28  |  0.52    Ca    9.5      12 Sep 2022 13:11  Phos  3.4     09-12  Mg     2.3     09-12    TPro  7.4  /  Alb  4.2  /  TBili  0.7  /  DBili  x   /  AST  28  /  ALT  18  /  AlkPhos  159<H>  09-12  PT/INR - ( 12 Sep 2022 13:11 )   PT: 11.4 sec;   INR: 0.99 ratio         PTT - ( 12 Sep 2022 13:11 )  PTT:37.3 sec      09-12 @ 07:01  -  09-13 @ 07:00  --------------------------------------------------------  IN: 0 mL / OUT: 300 mL / NET: -300 mL      DRAINS: None    MEDICATIONS:  Antibiotics:    Neuro:  acetaminophen     Tablet .. 650 milliGRAM(s) Oral every 6 hours PRN Temp greater or equal to 38C (100.4F), Mild Pain (1 - 3)  gabapentin 900 milliGRAM(s) Oral three times a day  levETIRAcetam  IVPB 500 milliGRAM(s) IV Intermittent every 12 hours  methadone   Solution 2 milliGRAM(s) Oral three times a day  ondansetron Injectable 4 milliGRAM(s) IV Push every 6 hours PRN Nausea and/or Vomiting  oxyCODONE    IR 5 milliGRAM(s) Oral every 4 hours PRN Moderate Pain (4 - 6)  oxyCODONE    IR 10 milliGRAM(s) Oral every 4 hours PRN Severe Pain (7 - 10)    Cardiac:    Pulm:    GI/:  calcium carbonate    500 mG (Tums) Chewable 2 Tablet(s) Chew every 6 hours PRN Upset Stomach  polyethylene glycol 3350 17 Gram(s) Oral daily  senna 2 Tablet(s) Oral at bedtime    Other:   dexAMETHasone  Injectable 4 milliGRAM(s) IV Push every 6 hours  lactated ringers. 1000 milliLiter(s) IV Continuous <Continuous>  pazopanib 200 milliGRAM(s) Oral daily    DIET: [x - NPO after midnight] Regular [] CCD [] Renal [] Puree [] Dysphagia [] Tube Feeds:     IMAGING:   < from: CT Head No Cont (09.12.22 @ 14:52) >  IMPRESSION: Large right frontal cystic mass likely representing neoplasm.   In view of patient's history this likely represents a metastasis. Mass   effect lateral ventricle and midline shift to the left.    Normal CTA of the head and neck except for mild inferior displacement   right middle cerebral arteries by the right frontal mass.    --- End of Report ---      LYLA FALLON MD; Attending Radiologist  This document has been electronically signed. Sep 12 2022  3:18PM    < end of copied text >    < from: CT Chest w/ IV Cont (09.12.22 @ 17:40) >  IMPRESSION:    Increased size of locally advanced pancreatic tail tumor    Progressive pulmonary and pleural metastases    Similar right iliopsoas tumor as described    Asymmetrically greater right lateral bladder wall thickening and   perivesicular inflammation which could represent treatment-related   change. Differential considerations include cystitis. Consider   correlation with urinalysis    --- End of Report ---     TANVIR BONE MD; Resident Radiologist  This document has been electronically signed.   CORAZON BENNETT MD; Attending Radiologist  This document has been electronically signed. Sep 12 2022  7:05PM    < end of copied text >

## 2022-09-13 NOTE — CONSULT NOTE ADULT - SUBJECTIVE AND OBJECTIVE BOX
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 CT head shows large 4cm mass in the R frontal lobe with mild associated edema, no midline shift. Exam: [place] speaking, AO3, PERRL, EOMI, L droop and drift, L hand  4-/5, L bi/tri 4+/5, otherwise intact (although would not fully comply with df/pf testing)   (12 Sep 2022 18:02)    22 @ 11:25  PAST MEDICAL & SURGICAL HISTORY:  Abdominal tumor      Back pain      Rectal cancer      No significant past surgical history          Review of Systems:   CONSTITUTIONAL: No fever, weight loss, or fatigue  EYES: No eye pain, visual disturbances, or discharge  ENMT:  No difficulty hearing, tinnitus, vertigo; No sinus or throat pain  NECK: No pain or stiffness  BREASTS: No pain, masses, or nipple discharge  RESPIRATORY: No cough, wheezing, chills or hemoptysis; No shortness of breath  CARDIOVASCULAR: No chest pain, palpitations, dizziness, or leg swelling  GASTROINTESTINAL: No abdominal or epigastric pain. No nausea, vomiting, or hematemesis; No diarrhea or constipation. No melena or hematochezia.  GENITOURINARY: No dysuria, frequency, hematuria, or incontinence  NEUROLOGICAL: as above  SKIN: No itching, burning, rashes, or lesions   LYMPH NODES: No enlarged glands  ENDOCRINE: No heat or cold intolerance; No hair loss  MUSCULOSKELETAL: No joint pain or swelling; No muscle, back, or extremity pain  PSYCHIATRIC: No depression, anxiety, mood swings, or difficulty sleeping  HEME/LYMPH: No easy bruising, or bleeding gums  ALLERY AND IMMUNOLOGIC: No hives or eczema    Allergies    No Known Allergies    Intolerances        Social History:     FAMILY HISTORY:      MEDICATIONS  (STANDING):  dexAMETHasone  Injectable 4 milliGRAM(s) IV Push every 6 hours  enoxaparin Injectable 40 milliGRAM(s) SubCutaneous every 24 hours  gabapentin 900 milliGRAM(s) Oral three times a day  levETIRAcetam  IVPB 500 milliGRAM(s) IV Intermittent every 12 hours  methadone   Solution 2 milliGRAM(s) Oral three times a day  pazopanib 200 milliGRAM(s) Oral daily  polyethylene glycol 3350 17 Gram(s) Oral daily  senna 2 Tablet(s) Oral at bedtime    MEDICATIONS  (PRN):  acetaminophen     Tablet .. 650 milliGRAM(s) Oral every 6 hours PRN Temp greater or equal to 38C (100.4F), Mild Pain (1 - 3)  calcium carbonate    500 mG (Tums) Chewable 2 Tablet(s) Chew every 6 hours PRN Upset Stomach  ondansetron Injectable 4 milliGRAM(s) IV Push every 6 hours PRN Nausea and/or Vomiting  oxyCODONE    IR 5 milliGRAM(s) Oral every 4 hours PRN Moderate Pain (4 - 6)  oxyCODONE    IR 10 milliGRAM(s) Oral every 4 hours PRN Severe Pain (7 - 10)      Vital Signs Last 24 Hrs  T(C): 36.6 (13 Sep 2022 09:53), Max: 36.7 (12 Sep 2022 22:05)  T(F): 97.9 (13 Sep 2022 09:53), Max: 98.1 (12 Sep 2022 22:05)  HR: 58 (13 Sep 2022 09:53) (58 - 87)  BP: 118/75 (13 Sep 2022 09:53) (110/66 - 135/76)  BP(mean): --  RR: 18 (13 Sep 2022 09:53) (16 - 20)  SpO2: 97% (13 Sep 2022 09:53) (95% - 98%)    Parameters below as of 13 Sep 2022 09:53  Patient On (Oxygen Delivery Method): room air      CAPILLARY BLOOD GLUCOSE        I&O's Summary    12 Sep 2022 07:01  -  13 Sep 2022 07:00  --------------------------------------------------------  IN: 0 mL / OUT: 300 mL / NET: -300 mL        PHYSICAL EXAM:  GENERAL: NAD, well-developed  HEAD:  Atraumatic, Normocephalic  EYES: EOMI, PERRLA, conjunctiva and sclera clear  NECK: Supple, No JVD  CHEST/LUNG: Clear to auscultation bilaterally; No wheeze  HEART: Regular rate and rhythm; No murmurs, rubs, or gallops  ABDOMEN: Soft, Nontender, Nondistended; Bowel sounds present  EXTREMITIES:  2+ Peripheral Pulses, No clubbing, cyanosis, or edema  PSYCH: AAOx3  SKIN: No rashes or lesions    LABS:                        13.1   3.06  )-----------( 147      ( 12 Sep 2022 13:11 )             40.6     12    139  |  100  |  11  ----------------------------<  151<H>  4.0   |  28  |  0.52    Ca    9.5      12 Sep 2022 13:11  Phos  3.4       Mg     2.3         TPro  7.4  /  Alb  4.2  /  TBili  0.7  /  DBili  x   /  AST  28  /  ALT  18  /  AlkPhos  159<H>      PT/INR - ( 12 Sep 2022 13:11 )   PT: 11.4 sec;   INR: 0.99 ratio         PTT - ( 12 Sep 2022 13:11 )  PTT:37.3 sec      Urinalysis Basic - ( 12 Sep 2022 20:22 )    Color: Yellow / Appearance: Clear / S.051 / pH: x  Gluc: x / Ketone: Negative  / Bili: Negative / Urobili: 2 mg/dL   Blood: x / Protein: Trace / Nitrite: Negative   Leuk Esterase: Negative / RBC: 1 /hpf / WBC 0 /HPF   Sq Epi: x / Non Sq Epi: 0 /hpf / Bacteria: Negative    < from: 12 Lead ECG (22 @ 11:45) >    Ventricular Rate 60 BPM    Atrial Rate 60 BPM    P-R Interval 168 ms    QRS Duration 106 ms    Q-T Interval 420 ms    QTC Calculation(Bazett) 420 ms    P Axis 81 degrees    R Axis 53 degrees    T Axis 70 degrees    Diagnosis Line Normal sinus rhythm  Normal ECG    < end of copied text >      < from: CT Abdomen and Pelvis w/ IV Cont (22 @ 17:40) >  INTERPRETATION:  CLINICAL INFORMATION: Rectal cancer and pelvic soft   tissue sarcoma.    COMPARISON: CT chest abdomen pelvis 2022    CONTRAST/COMPLICATIONS:  IV Contrast: Omnipaque 350 (accession [id number]), IV contrast documented   in associated exam (accession [id number])  90 cc administered   0 cc   discarded  Oral Contrast: NONE  Complications: None reported at time of study completion    PROCEDURE:  CT of the Chest, Abdomen and Pelvis was performed.  Sagittal and coronal reformats were performed.    FINDINGS: Motion artifacts degrade some of the imaging. Some images are   degraded by artifacts from the patient's arms within the scanning field   of view.    CHEST:  LUNGS AND LARGE AIRWAYS: Respiratory motion artifact. Airway wall   thickening. Calcified granulomata. Biapical scarring. Bilateral lower   lobe prominent intralobular septal markings with groundglass density   suggestive for pulmonary interstitial with alveolar edema.  Increased size of several scattered bilateral pulmonary and pleural   metastases.  Increased size of the approximately 1 cm cavitary nodule posteriorly   within the right upper lobe near major fissure.  PLEURA: Dominant pleural metastasis within the right mid thorax now   measures 4.2 x 2.1 cm, previously subcentimeter. Trace pleural effusions  VESSELS: Dilated ascending aorta measures up to 3.9 cm.  HEART: Heart size is normal. No pericardial effusion. Coronary artery   calcification  MEDIASTINUM AND MONSE: No discrete lymphadenopathy.  CHEST WALL AND LOWER NECK: Right chest Mediport catheter tip terminates   at the superior cavoatrial junction. Bilateral gynecomastia    ABDOMEN AND PELVIS:  LIVER: Within normal limits.  BILE DUCTS: Normal caliber.  GALLBLADDER: Nondistended  SPLEEN: Within normal size limits. Splenic hilar and perigastric varices  PANCREAS: Increased size of the pancreatic tail tumor with greater   extension to the splenic hilus and gastric greater curvature also abuts   the left upper renal pole. Tumor approximately measures 5.3 x 3.3 cm   transversely as compared with previously 4.2 x 3.9 cm  ADRENALS: Unchanged left adrenal nodular thickening  KIDNEYS/URETERS: Symmetric renal enhancement without hydronephrosis.    BLADDER: Asymmetric right lateral bladder wall thickening and   perivesicular inflammation. Bilateral ureteral jets are visualized.  REPRODUCTIVE ORGANS: The prostate is within normal size limits however   incompletely included within the scanning field of view    BOWEL: No bowel obstruction. LAR. Presacral edema. Taken down ileostomy.  PERITONEUM: No ascites.  VESSELS: Atherosclerotic changes.  RETROPERITONEUM/LYMPH NODES: No lymphadenopathy.  ABDOMINAL WALL: No significant acute abnormality.  BONES: Similar right iliopsoas tumor with adjacent bony destruction and   extension along the right pelvic sidewall with displacement of the right   internal and external iliac vasculature. Similar mottled destruction of   sacrum  Please see same day dedicated [facility] bone pelvis report for further detail  Multilevel degenerative changes throughout the spine.    IMPRESSION:    Increased size of locally advanced pancreatic tail tumor    Progressive pulmonary and pleural metastases    Similar right iliopsoas tumor as described    Asymmetrically greater right lateral bladder wall thickening and   perivesicular inflammation which could represent treatment-related   change. Differential considerations include cystitis. Consider   correlation with urinalysis      < end of copied text >  < from: MR Lumbar Spine w/wo IV Cont (21 @ 22:15) >  ALIGNMENT:  The alignment is normal.    VERTEBRAE: Partially visualized enhancing destructive mass involving the right pelvis with destructive changes of the iliac bone and sacrum. Please see concurrent MRI of the pelvis for further evaluation.    Lumbar spine demonstrates no destructive lesion. Endplate degenerative changes are noted most prominent at L5-S1 level.    DISCS: The disc spaces are maintained.    CONUS MEDULLARIS AND CAUDA EQUINA: The conus medullaris terminates at L1-L2. There is normal appearance of the conus medullaris and cauda equina.    EVALUATION OF INDIVIDUAL LEVELS:  L1-2: No disc herniation, spinal canal or neuroforaminal stenosis.    L2-3: Mild disc bulge. No spinal canal or neuroforaminal stenosis.    L3-4: No disc herniation, spinal canal or neuroforaminal stenosis.    L4-5: No disc herniation, spinal canal or neuroforaminal stenosis.    L5-S1: Right central annular fissure. Diffuse disc bulge. No spinal canal stenosis. Mild bilateral neuroforaminal stenosis.    S1-S2: No spinal canal stenosis. Destructive lesion partially invades the right S1-S2 neuroforamen resulting in at least mild to moderate stenosis.    PARAVERTEBRAL SOFT TISSUES: Paraspinal musculature is unremarkable. Please see recent CT abdomen and pelvis for intra-abdominal findings.    IMPRESSION:    No spinal canal stenosis or destructive lesion throughout the lumbar spine.    Partially visualized destructive right pelvic lesion with infiltration of the right iliac bone and sacrum with at least mild to moderate right S1-S2 neuroforaminal narrowing. Please see concurrent dedicated MRI of the pelvis for further evaluation.      < end of copied text >      RADIOLOGY & ADDITIONAL TESTS:    Imaging Personally Reviewed:    Consultant(s) Notes Reviewed:      Care Discussed with Consultants/Other Providers:

## 2022-09-14 NOTE — PROGRESS NOTE ADULT - ASSESSMENT
ASSESSMENT/PLAN:    s/p R crani for tumor resection, grossly appears metastatic lesion    NEURO:  - neuro checks q1h  - MRI WWO brain w/in 48h  - Dex 4q6h for cerebral edema  - repeat CTH AM  - keppra for sz ppx  - pain control  - f/u final path  - Activity: PT/OT as tolerated    CVS:  - SBP goal  per nsg  - ekg, tele  - DC ritu AM    PULM:  - RA  - IS As tolerated  - Aspiration precautions    RENAL:  - Fluids: 70cc/h NS untol tolerating full diet  - daily IOs  - TOV AM    GI:  - Diet: dysphagia eval, ADAT  - GI prophylaxis: PPI while on CCS  - Bowel regimen: miralax, senna    ENDO:   - FS goal 120-180  - New while on CCS  - A1c    HEME/ONC:  High grade metastatic sarcoma s/p bx 10/4/21, s/p Chemo/XRT, now on Votrient, [place] [facility] [place] Treatment Status:. Gemzar/Taxotere -starting 12/3/21 --> POD 3/2022 ---> changed to Doxorubicin q3 weeks 3/18/22 --> POD 5/2022 --> changed to Votrient 6/2022 per hematology  - SCDs  - Chemoppx: hold d/t fresh post op, given hx of metastatic lesions, would need to start chemoppx when able  - LED    ID:  - monitor for fevers          Time spent: 35 critical care minutes ASSESSMENT/PLAN:    s/p R crani for tumor resection, grossly appears metastatic lesion    NEURO:  - neuro checks q1h  - MRI WWO brain w/in 48h  - Dex 4q6h for cerebral edema  - repeat CTH AM  - keppra for sz ppx  - pain control, pain management for cancer related pain  - f/u final path  - Activity: PT/OT as tolerated    CVS:  - SBP goal  per nsg  - ekg, tele  - DC ritu AM    PULM:  - RA  - IS As tolerated  - Aspiration precautions    RENAL:  - Fluids: 70cc/h NS untol tolerating full diet  - daily IOs  - TOV AM    GI:  - Diet: dysphagia eval, ADAT  - GI prophylaxis: PPI while on CCS  - Bowel regimen: miralax, senna    ENDO:   - FS goal 120-180  - New while on CCS  - A1c    HEME/ONC:  High grade metastatic sarcoma s/p bx 10/4/21, s/p Chemo/XRT, now on Votrient, [place] [facility] [place]  Current Treatment Status:. Gemzar/Taxotere -starting 12/3/21 --> POD 3/2022 ---> changed to Doxorubicin q3 weeks 3/18/22 --> POD 5/2022 --> changed to Votrient 6/2022 per hematology  - SCDs  - Chemoppx: hold d/t fresh post op, given hx of metastatic lesions, would need to start chemoppx when able  - LED  - onc for management of primary malignancy    ID:  - monitor for fevers          Time spent: 35 critical care minutes

## 2022-09-14 NOTE — PROGRESS NOTE ADULT - SUBJECTIVE AND OBJECTIVE BOX
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 [facility] [place] p/w 2 weeks LUE weakness, CTH w/ large 4cm mass in the R frontal lobe with mild associated edema, no midline shift, now s/p R crani for tumor resection.    24 Hour Events/Subjective:  - POD0 s/p R crani for tumor resection, appeatrs grossly metastatic  - no intra-op issues      REVIEW OF SYSTEMS:  - negative except as above    VITALS:   - T(C): 36.4 (09-14-22 @ 16:40), Max: 37 (09-14-22 @ 04:55)  T(F): 97.5 (09-14-22 @ 16:40), Max: 98.6 (09-14-22 @ 04:55)  HR: 57 (09-14-22 @ 16:40) (57 - 66)  BP: 112/68 (09-14-22 @ 16:40) (104/73 - 126/76)  ABP: --  ABP(mean): --  RR: 18 (09-14-22 @ 16:40) (18 - 18)  SpO2: 96% (09-14-22 @ 16:40) (95% - 98%)      IMAGING/DATA:   - Reviewed          PHYSICAL EXAM:    General: cooperative  CVS: RRR  Pulm: CTAB  GI: Soft, NTND  Extremities: No LE Edema  Neuro: AOx3, PERRL, EOMI, facial symmetrical, fluent speech, Rt side 4+/5 HG, rest 5/5, rt side 5/5 throughout, no PND, sensation in tact   NSCU Progress Note    Assessment/Hospital Course:    67M R hx of rectal CA (2011, s/p chemo/XRT) and 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 Osman at [facility] [place] p/w 2 weeks LUE weakness, CTH w/ large 4cm mass in the R frontal lobe with mild associated edema, no midline shift, now s/p R crani for tumor resection.    24 Hour Events/Subjective:  - POD0 s/p R crani for tumor resection, appears grossly metastatic  - no intra-op issues  - last dose 9/14 votrien      REVIEW OF SYSTEMS:  - negative except as above    VITALS:   - T(C): 36.4 (09-14-22 @ 16:40), Max: 37 (09-14-22 @ 04:55)  T(F): 97.5 (09-14-22 @ 16:40), Max: 98.6 (09-14-22 @ 04:55)  HR: 57 (09-14-22 @ 16:40) (57 - 66)  BP: 112/68 (09-14-22 @ 16:40) (104/73 - 126/76)  ABP: --  ABP(mean): --  RR: 18 (09-14-22 @ 16:40) (18 - 18)  SpO2: 96% (09-14-22 @ 16:40) (95% - 98%)      IMAGING/DATA:   - Reviewed          PHYSICAL EXAM:    General: cooperative  CVS: RRR  Pulm: CTAB  GI: Soft, NTND  Extremities: No LE Edema  Neuro: AOx3, PERRL, EOMI, facial symmetrical, fluent speech, Rt side 4+/5 HG, rest 5/5, rt side 5/5 throughout, no PND, sensation in tact

## 2022-09-14 NOTE — PROGRESS NOTE ADULT - ASSESSMENT
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 CT head shows large 4cm mass in the R frontal lobe with mild associated edema, no midline shift.  NS planning for resection of brain mass.    brain mass  - planning for resection  - brain MR done  - decadron  - keppra for px  - echo done  - pt has no medical contraindications for the planned procedure    pain  - cont oxycodone  - cont Methadone  - con gabapentin    constipation  - senna and miralax    dvt px  - lovenox

## 2022-09-14 NOTE — PROGRESS NOTE ADULT - SUBJECTIVE AND OBJECTIVE BOX
DATE OF SERVICE: 22 @ 12:49    Patient is a 67y old  Male who presents with a chief complaint of Brain mass (13 Sep 2022 13:15)      SUBJECTIVE / OVERNIGHT EVENTS:  No chest pain. No shortness of breath. No complaints. No events overnight.     MEDICATIONS  (STANDING):  dexAMETHasone  Injectable 4 milliGRAM(s) IV Push every 6 hours  gabapentin 900 milliGRAM(s) Oral three times a day  lactated ringers. 1000 milliLiter(s) (75 mL/Hr) IV Continuous <Continuous>  levETIRAcetam  IVPB 500 milliGRAM(s) IV Intermittent every 12 hours  methadone   Solution 2 milliGRAM(s) Oral three times a day  mupirocin 2% Nasal 1 Application(s) Both Nostrils two times a day  pazopanib 200 milliGRAM(s) Oral daily  polyethylene glycol 3350 17 Gram(s) Oral two times a day  senna 2 Tablet(s) Oral at bedtime    MEDICATIONS  (PRN):  acetaminophen     Tablet .. 650 milliGRAM(s) Oral every 6 hours PRN Temp greater or equal to 38C (100.4F), Mild Pain (1 - 3)  calcium carbonate    500 mG (Tums) Chewable 2 Tablet(s) Chew every 6 hours PRN Upset Stomach  ondansetron Injectable 4 milliGRAM(s) IV Push every 6 hours PRN Nausea and/or Vomiting  oxyCODONE    IR 5 milliGRAM(s) Oral every 4 hours PRN Moderate Pain (4 - 6)  oxyCODONE    IR 10 milliGRAM(s) Oral every 4 hours PRN Severe Pain (7 - 10)      Vital Signs Last 24 Hrs  T(C): 36.4 (14 Sep 2022 09:59), Max: 37 (14 Sep 2022 04:55)  T(F): 97.6 (14 Sep 2022 09:59), Max: 98.6 (14 Sep 2022 04:55)  HR: 58 (14 Sep 2022 09:59) (55 - 81)  BP: 126/76 (14 Sep 2022 09:59) (104/73 - 126/76)  BP(mean): --  RR: 18 (14 Sep 2022 09:59) (18 - 18)  SpO2: 98% (14 Sep 2022 09:59) (96% - 98%)    Parameters below as of 14 Sep 2022 09:59  Patient On (Oxygen Delivery Method): room air      CAPILLARY BLOOD GLUCOSE        I&O's Summary    13 Sep 2022 07:01  -  14 Sep 2022 07:00  --------------------------------------------------------  IN: 240 mL / OUT: 0 mL / NET: 240 mL        PHYSICAL EXAM:  GENERAL: NAD, well-developed  HEAD:  Atraumatic, Normocephalic  EYES: EOMI, PERRLA, conjunctiva and sclera clear  NECK: Supple, No JVD  CHEST/LUNG: Clear to auscultation bilaterally; No wheeze  HEART: Regular rate and rhythm; No murmurs, rubs, or gallops  ABDOMEN: Soft, Nontender, Nondistended; Bowel sounds present  EXTREMITIES:  2+ Peripheral Pulses, No clubbing, cyanosis, or edema  PSYCH: AAOx3  NEUROLOGY: non-focal  SKIN: No rashes or lesions    LABS:                        12.9   2.75  )-----------( 167      ( 13 Sep 2022 14:19 )             38.3     09-13    138  |  100  |  16  ----------------------------<  171<H>  3.8   |  23  |  0.53    Ca    9.4      13 Sep 2022 14:19  Phos  3.4     -12  Mg     2.3     12    TPro  7.4  /  Alb  4.2  /  TBili  0.7  /  DBili  x   /  AST  28  /  ALT  18  /  AlkPhos  159<H>  0912    PT/INR - ( 13 Sep 2022 14:19 )   PT: 12.0 sec;   INR: 1.03 ratio         PTT - ( 13 Sep 2022 14:19 )  PTT:34.3 sec      Urinalysis Basic - ( 12 Sep 2022 20:22 )    Color: Yellow / Appearance: Clear / S.051 / pH: x  Gluc: x / Ketone: Negative  / Bili: Negative / Urobili: 2 mg/dL   Blood: x / Protein: Trace / Nitrite: Negative   Leuk Esterase: Negative / RBC: 1 /hpf / WBC 0 /HPF   Sq Epi: x / Non Sq Epi: 0 /hpf / Bacteria: Negative    < from: MR Brain Stereotactic w/wo IV Cont (22 @ 19:57) >  INTERPRETATION:  Contrast-enhanced MRI of the brain.    CLINICAL INDICATION: Presurgical planning for is right frontal mass.   Patient with left-sided weakness.    TECHNIQUE:  Multiplanar, multisequence MR images of the brain were   obtained before and after the intravenous administration of 6 cc of   Gadavist. 1.5 cc were discarded.    COMPARISON: CT brain 2022.    FINDINGS:    4.6 x4.3 x 4.9 cm (maximal AP x TV x CC dimensions) heterogeneously   enhancing, partially necrotic right posterior frontal lesion with some   internal susceptibility artifact.    Surrounding T2 and FLAIR hyperintense signal with mass effect.    1.6 x 1.8x 1.5 cm avidly enhancing isointense to gray matter midline   frontal falcine mass.    Leftward midline shift of 5 mm. Basal cisterns are visualized. No   hydrocephalus.    No acute intracranial hemorrhage or acute infarction. Signal voids are   seenwithin the major intracranial vessels consistent with their patency.    Left maxillary sinus mucosal thickening. Remaining visualized paranasal   sinuses and mastoid air cells are clear.    The orbits, sellar and suprasellar structures, and craniocervical   junction are unremarkable.    IMPRESSION:    4.6 x 4.3 x 4.9 cm heterogeneously enhancing, partially necrotic right   posterior frontal lesion with some internal susceptibility artifact.   Findings are suspicious for the presence of glioblastoma multiforme.    Surrounding T2 and FLAIR hyperintense signal with mass effect may   represent nonenhancing infiltrative neoplasm and/or edema.    1.6 x 1.8 x 1.5 cm avidly enhancing isointense to gray matter midline   frontal falcine mass, likely a meningioma.    Leftward midline shift of 5 mm. Basal cisterns are visualized. No   hydrocephalus.        < end of copied text >      RADIOLOGY & ADDITIONAL TESTS:    Imaging Personally Reviewed:    Consultant(s) Notes Reviewed:      Care Discussed with Consultants/Other Providers:

## 2022-09-15 NOTE — PATIENT PROFILE ADULT - FALL HARM RISK - HARM RISK INTERVENTIONS
Assistance with ambulation/Assistance OOB with selected safe patient handling equipment/Communicate Risk of Fall with Harm to all staff/Discuss with provider need for PT consult/Monitor gait and stability/Provide patient with walking aids - walker, cane, crutches/Reinforce activity limits and safety measures with patient and family/Sit up slowly, dangle for a short time, stand at bedside before walking/Tailored Fall Risk Interventions/Use of alarms - bed, chair and/or voice tab/Visual Cue: Yellow wristband and red socks/Bed in lowest position, wheels locked, appropriate side rails in place/Call bell, personal items and telephone in reach/Instruct patient to call for assistance before getting out of bed or chair/Non-slip footwear when patient is out of bed/[place] to call system/Physically safe environment - no spills, clutter or unnecessary equipment/Purposeful Proactive Rounding/Room/bathroom lighting operational, light cord in reach

## 2022-09-15 NOTE — PROGRESS NOTE ADULT - SUBJECTIVE AND OBJECTIVE BOX
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 [facility] [place] p/w 2 weeks LUE weakness, CTH w/ large 4cm mass in the R frontal lobe with mild associated edema, no midline shift, now s/p R crani for tumor resection.    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 [facility] [place]/w 2 weeks LUE weakness, CTH w/ large 4cm mass in the R frontal lobe with mild associated edema, no midline shift, now s/p R crani for tumor resection.    24 Hour Events/Subjective:  - POD1 s/p R crani for tumor resection, appears grossly metastatic  - no intra-op issues  - last dose 9/14 votrien    REVIEW OF SYSTEMS:  Fatigued    VITALS:   - Reviewed    IMAGING/DATA:   - Reviewed    PHYSICAL EXAM: Coby  Jean Carlos [id number]  General: cooperative  CVS: Reg  Pulm: CTAB  GI: Soft, NTND  Extremities: No LE Edema  Neuro: AOx3, PERRL, EOMI, trace dysarthria, left hand  4-/5, left bi/tri 4/5, otherwise good strength

## 2022-09-15 NOTE — PROGRESS NOTE ADULT - SUBJECTIVE AND OBJECTIVE BOX
Alert and oriented, VSS-- afebrile. Speaks English, refusing . No reports of pain. NPO, tolerating some ice chips. Denies nausea or vomiting. Passing gas, voiding indpendently. Up ad katherine. No acute events overnight.    Patient seen and examined at bedside.    --Anticoagulation--    T(C): 36.4 (09-14-22 @ 16:40), Max: 37 (09-14-22 @ 04:55)  HR: 57 (09-14-22 @ 16:40) (57 - 66)  BP: 112/68 (09-14-22 @ 16:40) (104/73 - 126/76)  RR: 18 (09-14-22 @ 16:40) (18 - 18)  SpO2: 96% (09-14-22 @ 16:40) (95% - 98%)  Wt(kg): --    Exam:  Exam: Mandardin speaking, AO3, PERRL, EOMI, L droop and drift, L hand  4-/5, L bi/tri 4+/5, no word finding difficulties, otherwise intact (although would not fully comply with df/pf testing)

## 2022-09-15 NOTE — PROGRESS NOTE ADULT - ASSESSMENT
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 CT head shows large 4cm mass in the R frontal lobe with mild associated edema, no midline shift.  NS planning for resection of brain mass.    s/p R crani for tumor resection, grossly appears metastatic lesion    NEURO:  - neuro checks q1h  - MRI WWO brain w/in 48h  - Dex 4q6h for cerebral edema  - repeat CTH today  - keppra for sz ppx  - pain control, pain management for cancer related pain  - f/u final path  - Activity: PT/OT as tolerated    CVS:  - SBP goal  per nsg  - ekg, tele  - DC ritu AM    PULM:  - RA  - IS As tolerated  - Aspiration precautions    RENAL:  - Fluids: 70cc/h NS untol tolerating full diet  - daily IOs  - TOV AM    GI:  - Diet: dysphagia eval, ADAT  - GI prophylaxis: PPI while on CCS  - Bowel regimen: miralax, senna    ENDO:   - FS goal 120-180  - New while on CCS  - A1c    HEME/ONC:  High grade metastatic sarcoma s/p bx 10/4/21, s/p Chemo/XRT, now on Votrient, [place]. [place] [facility] [place]  Current Treatment Status:. Gemzar/Taxotere -starting 12/3/21 --> POD 3/2022 ---> changed to Doxorubicin q3 weeks 3/18/22 --> POD 5/2022 --> changed to Votrient 6/2022 per hematology  - SCDs  - Chemoppx: hold d/t fresh post op, given hx of metastatic lesions, would need to start chemoppx when able  - LED  - onc for management of primary malignancy    ID:  - monitor for fevers    pain  - cont oxycodone  - cont Methadone  - con gabapentin    constipation  - senna and miralax    dvt px  - lovenox

## 2022-09-15 NOTE — PROVIDER CONTACT NOTE (MEDICATION) - SITUATION
Patient on chemotherapy medication Votrient, taken daily. Patient unable to swallow pills at this time, failed bedside dysphagia screen. Awaiting speech/swallow evaluation. Verified with pharmacy that pill should not be crushed as it can be less effective. Provided information to daughter and spouse as this is a home medication. Explained to daughter about speaking to patients prescribing doctor. Daughter verified understanding. ELBA Polo made aware.

## 2022-09-15 NOTE — PROGRESS NOTE ADULT - SUBJECTIVE AND OBJECTIVE BOX
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 Osman at [facility] [place] p/w 2 weeks LUE weakness, CTH w/ large 4cm mass in the R frontal lobe with mild associated edema, no midline shift, now s/p R crani for tumor resection.    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 Osman at [facility] [place] p/w 2 weeks LUE weakness, CTH w/ large 4cm mass in the R frontal lobe with mild associated edema, no midline shift, now s/p R crani for tumor resection.    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 [facility] [place] p/w 2 weeks LUE weakness, CTH w/ large 4cm mass in the R frontal lobe with mild associated edema, no midline shift, now s/p R crani for tumor resection.    24 Hour Events/Subjective:  - POD1 s/p R crani for tumor resection, appears grossly metastatic  - no intra-op issues  - last dose 9/14 votrien    REVIEW OF SYSTEMS:  Fatigued    VITALS:   - Reviewed    IMAGING/DATA:   - Reviewed    PHYSICAL EXAM: Coby  Ranjit #[id number]  General: cooperative  CVS: Reg  Pulm: CTAB  GI: Soft, NTND  Extremities: No LE Edema  Neuro: AOx3, PERRL, EOMI, trace dysarthria, left hand  4-/5, left bi/tri 4/5, right proximal leg weakness baseline

## 2022-09-15 NOTE — PROGRESS NOTE ADULT - SUBJECTIVE AND OBJECTIVE BOX
DATE OF SERVICE: 09-15-22 @ 15:52    Patient is a 67y old  Male who presents with a chief complaint of Brain mass (15 Sep 2022 14:50)      SUBJECTIVE / OVERNIGHT EVENTS:    MEDICATIONS  (STANDING):  dexAMETHasone  Injectable 4 milliGRAM(s) IV Push every 6 hours  gabapentin 900 milliGRAM(s) Oral three times a day  levETIRAcetam  IVPB 500 milliGRAM(s) IV Intermittent every 12 hours  methadone   Solution 2 milliGRAM(s) Oral three times a day  mupirocin 2% Nasal 1 Application(s) Both Nostrils two times a day  pazopanib 200 milliGRAM(s) Oral daily  polyethylene glycol 3350 17 Gram(s) Oral two times a day  senna 2 Tablet(s) Oral at bedtime  sodium chloride 0.9%. 1000 milliLiter(s) (75 mL/Hr) IV Continuous <Continuous>    MEDICATIONS  (PRN):  acetaminophen     Tablet .. 650 milliGRAM(s) Oral every 6 hours PRN Temp greater or equal to 38C (100.4F), Mild Pain (1 - 3)  bisacodyl 5 milliGRAM(s) Oral daily PRN Constipation  calcium carbonate    500 mG (Tums) Chewable 2 Tablet(s) Chew every 6 hours PRN Upset Stomach  oxyCODONE    IR 5 milliGRAM(s) Oral every 4 hours PRN Moderate Pain (4 - 6)  oxyCODONE    IR 10 milliGRAM(s) Oral every 4 hours PRN Severe Pain (7 - 10)      Vital Signs Last 24 Hrs  T(C): 36.6 (15 Sep 2022 11:00), Max: 36.7 (14 Sep 2022 23:45)  T(F): 97.8 (15 Sep 2022 11:00), Max: 98.1 (14 Sep 2022 23:45)  HR: 56 (15 Sep 2022 12:00) (56 - 91)  BP: 112/68 (14 Sep 2022 16:40) (112/68 - 112/68)  BP(mean): --  RR: 16 (15 Sep 2022 12:00) (12 - 20)  SpO2: 100% (15 Sep 2022 12:00) (96% - 100%)    Parameters below as of 15 Sep 2022 07:00  Patient On (Oxygen Delivery Method): nasal cannula  O2 Flow (L/min): 2    CAPILLARY BLOOD GLUCOSE        I&O's Summary    14 Sep 2022 07:01  -  15 Sep 2022 07:00  --------------------------------------------------------  IN: 850 mL / OUT: 625 mL / NET: 225 mL    15 Sep 2022 07:01  -  15 Sep 2022 15:52  --------------------------------------------------------  IN: 475 mL / OUT: 0 mL / NET: 475 mL        PHYSICAL EXAM:  GENERAL: NAD, well-developed  HEAD:  Atraumatic, Normocephalic  EYES: EOMI, PERRLA, conjunctiva and sclera clear  NECK: Supple, No JVD  CHEST/LUNG: Clear to auscultation bilaterally; No wheeze  HEART: Regular rate and rhythm; No murmurs, rubs, or gallops  ABDOMEN: Soft, Nontender, Nondistended; Bowel sounds present  EXTREMITIES:  2+ Peripheral Pulses, No clubbing, cyanosis, or edema  PSYCH: AAOx3  NEUROLOGY: non-focal  SKIN: No rashes or lesions    LABS:                        11.8   6.36  )-----------( 182      ( 15 Sep 2022 01:19 )             35.9     09-15    139  |  104  |  18  ----------------------------<  205<H>  3.7   |  23  |  0.50    Ca    9.2      15 Sep 2022 01:19    TPro  6.4  /  Alb  3.5  /  TBili  0.5  /  DBili  x   /  AST  12  /  ALT  13  /  AlkPhos  124<H>  09-15              RADIOLOGY & ADDITIONAL TESTS:    Imaging Personally Reviewed:    Consultant(s) Notes Reviewed:      Care Discussed with Consultants/Other Providers:

## 2022-09-15 NOTE — PROGRESS NOTE ADULT - SUBJECTIVE AND OBJECTIVE BOX
Brief f/u visit from Geriatrics and Palliative Medicine Team. Note that pt underwent surgical resection of R frontal lobe mass last night. He is awake this afternoon with dtr and wife visiting. His oral meds are able to be continued via dobhoff. No reports of uncontrolled pain. Family waiting for path results to determine diagnosis of brain lesion and further treatment plans. They understand that radiation may be the next step. Dtr states if this is a new cancer, in addition to the metastatic sarcoma he has, they will need to weigh risk/benefit of recommended therapies with a goal of protecting his quality of life. Support provided to family today.     They welcome my continued following for guidance, pending information coming back.     Yessenia Watts MD  [facility] Team Consults  Please call if we can be of assistance, [phone]

## 2022-09-15 NOTE — PROGRESS NOTE ADULT - ASSESSMENT
ASSESSMENT/PLAN:  s/p R crani for tumor resection, grossly appears metastatic lesion 9/14    NEURO:  - MRI WWO brain fu read  CTH w slight pneumocephalus, epidural/subdural postop heme  - Dex 4q6h for cerebral edema taper per nsgy  - keppra for sz ppx  - pain control, pain management for cancer related pain  - f/u final path  - Activity: PT/OT as tolerated    CVS:  - SBP goal  per nsg  - ekg, tele  tte ef 65%, nml    PULM:  - RA  - IS As tolerated  - Aspiration precautions    RENAL:  - Fluids: 70cc/h NS untol tolerating full diet  - daily IOs  - condom cath    GI:  - Diet: failed dysphagia eval, NGT, start TF  - GI prophylaxis: PPI while on CCS  - Bowel regimen: miralax, senna    ENDO:   - FS goal 120-180  - Nwe while on CCS  - A1c check    HEME/ONC:  High grade metastatic sarcoma s/p bx 10/4/21, s/p Chemo/XRT, now on Votrient, Follows Dr. Osman at [facility] [place]  Current Treatment Status:. Gemzar/Taxotere -starting 12/3/21 --> POD 3/2022 ---> changed to Doxorubicin q3 weeks 3/18/22 --> POD 5/2022 --> changed to Votrient 200 mg qd 6/2022 per hematology, continue  - SCDs  - Chemoppx: hold d/t fresh post op, given hx of metastatic lesions, would need to start chemoppx when able  - LED neg 9/14  - onc for management of primary malignancy    ID:  - monitor for fevers    At risk for death/neuro decline due to: tumor related cerebral edema (symptomatic)    Time spent: 35 critical care minutes

## 2022-09-15 NOTE — PROGRESS NOTE ADULT - ASSESSMENT
ASSESSMENT/PLAN:    s/p R crani for tumor resection, grossly appears metastatic lesion    NEURO:  - neuro checks q1h  - MRI WWO brain w/in 48h  - Dex 4q6h for cerebral edema  - repeat CTH today  - keppra for sz ppx  - pain control, pain management for cancer related pain  - f/u final path  - Activity: PT/OT as tolerated    CVS:  - SBP goal  per nsg  - ekg, tele  - DC ritu AM    PULM:  - RA  - IS As tolerated  - Aspiration precautions    RENAL:  - Fluids: 70cc/h NS untol tolerating full diet  - daily IOs  - TOV AM    GI:  - Diet: dysphagia eval, ADAT  - GI prophylaxis: PPI while on CCS  - Bowel regimen: miralax, senna    ENDO:   - FS goal 120-180  - New while on CCS  - A1c    HEME/ONC:  High grade metastatic sarcoma s/p bx 10/4/21, s/p Chemo/XRT, now on Votrient, Follows Dr. Osman at [facility] [place]  Current Treatment Status:. Gemzar/Taxotere -starting 12/3/21 --> POD 3/2022 ---> changed to Doxorubicin q3 weeks 3/18/22 --> POD 5/2022 --> changed to Votrient 6/2022 per hematology  - SCDs  - Chemoppx: hold d/t fresh post op, given hx of metastatic lesions, would need to start chemoppx when able  - LED  - onc for management of primary malignancy    ID:  - monitor for fevers          Time spent: 35 critical care minutes ASSESSMENT/PLAN:  s/p R crani for tumor resection, grossly appears metastatic lesion    NEURO:  - MRI WWO brain w/in 48h  - Dex 4q6h for cerebral edema  - keppra for sz ppx  - pain control, pain management for cancer related pain  - f/u final path  - Activity: PT/OT as tolerated    CVS:  - SBP goal  per nsg  - ekg, tele    PULM:  - RA  - IS As tolerated  - Aspiration precautions    RENAL:  - Fluids: 70cc/h NS untol tolerating full diet  - daily IOs  - TOV AM    GI:  - Diet: dysphagia eval, ADAT  - GI prophylaxis: PPI while on CCS  - Bowel regimen: miralax, senna    ENDO:   - FS goal 120-180  - New while on CCS  - A1c    HEME/ONC:  High grade metastatic sarcoma s/p bx 10/4/21, s/p Chemo/XRT, now on Votrient, Follows Dr. Osman at [facility] [place]  Current Treatment Status:. Gemzar/Taxotere -starting 12/3/21 --> POD 3/2022 ---> changed to Doxorubicin q3 weeks 3/18/22 --> POD 5/2022 --> changed to Votrient 6/2022 per hematology  - SCDs  - Chemoppx: hold d/t fresh post op, given hx of metastatic lesions, would need to start chemoppx when able  - LED  - onc for management of primary malignancy    ID:  - monitor for fevers    At risk for death/neuro decline due to: tumor related cerebral edema (symptomatic)    Time spent: 35 critical care minutes

## 2022-09-16 NOTE — PROGRESS NOTE ADULT - NS ATTEND AMEND GEN_ALL_CORE FT
Data reviewed, patient seen/examined and care plan reviewed/updated with ACP. Care plan coordinated with NSGY.

## 2022-09-16 NOTE — OCCUPATIONAL THERAPY INITIAL EVALUATION ADULT - PHYSICAL ASSIST/NONPHYSICAL ASSIST: SUPINE/SIT, REHAB EVAL
Patient was getting something out of his car and slipped on the ice. He states he landed on his butt, and then hit his head on the ice. No LOC. Patient has small bump to back of his head.
verbal cues/nonverbal cues (demo/gestures)/1 person assist

## 2022-09-16 NOTE — PROGRESS NOTE ADULT - ASSESSMENT
ASSESSMENT/PLAN:  s/p R crani for tumor resection, grossly appears metastatic lesion 9/14    NEURO:  - MRI WWO brain fu read  CTH w slight pneumocephalus, epidural/subdural postop heme  - Dex 4q6h for cerebral edema taper per nsgy  - keppra for sz ppx  - pain control, pain management for cancer related pain  - f/u final path  - Activity: PT/OT as tolerated    CVS:  - SBP goal  per nsg  - ekg, tele  tte ef 65%, nml    PULM:  - RA  - IS As tolerated  - Aspiration precautions    RENAL:  - Fluids: 70cc/h NS untol tolerating full diet  - daily IOs  - condom cath    GI:  - Diet: failed dysphagia eval, NGT, start TF  - GI prophylaxis: PPI while on CCS  - Bowel regimen: miralax, senna    ENDO:   - FS goal 120-180  - New while on CCS  - A1c check    HEME/ONC:  High grade metastatic sarcoma s/p bx 10/4/21, s/p Chemo/XRT, now on Votrient, Follows Dr. Osman at [place]  Current Treatment Status:. Gemzar/Taxotere -starting 12/3/21 --> POD 3/2022 ---> changed to Doxorubicin q3 weeks 3/18/22 --> POD 5/2022 --> changed to Votrient 200 mg qd 6/2022 per hematology, continue  - SCDs  - Chemoppx: hold d/t fresh post op, given hx of metastatic lesions, would need to start chemoppx when able  - LED neg 9/14  - onc for management of primary malignancy    ID:  - monitor for fevers    At risk for death/neuro decline due to: tumor related cerebral edema (symptomatic)    Time spent: 35 critical care minutes ASSESSMENT/PLAN:  s/p R crani for tumor resection, grossly appears metastatic lesion 9/14    NEURO:  - MRI WWO brain fu read  CTH w slight pneumocephalus, epidural/subdural postop heme  - Dex 4q6h for cerebral edema taper per nsgy  - keppra for sz ppx  - pain control, pain management for cancer related pain  - f/u final path  - Activity: PT/OT as tolerated    CVS:  - SBP goal  per nsg  - ekg, tele  tte ef 65%, nml    PULM:  - RA  - IS As tolerated  - Aspiration precautions    RENAL:  - Fluids: 70cc/h NS untol tolerating full diet  - daily IOs  - condom cath    GI:  - Diet: failed dysphagia eval, NGT, start TF, S/S to see   - GI prophylaxis: PPI while on CCS  - Bowel regimen: miralax, senna    ENDO:   - FS goal 120-180  - New while on CCS  - A1c check    HEME/ONC:  High grade metastatic sarcoma s/p bx 10/4/21, s/p Chemo/XRT, now on Votrient, Follows Dr. Osman at [facility] [place]  Current Treatment Status:. Gemzar/Taxotere -starting 12/3/21 --> POD 3/2022 ---> changed to Doxorubicin q3 weeks 3/18/22 --> POD 5/2022 --> changed to Votrient 200 mg qd 6/2022 per hematology, continue  - SCDs  - Chemoppx: start 9/16  - LED neg 9/14  - onc for management of primary malignancy    ID:  - monitor for fevers    At risk for death/neuro decline due to: tumor related cerebral edema (symptomatic)    Time spent: 35 critical care minutes ASSESSMENT/PLAN:  s/p R crani for tumor resection, grossly appears metastatic lesion 9/14    NEURO:  - Neurochecks q4h  - MRI WWO brain fu read  CTH w slight pneumocephalus, epidural/subdural postop heme  - Dex 4q6h for cerebral edema taper per nsgy  - keppra for sz ppx  - pain control, pain management for cancer related pain  - f/u final path  - Activity: PT/OT as tolerated    CVS:  - SBP goal  per nsg  - ekg, tele  tte ef 65%, nml    PULM:  - RA  - IS As tolerated  - Aspiration precautions    RENAL:  - Fluids: 70cc/h NS until tolerating full diet  - daily IOs  - condom cath   - q6h bladder scan for intermittent retention     GI:  - Diet: failed dysphagia eval, NGT, start TF, S/S to see (chemo meds pending ability to swallow)  - GI prophylaxis: PPI while on CCS  - Bowel regimen: miralax, senna  - Last BM 9/15    ENDO:   - FS goal 120-180  - New while on CCS  - A1c check    HEME/ONC:  High grade metastatic sarcoma s/p bx 10/4/21, s/p Chemo/XRT, now on Votrient, [place] [facility] [place]  Current Treatment Status:. Gemzar/Taxotere -starting 12/3/21 --> POD 3/2022 ---> changed to Doxorubicin q3 weeks 3/18/22 --> POD 5/2022 --> changed to Votrient 200 mg qd 6/2022 per hematology, continue  - SCDs  - Chemoppx: started 9/16  - LED neg 9/14  - onc for management of primary malignancy    ID:  - monitor for fevers    At risk for death/neuro decline due to: tumor related cerebral edema (symptomatic)    Time spent: 35 critical care minutes ASSESSMENT/PLAN:  s/p R crani for tumor resection, grossly appears metastatic lesion 9/14    NEURO:  - Neurochecks q4h  - Dex 4q6h for cerebral edema taper per nsgy  - keppra for sz ppx  - pain control, pain management for cancer related pain  - f/u final path  - Activity: PT/OT as tolerated    CVS:  - SBP goal    tte ef 65%, nml    PULM:  - RA  - IS As tolerated  - Aspiration precautions    RENAL:  - daily IOs  - condom cath   - q6h bladder scan for intermittent retention     GI:  - Diet: failed dysphagia eval, NGT, TF, S/S to see (chemo meds pending ability to swallow)  - GI prophylaxis: PPI while on CCS  - Bowel regimen: miralax, senna  - Last BM 9/15    ENDO:   - FS goal 120-180  - New while on CCS  - A1c check    HEME/ONC:  High grade metastatic sarcoma s/p bx 10/4/21, s/p Chemo/XRT, now on Votrient, Follows Dr. Osman at [facility] [place]  Current Treatment Status:. Gemzar/Taxotere -starting 12/3/21 --> POD 3/2022 ---> changed to Doxorubicin q3 weeks 3/18/22 --> POD 5/2022 --> changed to Votrient 200 mg qd 6/2022 per hematology, continue  - SCDs  - Chemoppx: started 9/16  - LED neg 9/14  - onc for management of primary malignancy    ID:  - monitor for fevers    At risk for death/neuro decline due to: tumor related cerebral edema (symptomatic)

## 2022-09-16 NOTE — OCCUPATIONAL THERAPY INITIAL EVALUATION ADULT - LIVES WITH, PROFILE
As per pts daughter at bedside, pt lives with wife in private house with 3 steps to enter, 1st floor setup, tub in bathroom with chair. Pt ambulates with RW and requires some assist with ADLs

## 2022-09-16 NOTE — SWALLOW BEDSIDE ASSESSMENT ADULT - PHARYNGEAL PHASE
consistent throat clearing with thick puree; noted increase in mucus, cued productive requiring oral suctioning via [place]/Delayed pharyngeal swallow/Throat clear post oral intake Subjectively more timely initiation of swallow as compared to prior consistencies, however, with overt s/sx of aspiration with throat clearing/Throat clear post oral intake 1-3 swallows per bolus,no overt s/sx of aspiration/penetration +hyolaryngeal elevation and excursion/Delayed pharyngeal swallow/Multiple swallows

## 2022-09-16 NOTE — PROGRESS NOTE ADULT - SUBJECTIVE AND OBJECTIVE BOX
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 Osman at [facility] [place] p/w 2 weeks LUE weakness, CTH w/ large 4cm mass in the R frontal lobe with mild associated edema, no midline shift, now s/p R crani for tumor resection.    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 Osman at [facility] [place]) p/w 2 weeks LUE weakness, CTH w/ large 4cm mass in the R frontal lobe with mild associated edema, no midline shift, now s/p R crani for tumor resection.    24 Hour Events/Subjective:  - POD2 s/p R crani for tumor resection, appears grossly metastatic  - last dose 9/14 votrien    REVIEW OF SYSTEMS:  Fatigued    VITALS:   - Reviewed    IMAGING/DATA:   - Reviewed    PHYSICAL EXAM: Coby  Ranjit [id number]  General: cooperative  CVS: Reg  Pulm: CTAB  GI: Soft, NTND  Extremities: No LE Edema  Neuro: AOx3, PERRL, EOMI, dysarthric, left hand  4-/5, left bi/tri 4/5, baseline right leg weakness (antigravity with support under knee but proximally weak - this is baseline per patient)

## 2022-09-16 NOTE — PROGRESS NOTE ADULT - ASSESSMENT
ASSESSMENT/PLAN:  s/p R crani for tumor resection, grossly appears metastatic lesion 9/14    NEURO:  - Neurochecks q4h  - Dex 4q6h for cerebral edema taper per nsgy  - keppra for sz ppx  - pain control, pain management for cancer related pain (using methadone at home, gabapentin)  - f/u final path  - Activity: PT/OT as tolerated    CVS:  - SBP goal    tte ef 65%, nml    PULM:  - RA  - IS As tolerated  - Aspiration precautions    RENAL:  - daily IOs  - condom cath   - q6h bladder scan for intermittent retention     GI:  - Diet: failed dysphagia eval, NGT, TF, S/S to follow (chemo meds pending ability to swallow)  - GI prophylaxis: PPI while on CCS  - Bowel regimen: miralax, senna  - Last BM 9/16    ENDO:   - FS goal 120-180  - New while on CCS  - A1c 6.1    HEME/ONC:  High grade metastatic sarcoma s/p bx 10/4/21, s/p Chemo/XRT, now on Votrient, Follows Dr. Osman at [facility] [place]  Current Treatment Status:. Gemzar/Taxotere -starting 12/3/21 --> POD 3/2022 ---> changed to Doxorubicin q3 weeks 3/18/22 --> POD 5/2022 --> changed to Votrient 200 mg qd 6/2022 per hematology, continue  - SCDs  - Chemoppx: started 9/16  - LED neg 9/14  - onc for management of primary malignancy    ID:  - monitor for fevers    Dispo: Floor    At risk for death/neuro decline due to: tumor related cerebral edema (symptomatic) ASSESSMENT/PLAN:  s/p R crani for tumor resection, grossly appears metastatic lesion 9/14    NEURO:  - Neurochecks q4h  - Dex 4q6h for cerebral edema taper per nsgy  - keppra for sz ppx  - pain control, pain management for cancer related pain (using methadone at home, gabapentin)  - f/u final path  - Activity: PT/OT as tolerated    CVS:  - SBP goal    tte ef 65%, nml    PULM:  - RA  - IS As tolerated  - Aspiration precautions    RENAL: IVL  - daily IOs  - condom cath   - q6h bladder scan for intermittent retention     GI:  - Diet: failed dysphagia eval, NGT, TF at goal, S/S to follow (chemo meds pending ability to swallow)  - GI prophylaxis: PPI while on CCS  - Bowel regimen: miralax, senna  - Last BM 9/16    ENDO:   - FS goal 120-180  - New while on CCS  - A1c 6.1    HEME/ONC:  High grade metastatic sarcoma s/p bx 10/4/21, s/p Chemo/XRT, now on Votrient, [place] [facility] [place] Treatment Status:. Gemzar/Taxotere -starting 12/3/21 --> POD 3/2022 ---> changed to Doxorubicin q3 weeks 3/18/22 --> POD 5/2022 --> changed to Votrient 200 mg qd 6/2022 per hematology, continue, got it today  - SCDs  - Chemoppx: started 9/16  - LED neg 9/14  - onc for management of primary malignancy    ID:  - monitor for fevers    Dispo: Floor    At risk for death/neuro decline due to: tumor related cerebral edema (symptomatic)

## 2022-09-16 NOTE — OCCUPATIONAL THERAPY INITIAL EVALUATION ADULT - TRANSFER SAFETY CONCERNS NOTED: SIT/STAND, REHAB EVAL
TWO VIEWS ABDOMEN AND UPRIGHT VIEW OF THE CHEST:

 

HISTORY: 

Abdominal pain for 3 hours.

 

FINDINGS: 

Supine and upright views of the abdomen and upright view of the chest show a nonspecific, nonobstruct
ed bowel gas pattern.  Air is seen in the rectum.  No free air or air fluid levels are seen on uprigh
t examination.

 

The cardiomediastinal silhouette is normal in size.  There is no evidence of consolidation, mass, or 
pleural effusion.

 

IMPRESSION: 

Unremarkable exam.

 

POS: CET decreased weight-shifting ability

## 2022-09-16 NOTE — PHYSICAL THERAPY INITIAL EVALUATION ADULT - ACTIVE RANGE OF MOTION EXAMINATION, REHAB EVAL
R ankle PROM WNL/bilateral lower extremity Active ROM was WNL (within normal limits)/bilateral upper extremity Active ROM was WFL (within functional limits)/deficits as listed below

## 2022-09-16 NOTE — PHYSICAL THERAPY INITIAL EVALUATION ADULT - BALANCE TRAINING, PT EVAL
GOAL: Pt will demonstrate improved static/dynamic standing balance to fair , in order to improve stability, decrease fall risk and increase independence with ADLs within 4 weeks.

## 2022-09-16 NOTE — SWALLOW BEDSIDE ASSESSMENT ADULT - ASR SWALLOW DENTITION
Pt missing majority of lower dentition and some upper dentition; reports he has false teeth, however, does not use it with meals at baseline/incomplete

## 2022-09-16 NOTE — PHYSICAL THERAPY INITIAL EVALUATION ADULT - PERTINENT HX OF CURRENT PROBLEM, REHAB EVAL
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 CT head shows large 4cm mass in the R frontal lobe with mild associated edema, no midline shift. CT Abdomen/Pelvis: Increased size of locally advanced pancreatic tail tumor. Progressive pulmonary and pleural metastases Similar right iliopsoas tumor as described. Asymmetrically greater right lateral bladder wall thickening and perivesicular inflammation which could represent treatment-related change. Differential considerations include cystitis. Consider correlation with urinalysis. CT Head: Large right frontal cystic mass likely representing neoplasm. In view of patient's history this likely represents a metastasis. Mass effect lateral ventricle and midline shift to the left. Normal CTA of the head and neck except for mild inferior displacement right middle cerebral arteries by the right frontal mass. Pt now s/p R craniotomy for tumor resection on 9/14

## 2022-09-16 NOTE — SWALLOW BEDSIDE ASSESSMENT ADULT - ADDITIONAL RECOMMENDATIONS
Swallow: 1. Pt/family/caregiver will demonstrate understanding and carryover of dysphagia management (safe swallow guidelines, compensatory strategies, dysphagia diet).  2. Pt will tolerate recommended diet prior to d/c with no overt, clinical s/s of aspiration.

## 2022-09-16 NOTE — SWALLOW BEDSIDE ASSESSMENT ADULT - SWALLOW EVAL: DIAGNOSIS
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 Pt p/w oropharyngeal dysphagia likely acute i/s/o brain tumor and s/p R crani. Swallow characterized by trace anterior loss (L), mildly prolonged oral transport, delayed initiation of swallow trigger, with overt s/sx of aspiration with thick puree (pudding) and mildly thick liquids. Despite no overt s/sx of aspiration with trials of thin puree (applesauce) and moderately thick liquids, not recommended to be cleared for full P.O. meals at this time given overt s/sx of aspiration with thick puree.

## 2022-09-16 NOTE — SWALLOW BEDSIDE ASSESSMENT ADULT - ORAL PREPARATORY PHASE
trace anterior loss of bolus on L side trace anterior loss along L side/Anterior loss of bolus Within functional limits

## 2022-09-16 NOTE — PROGRESS NOTE ADULT - SUBJECTIVE AND OBJECTIVE BOX
DATE OF SERVICE: 09-16-22 @ 14:34    Patient is a 67y old  Male who presents with a chief complaint of Brain mass (16 Sep 2022 13:43)      SUBJECTIVE / OVERNIGHT EVENTS:  No chest pain. No shortness of breath. No complaints. No events overnight.     MEDICATIONS  (STANDING):  Biotene Dry Mouth Oral Rinse 5 milliLiter(s) Swish and Spit three times a day  dexAMETHasone  Injectable 4 milliGRAM(s) IV Push every 6 hours  enoxaparin Injectable 40 milliGRAM(s) SubCutaneous <User Schedule>  gabapentin 1000 milliGRAM(s) Oral three times a day  levETIRAcetam  IVPB 500 milliGRAM(s) IV Intermittent every 12 hours  methadone   Solution 2 milliGRAM(s) Oral three times a day  mupirocin 2% Nasal 1 Application(s) Both Nostrils two times a day  pantoprazole   Suspension 40 milliGRAM(s) Oral before breakfast  pazopanib 200 milliGRAM(s) Oral daily  polyethylene glycol 3350 17 Gram(s) Oral two times a day  senna 2 Tablet(s) Oral at bedtime  sodium chloride 0.9%. 1000 milliLiter(s) (75 mL/Hr) IV Continuous <Continuous>    MEDICATIONS  (PRN):  acetaminophen     Tablet .. 650 milliGRAM(s) Oral every 6 hours PRN Temp greater or equal to 38C (100.4F), Mild Pain (1 - 3)  bisacodyl 5 milliGRAM(s) Oral daily PRN Constipation  calcium carbonate    500 mG (Tums) Chewable 2 Tablet(s) Chew every 6 hours PRN Upset Stomach  oxyCODONE    IR 5 milliGRAM(s) Oral every 4 hours PRN Moderate Pain (4 - 6)  oxyCODONE    IR 10 milliGRAM(s) Oral every 4 hours PRN Severe Pain (7 - 10)      Vital Signs Last 24 Hrs  T(C): 36.8 (16 Sep 2022 11:00), Max: 37.2 (16 Sep 2022 03:00)  T(F): 98.3 (16 Sep 2022 11:00), Max: 99 (16 Sep 2022 03:00)  HR: 62 (16 Sep 2022 11:00) (48 - 66)  BP: 118/69 (16 Sep 2022 11:00) (101/59 - 118/69)  BP(mean): 83 (16 Sep 2022 11:00) (71 - 84)  RR: 17 (16 Sep 2022 11:00) (11 - 20)  SpO2: 94% (16 Sep 2022 11:00) (91% - 100%)    Parameters below as of 16 Sep 2022 09:40  Patient On (Oxygen Delivery Method): room air      CAPILLARY BLOOD GLUCOSE        I&O's Summary    15 Sep 2022 07:01  -  16 Sep 2022 07:00  --------------------------------------------------------  IN: 2230 mL / OUT: 600 mL / NET: 1630 mL    16 Sep 2022 07:01  -  16 Sep 2022 14:34  --------------------------------------------------------  IN: 765 mL / OUT: 0 mL / NET: 765 mL        PHYSICAL EXAM:  GENERAL: NAD, well-developed  HEAD:  Atraumatic, Normocephalic  EYES: EOMI, PERRLA, conjunctiva and sclera clear  NECK: Supple, No JVD  CHEST/LUNG: Clear to auscultation bilaterally; No wheeze  HEART: Regular rate and rhythm; No murmurs, rubs, or gallops  ABDOMEN: Soft, Nontender, Nondistended; Bowel sounds present  EXTREMITIES:  2+ Peripheral Pulses, No clubbing, cyanosis, or edema  SKIN: No rashes or lesions    LABS:                        10.7   6.99  )-----------( 151      ( 15 Sep 2022 21:00 )             32.3     09-15    144  |  111<H>  |  22  ----------------------------<  149<H>  4.1   |  24  |  0.46<L>    Ca    8.4      15 Sep 2022 21:00  Phos  3.1     09-15  Mg     2.4     09-15    TPro  6.4  /  Alb  3.5  /  TBili  0.5  /  DBili  x   /  AST  12  /  ALT  13  /  AlkPhos  124<H>  09-15              RADIOLOGY & ADDITIONAL TESTS:    Imaging Personally Reviewed:    Consultant(s) Notes Reviewed:      Care Discussed with Consultants/Other Providers:

## 2022-09-16 NOTE — CONSULT NOTE ADULT - REASON FOR ADMISSION
Brain mass
New left UEx and Daniel weakness with changes in speech over the weekend

## 2022-09-16 NOTE — PROGRESS NOTE ADULT - ASSESSMENT
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 CT head shows large 4cm mass in the R frontal lobe with mild associated edema, no midline shift.  NS planning for resection of brain mass.    s/p R crani for tumor resection, grossly appears metastatic lesion    NEURO:  - neuro checks q1h  - MRI WWO brain w/in 48h  - Dex 4q6h for cerebral edema  - keppra for sz ppx  - pain control, pain management for cancer related pain  - f/u final path  - Activity: PT/OT as tolerated    CVS:  - SBP goal  per nsg  - ekg, tele  - DC ritu OQUENDO    PULM:  - RA  - IS As tolerated  - Aspiration precautions    RENAL:  - Fluids: 70cc/h NS untol tolerating full diet  - daily IOs  - TOV AM    GI:  - Diet: dysphagia eval, ADAT  - GI prophylaxis: PPI while on CCS  - Bowel regimen: miralax, senna    ENDO:   - FS goal 120-180  - New while on CCS  - A1c    HEME/ONC:  High grade metastatic sarcoma s/p bx 10/4/21, s/p Chemo/XRT, now on Votrient, Follows Dr. Osman at [facility] [place]  Current Treatment Status:. Gemzar/Taxotere -starting 12/3/21 --> POD 3/2022 ---> changed to Doxorubicin q3 weeks 3/18/22 --> POD 5/2022 --> changed to Votrient 6/2022 per hematology  - SCDs  - Chemoppx: hold d/t fresh post op, given hx of metastatic lesions, would need to start chemoppx when able  - LED  - onc for management of primary malignancy    ID:  - monitor for fevers    pain  - cont oxycodone  - cont Methadone  - con gabapentin    constipation  - senna and miralax    dvt px  - lovenox

## 2022-09-16 NOTE — PROGRESS NOTE ADULT - SUBJECTIVE AND OBJECTIVE BOX
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 Osman at [facility] [place]) p/w 2 weeks LUE weakness, CTH w/ large 4cm mass in the R frontal lobe with mild associated edema, no midline shift, now s/p R crani for tumor resection.    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 Osman at [facility] [place]) p/w 2 weeks LUE weakness, CTH w/ large 4cm mass in the R frontal lobe with mild associated edema, no midline shift, now s/p R crani for tumor resection.    24 Hour Events/Subjective:  - POD2 s/p R crani for tumor resection, appears grossly metastatic  - last dose 9/14 votrien    REVIEW OF SYSTEMS:  Fatigued    VITALS:   - Reviewed    IMAGING/DATA:   - Reviewed    PHYSICAL EXAM: Coby  Roland  [id number]  General: cooperative  CVS: Reg, bradycardic  Pulm: CTAB  GI: Soft, NTND  Extremities: No LE Edema  Neuro: AOx3, PERRL, EOMI, dysarthric, left hand  4-/5, left bi/tri 4+/5, baseline right leg weakness (antigravity with support under knee but proximally weak - this is baseline per patient) and is AG throughout, LLE AG throughout

## 2022-09-16 NOTE — PHYSICAL THERAPY INITIAL EVALUATION ADULT - ADDITIONAL COMMENTS
As per pts daughter at bedside, pt lives with wife in private house with 3 steps to enter with HR. +1st floor setup. PTA pt was ambulating with R/W (I) and required some assist with ADLs

## 2022-09-16 NOTE — SWALLOW BEDSIDE ASSESSMENT ADULT - ASR SWALLOW ASPIRATION MONITOR
Monitor for s/s aspiration/laryngeal penetration. If noted:  D/C p.o. intake, provide non-oral nutrition/hydration/meds, and contact this service @ [phone]/change of breathing pattern/position upright (90Y)/cough/gurgly voice/fever/pneumonia/throat clearing/upper respiratory infection

## 2022-09-16 NOTE — SWALLOW BEDSIDE ASSESSMENT ADULT - SLP GENERAL OBSERVATIONS
Pt received at bedside breathing comfortably on RA with baseline Spo2 93-97%. Aox4, mildly lethargic, +NGT, able to follow all directives and express simple wants/needs. Pt speaks Mandarin and Fuzhounese. Spouse and daughter present; both speak Mandarin and Fuzhounese, daughter also speaks fluent English. Assessment conducted in Mandarin with this Mandarin-English speaking clinician.  daughter reports no hx of dysphagia and no change in pt ability to understand or express himself in conversation.

## 2022-09-16 NOTE — PROGRESS NOTE ADULT - SUBJECTIVE AND OBJECTIVE BOX
Patient seen and examined at bedside.    --Anticoagulation--    T(C): 36.7 (09-15-22 @ 23:00), Max: 36.8 (09-15-22 @ 15:00)  HR: 56 (09-16-22 @ 00:00) (48 - 70)  BP: 109/63 (09-15-22 @ 23:00) (101/60 - 116/69)  RR: 11 (09-16-22 @ 00:00) (11 - 20)  SpO2: 96% (09-16-22 @ 00:00) (94% - 100%)  Wt(kg): --    Exam:  Arousable, Ox3, PERRL, EOMI, L facial and drift, L hand  4-/5, L bi/tri 4/5, RUE 5/5  BLE AG w assistance L>R, L DF/PF 3/5    Mandarin speaking

## 2022-09-16 NOTE — CONSULT NOTE ADULT - CONSULT REASON
pre op clearance
Evaluate Rehabilitation Needs
pain management and GOC if indicated
History of high grade metastatic sarcoma

## 2022-09-16 NOTE — SWALLOW BEDSIDE ASSESSMENT ADULT - SLP PERTINENT HISTORY OF CURRENT PROBLEM
now s/p R crani for tumor resection, appears grossly metastatic; no intra-op issues; last dose 9/14 emiliana. CTH w slight pneumocephalus, epidural/subdural postop heme; Dex 4q6h for cerebral edema taper per nsgy; keppra for sz ppx; pain management for cancer related pain; f/u final path.

## 2022-09-16 NOTE — OCCUPATIONAL THERAPY INITIAL EVALUATION ADULT - PERTINENT HX OF CURRENT PROBLEM, REHAB EVAL
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 CT head shows large 4cm mass in the R frontal lobe with mild associated edema, no midline shift. Exam: Mandardin speaking, AO3, PERRL, EOMI, L droop and drift, L hand  4-/5, L bi/tri 4+/5, otherwise intact (although would not fully comply with df/pf testing). 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 CT head shows large 4cm mass in the R frontal lobe with mild associated edema, no midline shift. Exam: Mandardin speaking, AO3, PERRL, EOMI, L droop and drift, L hand  4-/5, L bi/tri 4+/5, otherwise intact (although would not fully comply with df/pf testing). Pt s/p R crani for tumor 9/14  CT Abdomen/Pelvis: Increased size of locally advanced pancreatic tail tumor. Progressive pulmonary and pleural metastases Similar right iliopsoas tumor as described. Asymmetrically greater right lateral bladder wall thickening and perivesicular inflammation which could represent treatment-related change. Differential considerations include cystitis. Consider correlation with urinalysis  CT Head: Large right frontal cystic mass likely representing neoplasm. In view of patient's history this likely represents a metastasis. Mass effect lateral ventricle and midline shift to the left. Normal CTA of the head and neck except for mild inferior displacement right middle cerebral arteries by the right frontal mass.

## 2022-09-16 NOTE — SWALLOW BEDSIDE ASSESSMENT ADULT - H & P REVIEW
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 CT head shows large 4cm mass in the R frontal lobe with mild associated edema, no midline shift. Exam: Mandardin speaking, AO3, PERRL, EOMI, L droop and drift, L hand  4-/5, L bi/tri 4+/5, otherwise intact (although would not fully comply with df/pf testing)/yes

## 2022-09-16 NOTE — SWALLOW BEDSIDE ASSESSMENT ADULT - MUCOSAL QUALITY
pink with trace white secretions; family reports baseline xerostomia; ELBA Mckeon made aware of family request for biotene. Productive cough and suctioning via [place] prior to start of session.

## 2022-09-16 NOTE — CONSULT NOTE ADULT - SUBJECTIVE AND OBJECTIVE BOX
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 CT head shows large 4cm mass in the R frontal lobe with mild associated edema, no midline shift. Exam: Mandardin speaking, AO3, PERRL, EOMI, L droop and drift, L hand  4-/5, L bi/tri 4+/5, otherwise intact (although would not fully comply with df/pf testing)   (12 Sep 2022 18:02)    Interval History:  Patient went to OR on 9/14 for craniotomy and resection of metastatic lesion.   Post-op imaging:     CT Head No Cont (09.15.22 @ 09:09) >  Post right frontal craniotomy and removal of the   ring-enhancing right frontal mass there is expected postoperative change.   No change in vasogenic edema compared with 9/13/2022.    REVIEW OF SYSTEMS: No chest pain, shortness of breath, nausea, vomiting or diarhea; other ROS neg     PAST MEDICAL & SURGICAL HISTORY  Abdominal tumor  Back pain  Rectal cancer  No significant past surgical history    FUNCTIONAL HISTORY:   Lives w spouse in home w 3 CASA  PTA Independent amb, some assist w ADLs    CURRENT FUNCTIONAL STATUS:  Mod A transfers    FAMILY HISTORY   Neg    MEDICATIONS   acetaminophen     Tablet .. 650 milliGRAM(s) Oral every 6 hours PRN  Biotene Dry Mouth Oral Rinse 5 milliLiter(s) Swish and Spit three times a day  bisacodyl 5 milliGRAM(s) Oral daily PRN  calcium carbonate    500 mG (Tums) Chewable 2 Tablet(s) Chew every 6 hours PRN  dexAMETHasone  Injectable 4 milliGRAM(s) IV Push every 6 hours  enoxaparin Injectable 40 milliGRAM(s) SubCutaneous <User Schedule>  gabapentin 1000 milliGRAM(s) Oral three times a day  levETIRAcetam  IVPB 500 milliGRAM(s) IV Intermittent every 12 hours  methadone   Solution 2 milliGRAM(s) Oral three times a day  mupirocin 2% Nasal 1 Application(s) Both Nostrils two times a day  oxyCODONE    IR 5 milliGRAM(s) Oral every 4 hours PRN  oxyCODONE    IR 10 milliGRAM(s) Oral every 4 hours PRN  pantoprazole   Suspension 40 milliGRAM(s) Oral before breakfast  pazopanib 200 milliGRAM(s) Oral daily  polyethylene glycol 3350 17 Gram(s) Oral two times a day  senna 2 Tablet(s) Oral at bedtime  sodium chloride 0.9%. 1000 milliLiter(s) IV Continuous <Continuous>    ALLERGIES  No Known Allergies    VITALS  T(C): 36.8 (09-16-22 @ 11:00), Max: 37.2 (09-16-22 @ 03:00)  HR: 62 (09-16-22 @ 11:00) (48 - 66)  BP: 118/69 (09-16-22 @ 11:00) (101/59 - 118/69)  RR: 17 (09-16-22 @ 11:00) (11 - 20)  SpO2: 94% (09-16-22 @ 11:00) (91% - 100%)  Wt(kg): --    PHYSICAL EXAM  Constitutional - NAD, Comfortable  HEENT - NCAT, EOMI  Neck - Supple, No limited ROM  Chest - CTA bilaterally, No wheeze, No rhonchi, No crackles  Cardiovascular - RRR, S1S2, No murmurs  Abdomen - BS+, Soft, NTND  Extremities - No C/C/E, No calf tenderness   Neurologic Exam -                    Cognitive - Awake, Alert, AAO to self, place, date, year, situation     Communication - Fluent, No dysarthria, no aphasia     Cranial Nerves - CN 2-12 intact     Motor - No focal deficits      Sensory - Intact to LT     Reflexes - DTR Intact, No primitive reflexive  Psychiatric - Mood stable, Affect WNL    RECENT LABS/IMAGING  CBC Full  -  ( 15 Sep 2022 21:00 )  WBC Count : 6.99 K/uL  RBC Count : 3.29 M/uL  Hemoglobin : 10.7 g/dL  Hematocrit : 32.3 %  Platelet Count - Automated : 151 K/uL  Mean Cell Volume : 98.2 fl  Mean Cell Hemoglobin : 32.5 pg  Mean Cell Hemoglobin Concentration : 33.1 gm/dL  Auto Neutrophil # : x  Auto Lymphocyte # : x  Auto Monocyte # : x  Auto Eosinophil # : x  Auto Basophil # : x  Auto Neutrophil % : x  Auto Lymphocyte % : x  Auto Monocyte % : x  Auto Eosinophil % : x  Auto Basophil % : x    09-15    144  |  111<H>  |  22  ----------------------------<  149<H>  4.1   |  24  |  0.46<L>    Ca    8.4      15 Sep 2022 21:00  Phos  3.1     09-15  Mg     2.4     09-15    TPro  6.4  /  Alb  3.5  /  TBili  0.5  /  DBili  x   /  AST  12  /  ALT  13  /  AlkPhos  124<H>  09-15    Impression:   68 yo with functional deficits secondary to diagnosis of brain mass    Plan:  PT- ROM, Bed Mob, Transfers, Amb w AD and bracing as needed  OT- ADLs, bracing  SLP- Dysphagia eval and treat  Prec- Falls, Cardiac  DVT Prophylaxis- Lovenox  Skin- Turn q2 h  Dispo-  Patient is a 67y old  Male who presents with a chief complaint of Brain mass (16 Sep 2022 06:59)    Admission HPI:  67M R handed, pmh rectal CA (2011, s/p radiation/chemo) and 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 CT head shows large 4cm mass in the R frontal lobe with mild associated edema, no midline shift. Exam: Mandardin speaking, AO3, PERRL, EOMI, L droop and drift, L hand  4-/5, L bi/tri 4+/5, otherwise intact (although would not fully comply with df/pf testing)   (12 Sep 2022 18:02)    Interval History:  Patient went to OR on 9/14 for craniotomy and resection of metastatic lesion.   Post-op imaging:     CT Head No Cont (09.15.22 @ 09:09) >  Post right frontal craniotomy and removal of the   ring-enhancing right frontal mass there is expected postoperative change.   No change in vasogenic edema compared with 9/13/2022.    Mother and daughter (who translates) at bedside    REVIEW OF SYSTEMS: L weakness, poor balance, fatigue, No chest pain, shortness of breath, nausea, vomiting or diarhea; other ROS neg     PAST MEDICAL & SURGICAL HISTORY  Abdominal tumor  Back pain  Rectal cancer  No significant past surgical history    FUNCTIONAL HISTORY:   Lives w spouse in home w 3 CASA  PTA Independent amb, some assist w ADLs    CURRENT FUNCTIONAL STATUS:  Mod A transfers    FAMILY HISTORY   Neg    MEDICATIONS   acetaminophen     Tablet .. 650 milliGRAM(s) Oral every 6 hours PRN  Biotene Dry Mouth Oral Rinse 5 milliLiter(s) Swish and Spit three times a day  bisacodyl 5 milliGRAM(s) Oral daily PRN  calcium carbonate    500 mG (Tums) Chewable 2 Tablet(s) Chew every 6 hours PRN  dexAMETHasone  Injectable 4 milliGRAM(s) IV Push every 6 hours  enoxaparin Injectable 40 milliGRAM(s) SubCutaneous <User Schedule>  gabapentin 1000 milliGRAM(s) Oral three times a day  levETIRAcetam  IVPB 500 milliGRAM(s) IV Intermittent every 12 hours  methadone   Solution 2 milliGRAM(s) Oral three times a day  mupirocin 2% Nasal 1 Application(s) Both Nostrils two times a day  oxyCODONE    IR 5 milliGRAM(s) Oral every 4 hours PRN  oxyCODONE    IR 10 milliGRAM(s) Oral every 4 hours PRN  pantoprazole   Suspension 40 milliGRAM(s) Oral before breakfast  pazopanib 200 milliGRAM(s) Oral daily  polyethylene glycol 3350 17 Gram(s) Oral two times a day  senna 2 Tablet(s) Oral at bedtime  sodium chloride 0.9%. 1000 milliLiter(s) IV Continuous <Continuous>    ALLERGIES  No Known Allergies    VITALS  T(C): 36.8 (09-16-22 @ 11:00), Max: 37.2 (09-16-22 @ 03:00)  HR: 62 (09-16-22 @ 11:00) (48 - 66)  BP: 118/69 (09-16-22 @ 11:00) (101/59 - 118/69)  RR: 17 (09-16-22 @ 11:00) (11 - 20)  SpO2: 94% (09-16-22 @ 11:00) (91% - 100%)  Wt(kg): --    PHYSICAL EXAM  Constitutional - NAD, Comfortable  HEENT - Incision intact, EOMI  Neck - Supple, No limited ROM  Chest - CTA bilaterally, No wheeze, No rhonchi, No crackles  Cardiovascular - RRR, S1S2, No murmurs  Abdomen - BS+, Soft, NTND  Extremities - No C/C/E, No calf tenderness   Neurologic Exam -                 Lethargic but arousable  LUE/LLE 3/5, RLE 3/5, RUE 4+/5  Follows verbal instruction     Psychiatric - Mood stable, Affect WNL    RECENT LABS/IMAGING  CBC Full  -  ( 15 Sep 2022 21:00 )  WBC Count : 6.99 K/uL  RBC Count : 3.29 M/uL  Hemoglobin : 10.7 g/dL  Hematocrit : 32.3 %  Platelet Count - Automated : 151 K/uL  Mean Cell Volume : 98.2 fl  Mean Cell Hemoglobin : 32.5 pg  Mean Cell Hemoglobin Concentration : 33.1 gm/dL  Auto Neutrophil # : x  Auto Lymphocyte # : x  Auto Monocyte # : x  Auto Eosinophil # : x  Auto Basophil # : x  Auto Neutrophil % : x  Auto Lymphocyte % : x  Auto Monocyte % : x  Auto Eosinophil % : x  Auto Basophil % : x    09-15    144  |  111<H>  |  22  ----------------------------<  149<H>  4.1   |  24  |  0.46<L>    Ca    8.4      15 Sep 2022 21:00  Phos  3.1     09-15  Mg     2.4     09-15    TPro  6.4  /  Alb  3.5  /  TBili  0.5  /  DBili  x   /  AST  12  /  ALT  13  /  AlkPhos  124<H>  09-15    Impression:   66 yo with functional deficits secondary to diagnosis of brain mass    Plan:  PT- ROM, Bed Mob, Transfers, Amb w AD and bracing as needed  OT- ADLs, bracing  SLP- Dysphagia eval and treat  Prec- Falls, Cardiac  DVT Prophylaxis- Lovenox  Skin- Turn q2 h  Dispo- Acute Rehab- can tolerate 3h/d of therapies and requires daily physician visits   Patient is a 67y old  Male who presents with a chief complaint of Brain mass (16 Sep 2022 06:59)    Admission HPI:  67M R handed, pmh rectal CA (2011, s/p radiation/chemo) and 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 CT head shows large 4cm mass in the R frontal lobe with mild associated edema, no midline shift. Exam: Mandardin speaking, AO3, PERRL, EOMI, L droop and drift, L hand  4-/5, L bi/tri 4+/5, otherwise intact (although would not fully comply with df/pf testing)   (12 Sep 2022 18:02)    Interval History:  Patient went to OR on 9/14 for craniotomy and resection of metastatic lesion.   Post-op imaging:     CT Head No Cont (09.15.22 @ 09:09) >  Post right frontal craniotomy and removal of the   ring-enhancing right frontal mass there is expected postoperative change.   No change in vasogenic edema compared with 9/13/2022.    Mother and daughter (who translates) at bedside    REVIEW OF SYSTEMS: L weakness, poor balance, fatigue, No chest pain, shortness of breath, nausea, vomiting or diarhea; other ROS neg     PAST MEDICAL & SURGICAL HISTORY  Abdominal tumor  Back pain  Rectal cancer  No significant past surgical history    FUNCTIONAL HISTORY:   Lives w spouse in home w 3 CASA  PTA Independent amb, some assist w ADLs    CURRENT FUNCTIONAL STATUS:  Mod A transfers    FAMILY HISTORY   Neg    MEDICATIONS   acetaminophen     Tablet .. 650 milliGRAM(s) Oral every 6 hours PRN  Biotene Dry Mouth Oral Rinse 5 milliLiter(s) Swish and Spit three times a day  bisacodyl 5 milliGRAM(s) Oral daily PRN  calcium carbonate    500 mG (Tums) Chewable 2 Tablet(s) Chew every 6 hours PRN  dexAMETHasone  Injectable 4 milliGRAM(s) IV Push every 6 hours  enoxaparin Injectable 40 milliGRAM(s) SubCutaneous <User Schedule>  gabapentin 1000 milliGRAM(s) Oral three times a day  levETIRAcetam  IVPB 500 milliGRAM(s) IV Intermittent every 12 hours  methadone   Solution 2 milliGRAM(s) Oral three times a day  mupirocin 2% Nasal 1 Application(s) Both Nostrils two times a day  oxyCODONE    IR 5 milliGRAM(s) Oral every 4 hours PRN  oxyCODONE    IR 10 milliGRAM(s) Oral every 4 hours PRN  pantoprazole   Suspension 40 milliGRAM(s) Oral before breakfast  pazopanib 200 milliGRAM(s) Oral daily  polyethylene glycol 3350 17 Gram(s) Oral two times a day  senna 2 Tablet(s) Oral at bedtime  sodium chloride 0.9%. 1000 milliLiter(s) IV Continuous <Continuous>    ALLERGIES  No Known Allergies    VITALS  T(C): 36.8 (09-16-22 @ 11:00), Max: 37.2 (09-16-22 @ 03:00)  HR: 62 (09-16-22 @ 11:00) (48 - 66)  BP: 118/69 (09-16-22 @ 11:00) (101/59 - 118/69)  RR: 17 (09-16-22 @ 11:00) (11 - 20)  SpO2: 94% (09-16-22 @ 11:00) (91% - 100%)  Wt(kg): --    PHYSICAL EXAM  Constitutional - NAD, Comfortable  HEENT - Incision intact, EOMI  Neck - Supple, No limited ROM  Chest - CTA bilaterally, No wheeze, No rhonchi, No crackles  Cardiovascular - RRR, S1S2, No murmurs  Abdomen - BS+, Soft, NTND  Extremities - No C/C/E, No calf tenderness   Neurologic Exam -                 Lethargic but arousable  LUE/LLE 3/5, RLE 3/5, RUE 4+/5  Follows verbal instruction     Psychiatric - Mood stable, Affect WNL    RECENT LABS/IMAGING  CBC Full  -  ( 15 Sep 2022 21:00 )  WBC Count : 6.99 K/uL  RBC Count : 3.29 M/uL  Hemoglobin : 10.7 g/dL  Hematocrit : 32.3 %  Platelet Count - Automated : 151 K/uL  Mean Cell Volume : 98.2 fl  Mean Cell Hemoglobin : 32.5 pg  Mean Cell Hemoglobin Concentration : 33.1 gm/dL  Auto Neutrophil # : x  Auto Lymphocyte # : x  Auto Monocyte # : x  Auto Eosinophil # : x  Auto Basophil # : x  Auto Neutrophil % : x  Auto Lymphocyte % : x  Auto Monocyte % : x  Auto Eosinophil % : x  Auto Basophil % : x    09-15    144  |  111<H>  |  22  ----------------------------<  149<H>  4.1   |  24  |  0.46<L>    Ca    8.4      15 Sep 2022 21:00  Phos  3.1     09-15  Mg     2.4     09-15    TPro  6.4  /  Alb  3.5  /  TBili  0.5  /  DBili  x   /  AST  12  /  ALT  13  /  AlkPhos  124<H>  09-15    Impression:   68 yo with functional deficits secondary to diagnosis of brain mass    Plan:  PT- ROM, Bed Mob, Transfers, Amb w AD and bracing as needed  OT- ADLs, bracing  SLP- Dysphagia eval and treat  Prec- Falls, Cardiac  DVT Prophylaxis- Lovenox  Skin- Turn q2 h  Dispo- Acute Rehab- can tolerate 3h/d of therapies and requires daily physician visits  Discussed rehab options w patient's daughter.

## 2022-09-16 NOTE — PHYSICAL THERAPY INITIAL EVALUATION ADULT - TRANSFER TRAINING, PT EVAL
GOAL: Pt will perform ALL transfers with CG Ax1 w/use of appropriate assistive device as needed, in 4 weeks.

## 2022-09-17 NOTE — PROGRESS NOTE ADULT - ASSESSMENT
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 CT head shows large 4cm mass in the R frontal lobe with mild associated edema, no midline shift.  NS planning for resection of brain mass.    s/p R crani for tumor resection, grossly appears metastatic lesion    NEURO:  - neuro checks q1h  - MRI WWO brain w/in 48h  - Dex 4q6h for cerebral edema  - keppra for sz ppx  - pain control, pain management for cancer related pain  - f/u final path  - Activity: PT/OT as tolerated      CVS:  - SBP goal  per nsg  - ekg, tele  - DC ritu OQUENDO    PULM:  - RA  - IS As tolerated  - Aspiration precautions    RENAL:  - Fluids: 70cc/h NS untol tolerating full diet  - daily IOs  - TOV AM    GI:  - Diet: dysphagia eval, ADAT  - GI prophylaxis: PPI while on CCS  - Bowel regimen: miralax, senna    ENDO:   - FS goal 120-180  - New while on CCS  - A1c    HEME/ONC:  High grade metastatic sarcoma s/p bx 10/4/21, s/p Chemo/XRT, now on Votrient, Follow[facility] Dr. [facility]man [facility]  Current Treatment Status:. Gemzar/Taxotere -starting 12/3/21 --> POD 3/2022 ---> changed to Doxorubicin q3 weeks 3/18/22 --> POD 5/2022 --> changed to Votrient 6/2022 per hematology  - SCDs  - Chemoppx: hold d/t fresh post op, given hx of metastatic lesions, would need to start chemoppx when able  - LED  - onc for management of primary malignancy    ID:  - monitor for fevers    pain  - cont oxycodone  - cont Methadone  - con gabapentin    constipation  - senna and miralax    dvt px  - lovenox    plan as per SICU and NS

## 2022-09-17 NOTE — PROGRESS NOTE ADULT - SUBJECTIVE AND OBJECTIVE BOX
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 CT head shows large 4cm mass in the R frontal lobe with mild associated edema, no midline shift. Exam: Mandardin speaking, AO3, PERRL, EOMI, L droop and drift, L hand  4-/5, L bi/tri 4+/5, otherwise intact (although would not fully comply with df/pf testing)   (12 Sep 2022 18:02)    SURGERY: Right craniotomy for tumor        Allergies and Intolerances:        Allergies:  	No Known Allergies:     Home Medications:   * Patient Currently Takes Medications as of 18-Apr-2022 15:44 documented in Structured Notes  · 	gabapentin 300 mg oral capsule: 1 cap(s) orally 2 times a day  · 	oxyCODONE 5 mg oral tablet: 1 tab(s) orally every 6 hours, As needed, Moderate Pain (4 - 6) MDD:4  · 	methadone 10 mg oral tablet: 1 tab(s) orally every 8 hours  · 	MiraLax oral powder for reconstitution: 17gram once daily    Patient History:    Social History:  · Substance use	No     Tobacco Screening:  · Core Measure Site	No    Risk Assessment:    Present on Admission:  Deep Venous Thrombosis	no  Pulmonary Embolus	no            REVIEW OF SYSTEMS: [ ] Unable to Assess due to neurologic exam   [x ] All ROS addressed below are non-contributory, except:  Neuro: [ ] Headache [ ] Back pain [ ] Numbness [ ] Weakness [ ] Ataxia [ ] Dizziness [ ] Aphasia [ ] Dysarthria [ ] Visual disturbance  Resp: [ ] Shortness of breath/dyspnea, [ ] Orthopnea [ ] Cough  CV: [ ] Chest pain [ ] Palpitation [ ] Lightheadedness [ ] Syncope  Renal: [ ] Thirst [ ] Edema  GI: [ ] Nausea [ ] Emesis [ ] Abdominal pain [ ] Constipation [ ] Diarrhea  Hem: [ ] Hematemesis [ ] bright red blood per rectum  ID: [ ] Fever [ ] Chills [ ] Dysuria  ENT: [ ] Rhinorrhea          EVENTS:         ICU Vital Signs Last 24 Hrs  T(C): 36.9 (17 Sep 2022 03:00), Max: 37 (16 Sep 2022 23:00)  T(F): 98.4 (17 Sep 2022 03:00), Max: 98.6 (16 Sep 2022 23:00)  HR: 51 (17 Sep 2022 07:00) (51 - 65)  BP: 113/64 (17 Sep 2022 07:00) (103/63 - 132/78)  BP(mean): 79 (17 Sep 2022 07:00) (75 - 93)  ABP: --  ABP(mean): --  RR: 12 (17 Sep 2022 07:00) (12 - 18)  SpO2: 96% (17 Sep 2022 07:00) (91% - 98%)    O2 Parameters below as of 16 Sep 2022 19:00  Patient On (Oxygen Delivery Method): room air           09-16 @ 07:01  -  09-17 @ 07:00  --------------------------------------------------------  IN: 1940 mL / OUT: 1350 mL / NET: 590 mL                             10.1   4.91  )-----------( 146      ( 16 Sep 2022 21:21 )             31.2    09-16    143  |  110<H>  |  26<H>  ----------------------------<  187<H>  3.8   |  25  |  0.38<L>    Ca    8.4      16 Sep 2022 21:21  Phos  2.1     09-16  Mg     2.3     09-16              PHYSICAL EXAM:    General: No Acute Distress     Neurological: Awake, alert oriented to person, place and time, Following Commands, PERRL, EOMI, Face Symmetrical, Speech Fluent, Moving all extremities, left UE and LE drift , right foot drop, right leg 4/5 pain limited , left side 5/5      Pulmonary: Clear to Auscultation, No Rales, No Rhonchi, No Wheezes     Cardiovascular: S1, S2, Regular Rate and Rhythm     Gastrointestinal: Soft, Nontender, Nondistended     Extremities: No calf tenderness     Incision:       MEDICATIONS:  Antibiotics:      Neurological:   acetaminophen     Tablet .. 650 milliGRAM(s) Oral every 6 hours PRN  gabapentin 1000 milliGRAM(s) Oral three times a day  levETIRAcetam  IVPB 500 milliGRAM(s) IV Intermittent every 12 hours  methadone   Solution 2 milliGRAM(s) Oral three times a day  oxyCODONE    IR 5 milliGRAM(s) Oral every 4 hours PRN  oxyCODONE    IR 10 milliGRAM(s) Oral every 4 hours PRN    Cardiac:     Pulm:    Heme:   enoxaparin Injectable 40 milliGRAM(s) SubCutaneous <User Schedule>    Other:   Biotene Dry Mouth Oral Rinse 5 milliLiter(s) Swish and Spit three times a day  bisacodyl 5 milliGRAM(s) Oral daily PRN Constipation  calcium carbonate    500 mG (Tums) Chewable 2 Tablet(s) Chew every 6 hours PRN Upset Stomach  dexAMETHasone  Injectable 4 milliGRAM(s) IV Push every 6 hours  mupirocin 2% Nasal 1 Application(s) Both Nostrils two times a day  pantoprazole   Suspension 40 milliGRAM(s) Oral before breakfast  polyethylene glycol 3350 17 Gram(s) Oral two times a day  senna 2 Tablet(s) Oral at bedtime  tetracaine/benzocaine/butamben Spray 1 Spray(s) Topical three times a day       DEVICES: [] Restraints [] MAITE/HMV []LD [] ET tube [] Trach [] Chest Tube [] A-line [] Mcgraw [] NGT [] Rectal Tube       A/P:  67M R handed, pmh rectal CA (2011, s/p radiation/chemo) and 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 was found to have large 4cm mass in the R frontal lobe with mild associated edema, no midline shift s/p R crani for tumor resection, grossly appears metastatic lesion 9/14    Neuro:   Respiratory:   CV:  Endocrine:   Heme/Onc:             DVT ppx:   Renal:   ID:   GI:   Social/Family:   Discharge planning:     Code Status: [] Full Code [] DNR [] DNI [] Goals of Care:   Disposition: [] ICU [] Stroke Unit [] RCU []PCU []Floor [] Discharge Home     Patient at high risk for neurologic deterioration, critical care time, excluding procedures: 45 minutes                    HPI:  67M R handed, pmh rectal CA (2011, s/p radiation/chemo) and 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 CT head shows large 4cm mass in the R frontal lobe with mild associated edema, no midline shift. Exam: Mandardin speaking, AO3, PERRL, EOMI, L droop and drift, L hand  4-/5, L bi/tri 4+/5, otherwise intact (although would not fully comply with df/pf testing)   (12 Sep 2022 18:02)    SURGERY: Right craniotomy for tumor        Allergies and Intolerances:        Allergies:  	No Known Allergies:     Home Medications:   * Patient Currently Takes Medications as of 18-Apr-2022 15:44 documented in Structured Notes  · 	gabapentin 300 mg oral capsule: 1 cap(s) orally 2 times a day  · 	oxyCODONE 5 mg oral tablet: 1 tab(s) orally every 6 hours, As needed, Moderate Pain (4 - 6) MDD:4  · 	methadone 10 mg oral tablet: 1 tab(s) orally every 8 hours  · 	MiraLax oral powder for reconstitution: 17gram once daily    Patient History:    Social History:  · Substance use	No     Tobacco Screening:  · Core Measure Site	No    Risk Assessment:    Present on Admission:  Deep Venous Thrombosis	no  Pulmonary Embolus	no            REVIEW OF SYSTEMS: [ ] Unable to Assess due to neurologic exam   [x ] All ROS addressed below are non-contributory, except:  Neuro: [ ] Headache [ ] Back pain [ ] Numbness [ ] Weakness [ ] Ataxia [ ] Dizziness [ ] Aphasia [ ] Dysarthria [ ] Visual disturbance  Resp: [ ] Shortness of breath/dyspnea, [ ] Orthopnea [ ] Cough  CV: [ ] Chest pain [ ] Palpitation [ ] Lightheadedness [ ] Syncope  Renal: [ ] Thirst [ ] Edema  GI: [ ] Nausea [ ] Emesis [ ] Abdominal pain [ ] Constipation [ ] Diarrhea  Hem: [ ] Hematemesis [ ] bright red blood per rectum  ID: [ ] Fever [ ] Chills [ ] Dysuria  ENT: [ ] Rhinorrhea          EVENTS:   exam stable overnight     T(C): 36.9 (09-17-22 @ 03:00), Max: 37 (09-16-22 @ 23:00)  HR: 51 (09-17-22 @ 07:00) (51 - 63)  BP: 113/64 (09-17-22 @ 07:00) (113/64 - 132/78)  RR: 12 (09-17-22 @ 07:00) (12 - 18)  SpO2: 96% (09-17-22 @ 07:00) (94% - 98%)  09-16-22 @ 07:01  -  09-17-22 @ 07:00  --------------------------------------------------------  IN: 1940 mL / OUT: 1350 mL / NET: 590 mL    acetaminophen     Tablet .. 650 milliGRAM(s) Oral every 6 hours PRN  Biotene Dry Mouth Oral Rinse 5 milliLiter(s) Swish and Spit three times a day  bisacodyl 5 milliGRAM(s) Oral daily PRN  calcium carbonate    500 mG (Tums) Chewable 2 Tablet(s) Chew every 6 hours PRN  dexAMETHasone  Injectable 4 milliGRAM(s) IV Push every 6 hours  enoxaparin Injectable 40 milliGRAM(s) SubCutaneous <User Schedule>  gabapentin 1000 milliGRAM(s) Oral three times a day  levETIRAcetam  IVPB 500 milliGRAM(s) IV Intermittent every 12 hours  methadone   Solution 2 milliGRAM(s) Oral three times a day  mupirocin 2% Nasal 1 Application(s) Both Nostrils two times a day  oxyCODONE    IR 5 milliGRAM(s) Oral every 4 hours PRN  oxyCODONE    IR 10 milliGRAM(s) Oral every 4 hours PRN  pantoprazole   Suspension 40 milliGRAM(s) Oral before breakfast  pazopanib 200 milliGRAM(s) Oral daily  polyethylene glycol 3350 17 Gram(s) Oral two times a day  senna 2 Tablet(s) Oral at bedtime  tetracaine/benzocaine/butamben Spray 1 Spray(s) Topical three times a day            PHYSICAL EXAM:    General: No Acute Distress     Neurological: Awake, alert oriented to person, place and time, Following Commands, PERRL, EOMI, Face Symmetrical, Speech Fluent, Moving all extremities, left UE and LE drift , right foot drop, right leg 4/5 pain limited , left side 5/5      Pulmonary: Clear to Auscultation, No Rales, No Rhonchi, No Wheezes     Cardiovascular: S1, S2, Regular Rate and Rhythm     Gastrointestinal: Soft, Nontender, Nondistended     Extremities: No calf tenderness     Incision:         LABS:  Na: 143 (09-16 @ 21:21), 144 (09-15 @ 21:00), 139 (09-15 @ 01:19)  K: 3.8 (09-16 @ 21:21), 4.1 (09-15 @ 21:00), 3.7 (09-15 @ 01:19)  Cl: 110 (09-16 @ 21:21), 111 (09-15 @ 21:00), 104 (09-15 @ 01:19)  CO2: 25 (09-16 @ 21:21), 24 (09-15 @ 21:00), 23 (09-15 @ 01:19)  BUN: 26 (09-16 @ 21:21), 22 (09-15 @ 21:00), 18 (09-15 @ 01:19)  Cr: 0.38 (09-16 @ 21:21), 0.46 (09-15 @ 21:00), 0.50 (09-15 @ 01:19)  Glu: 187(09-16 @ 21:21), 149(09-15 @ 21:00), 205(09-15 @ 01:19)    Hgb: 10.1 (09-16 @ 21:21), 10.7 (09-15 @ 21:00), 11.8 (09-15 @ 01:19)  Hct: 31.2 (09-16 @ 21:21), 32.3 (09-15 @ 21:00), 35.9 (09-15 @ 01:19)  WBC: 4.91 (09-16 @ 21:21), 6.99 (09-15 @ 21:00), 6.36 (09-15 @ 01:19)  Plt: 146 (09-16 @ 21:21), 151 (09-15 @ 21:00), 182 (09-15 @ 01:19)    INR:   PTT:         ASSESSMENT/PLAN:  right frontal lobe mass with vasogenic edema s/p R crani for tumor resection     NEURO:  - Neurochecks q4h  - vasogenic edema decadron 4 mg q 6 hr   - keppra  500 mg q 12 hr   - follow official report   - PT/OT/OBC   - gabapentin, oxycodon  for pain control     CVS:  - SBP goal 100-160 mmhg     PULM:  RA     RENAL:  IVL   voiding freely     GI:  passed swallow eval, start regular diet, d/c NG   PPI while on decadron   Last BM yesterday     ENDO:   - FS goal 120-180    HEME/ONC:  High grade metastatic sarcoma s/p bx 10/4/21  pazopanib  lovenox 40 mg sc qhs   onc for management of primary malignancy    ID:  afebrile     not critical

## 2022-09-17 NOTE — PROGRESS NOTE ADULT - SUBJECTIVE AND OBJECTIVE BOX
Synthroid 175 mcg p o   Daily DATE OF SERVICE: 09-17-22 @ 10:47    Patient is a 67y old  Male who presents with a chief complaint of Brain mass (17 Sep 2022 07:22)      SUBJECTIVE / OVERNIGHT EVENTS:  No chest pain. No shortness of breath. No complaints. No events overnight.     MEDICATIONS  (STANDING):  Biotene Dry Mouth Oral Rinse 5 milliLiter(s) Swish and Spit three times a day  dexAMETHasone  Injectable 4 milliGRAM(s) IV Push every 6 hours  enoxaparin Injectable 40 milliGRAM(s) SubCutaneous <User Schedule>  gabapentin 1000 milliGRAM(s) Oral three times a day  levETIRAcetam  IVPB 500 milliGRAM(s) IV Intermittent every 12 hours  methadone   Solution 2 milliGRAM(s) Oral three times a day  mupirocin 2% Nasal 1 Application(s) Both Nostrils two times a day  pantoprazole   Suspension 40 milliGRAM(s) Oral before breakfast  pazopanib 200 milliGRAM(s) Oral daily  polyethylene glycol 3350 17 Gram(s) Oral two times a day  senna 2 Tablet(s) Oral at bedtime  tetracaine/benzocaine/butamben Spray 1 Spray(s) Topical three times a day    MEDICATIONS  (PRN):  acetaminophen     Tablet .. 650 milliGRAM(s) Oral every 6 hours PRN Temp greater or equal to 38C (100.4F), Mild Pain (1 - 3)  bisacodyl 5 milliGRAM(s) Oral daily PRN Constipation  calcium carbonate    500 mG (Tums) Chewable 2 Tablet(s) Chew every 6 hours PRN Upset Stomach  oxyCODONE    IR 5 milliGRAM(s) Oral every 4 hours PRN Moderate Pain (4 - 6)  oxyCODONE    IR 10 milliGRAM(s) Oral every 4 hours PRN Severe Pain (7 - 10)      Vital Signs Last 24 Hrs  T(C): 36.9 (17 Sep 2022 03:00), Max: 37 (16 Sep 2022 23:00)  T(F): 98.4 (17 Sep 2022 03:00), Max: 98.6 (16 Sep 2022 23:00)  HR: 51 (17 Sep 2022 07:00) (51 - 63)  BP: 113/64 (17 Sep 2022 07:00) (113/64 - 132/78)  BP(mean): 79 (17 Sep 2022 07:00) (79 - 93)  RR: 12 (17 Sep 2022 07:00) (12 - 18)  SpO2: 96% (17 Sep 2022 07:00) (94% - 98%)    Parameters below as of 16 Sep 2022 19:00  Patient On (Oxygen Delivery Method): room air      CAPILLARY BLOOD GLUCOSE        I&O's Summary    16 Sep 2022 07:01  -  17 Sep 2022 07:00  --------------------------------------------------------  IN: 1940 mL / OUT: 1350 mL / NET: 590 mL        PHYSICAL EXAM:  GENERAL: NAD, well-developed  HEAD:  Atraumatic, Normocephalic  EYES: EOMI, PERRLA, conjunctiva and sclera clear  NECK: Supple, No JVD  CHEST/LUNG: Clear to auscultation bilaterally; No wheeze  HEART: Regular rate and rhythm; No murmurs, rubs, or gallops  ABDOMEN: Soft, Nontender, Nondistended; Bowel sounds present  EXTREMITIES:  2+ Peripheral Pulses, No clubbing, cyanosis, or edema  Neurological: Awake, alert oriented to person, place and time, Following Commands, PERRL, EOMI, Face Symmetrical, Speech Fluent, Moving all extremities, left UE and LE drift , right foot drop, right leg 4/5 pain limited , left side 5/5      SKIN: No rashes or lesions    LABS:                        10.1   4.91  )-----------( 146      ( 16 Sep 2022 21:21 )             31.2     09-16    143  |  110<H>  |  26<H>  ----------------------------<  187<H>  3.8   |  25  |  0.38<L>    Ca    8.4      16 Sep 2022 21:21  Phos  2.1     09-16  Mg     2.3     09-16                RADIOLOGY & ADDITIONAL TESTS:    Imaging Personally Reviewed:    Consultant(s) Notes Reviewed:      Care Discussed with Consultants/Other Providers:

## 2022-09-17 NOTE — CHART NOTE - NSCHARTNOTEFT_GEN_A_CORE
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 Pt found to have R frontal lobe with mild associated edema, no midline shift. s/p R crani for tumor resection 9/14.     9/16 Initial bedside swallow evaluation completed. Rx for NPO except for meds in applesauce only (not pudding = thick puree) and allowance for moderately thick liquid wash, given overt s/sx of aspiration with thick puree and mildly thick liquids.     TODAY, seen for re-evaluation of swallow and candidacy for P.O. meals. Pt w/ improved alertness and strength as compared to yesterday session. Pink and moist oral cavity  Session conducted in Mandarin with this Mandarin-English speaking clinician.      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 Pt found to have R frontal lobe with mild associated edema, no midline shift. s/p R crani for tumor resection 9/14.     9/16 Initial bedside swallow evaluation completed. Rx for NPO except for meds in applesauce only (not pudding = thick puree) and allowance for moderately thick liquid wash, given overt s/sx of aspiration with thick puree and mildly thick liquids.     TODAY, seen for re-evaluation of swallow and candidacy for P.O. meals. Pt w/ improved alertness and strength as compared to yesterday session; AOx4, follows all directives and verbally expresses all wants/needs. Pink and moist oral cavity; pt reports reduced phlegm/mucus on this date. Session conducted in Mandarin with this Mandarin-English speaking clinician. Pt administered regular solids, easy to chew solids, thick puree, mildly thick liquids, thin liquids. Oral phase characterized by prolonged, yet functional mastication, likely in setting of incomplete dentition, functional oral transport, subjectively timely initiation of swallow and adequate hyolaryngeal elevation/excursion, no overt s/sx of aspiration across all administered consistencies.     IMPRESSIONS: Pt p/w overtly functional oropharyngeal swallow. Diet modification not indicated at this time.     RECOMMENDATIONS:  >Regular solids/thin liquids - pt may choose softer foods per preference.  >This service will continue to follow for diet monitor and speech-language evaluation  >Maintain strict aspiration precautions  >Maintain oral hygiene  >D/C acute rehab     D/W ELBA Blackmon and Mike    Speech pathology  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 Pt found to have R frontal lobe with mild associated edema, no midline shift. s/p R crani for tumor resection 9/14.     9/16 Initial bedside swallow evaluation completed. Rx for NPO except for meds in applesauce only (not pudding = thick puree) and allowance for moderately thick liquid wash, given overt s/sx of aspiration with thick puree and mildly thick liquids.     TODAY, seen for re-evaluation of swallow and candidacy for P.O. meals. Pt w/ improved alertness and strength as compared to yesterday session; AOx4, follows all directives and verbally expresses all wants/needs. Pink and moist oral cavity; pt reports reduced phlegm/mucus on this date. Session conducted in Mandarin with this Mandarin-English speaking clinician. Pt administered regular solids, easy to chew solids, thick puree, mildly thick liquids, thin liquids. Oral phase characterized by prolonged, yet functional mastication, likely in setting of incomplete dentition, functional oral transport, subjectively timely initiation of swallow and adequate hyolaryngeal elevation/excursion, no overt s/sx of aspiration across all administered consistencies.     IMPRESSIONS: Pt p/w overtly functional oropharyngeal swallow. Diet modification not indicated at this time.     RECOMMENDATIONS:  >Regular solids/thin liquids - pt may choose softer foods per preference.  >This service will continue to follow for diet monitor   >Maintain strict aspiration precautions  >Maintain oral hygiene  >D/C acute rehab     D/W ELBA Blackmon and Mike    Speech pathology  [facility]-SLP *Teams preferred* ([id number])

## 2022-09-17 NOTE — PROGRESS NOTE ADULT - SUBJECTIVE AND OBJECTIVE BOX
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 Osman at [facility] [place]) p/w 2 weeks LUE weakness, CTH w/ large 4cm mass in the R frontal lobe with mild associated edema, no midline shift, now s/p R crani for tumor resection.    24 Hour Events/Subjective:  - POD3 s/p R crani for tumor resection, appears grossly metastatic  - last dose 9/14 votrien  stable exam, awaiting floor bed    REVIEW OF SYSTEMS:  as above    VITALS:   - Reviewed    IMAGING/DATA:   - Reviewed    PHYSICAL EXAM: Mandarin    General: cooperative  CVS: Reg, bradycardic  Pulm: CTAB  GI: Soft, NTND  Extremities: No LE Edema  Neuro: AOx3, PERRL, EOMI, dysarthric, left hand  4-/5, left bi/tri 4+/5, baseline right leg weakness (antigravity with support under knee but proximally weak - this is baseline per patient) and is AG throughout, LLE AG throughout   SUMMARY:   67M R hx of rectal CA (2011, s/p chemo/XRT) and 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 Osman at [facility] [place]) p/w 2 weeks LUE weakness, CTH w/ large 4cm mass in the R frontal lobe with mild associated edema, no midline shift, now s/p R crani for tumor resection.    24 Hour Events/Subjective:  - POD3 s/p R crani for tumor resection, appears grossly metastatic  - last dose 9/14 votrien  stable exam, awaiting floor bed    REVIEW OF SYSTEMS:  as above    VITALS:   - Reviewed    IMAGING/DATA:   - Reviewed    PHYSICAL EXAM: Mandarin  celio [id number]  General: cooperative  CVS: Reg, bradycardic  Pulm: CTAB  GI: Soft, NTND  Extremities: No LE Edema  Neuro: AOx3, PERRL, EOMI, dysarthric, left hand  4-/5, left bi/tri 4+/5, baseline right leg weakness (antigravity with support under knee but proximally weak - this is baseline per patient) and is AG throughout, LLE AG throughout

## 2022-09-17 NOTE — PROGRESS NOTE ADULT - ASSESSMENT
ASSESSMENT/PLAN:  s/p R crani for tumor resection, grossly appears metastatic lesion 9/14    NEURO:  - Neurochecks q4h  - Dex 4q6h for cerebral edema taper per nsgy  - keppra for sz ppx bid  - pain control, pain management for cancer related pain (using methadone at home, gabapentin)  - f/u final path  - Activity: PT/OT as tolerated    CVS:  - SBP goal    tte ef 65%, nml    PULM:  - RA  - IS As tolerated  - Aspiration precautions    RENAL: IVL  - daily IOs  - voiding  - q6h bladder scan for intermittent retention     GI:  - Diet: passed S/S, reg diet  - GI prophylaxis: PPI while on CCS  - Bowel regimen: miralax, senna  - Last BM 9/16    ENDO:   - FS goal 120-180  - New while on CCS  - A1c 6.1    HEME/ONC:  High grade metastatic sarcoma s/p bx 10/4/21, s/p Chemo/XRT, now on Votrient, [place] [facility] [place] Treatment Status:. Gemzar/Taxotere -starting 12/3/21 --> POD 3/2022 ---> changed to Doxorubicin q3 weeks 3/18/22 --> POD 5/2022 --> changed to Votrient 200 mg qd 6/2022 continue per hematology  - SCDs  - Chemoppx: started 9/16 sql  - LED neg 9/14  - onc for management of primary malignancy  plt drop, trend daily, if worsens rule out HIT, consider switching keppra    ID: afebrile  - monitor for fevers    Dispo: Floor    At risk for death/neuro decline due to: tumor related cerebral edema (symptomatic) ASSESSMENT/PLAN:  s/p R crani for tumor resection, grossly appears metastatic lesion 9/14    NEURO:  - Neurochecks q4h, PST  - Dex 4q6h for cerebral edema taper per nsgy  - keppra for sz ppx bid  - pain control, pain management for cancer related pain (using methadone at home, gabapentin)  - f/u final path  - Activity: PT/OT as tolerated    CVS:  - SBP goal    tte ef 65%, nml    PULM:  - RA  - IS As tolerated  - Aspiration precautions    RENAL: IVL  - daily IOs  - voiding  - q6h bladder scan for intermittent retention     GI:  - Diet: passed S/S, reg diet  - GI prophylaxis: PPI while on CCS  - Bowel regimen: miralax, senna  - Last BM 9/16    ENDO:   - FS goal 120-180  - New while on CCS  - A1c 6.1    HEME/ONC:  High grade metastatic sarcoma s/p bx 10/4/21, s/p Chemo/XRT, now on Votrient, Follows Dr. Osman at [facility] [place]  Current Treatment Status:. Gemzar/Taxotere -starting 12/3/21 --> POD 3/2022 ---> changed to Doxorubicin q3 weeks 3/18/22 --> POD 5/2022 --> changed to Votrient 200 mg qd 6/2022 continue per hematology  - SCDs  - Chemoppx: started 9/16 sql  - LED neg 9/14  - onc for management of primary malignancy  plt drop, trend daily, if worsens rule out HIT, consider switching keppra    ID: afebrile  - monitor for fevers    Dispo: Floor    At risk for death/neuro decline due to: tumor related cerebral edema (symptomatic)

## 2022-09-17 NOTE — PROGRESS NOTE ADULT - SUBJECTIVE AND OBJECTIVE BOX
Patient seen and examined at bedside.    --Anticoagulation--  enoxaparin Injectable 40 milliGRAM(s) SubCutaneous <User Schedule>    T(C): 37 (09-16-22 @ 23:00), Max: 37.2 (09-16-22 @ 03:00)  HR: 53 (09-16-22 @ 23:00) (53 - 66)  BP: 130/65 (09-16-22 @ 23:00) (101/59 - 132/78)  RR: 15 (09-16-22 @ 23:00) (13 - 18)  SpO2: 97% (09-16-22 @ 23:00) (91% - 98%)  Wt(kg): --    Exam:  Arousable, Ox3, PERRL, EOMI, L facial and drift, L hand  4+/5, L bi/tri 4+/5, RUE 5/5  rle proximal 3/5, df/pf 1/5, lle 3/5 throughout .     Mandarin speaking

## 2022-09-17 NOTE — PROGRESS NOTE ADULT - TIME BILLING
Symptom assessment, supportive counseling
see note
Data reviewed, patient seen/examined and care plan reviewed/updated with ACP. Care plan coordinated with NSGY.

## 2022-09-18 NOTE — PROGRESS NOTE ADULT - ATTENDING COMMENTS
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    Alert, oriented to self and hospital, speech is fluent, briskly follows commands, mild L finger curling, R leg pain limited, L leg AG    awaiting transfer to floor   dex 4mg q8h   LBM today  mobilize to chair     Patient seen and examined by attending on 9/18/2022.    Patient is not critically ill but is medically complex due to resection of intracranial metastasis requiring post-op care and management of brain edema.
Data reviewed, patient seen/examined and care plan reviewed/updated with fellow.

## 2022-09-18 NOTE — PROGRESS NOTE ADULT - SUBJECTIVE AND OBJECTIVE BOX
DATE OF SERVICE: 09-18-22 @ 19:53    Patient is a 67y old  Male who presents with a chief complaint of Brain mass (18 Sep 2022 06:06)      SUBJECTIVE / OVERNIGHT EVENTS:  No chest pain. No shortness of breath. No complaints. No events overnight.     MEDICATIONS  (STANDING):  Biotene Dry Mouth Oral Rinse 5 milliLiter(s) Swish and Spit three times a day  dexAMETHasone  Injectable 4 milliGRAM(s) IV Push every 8 hours  enoxaparin Injectable 40 milliGRAM(s) SubCutaneous <User Schedule>  gabapentin 1000 milliGRAM(s) Oral three times a day  levETIRAcetam  IVPB 500 milliGRAM(s) IV Intermittent every 12 hours  methadone   Solution 2 milliGRAM(s) Oral three times a day  mupirocin 2% Nasal 1 Application(s) Both Nostrils two times a day  pantoprazole   Suspension 40 milliGRAM(s) Oral before breakfast  pazopanib 200 milliGRAM(s) Oral daily  polyethylene glycol 3350 17 Gram(s) Oral two times a day  senna 2 Tablet(s) Oral at bedtime  tetracaine/benzocaine/butamben Spray 1 Spray(s) Topical three times a day    MEDICATIONS  (PRN):  acetaminophen     Tablet .. 650 milliGRAM(s) Oral every 6 hours PRN Temp greater or equal to 38C (100.4F), Mild Pain (1 - 3)  artificial  tears Solution 2 Drop(s) Both EYES every 4 hours PRN Dry Eyes  bisacodyl 5 milliGRAM(s) Oral daily PRN Constipation  calcium carbonate    500 mG (Tums) Chewable 2 Tablet(s) Chew every 6 hours PRN Upset Stomach  oxyCODONE    IR 5 milliGRAM(s) Oral every 4 hours PRN Moderate Pain (4 - 6)  oxyCODONE    IR 10 milliGRAM(s) Oral every 4 hours PRN Severe Pain (7 - 10)      Vital Signs Last 24 Hrs  T(C): 36.8 (18 Sep 2022 14:00), Max: 36.9 (18 Sep 2022 05:00)  T(F): 98.2 (18 Sep 2022 14:00), Max: 98.4 (18 Sep 2022 05:00)  HR: 66 (18 Sep 2022 14:00) (46 - 66)  BP: 116/69 (18 Sep 2022 14:00) (105/69 - 138/74)  BP(mean): 81 (18 Sep 2022 11:00) (81 - 93)  RR: 16 (18 Sep 2022 14:00) (10 - 16)  SpO2: 99% (18 Sep 2022 14:00) (94% - 100%)    Parameters below as of 18 Sep 2022 14:00  Patient On (Oxygen Delivery Method): room air      CAPILLARY BLOOD GLUCOSE        I&O's Summary    17 Sep 2022 07:01  -  18 Sep 2022 07:00  --------------------------------------------------------  IN: 750 mL / OUT: 1800 mL / NET: -1050 mL    18 Sep 2022 07:01  -  18 Sep 2022 19:53  --------------------------------------------------------  IN: 500 mL / OUT: 450 mL / NET: 50 mL        PHYSICAL EXAM:  GENERAL: NAD, well-developed  HEAD:  Atraumatic, Normocephalic  EYES: EOMI, PERRLA, conjunctiva and sclera clear  NECK: Supple, No JVD  CHEST/LUNG: Clear to auscultation bilaterally; No wheeze  HEART: Regular rate and rhythm; No murmurs, rubs, or gallops  ABDOMEN: Soft, Nontender, Nondistended; Bowel sounds present  EXTREMITIES:  2+ Peripheral Pulses, No clubbing, cyanosis, or edema  PSYCH: AAOx3  NEUROLOGY: non-focal  SKIN: No rashes or lesions    LABS:                        11.0   3.63  )-----------( 154      ( 18 Sep 2022 05:21 )             33.8     09-18    140  |  105  |  21  ----------------------------<  129<H>  4.0   |  25  |  0.35<L>    Ca    8.9      18 Sep 2022 05:21  Phos  3.3     09-18  Mg     2.3     09-18        < from: MR Head w/wo IV Cont (09.15.22 @ 17:03) >    FINDINGS:  Status post craniotomy and resection of a right posterior frontal lobe   lesion. The postoperative bed measures 2.8 x 2.5 cm and demonstrates   trace residual enhancement in the posterior region. There is surrounding   devitalized tissue, edema and mass effect on the right lateral ventricle.   There is a 0.3 cm leftward midline shift.  Moderate pneumocephalus along anterior bifrontal convexities.    Redemonstration of a 1.4 x 1.2 x 1.3 cm enhancing right falcine mass that   extends 0.3 cm to the left parafalcine region, suggestive of meningioma.    No acute infarction or intracranial hemorrhage.    Few FLAIR hyperintense white matter foci, compatible with minimal chronic   small vessel changes.    No hydrocephalus. Small extradural collection adjacent to the craniotomy   flap measuring 4.5 mm in thickness. The skull base flow voids are present.    Left nasogastric tube. The visualized intraorbital contents are normal.   Mild mucosal thickening of the left maxillary sinus and a mucosal   polyp/retention cyst. The mastoid air cells are clear. The visualized   soft tissues and partially visualized parotid glands appear normal.    IMPRESSION:    Status post resection of a right posterior frontal lesion with small   residual enhancement and 0.3 cm leftward midline shift.        < end of copied text >          RADIOLOGY & ADDITIONAL TESTS:    Imaging Personally Reviewed:    Consultant(s) Notes Reviewed:      Care Discussed with Consultants/Other Providers:

## 2022-09-18 NOTE — PROGRESS NOTE ADULT - ASSESSMENT
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 CT head shows large 4cm mass in the R frontal lobe with mild associated edema, no midline shift.  NS planning for resection of brain mass.    s/p R crani for tumor resection, grossly appears metastatic lesion    NEURO:  - MRI WWO brain done  - Dex 4q6h for cerebral edema  - keppra for sz ppx  - pain control, pain management for cancer related pain  - f/u final path  - Activity: PT/OT as tolerated    RENAL:  -  tolerating full diet  - daily IOs  - TOV AM    GI:  - Diet: dysphagia eval, ADAT  - GI prophylaxis: PPI while on CCS    ENDO:   - FS goal 120-180  - New while on CCS  - A1c 6.1    HEME/ONC:  High grade metastatic sarcoma s/p bx 10/4/21, s/p Chemo/XRT, now on Votrient, Follows Dr. Osman at [facility] [place]  Current Treatment Status:. Gemzar/Taxotere -starting 12/3/21 --> POD 3/2022 ---> changed to Doxorubicin q3 weeks 3/18/22 --> POD 5/2022 --> changed to Votrient 6/2022 per hematology  - SCDs  - Chemoppx: hold d/t fresh post op, given hx of metastatic lesions, would need to start chemoppx when able  - LED  - onc for management of primary malignancy    pain  - cont oxycodone  - cont Methadone  - con gabapentin    constipation  - senna and miralax    dvt px  - lovenox    plan as per  NS

## 2022-09-18 NOTE — PROGRESS NOTE ADULT - SUBJECTIVE AND OBJECTIVE BOX
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 [facility] [place] p/w 2 weeks LUE weakness, CTH w/ large 4cm mass in the R frontal lobe with mild associated edema, no midline shift, now s/p R crani for tumor resection.    24 Hour Events/Subjective:  - POD4 s/p R crani for tumor resection, appears grossly metastatic  - last dose 9/14 votrien  stable exam, awaiting floor bed    REVIEW OF SYSTEMS:  as above    ICU Vital Signs Last 24 Hrs:    T(C): 36.9 (18 Sep 2022 05:00), Max: 37 (17 Sep 2022 07:00)  T(F): 98.4 (18 Sep 2022 05:00), Max: 98.6 (17 Sep 2022 07:00)  HR: 53 (17 Sep 2022 23:00) (51 - 60)  BP: 113/65 (17 Sep 2022 23:00) (111/73 - 125/73)  BP(mean): 81 (17 Sep 2022 23:00) (79 - 89)  ABP: --  ABP(mean): --  RR: 13 (17 Sep 2022 23:00) (12 - 14)  SpO2: 95% (17 Sep 2022 23:00) (93% - 98%)    I&O's Summary    16 Sep 2022 07:01  -  17 Sep 2022 07:00  --------------------------------------------------------  IN: 1990 mL / OUT: 1350 mL / NET: 640 mL    17 Sep 2022 07:01  -  18 Sep 2022 06:08  --------------------------------------------------------  IN: 650 mL / OUT: 1800 mL / NET: -1150 mL        PHYSICAL EXAM: Mandarin    General: cooperative  CVS: Reg, bradycardic  Pulm: CTAB  GI: Soft, NTND  Extremities: No LE Edema  Neuro: AOx3, PERRL, EOMI, dysarthric, left hand  4-/5, left bi/tri 4+/5, baseline right leg weakness (antigravity with support under knee but proximally weak - this is baseline per patient) and is AG throughout, LLE AG throughout   SUMMARY:   67M R hx of rectal CA (2011, s/p chemo/XRT) and 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 Osman at [facility] [place]) p/w 2 weeks LUE weakness, CTH w/ large 4cm mass in the R frontal lobe with mild associated edema, no midline shift, now s/p R crani for tumor resection.    24 Hour Events/Subjective:  - POD4 s/p R crani for tumor resection, appears grossly metastatic  - last dose 9/14 votrien  stable exam, awaiting floor bed    REVIEW OF SYSTEMS:  as above    ICU Vital Signs Last 24 Hrs:    T(C): 36.9 (18 Sep 2022 05:00), Max: 37 (17 Sep 2022 07:00)  T(F): 98.4 (18 Sep 2022 05:00), Max: 98.6 (17 Sep 2022 07:00)  HR: 53 (17 Sep 2022 23:00) (51 - 60)  BP: 113/65 (17 Sep 2022 23:00) (111/73 - 125/73)  BP(mean): 81 (17 Sep 2022 23:00) (79 - 89)  ABP: --  ABP(mean): --  RR: 13 (17 Sep 2022 23:00) (12 - 14)  SpO2: 95% (17 Sep 2022 23:00) (93% - 98%)    I&O's Summary    16 Sep 2022 07:01  -  17 Sep 2022 07:00  --------------------------------------------------------  IN: 1990 mL / OUT: 1350 mL / NET: 640 mL    17 Sep 2022 07:01  -  18 Sep 2022 06:08  --------------------------------------------------------  IN: 650 mL / OUT: 1800 mL / NET: -1150 mL        PHYSICAL EXAM: Coby viramontes [id number]  General: cooperative  CVS: Reg, bradycardic  Pulm: CTAB  GI: Soft, NTND  Extremities: No LE Edema  Neuro: AOx3, PERRL, EOMI, dysarthric, left hand  4-/5, left bi/tri 4+/5, baseline right leg weakness (antigravity with support under knee but proximally weak - this is baseline per patient) and is AG throughout, LLE AG throughout

## 2022-09-18 NOTE — PROGRESS NOTE ADULT - ASSESSMENT
ASSESSMENT/PLAN:  s/p R crani for tumor resection, grossly appears metastatic lesion 9/14    NEURO:  - Neurochecks q4h, Protected Sleep Time  - Dex 4q6h for cerebral edema taper per nsgy  - keppra for sz ppx bid  - pain control, pain management for cancer related pain (using methadone at home, gabapentin)  - f/u final path  - Activity: PT/OT as tolerated    CVS:  - SBP goal    tte ef 65%, nml    PULM:  - RA  - IS As tolerated  - Aspiration precautions    RENAL: IVL  - daily IOs  - voiding  - q6h bladder scan for intermittent retention     GI:  - Diet: passed S/S, reg diet  - GI prophylaxis: PPI while on CCS  - Bowel regimen: miralax, senna  - Last BM 9/16    ENDO:   - FS goal 120-180  - New while on CCS  - A1c 6.1    HEME/ONC:  High grade metastatic sarcoma s/p bx 10/4/21, s/p Chemo/XRT, now on Votrient, [place]. Osman at [facility] [place]  Current Treatment Status:. Gemzar/Taxotere -starting 12/3/21 --> POD 3/2022 ---> changed to Doxorubicin q3 weeks 3/18/22 --> POD 5/2022 --> changed to Votrient 200 mg qd 6/2022 continue per hematology  - SCDs  - Chemoppx: started 9/16 sql  - LED neg 9/14  - onc for management of primary malignancy  plt drop, trend daily, if worsens rule out HIT, consider switching keppra    ID: afebrile  - monitor for fevers    Dispo: Floor    At risk for death/neuro decline due to: tumor related cerebral edema (symptomatic) ASSESSMENT/PLAN:  s/p R crani for tumor resection, grossly appears metastatic lesion 9/14    NEURO:  - Neurochecks q4h, Protected Sleep Time  - Dex 4q6h for cerebral edema taper per nsgy  - keppra for sz ppx bid  - pain control, pain management for cancer related pain (using methadone at home, gabapentin)  - f/u final path  - Activity: PT/OT as tolerated    CVS:  - SBP goal    tte ef 65%, nml    PULM:  - RA  - IS As tolerated  - Aspiration precautions    RENAL: IVL  - daily IOs  - voiding  - q6h bladder scan for intermittent retention     GI:  - Diet: passed S/S, reg diet  - GI prophylaxis: PPI while on CCS  - Bowel regimen: miralax, senna  - Last BM 9/18    ENDO:   - FS goal 120-180  - New while on CCS  - A1c 6.1    HEME/ONC:  High grade metastatic sarcoma s/p bx 10/4/21, s/p Chemo/XRT, now on Votrient, Follow[place]. Osman at [facility] [place]  Current Treatment Status:. Gemzar/Taxotere -starting 12/3/21 --> POD 3/2022 ---> changed to Doxorubicin q3 weeks 3/18/22 --> POD 5/2022 --> changed to Votrient 200 mg qd 6/2022 continue per hematology  - SCDs  - Chemoppx: started 9/16 sql  - LED neg 9/14  - onc for management of primary malignancy  plt drop, trend daily, if worsens rule out HIT, consider switching keppra    ID: afebrile  - monitor for fevers    Dispo: Floor    At risk for death/neuro decline due to: tumor related cerebral edema (symptomatic)

## 2022-09-19 NOTE — PROGRESS NOTE ADULT - ASSESSMENT
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 CT head shows large 4cm mass in the R frontal lobe with mild associated edema, no midline shift.  NS planning for resection of brain mass.    s/p R crani for tumor resection, grossly appears metastatic lesion    NEURO:  - MRI WWO brain done  - Dex 4q6h for cerebral edema  - keppra for sz ppx  - pain control, pain management for cancer related pain  - f/u final path  - Activity: PT/OT as tolerated    RENAL:  -  tolerating full diet  - daily IOs    GI:  - Diet: dysphagia eval, ADAT  - GI prophylaxis: PPI while on CCS    ENDO:   - FS goal 120-180  - New while on CCS  - A1c 6.1    HEME/ONC:  High grade metastatic sarcoma s/p bx 10/4/21, s/p Chemo/XRT, now on Votrient, [place] [facility] [place] Treatment Status:. Gemzar/Taxotere -starting 12/3/21 --> POD 3/2022 ---> changed to Doxorubicin q3 weeks 3/18/22 --> POD 5/2022 --> changed to Votrient 6/2022 per hematology  - SCDs  - Chemoppx: hold d/t fresh post op, given hx of metastatic lesions, would need to start chemoppx when able  - LED  - onc for management of primary malignancy    pain  - cont oxycodone  - cont Methadone  - con gabapentin    constipation  - senna and miralax    dvt px  - lovenox    plan as per  NS

## 2022-09-19 NOTE — PROGRESS NOTE ADULT - SUBJECTIVE AND OBJECTIVE BOX
Patient was seen at bedside this am. Patient denied any headache, cp, sob, n/v, or abd pain.    OVERNIGHT EVENTS: no acute event overnight    Vital Signs Last 24 Hrs  T(C): 36.8 (19 Sep 2022 13:18), Max: 36.9 (19 Sep 2022 05:00)  T(F): 98.2 (19 Sep 2022 13:18), Max: 98.4 (19 Sep 2022 05:00)  HR: 58 (19 Sep 2022 13:18) (50 - 60)  BP: 108/66 (19 Sep 2022 13:18) (106/78 - 120/68)  BP(mean): --  RR: 18 (19 Sep 2022 13:18) (18 - 18)  SpO2: 98% (19 Sep 2022 13:18) (97% - 98%)    Parameters below as of 19 Sep 2022 13:18  Patient On (Oxygen Delivery Method): room air        I&O's Detail    18 Sep 2022 07:01  -  19 Sep 2022 07:00  --------------------------------------------------------  IN:    Oral Fluid: 740 mL  Total IN: 740 mL    OUT:    Intermittent Catheterization - Urethral (mL): 450 mL  Total OUT: 450 mL    Total NET: 290 mL        I&O's Summary    18 Sep 2022 07:01  -  19 Sep 2022 07:00  --------------------------------------------------------  IN: 740 mL / OUT: 450 mL / NET: 290 mL        PHYSICAL EXAM:  Neurological:  awake, alert, oriented to person, place, and date, PERRL, mild dysarthria, following commands, LUE 4+/5 but HG 4/5 (swollen 2/2 infiltrated IV), RUE 5/5,  Motor exam:             [] Lower extremity             HF         KF          KE        PF          DF                                          R        3/5        3/5        4/5       1/5        1/5    (baseline weakness per patient)                                          L         5/5        5/5       5/5        5/5        5/5                                                           [x] warm well perfused  Sensation: [x] intact to light touch  [] decreased:   Cardiovascular: +s1, s2  Respiratory: clear to auscultation b/l  Gastrointestinal: soft, non-distended, non-tender  Genitourinary: voiding  Extremities: warm, dry  Incision/Wound: incision site C/D/I    LABS:                        11.9   3.91  )-----------( 193      ( 19 Sep 2022 06:58 )             36.6     09-19    138  |  100  |  21  ----------------------------<  122<H>  3.9   |  27  |  0.43<L>    Ca    9.1      19 Sep 2022 06:58  Phos  3.3     09-18  Mg     2.3     09-18              CAPILLARY BLOOD GLUCOSE          Drug Levels: [] N/A    CSF Analysis: [] N/A      Allergies    No Known Allergies    Intolerances      MEDICATIONS:  Antibiotics:    Neuro:  acetaminophen     Tablet .. 650 milliGRAM(s) Oral every 6 hours PRN  gabapentin 1000 milliGRAM(s) Oral three times a day  levETIRAcetam  IVPB 500 milliGRAM(s) IV Intermittent every 12 hours  methadone   Solution 2 milliGRAM(s) Oral three times a day  oxyCODONE    IR 5 milliGRAM(s) Oral every 4 hours PRN  oxyCODONE    IR 10 milliGRAM(s) Oral every 4 hours PRN    Anticoagulation:  enoxaparin Injectable 40 milliGRAM(s) SubCutaneous <User Schedule>    OTHER:  artificial  tears Solution 2 Drop(s) Both EYES every 4 hours PRN  Biotene Dry Mouth Oral Rinse 5 milliLiter(s) Swish and Spit three times a day  bisacodyl 5 milliGRAM(s) Oral daily PRN  calcium carbonate    500 mG (Tums) Chewable 2 Tablet(s) Chew every 6 hours PRN  dexAMETHasone  Injectable 4 milliGRAM(s) IV Push every 8 hours  mupirocin 2% Nasal 1 Application(s) Both Nostrils two times a day  pantoprazole   Suspension 40 milliGRAM(s) Oral before breakfast  pazopanib 200 milliGRAM(s) Oral daily  polyethylene glycol 3350 17 Gram(s) Oral two times a day  senna 2 Tablet(s) Oral at bedtime  tetracaine/benzocaine/butamben Spray 1 Spray(s) Topical three times a day    IVF:    CULTURES:  Culture Results:   <10,000 CFU/mL Normal Urogenital Cristal (09-12 @ 20:22)    RADIOLOGY & ADDITIONAL TESTS:

## 2022-09-19 NOTE — PROGRESS NOTE ADULT - SUBJECTIVE AND OBJECTIVE BOX
DATE OF SERVICE: 09-19-22 @ 12:02    Patient is a 67y old  Male who presents with a chief complaint of Brain mass (18 Sep 2022 19:52)      SUBJECTIVE / OVERNIGHT EVENTS:  No chest pain. No shortness of breath. No complaints. No events overnight.     MEDICATIONS  (STANDING):  Biotene Dry Mouth Oral Rinse 5 milliLiter(s) Swish and Spit three times a day  dexAMETHasone  Injectable 4 milliGRAM(s) IV Push every 8 hours  enoxaparin Injectable 40 milliGRAM(s) SubCutaneous <User Schedule>  gabapentin 1000 milliGRAM(s) Oral three times a day  levETIRAcetam  IVPB 500 milliGRAM(s) IV Intermittent every 12 hours  methadone   Solution 2 milliGRAM(s) Oral three times a day  mupirocin 2% Nasal 1 Application(s) Both Nostrils two times a day  pantoprazole   Suspension 40 milliGRAM(s) Oral before breakfast  pazopanib 200 milliGRAM(s) Oral daily  polyethylene glycol 3350 17 Gram(s) Oral two times a day  senna 2 Tablet(s) Oral at bedtime  tetracaine/benzocaine/butamben Spray 1 Spray(s) Topical three times a day    MEDICATIONS  (PRN):  acetaminophen     Tablet .. 650 milliGRAM(s) Oral every 6 hours PRN Temp greater or equal to 38C (100.4F), Mild Pain (1 - 3)  artificial  tears Solution 2 Drop(s) Both EYES every 4 hours PRN Dry Eyes  bisacodyl 5 milliGRAM(s) Oral daily PRN Constipation  calcium carbonate    500 mG (Tums) Chewable 2 Tablet(s) Chew every 6 hours PRN Upset Stomach  oxyCODONE    IR 5 milliGRAM(s) Oral every 4 hours PRN Moderate Pain (4 - 6)  oxyCODONE    IR 10 milliGRAM(s) Oral every 4 hours PRN Severe Pain (7 - 10)      Vital Signs Last 24 Hrs  T(C): 36.5 (19 Sep 2022 09:36), Max: 36.9 (19 Sep 2022 05:00)  T(F): 97.7 (19 Sep 2022 09:36), Max: 98.4 (19 Sep 2022 05:00)  HR: 57 (19 Sep 2022 09:36) (50 - 66)  BP: 115/75 (19 Sep 2022 09:36) (111/62 - 120/68)  BP(mean): --  RR: 18 (19 Sep 2022 09:36) (16 - 18)  SpO2: 97% (19 Sep 2022 09:36) (97% - 99%)    Parameters below as of 19 Sep 2022 09:36  Patient On (Oxygen Delivery Method): room air      CAPILLARY BLOOD GLUCOSE        I&O's Summary    18 Sep 2022 07:01  -  19 Sep 2022 07:00  --------------------------------------------------------  IN: 740 mL / OUT: 450 mL / NET: 290 mL        PHYSICAL EXAM:  GENERAL: NAD, well-developed  HEAD:  Atraumatic, Normocephalic  EYES: EOMI, PERRLA, conjunctiva and sclera clear  NECK: Supple, No JVD  CHEST/LUNG: Clear to auscultation bilaterally; No wheeze  HEART: Regular rate and rhythm; No murmurs, rubs, or gallops  ABDOMEN: Soft, Nontender, Nondistended; Bowel sounds present  EXTREMITIES:  2+ Peripheral Pulses, No clubbing, cyanosis, or edema  PSYCH: AAOx3  NEUROLOGY: non-focal  SKIN: No rashes or lesions    LABS:                        11.9   3.91  )-----------( 193      ( 19 Sep 2022 06:58 )             36.6     09-19    138  |  100  |  21  ----------------------------<  122<H>  3.9   |  27  |  0.43<L>    Ca    9.1      19 Sep 2022 06:58  Phos  3.3     09-18  Mg     2.3     09-18                RADIOLOGY & ADDITIONAL TESTS:    Imaging Personally Reviewed:    Consultant(s) Notes Reviewed:      Care Discussed with Consultants/Other Providers:

## 2022-09-19 NOTE — PROGRESS NOTE ADULT - SUBJECTIVE AND OBJECTIVE BOX
Patient tolerating therapies.  Min A transfers and gait.    MEDICATIONS  (STANDING):  Biotene Dry Mouth Oral Rinse 5 milliLiter(s) Swish and Spit three times a day  dexAMETHasone  Injectable 4 milliGRAM(s) IV Push every 8 hours  enoxaparin Injectable 40 milliGRAM(s) SubCutaneous <User Schedule>  gabapentin 1000 milliGRAM(s) Oral three times a day  levETIRAcetam  IVPB 500 milliGRAM(s) IV Intermittent every 12 hours  methadone   Solution 2 milliGRAM(s) Oral three times a day  mupirocin 2% Nasal 1 Application(s) Both Nostrils two times a day  pantoprazole   Suspension 40 milliGRAM(s) Oral before breakfast  pazopanib 200 milliGRAM(s) Oral daily  polyethylene glycol 3350 17 Gram(s) Oral two times a day  senna 2 Tablet(s) Oral at bedtime  tetracaine/benzocaine/butamben Spray 1 Spray(s) Topical three times a day    MEDICATIONS  (PRN):  acetaminophen     Tablet .. 650 milliGRAM(s) Oral every 6 hours PRN Temp greater or equal to 38C (100.4F), Mild Pain (1 - 3)  artificial  tears Solution 2 Drop(s) Both EYES every 4 hours PRN Dry Eyes  bisacodyl 5 milliGRAM(s) Oral daily PRN Constipation  calcium carbonate    500 mG (Tums) Chewable 2 Tablet(s) Chew every 6 hours PRN Upset Stomach  oxyCODONE    IR 5 milliGRAM(s) Oral every 4 hours PRN Moderate Pain (4 - 6)  oxyCODONE    IR 10 milliGRAM(s) Oral every 4 hours PRN Severe Pain (7 - 10)    Vital Signs Last 24 Hrs  T(C): 36.8 (19 Sep 2022 13:18), Max: 36.9 (19 Sep 2022 05:00)  T(F): 98.2 (19 Sep 2022 13:18), Max: 98.4 (19 Sep 2022 05:00)  HR: 58 (19 Sep 2022 13:18) (50 - 60)  BP: 108/66 (19 Sep 2022 13:18) (106/78 - 120/68)  BP(mean): --  RR: 18 (19 Sep 2022 13:18) (18 - 18)  SpO2: 98% (19 Sep 2022 13:18) (97% - 98%)    PHYSICAL EXAM  Constitutional - NAD, Comfortable  HEENT - Incision intact, EOMI  Neck - Supple, No limited ROM  Chest - CTA bilaterally, No wheeze, No rhonchi, No crackles  Cardiovascular - RRR, S1S2, No murmurs  Abdomen - BS+, Soft, NTND  Extremities - No C/C/E, No calf tenderness   Neurologic Exam -                 Alert  LUE/LLE 3/5, RLE 3/5, RUE 4+/5  Follows verbal instruction     Psychiatric - Mood stable, Affect WNL                          11.9   3.91  )-----------( 193      ( 19 Sep 2022 06:58 )             36.6     09-19    138  |  100  |  21  ----------------------------<  122<H>  3.9   |  27  |  0.43<L>    Ca    9.1      19 Sep 2022 06:58  Phos  3.3     09-18  Mg     2.3     09-18    Impression:   66 yo with functional deficits secondary to diagnosis of brain mass    Plan:  PT- ROM, Bed Mob, Transfers, Amb w AD and bracing as needed  OT- ADLs, bracing  SLP- Dysphagia eval and treat  Prec- Falls, Cardiac  DVT Prophylaxis- Lovenox  Skin- Turn q2 h  Dispo- Acute Rehab- can tolerate 3h/d of therapies and requires daily physician visits

## 2022-09-19 NOTE — PROGRESS NOTE ADULT - ASSESSMENT
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 p/w 2 weeks LUE weakness, CTH w/ large 4cm mass in the R frontal lobe with mild associated edema, no midline shift, now s/p R crani for tumor resection.    PLAN:  - neuro and vital check Q4hrs  - MRI on 9/15 showed status post resection of a right posterior frontal lesion with small residual enhancement and 0.3 cm leftward midline shift.  - continue decadron 4q8hrs for cerebral edema  - pain control with tylenol and oxycodone prn, continue home dose methadone and gabapentin  - continue keppra for seizure ppx  - rectal CA continue Votrient  - tolerating regular diet  - last BM today, continue current bowel regimens  - activity ambulate as tolerated  - incentive spirometer  - DVT ppx: b/l SCDs and SQL  - Disposition: PT/OT/PMR- acute rehab pending, COVID test sent today, will f/u results    [facility] [id number]

## 2022-09-20 NOTE — PROGRESS NOTE ADULT - REASON FOR ADMISSION
Brain mass

## 2022-09-20 NOTE — PROGRESS NOTE ADULT - THIS PATIENT HAS THE FOLLOWING CONDITION(S)/DIAGNOSES ON THIS ADMISSION:
Cerebral Edema
Cerebral Edema/Brain Compression / Herniation
None
Cerebral Edema/Brain Compression / Herniation
Cerebral Edema
Cerebral Edema/Brain Compression / Herniation
None
Cerebral Edema
Cerebral Edema/Brain Compression / Herniation
None
None/Cerebral Edema

## 2022-09-20 NOTE — H&P ADULT - NSHPSOCIALHISTORY_GEN_ALL_CORE
Pt lives with wife in private house with 3 steps to enter with HR. +1st floor setup. PTA pt was ambulating with R/W (I) and required some assist with ADLs Pt lives with wife in private house with 3 steps to enter with HR. +1st floor setup. PTA pt was ambulating with R/W (I) and required some assist with ADLs  Rh dominant. He reports that his pain was controlled at home. Did not have any prior surgery for sarcoma. Ambulation and strength worsened in last week prior to hosptialization

## 2022-09-20 NOTE — H&P ADULT - NSHPPHYSICALEXAM_GEN_ALL_CORE
Vital Signs  T(C): 36.6 (09-20-22 @ 17:23), Max: 37.1 (09-20-22 @ 00:11)  HR: 57 (09-20-22 @ 17:23) (49 - 66)  BP: 125/74 (09-20-22 @ 17:23) (106/70 - 128/76)  RR: 18 (09-20-22 @ 17:23) (17 - 18)  SpO2: 98% (09-20-22 @ 17:23) (95% - 98%)    Gen - Mandarin speaking, AO3, PERRL, EOMI  Neck - Supple, No limited ROM  Pulm - CTAB, No wheeze, No rhonchi, No crackles  Cardiovascular - RRR, S1S2, No m/r/g  Abdomen - Soft, NT/ND, +BS  Extremities - No C/C/E, No calf tenderness  Neuro-     Cognitive - AAOx3     Communication - Fluent, No dysarthria     Attention: Intact      Judgement: Good evidence of judgement     Cranial Nerves - CN 2-12 intact; mild      Motor -                    LEFT    UE - ShAB 4/5, EF 4/5, EE 5/5, WE 4/5,  4/5                    RIGHT UE - ShAB 4/5, EF 4/5, EE 4/5, WE 4/5,  4/5                    LEFT    LE - HF 4/5, KE 4/5, DF 4/5, PF 4/5                    RIGHT LE - HF 2/5 (?effort), KE 3/5, DF 1/5, PF 1/5        Sensory - Intact to LT     Reflexes - DTR Intact, No primitive reflex     Coordination - FTN intact     Tone - normal  Psychiatric - Mood stable, Affect WNL  Skin: Intact; chemoport present right pectoral Vital Signs  T(C): 36.6 (09-20-22 @ 17:23), Max: 37.1 (09-20-22 @ 00:11)  HR: 57 (09-20-22 @ 17:23) (49 - 66)  BP: 125/74 (09-20-22 @ 17:23) (106/70 - 128/76)  RR: 18 (09-20-22 @ 17:23) (17 - 18)  SpO2: 98% (09-20-22 @ 17:23) (95% - 98%)    Gen - Mandarin speaking, AO3, PERRL, EOMI Low vocal volume but appropriate, O x 3 follows 1-2 step commands    Pulm - CTAB, No wheeze, No rhonchi, No crackles. Fair effort  Cardiovascular - RRR, S1S2, No m/r/g  Abdomen - Soft, NT/ND, +BS  Extremities - , No calf tenderness  RLE trace non pitting edema,+ertyehma, blanchable, no warmt no cord palpable +asymmetric  Neuro-     Cognitive - AAOx3     Communication - Fluent, No dysarthria     Attention: Intact      Judgement: Good evidence of judgement     Cranial Nerves - CN 2-12 intact; mild      Motor -                    LEFT    UE - ShAB 4/5, EF 4/5, EE 5/5, WE 4/5,  4/5                    RIGHT UE - ShAB 4/5, EF 4/5, EE 4/5, WE 4/5,  4/5                    LEFT    LE - HF 4/5, KE 4/5, DF 4/5, PF 4/5                    RIGHT LE - HF 2/5 , KE 3/5, DF 1/5, PF 1/5        Sensory - Intact to LT     Reflexes - DTR Intact, No primitive reflex     Coordination - FTN intact     Tone - normal  Psychiatric - Mood stable, Affect WNL  Skin: Intact; chemo port present right pectoral

## 2022-09-20 NOTE — DISCHARGE NOTE PROVIDER - HOSPITAL COURSE
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 CT head shows large 4cm mass in the R frontal lobe with mild associated edema, no midline shift. Exam: Mandardin speaking, AO3, PERRL, EOMI, L droop and drift, L hand  4-/5, L bi/tri 4+/5, otherwise intact (although would not fully comply with df/pf testing).    On 9/12 patient was admitted under Neurosurgery Dr. Day for surgical planning. Patient was seen by Internal Medicine for clearance. Palliative Team was following patient for pain management as he is on Methadone. Patient was also seen by Hematology Oncology as he was being treated by Dr. Moon at [facility] for metastatic high grade sarcoma and was receiving Palliative RT with Dr. Alvarado, patient was continued on his oral chemotherapy Votrient. Patient had CT CAP 9/12 which revealed increased size of locally advanced pancreatic tail tumor, progressive pulmonary and pleural metastases, similar right iliopsoas, he also had CT T/L spine which reveals no pathologic fracture or lytic / destructive lesions involving the thoracic and lumbar spine; S1-3 pathologic fracture no retropulsed bone, similar appearing right sided epidural metastases in the lumbosacral region. He had lower extremity dopplers which were negative for DVT 9/14. TTE 9/13 EF 65% normal echocardiogram. On 9/14 patient underwent right craniotomy for tumor resection 9/14/22, patient tolerated procedure well [facility]. On 9/15 patient had his post-op CT which revealed post-op changes and he had his MRI Brain on 9/15 which revealed post-op resection of right posterior frontal lesion with small residual enhancement and 0.3cm leftward midline shift. Patient continued to be on his Decadron taper. On 9/18 patient transitioned from [facility] to [facility] and PT/OT evaluated him and deemed him a candidate for acute rehab. PMR evaluated patient. On 9/20 patient was discharge to [facility], he is neurologically and hemodynamically stable.

## 2022-09-20 NOTE — PROGRESS NOTE ADULT - ASSESSMENT
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 CT head shows large 4cm mass in the R frontal lobe with mild associated edema, no midline shift.  NS planning for resection of brain mass.    s/p R crani for tumor resection, grossly appears metastatic lesion    NEURO:  - MRI WWO brain done  - Dex 4q6h for cerebral edema  - keppra for sz ppx  - pain control, pain management for cancer related pain  - f/u final path  - Activity: PT/OT as tolerated    RENAL:  -  tolerating full diet  - daily IOs    GI:  - Diet: dysphagia eval, ADAT  - GI prophylaxis: PPI while on CCS    ENDO:   - FS goal 120-180  - New while on CCS  - A1c 6.1    HEME/ONC:  High grade metastatic sarcoma s/p bx 10/4/21, s/p Chemo/XRT, now on Votrient, Follows Dr. Osman at [facility]  Current Treatment Status:. Gemzar/Taxotere -starting 12/3/21 --> POD 3/2022 ---> changed to Doxorubicin q3 weeks 3/18/22 --> POD 5/2022 --> changed to Votrient 6/2022 per hematology  - SCDs  - Chemoppx: hold d/t fresh post op, given hx of metastatic lesions, would need to start chemoppx when able  - LED  - onc for management of primary malignancy    pain  - cont oxycodone  - cont Methadone  - con gabapentin    constipation  - senna and miralax    dvt px  - lovenox    plan as per  NS

## 2022-09-20 NOTE — DISCHARGE NOTE NURSING/CASE MANAGEMENT/SOCIAL WORK - NSDCPEFALRISK_GEN_ALL_CORE
For information on Fall & Injury Prevention, visit: [email or web address] OR  [email or web address] OR  https://www.cdc.gov/steadi/patient.html

## 2022-09-20 NOTE — DISCHARGE NOTE PROVIDER - NSDCFUADDINST_GEN_ALL_CORE_FT
Please make all necessary appointments and follow up. Please DO NOT take any Aspirin and NSAIDs (Advil, Aleve, Motrin, Ibuprofen) until cleared by your Neurosurgeon. Please DO NOT do any heavy lifting, bending, twisting and straining. You have surgical staples, they will be removed on post-op day 12 which will be on 9/26/22. You may shower, but NO SOAP / NO SHAMPOO. DO NOT do any scrubbing. Pat dry only. Please come to the emergency room for any of the following: altered mental status, seizures, pain uncontrolled by pain medications, fevers, leaking / bleeding from surgical site, chest pain and shortness of breath.

## 2022-09-20 NOTE — H&P ADULT - HISTORY OF PRESENT ILLNESS
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 Patient was admitted on 9/12 under Neurosurgery Dr. Day for surgical planning. Additionally Patient had CT CAP 9/12 which revealed increased size of locally advanced pancreatic tail tumor, progressive pulmonary and pleural metastases, similar right iliopsoas, he also had CT T/L spine which revealed no pathologic fracture or lytic / destructive lesions involving the thoracic and lumbar spine; S1-3 pathologic fracture no retropulsed bone, similar appearing right sided epidural metastases in the lumbosacral region. On 9/14 patient underwent right craniotomy for tumor resection 9/14/22, patient tolerated procedure well and was in [facility] for post-op care and management. On 9/15 patient had his post-op CT which revealed post-op changes and he had his MRI Brain on 9/15 which revealed post-op resection of right posterior frontal lesion with small residual enhancement and 0.3cm leftward midline shift. Patient continued to be on his Decadron taper. On 9/18 patient transitioned from [facility] to [facility] and PT/OT evaluated him and deemed him a candidate for acute rehab. Now admitted for multidisciplinary rehabilitation program.    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 CT head showed large 4cm mass in the R frontal lobe with mild associated edema, no midline shift. Patient was admitted under Neurosurgery Dr. Day for surgical planning.     CT CAP 9/12 revealed increased size of locally advanced pancreatic tail tumor, progressive pulmonary and pleural metastases. Similar-appearing right iliopsoas. CT T/L spine revealed no pathologic fracture or lytic / destructive lesions involving the thoracic and lumbar spine; S1-3 pathologic fracture no retropulsed bone, similar-appearing right sided epidural metastases in the lumbosacral region. On 9/14 patient underwent right craniotomy for tumor resection 9/14/22.    )ost-op CT 9/15 revealed post-op changes. MRI Brain 9/15 revealed post-op resection of right posterior frontal lesion with small residual enhancement and 0.3cm leftward midline shift. Patient placed on Decadron taper. Now admitted for multidisciplinary rehabilitation program 9/20/22.    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 CT head showed large 4cm mass in the R frontal lobe with mild associated edema, no midline shift. Patient was admitted under Neurosurgery Dr. Day for surgical planning.     CT CAP 9/12 revealed increased size of locally advanced pancreatic tail tumor, progressive pulmonary and pleural metastases. Similar-appearing right iliopsoas. CT T/L spine revealed no pathologic fracture or lytic / destructive lesions involving the thoracic and lumbar spine; S1-3 pathologic fracture no retropulsed bone, similar-appearing right sided epidural metastases in the lumbosacral region. On 9/14 patient underwent right craniotomy for tumor resection 9/14/22.    Post-op CT 9/15 revealed post-op changes. MRI Brain 9/15 revealed post-op resection of right posterior frontal lesion with small residual enhancement and 0.3cm leftward midline shift. Patient placed on Decadron taper. Now admitted for multidisciplinary rehabilitation program 9/20/22.

## 2022-09-20 NOTE — DISCHARGE NOTE PROVIDER - PROVIDER TOKENS
PROVIDER:[TOKEN:[[id number]]],PROVIDER:[TOKEN:[[id number]]],PROVIDER:[TOKEN:[[id number]]],PROVIDER:[TOKEN:[[id number]]]

## 2022-09-20 NOTE — DISCHARGE NOTE PROVIDER - NSDCCPCAREPLAN_GEN_ALL_CORE_FT
PRINCIPAL DISCHARGE DIAGNOSIS  Diagnosis: Brain lesion  Assessment and Plan of Treatment: s/p right craniotomy for tumor resection 9/14/22  Please make an appointment and follow up with Dr. Day in one week after discharge from  You have surgical staples that can be removed on post-op day 12 which is on 9/26/22. You may shower however no soap / no shampoo / no scrubbing at incision site. PAT DRY ONLY. Please continue to take Keppra for seizure prophylaxis. You have been started on a new medication, keppra.  Keppra helps control and prevent seizures.  The most common side effects include diarrhea or constipation, excessive sleepiness, irritability and mood changes.  Rare and sometimes serious side effects include rash. Continue to take Decadron taper 14 day total taper to 2mg daily. You may take Tylenol as needed for mild pain or you may take Oxycodone as needed for moderate to severe pain. When you follow up with Dr. Day after rehab he will discuss the surgical pathology with you then.   Please make an appointment and follow up with your primary care provider in one week after discharge from       SECONDARY DISCHARGE DIAGNOSES  Diagnosis: Cancer related pain  Assessment and Plan of Treatment: Please continue to take Methadone as prescribed. You were seen by Palliative Team during your hospitalization and you will follow up with Dr. Cabral or ELBA Arteaga in 2 weeks after your discharge from rehab.    Diagnosis: Sarcoma  Assessment and Plan of Treatment: Please make an appointment and follow up in one week after discharge from rehab with Dr. Moon (Oncology) and Dr. Alvarado (Rad Onc). Please continue to take your oral chemotherapy Votrient 200mg daily as prescribed by your doctor.    Diagnosis: Weakness of left side of body  Assessment and Plan of Treatment:

## 2022-09-20 NOTE — PROGRESS NOTE ADULT - PROVIDER SPECIALTY LIST ADULT
Internal Medicine
NSICU
Neurosurgery
Rehab Medicine
Internal Medicine
NSICU
NSICU
Neurosurgery
Neurosurgery
Internal Medicine
NSICU
NSICU
Neurosurgery
Neurosurgery
Palliative Care
Internal Medicine
Internal Medicine
Neurosurgery
NSICU
Palliative Care

## 2022-09-20 NOTE — DISCHARGE NOTE PROVIDER - CARE PROVIDERS DIRECT ADDRESSES
,[email or web address]

## 2022-09-20 NOTE — DISCHARGE NOTE PROVIDER - NSDCMRMEDTOKEN_GEN_ALL_CORE_FT
acetaminophen 325 mg oral tablet: 2 tab(s) orally every 6 hours, As needed, Temp greater or equal to 38C (100.4F), Mild Pain (1 - 3)  benzocaine/butamben/tetracaine 14%-2%-2% mucous membrane aerosol: 1 spray(s) mucous membrane 3 times a day  bisacodyl 5 mg oral delayed release tablet: 1 tab(s) orally once a day, As needed, Constipation  calcium carbonate 500 mg (200 mg elemental calcium) oral tablet, chewable: 2 tab(s) orally every 6 hours, As needed, Upset Stomach  dexamethasone: 4 milligram(s) orally every 8 hours for 2 days, then 4 mg orally every 12 hours for 4 days, then 3 mg orally every 12 hours for 4 days, then 2 mg orally every 12 hours for 4 days, then continue with 2mg daily.   enoxaparin: 40 milligram(s) subcutaneous once a day (at bedtime)  gabapentin: 1000 milligram(s) orally 3 times a day  levETIRAcetam 500 mg oral tablet: 1 tab(s) orally 2 times a day  methadone 10 mg/5 mL oral solution: 1 milliliter(s) orally 3 times a day  ocular lubricant ophthalmic solution: 2 drop(s) to each affected eye every 4 hours, As needed, Dry Eyes  oxyCODONE 10 mg oral tablet: 1 tab(s) orally every 4 hours, As needed, Severe Pain (7 - 10)  oxyCODONE 5 mg oral tablet: 1 tab(s) orally every 4 hours, As needed, Moderate Pain (4 - 6)  pantoprazole 40 mg oral granule, delayed release: 40 milligram(s) orally once a day (before a meal)  PAZOPanib 200 mg oral tablet: 1 tab(s) orally once a day  polyethylene glycol 3350 oral powder for reconstitution: 17 gram(s) orally 2 times a day  saliva substitutes oral solution: 5 milliliter(s) swish and spit, three times a day  senna leaf extract oral tablet: 2 tab(s) orally once a day (at bedtime)

## 2022-09-20 NOTE — PROGRESS NOTE ADULT - SUBJECTIVE AND OBJECTIVE BOX
Brief f/u with patient this morning, wife at bedside. Pt is awake and alert, following instructions appropriately, no complaints at this time. Case d/w neurosurgery team who is coordinating for transfer to acute inpatient rehab at [place] [facility] today. Pt's pain remains well controlled at this time.     Discharge regimen for pain: methadone 2 mg (1mL) TID, oxycodone 10 mg q4 hours prn, gabapentin 900 mg TID, mirala/senna for bowel regimen    Please have pt follow up with Dr. Cabral or Jenni Adams in 2 weeks for continued pain management. I will communicate with the outpatient palliative team re: his hospital course.     An MD Mac  GAP Team Consults  Please call if we can be of assistance, [phone]

## 2022-09-20 NOTE — H&P ADULT - NSHPLABSRESULTS_GEN_ALL_CORE
< from: MR Head w/wo IV Cont (09.15.22 @ 17:03) >  IMPRESSION:    Status post resection of a right posterior frontal lesion with small   residual enhancement and 0.3 cm leftward midline shift. < from: MR Head w/wo IV Cont (09.15.22 @ 17:03) >  IMPRESSION:    Status post resection of a right posterior frontal lesion with small   residual enhancement and 0.3 cm leftward midline shift.    RECENT LABS    Vital Signs Last 24 Hrs  T(C): 36.7 (20 Sep 2022 12:56), Max: 37.3 (19 Sep 2022 17:13)  T(F): 98 (20 Sep 2022 12:56), Max: 99.2 (19 Sep 2022 17:13)  HR: 66 (20 Sep 2022 12:56) (49 - 66)  BP: 118/71 (20 Sep 2022 12:56) (106/70 - 128/76)  BP(mean): --  RR: 18 (20 Sep 2022 12:56) (17 - 18)  SpO2: 96% (20 Sep 2022 12:56) (95% - 98%)                          12.1   4.94  )-----------( 201      ( 20 Sep 2022 06:04 )             36.4     09-20    140  |  102  |  30<H>  ----------------------------<  124<H>  4.1   |  27  |  0.44<L>    Ca    8.8      20 Sep 2022 06:04          CAPILLARY BLOOD GLUCOSE

## 2022-09-20 NOTE — H&P ADULT - NSHPREVIEWOFSYSTEMS_GEN_ALL_CORE
Constitutional: No fever, No Chills. Some fatigue. No headache  HEENT: No eye pain, No visual disturbances, No difficulty hearing, No Dysphagia   Pulm: No cough,  No shortness of breath  Cardio: No chest pain, No palpitations  GI:  No abdominal pain, No nausea, No vomiting, No diarrhea, No constipation, No incontinence, Last Bowel Movement on morning admission - 9/20  : No dysuria, No frequency, No hematuria  Neuro: No headaches. Loss of strength in RLE, sensation is a bit "strange" in both lower extremities  Skin: No itching, No rashes, No lesions   Endo: No temperature intolerance  MSK: No joint pain, No joint swelling, No muscle pain, No Neck or back pain  Psych:  No depression, No anxiety Constitutional: No fever, No Chills. Some fatigue. +mild H/A and dizziness  +craniotomy incision  HEENT: No eye pain, No visual disturbances, No difficulty hearing, No Dysphagia   Pulm: No cough,  No shortness of breath  Cardio: No chest pain, No palpitations  GI:  No abdominal pain, No nausea, No vomiting, No diarrhea, No constipation, No incontinence, Last Bowel Movement on morning admission - 9/20  : No dysuria, No frequency, No hematuria  Neuro: Loss of strength in RLE, sensation is a bit "strange" in both lower extremities  Skin: No itching, No rashes, No lesions     MSK: +chronic RLE swelling although worsened per patient. Usually wears compression stockings at home. +pain hip managed with chronic pain medications  Psych:  No depression, No anxiety

## 2022-09-20 NOTE — DISCHARGE NOTE NURSING/CASE MANAGEMENT/SOCIAL WORK - PATIENT PORTAL LINK FT
You can access the FollowMyHealth Patient Portal offered by [facility] by registering at the following website: [email or web address]. By joining [employer]’s FollowMyHealth portal, you will also be able to view your health information using other applications (apps) compatible with our system.

## 2022-09-20 NOTE — PROGRESS NOTE ADULT - SUBJECTIVE AND OBJECTIVE BOX
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 CT head shows large 4cm mass in the R frontal lobe with mild associated edema, no midline shift. Exam: Mandardin speaking, AO3, PERRL, EOMI, L droop and drift, L hand  4-/5, L bi/tri 4+/5, otherwise intact (although would not fully comply with df/pf testing)   (12 Sep 2022 18:02)      OVERNIGHT EVENTS: No acute events overnight, patient seen and evaluated at bedside with his wife, used Mandarin  [id number]. Reports doing well, looking forward to going to rehab.     Vital Signs Last 24 Hrs  T(C): 36.4 (20 Sep 2022 04:15), Max: 37.3 (19 Sep 2022 17:13)  T(F): 97.6 (20 Sep 2022 04:15), Max: 99.2 (19 Sep 2022 17:13)  HR: 49 (20 Sep 2022 04:15) (49 - 61)  BP: 110/61 (20 Sep 2022 04:15) (106/70 - 112/72)  BP(mean): --  RR: 18 (20 Sep 2022 04:15) (17 - 18)  SpO2: 95% (20 Sep 2022 04:15) (95% - 98%)    Parameters below as of 20 Sep 2022 04:15  Patient On (Oxygen Delivery Method): room air        PHYSICAL EXAM:    General: No Acute Distress     Neurological: Awake, Ox3, PERRL, EOMI, following commands, no drift, LUE biceps / triceps 4+/5 and HG 4+/5, RUE 5/5, LLE 4+/5, RLE KF 3/5 and distally 2/5 with PF/DF 1/5, sensation decreased on left side    Pulmonary: Clear to Auscultation, No Rales, No Rhonchi, No Wheezes     Cardiovascular: S1, S2, Regular Rate and Rhythm     Gastrointestinal: Soft, Nontender, Nondistended     Incision: right crani incision with staples in place, C/D/I    LABS:                        12.1   4.94  )-----------( 201      ( 20 Sep 2022 06:04 )             36.4    09-20    140  |  102  |  30<H>  ----------------------------<  124<H>  4.1   |  27  |  0.44<L>    Ca    8.8      20 Sep 2022 06:04          09-19 @ 07:01  -  09-20 @ 07:00  --------------------------------------------------------  IN: 240 mL / OUT: 0 mL / NET: 240 mL      DRAINS: None    MEDICATIONS:  Antibiotics:    Neuro:  acetaminophen     Tablet .. 650 milliGRAM(s) Oral every 6 hours PRN Temp greater or equal to 38C (100.4F), Mild Pain (1 - 3)  gabapentin 1000 milliGRAM(s) Oral three times a day  levETIRAcetam 500 milliGRAM(s) Oral two times a day  methadone   Solution 2 milliGRAM(s) Oral three times a day  oxyCODONE    IR 5 milliGRAM(s) Oral every 4 hours PRN Moderate Pain (4 - 6)  oxyCODONE    IR 10 milliGRAM(s) Oral every 4 hours PRN Severe Pain (7 - 10)    Cardiac:    Pulm:    GI/:  bisacodyl 5 milliGRAM(s) Oral daily PRN Constipation  calcium carbonate    500 mG (Tums) Chewable 2 Tablet(s) Chew every 6 hours PRN Upset Stomach  pantoprazole   Suspension 40 milliGRAM(s) Oral before breakfast  polyethylene glycol 3350 17 Gram(s) Oral two times a day  senna 2 Tablet(s) Oral at bedtime    Other:   artificial  tears Solution 2 Drop(s) Both EYES every 4 hours PRN Dry Eyes  Biotene Dry Mouth Oral Rinse 5 milliLiter(s) Swish and Spit three times a day  dexAMETHasone     Tablet   Oral   dexAMETHasone     Tablet 4 milliGRAM(s) Oral every 8 hours  enoxaparin Injectable 40 milliGRAM(s) SubCutaneous <User Schedule>  pazopanib 200 milliGRAM(s) Oral daily  tetracaine/benzocaine/butamben Spray 1 Spray(s) Topical three times a day    DIET: [x - easy to chew] Regular [] CCD [] Renal [] Puree [] Dysphagia [] Tube Feeds:     IMAGING:   < from: MR Head w/wo IV Cont (09.15.22 @ 17:03) >  IMPRESSION:    Status post resection of a right posterior frontal lesion with small   residual enhancement and 0.3 cm leftward midline shift.      --- End of Report ---       DANIELLA VILLALPANDO MD; Resident Radiologist  This document has been electronically signed.  MARK NOEL MD; Attending Radiologist  This document has been electronically signed. Sep 16 2022  4:02PM    < end of copied text >

## 2022-09-20 NOTE — H&P ADULT - ATTENDING COMMENTS
Progress note amended to include my discussions with patient, resident, hospitalist, RN, SW, PT and my findings      RECENT LABS    Vital Signs Last 24 Hrs  T(C): 36.4 (21 Sep 2022 08:01), Max: 36.7 (20 Sep 2022 12:56)  T(F): 97.5 (21 Sep 2022 08:01), Max: 98 (20 Sep 2022 12:56)  HR: 53 (21 Sep 2022 08:01) (51 - 66)  BP: 101/64 (21 Sep 2022 08:01) (101/64 - 128/76)  BP(mean): --  RR: 15 (21 Sep 2022 08:01) (15 - 18)  SpO2: 99% (21 Sep 2022 08:01) (96% - 99%)    Parameters below as of 21 Sep 2022 08:01  Patient On (Oxygen Delivery Method): room air                              11.1   5.37  )-----------( 199      ( 21 Sep 2022 06:00 )             32.7     09-21    137  |  102  |  27<H>  ----------------------------<  114<H>  3.9   |  32<H>  |  0.46<L>    Ca    8.1<L>      21 Sep 2022 06:00    TPro  5.5<L>  /  Alb  2.4<L>  /  TBili  0.5  /  DBili  x   /  AST  20  /  ALT  41  /  AlkPhos  94  09-21        CAPILLARY BLOOD GLUCOSE Progress note amended to include my discussions with patient, resident, hospitalist, RN, SW, PT and my findings    Patient is RH dominant male seen with [employer] phone  in [facility] #[phone]. wife also present    Patient sustained eye opening, spontaneous. Pleasant, appears comfortable. Craniotomy incision intact with staples, very trace subcutaneous swelling distal pole, no drainage or oozing. No erythema or warmth, no fluctuance or induration, no TTP,.  EOMI, PERRL. Patient reports very mild dizziness and H/A -like symptoms post surgery "different" from pre-surgery. Symptoms due to tumor and post op changes, and importance of monitoring and letting staff know if symptoms change/worsen, reinforced, patient verbalized understanding.  Denies any constipation or dysuria.    left shoulder 5-/5 elbow flexion and extension 4+/5 gross grasp 4+/5. RUE motor 5-/5. Normal tone bilaterally and no pain with ROM, no hand swelling. right HF 2/5 (resistance not applied due to history of sarcoma and sacral fractures)  quad 4-/5 ankle PF 1/5 DF 0/5. Left HF 4/ 5 quad 4+/5 ankle PF and DF 5/5. Negative SLR no sensory deficits LT. Did not have AFO for right foot drop previously . RLE also appears erythematous although not warm or TTP +blanchable and good temperature. Patient reports he has baseline trace swelling but discoloration and swelling more than usual; usually uses compression stockings. no LLE discoloration or swelling. Will order doppler, TEDs.    comprehensive rehab program in progress. encourag epo fluids, monitor BMP, labs scheduled for AM      RECENT LABS    Vital Signs Last 24 Hrs  T(C): 36.4 (21 Sep 2022 08:01), Max: 36.7 (20 Sep 2022 12:56)  T(F): 97.5 (21 Sep 2022 08:01), Max: 98 (20 Sep 2022 12:56)  HR: 53 (21 Sep 2022 08:01) (51 - 66)  BP: 101/64 (21 Sep 2022 08:01) (101/64 - 128/76)  BP(mean): --  RR: 15 (21 Sep 2022 08:01) (15 - 18)  SpO2: 99% (21 Sep 2022 08:01) (96% - 99%)    Parameters below as of 21 Sep 2022 08:01  Patient On (Oxygen Delivery Method): room air                              11.1   5.37  )-----------( 199      ( 21 Sep 2022 06:00 )             32.7     09-21    137  |  102  |  27<H>  ----------------------------<  114<H>  3.9   |  32<H>  |  0.46<L>    Ca    8.1<L>      21 Sep 2022 06:00    TPro  5.5<L>  /  Alb  2.4<L>  /  TBili  0.5  /  DBili  x   /  AST  20  /  ALT  41  /  AlkPhos  94  09-21        CAPILLARY BLOOD GLUCOSE Progress note amended to include my discussions with patient, resident, hospitalist, RN, SW, PT and my findings    Patient is RH dominant male seen with [employer] phone  in [facility] #[phone]. wife also present    Patient sustained eye opening, spontaneous. Pleasant, appears comfortable. Craniotomy incision intact with staples, very trace subcutaneous swelling distal pole, no drainage or oozing. No erythema or warmth, no fluctuance or induration, no TTP,.  EOMI, PERRL. Patient reports very mild dizziness and H/A -like symptoms post surgery "different" from pre-surgery. Symptoms due to tumor and post op changes, and importance of monitoring and letting staff know if symptoms change/worsen, reinforced, patient verbalized understanding.  Denies any constipation or dysuria.    left shoulder 5-/5 elbow flexion and extension 4+/5 gross grasp 4+/5. RUE motor 5-/5. Normal tone bilaterally and no pain with ROM, no hand swelling. right HF 2/5 (resistance not applied due to history of sarcoma and sacral fractures)  quad 4-/5 ankle PF 1/5 DF 0/5. Left HF 4/ 5 quad 4+/5 ankle PF and DF 5/5. Negative SLR no sensory deficits LT. Did not have AFO for right foot drop previously . RLE also appears erythematous although not warm or TTP +blanchable and good temperature. Patient reports he has baseline trace swelling but discoloration and swelling more than usual; usually uses compression stockings. no LLE discoloration or swelling. Will order doppler, TEDs.    comprehensive rehab program in progress. encourag epo fluids, monitor BMP, labs scheduled for AM      RECENT LABS    Vital Signs Last 24 Hrs  T(C): 36.4 (21 Sep 2022 08:01), Max: 36.7 (20 Sep 2022 12:56)  T(F): 97.5 (21 Sep 2022 08:01), Max: 98 (20 Sep 2022 12:56)  HR: 53 (21 Sep 2022 08:01) (51 - 66)  BP: 101/64 (21 Sep 2022 08:01) (101/64 - 128/76)  BP(mean): --  RR: 15 (21 Sep 2022 08:01) (15 - 18)  SpO2: 99% (21 Sep 2022 08:01) (96% - 99%)    Parameters below as of 21 Sep 2022 08:01  Patient On (Oxygen Delivery Method): room air                              11.1   5.37  )-----------( 199      ( 21 Sep 2022 06:00 )             32.7     09-21    137  |  102  |  27<H>  ----------------------------<  114<H>  3.9   |  32<H>  |  0.46<L>    Ca    8.1<L>      21 Sep 2022 06:00    TPro  5.5<L>  /  Alb  2.4<L>  /  TBili  0.5  /  DBili  x   /  AST  20  /  ALT  41  /  AlkPhos  94  09-21        CAPILLARY BLOOD GLUCOSE    addendum 2:51 PM: From SLP, diet changed to minced and moist solids with thin liquids. Patient is endorsing globus sensation and pill dysphagia MBS ordered for Friday t 9:00 AM

## 2022-09-20 NOTE — DISCHARGE NOTE PROVIDER - NSDCFUSCHEDAPPT_GEN_ALL_CORE_FT
Cora Cabral  [facility] Physician [facility]  GERIATRICS [address] [place] [address]   Scheduled Appointment: 10/12/2022

## 2022-09-20 NOTE — H&P ADULT - ASSESSMENT
67 year old male with PHx of rectal CA (2011, s/p radiation/chemo) and high-grade metastatic sarcoma (s/p radiation, on chemo) presented with two weeks of LUE weakness and possible speech deficit. CT Head showed 4 cm mass in R frontal lobe, now post-resection 9/14/22. Admitted for multidisciplinary rehab program    #Comprehensive Multidisciplinary Rehab Program:  - Gait, ADL, Functional impairments  - PT/OT/ SLP 3 hours a day 5 days a week    #S/p craniotomy for tumor resection  - F/up surgical pathology  - Remove surgical staples 9/26 (POD 12)  - Continue decadron taper for cerebral edema  - Continue keppra for seizure prophylaxis  - Continue gabapentin for neuropathic pain     #Palliative  #Mood / Cognition:  - Neuropsych evaluation   - Palliative was following for pain management, currently on metahdone 2 mg TID    #Sleep:  - Maintain quiet hours and low stim environment  - Melatonin PRN    #Pain:  - Tylenol PRN  - avoid sedating meds that may affect cognitive recovery    #GI/Bowel:  - Last BM (per records) 9/18  - Senna 2 tabs daily    #/Bladder:  - Monitor PVR if no void in 8h; SC for >400 cc  - Toileting schedule q4h    #Diet / Dysphagia:    - Diet: Easy to chew  - Ongoing SLP assessment  - Nutrition to follow    #Skin/ Pressure Injury Prevention:  - Assessment on admission   - Incisions: R frontal incision s/p craniotomy   - Turn Q2hrs in bed while awake, OOB to Chair, PT/OT/SLP     #DVT prophylaxis:  - SCDs  - Last doppler on 9/14    #Precautions/ Restrictions  - Falls, Seizures, Aspiration  - COVID PCR: Negative 9/19    --------------------------------------------  Outpatient Follow up:  Alvaro Day)  Neurosurgery  [address], [address]  [place] [address]  Phone: [phone]  Fax: [phone]  Follow Up Time:     Serge Alvarado)  Radiation Oncology  [facility] - Dept. of Radiation Medicine, [address] [place] [address]  Phone: [phone]  Fax: [phone]  Follow Up Time:     Cora Hanson)  HospicePalliative Medicine; Internal Medicine  [address] [place] [address]  Phone: [phone]  Fax: [phone]  Follow Up Time:     Nghia Moon)  Hematology; Medical Oncology  [address] [place] [address]  Phone: [phone]  Fax: [phone]    Scheduled Appointments  Cora Cabral  [facility]  GERIATRICS [address] [place] [address]   Scheduled Appointment: 10/12/2022  --------------------------------------------      MEDICAL PROGNOSIS: GOOD            REHAB POTENTIAL: GOOD             ESTIMATED DISPOSITION: HOME WITH HOME CARE            ELOS: 10-14 Days   EXPECTED THERAPY:     P.T. 1hr/day       O.T. 1hr/day      S.L.P. 1hr/day     P&O Unnecessary     EXP FREQUENCY: 5 days per 7 day period     PRESCREEN COMPARISON:   I have reviewed the prescreen information and I have found no relevant changes between the preadmission screening and my post admission evaluation     RATIONALE FOR INPATIENT ADMISSION - Patient demonstrates the following: (check all that apply)  [X] Medically appropriate for rehabilitation admission  [X] Has attainable rehab goals with an appropriate initial discharge plan  [X] Has rehabilitation potential (expected to make a significant improvement within a reasonable period of time)   [X] Requires close medical management by a rehab physician, rehab nursing care, Hospitalist and comprehensive interdisciplinary team (including PT, OT, & or SLP, Prosthetics and Orthotics)  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 Admitted for multidisciplinary rehab program    # S/p craniotomy for tumor resection, left hemiparesis  - F/up surgical pathology  - Remove surgical staples 9/26 (POD 12)  - Continue decadron taper for cerebral edema  - Continue keppra for seizure prophylaxis  - Begin comprehensive rehab program PT/OT/ SLP 3 hours a day 5 days a week  - Precautions: CA (rectal, sarcoma), fall, SZ aspiration, AMS    # Mood / Cognition:  - Neuropsych evaluation   - recreation therapy    # Palliative Care/pain  - palliative was following for pain management  - tylenol PRN  - methadone 2 mg TID  - Continue gabapentin for neuropathic pain     # Sleep:  - Maintain quiet hours and low stim environment  - Melatonin PRN    # GI/Bowel:  - Last BM (per records) 9/18  - Senna 2 tabs daily    # /Bladder:  - Monitor PVR if no void in 8h; SC for >400 cc  - Toileting schedule q4h    # Diet / Dysphagia:    - Diet: Easy to chew  - Ongoing SLP assessment  - Nutrition to follow    # Skin/ Pressure Injury Prevention:  - Assessment on admission   - Incisions: R frontal incision s/p craniotomy   - Turn Q2hrs in bed while awake, OOB to Chair, PT/OT/SLP     # DVT prophylaxis:  - Last doppler on 9/14  - SCDs    # COVID PCR: Negative 9/19    # LABS  CBC BMP 9/21    --------------------------------------------  Outpatient Follow up:  Alvaro Day)  Neurosurgery  General  [address] [place] [address], [address]  [place] [address]  Phone: [phone]  Fax: [phone]  Follow Up Time:     Serge Alvarado)  Radiation Oncology  [facility] - Dept. of Radiation Medicine, [address] [place] [address]  Phone: [phone]  Fax: [phone]  Follow Up Time:     Cora Hanson)  HospicePalliative Medicine; Internal Medicine  [address] [place] [address]  Phone: [phone]  Fax: [phone]  Follow Up Time:     Nghia Moon)  Hematology; Medical Oncology  [address] [place] [address]  Phone: [phone]  Fax: [phone]    Scheduled Appointments  Croa Cabral  [facility]  GERIATRICS [address] [place] [address]   Scheduled Appointment: 10/12/2022  --------------------------------------------      MEDICAL PROGNOSIS: fair-            REHAB POTENTIAL: fair+             ESTIMATED DISPOSITION: HOME WITH HOME CARE and caregiver support           ELOS: 17-21 Days   EXPECTED THERAPY:     P.T. 1hr/day       O.T. 1hr/day      S.L.P. 1hr/day     P&O Unnecessary     EXP FREQUENCY: 5 days per 7 day period     PRESCREEN COMPARISON:   I have reviewed the prescreen information and I have found no relevant changes between the preadmission screening and my post admission evaluation     RATIONALE FOR INPATIENT ADMISSION - Patient demonstrates the following: (check all that apply)  [X] Medically appropriate for rehabilitation admission  [X] Has attainable rehab goals with an appropriate initial discharge plan  [X] Has rehabilitation potential (expected to make a significant improvement within a reasonable period of time)   [X] Requires close medical management by a rehab physician, rehab nursing care, Hospitalist and comprehensive interdisciplinary team (including PT, OT, & or SLP, Prosthetics and Orthotics)  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 Admitted for multidisciplinary rehab program    # S/p craniotomy for tumor resection, left hemiparesis  - F/up surgical pathology  - Remove surgical staples 9/26 (POD 12)  - Continue decadron taper for cerebral edema  - Continue keppra for seizure prophylaxis  - Begin comprehensive rehab program PT/OT/ SLP 3 hours a day 5 days a week  - Precautions: CA (rectal, sarcoma), fall, SZ aspiration, AMS    # Mood / Cognition:  - Neuropsych evaluation   - recreation therapy    # Palliative Care/pain  - palliative was following for pain management  - tylenol PRN  - methadone 2 mg TID  - Continue gabapentin for neuropathic pain     # Sleep:  - Maintain quiet hours and low stim environment  - Melatonin PRN    # GI/Bowel:  - Last BM (per records) 9/18  - Senna 2 tabs daily    # /Bladder:  - Monitor PVR if no void in 8h; SC for >400 cc  - Toileting schedule q4h    # DietL  - Diet: regular  - BUn/Cr 27/0.46 9/21. Encourage po fluids, BMP 9/22    # Skin/ Pressure Injury Prevention:  - Assessment on admission   - Incisions: R frontal incision s/p craniotomy   - Turn Q2hrs in bed while awake, OOB to Chair, PT/OT/SLP     # DVT prophylaxis:  - Last doppler on 9/14  - SCDs    # COVID PCR: Negative 9/19    # LABS  CBC BMP 9/21    --------------------------------------------  Outpatient Follow up:  Alvaro Day)  Neurosurgery  General  [address] [place] [address], [address]  [place] [address]  Phone: [phone]  Fax: [phone]  Follow Up Time:     Serge Alvarado)  Radiation Oncology  [facility] - Dept. of Radiation Medicine, [address] [place] [address]  Phone: [phone]  Fax: [phone]  Follow Up Time:     Cora Hanosn)  HospicePalliative Medicine; Internal Medicine  [address] [place] [address]  Phone: [phone]  Fax: [phone]  Follow Up Time:     Nghia Moon)  Hematology; Medical Oncology  [address] [place] [address]  Phone: [phone]  Fax: [phone]    Scheduled Appointments  Cora Cabral  [facility]  GERIATRICS [address] [place] [address]   Scheduled Appointment: 10/12/2022  --------------------------------------------      MEDICAL PROGNOSIS: fair-            REHAB POTENTIAL: fair+             ESTIMATED DISPOSITION: HOME WITH HOME CARE and caregiver support           ELOS: 17-21 Days   EXPECTED THERAPY:     P.T. 1hr/day       O.T. 1hr/day      S.L.P. 1hr/day     P&O Unnecessary     EXP FREQUENCY: 5 days per 7 day period     PRESCREEN COMPARISON:   I have reviewed the prescreen information and I have found no relevant changes between the preadmission screening and my post admission evaluation     RATIONALE FOR INPATIENT ADMISSION - Patient demonstrates the following: (check all that apply)  [X] Medically appropriate for rehabilitation admission  [X] Has attainable rehab goals with an appropriate initial discharge plan  [X] Has rehabilitation potential (expected to make a significant improvement within a reasonable period of time)   [X] Requires close medical management by a rehab physician, rehab nursing care, Hospitalist and comprehensive interdisciplinary team (including PT, OT, & or SLP, Prosthetics and Orthotics)  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 Admitted for multidisciplinary rehab program    # S/p craniotomy for tumor resection, left hemiparesis  - F/up surgical pathology  - Remove surgical staples 9/26 (POD 12)  - Continue decadron taper for cerebral edema  - Continue keppra for seizure prophylaxis  - Begin comprehensive rehab program PT/OT/ SLP 3 hours a day 5 days a week  - right foot drop, may benefit from AFO  - Precautions: CA (rectal, sarcoma), fall, SZ aspiration, AMS    # Mood / Cognition:  - Neuropsych evaluation   - recreation therapy    # Palliative Care/pain  - palliative was following for pain management  - tylenol PRN  - methadone 2 mg TID  - Continue gabapentin for neuropathic pain     # Sleep:  - Maintain quiet hours and low stim environment  - Melatonin PRN    # GI/Bowel:  - Last BM (per records) 9/18  - Senna 2 tabs daily    # /Bladder:  - Monitor PVR if no void in 8h; SC for >400 cc  - Toileting schedule q4h    # DietL  - Diet: regular  - BUn/Cr 27/0.46 9/21. Encourage po fluids, BMP 9/22    # Skin/ Pressure Injury Prevention:  - Assessment on admission   - Incisions: R frontal incision s/p craniotomy   - Turn Q2hrs in bed while awake, OOB to Chair, PT/OT/SLP     # DVT prophylaxis:  - Last doppler on 9/14  - SCDs    # COVID PCR: Negative 9/19    # LABS  CBC BMP 9/21    --------------------------------------------  Outpatient Follow up:  Alvaro Day)  Neurosurgery  General  [address] [place] [address], [address]  [place] [address]  Phone: [phone]  Fax: [phone]  Follow Up Time:     Serge Alvarado)  Radiation Oncology  [facility] - Dept. of Radiation Medicine, [address] [place] [address]  Phone: [phone]  Fax: [phone]  Follow Up Time:     Cora Hanson)  HospicePalliative Medicine; Internal Medicine  [address] [place] [address]  Phone: [phone]  Fax: [phone]  Follow Up Time:     Nghia Moon)  Hematology; Medical Oncology  [address] [place] [address]  Phone: [phone]  Fax: [phone]    Scheduled Appointments  Cora Cabral  [facility]  GERIATRICS [address] [place] [address]   Scheduled Appointment: 10/12/2022  --------------------------------------------      MEDICAL PROGNOSIS: fair-            REHAB POTENTIAL: fair+             ESTIMATED DISPOSITION: HOME WITH HOME CARE and caregiver support           ELOS: 17-21 Days   EXPECTED THERAPY:     P.T. 1hr/day       O.T. 1hr/day      S.L.P. 1hr/day     P&O Unnecessary     EXP FREQUENCY: 5 days per 7 day period     PRESCREEN COMPARISON:   I have reviewed the prescreen information and I have found no relevant changes between the preadmission screening and my post admission evaluation     RATIONALE FOR INPATIENT ADMISSION - Patient demonstrates the following: (check all that apply)  [X] Medically appropriate for rehabilitation admission  [X] Has attainable rehab goals with an appropriate initial discharge plan  [X] Has rehabilitation potential (expected to make a significant improvement within a reasonable period of time)   [X] Requires close medical management by a rehab physician, rehab nursing care, Hospitalist and comprehensive interdisciplinary team (including PT, OT, & or SLP, Prosthetics and Orthotics)  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 Admitted for multidisciplinary rehab program    # S/p craniotomy for tumor resection, left hemiparesis  - F/up surgical pathology  - Remove surgical staples 9/26 (POD 12)  - Continue decadron taper for cerebral edema  - Continue keppra for seizure prophylaxis  - Begin comprehensive rehab program PT/OT/ SLP 3 hours a day 5 days a week  - right foot drop, may benefit from AFO  - Precautions: CA (rectal, sarcoma), fall, SZ aspiration, AMS    # Mood / Cognition:  - Neuropsych evaluation   - recreation therapy    # Palliative Care/pain  - palliative was following for pain management  - tylenol PRN  - methadone 2 mg TID  - Continue gabapentin for neuropathic pain     # Sleep:  - Maintain quiet hours and low stim environment  - Melatonin PRN    # GI/Bowel:  - Last BM (per records) 9/18  - Senna 2 tabs daily    # /Bladder:  - Monitor PVR if no void in 8h; SC for >400 cc  - Toileting schedule q4h    # DietL  - Diet: regular--> downgraded mince and moist 9/21  - MBS 9/23 9 AM  - BUn/Cr 27/0.46 9/21. Encourage po fluids, BMP 9/22    # Skin/ Pressure Injury Prevention:  - Assessment on admission   - Incisions: R frontal incision s/p craniotomy   - Turn Q2hrs in bed while awake, OOB to Chair, PT/OT/SLP     # DVT prophylaxis:  - Last doppler on 9/14  - SCDs    # COVID PCR: Negative 9/19    # LABS  CBC BMP 9/21    --------------------------------------------  Outpatient Follow up:  Alvaro Day)  Neurosurgery  General  [address] [place] [address], [address]  [place] [address]  Phone: [phone]  Fax: [phone]  Follow Up Time:     Serge Alvarado)  Radiation Oncology  [facility] - Dept. of Radiation Medicine, [address] [place] [address]  Phone: [phone]  Fax: [phone]  Follow Up Time:     Cora Hanson)  HospicePalliative Medicine; Internal Medicine  [address] [place] [address]  Phone: [phone]  Fax: [phone]  Follow Up Time:     Nghia Moon)  Hematology; Medical Oncology  [address] [place] [address]  Phone: [phone]  Fax: [phone]    Scheduled Appointments  Cora Cabral  [facility]  GERIATRICS [address] [place] [address]   Scheduled Appointment: 10/12/2022  --------------------------------------------      MEDICAL PROGNOSIS: fair-            REHAB POTENTIAL: fair+             ESTIMATED DISPOSITION: HOME WITH HOME CARE and caregiver support           ELOS: 17-21 Days   EXPECTED THERAPY:     P.T. 1hr/day       O.T. 1hr/day      S.L.P. 1hr/day     P&O Unnecessary     EXP FREQUENCY: 5 days per 7 day period     PRESCREEN COMPARISON:   I have reviewed the prescreen information and I have found no relevant changes between the preadmission screening and my post admission evaluation     RATIONALE FOR INPATIENT ADMISSION - Patient demonstrates the following: (check all that apply)  [X] Medically appropriate for rehabilitation admission  [X] Has attainable rehab goals with an appropriate initial discharge plan  [X] Has rehabilitation potential (expected to make a significant improvement within a reasonable period of time)   [X] Requires close medical management by a rehab physician, rehab nursing care, Hospitalist and comprehensive interdisciplinary team (including PT, OT, & or SLP, Prosthetics and Orthotics)

## 2022-09-20 NOTE — CHART NOTE - NSCHARTNOTEFT_GEN_A_CORE
67M R handed, pmhx rectal CA (2011, s/p radiation/chemo) and 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 (s/p radiation, now on Votrient, last dose 9/14) p/w 2 weeks of LUE weakness and possible speech deficit (unclear). Pt found to have R frontal lobe with mild associated edema, no midline shift. s/p R crani for tumor resection 9/14.     9/16 Initial bedside swallow evaluation completed. Rx for NPO except for meds in applesauce only (not pudding = thick puree) and allowance for moderately thick liquid wash, given overt s/sx of aspiration with thick puree and mildly thick liquids.   9/17 Rx for Regular solids/thin liquids; pt to choose softer foods per preference given incomplete dentition. Pt p/w overtly functional oropharyngeal swallow. Diet modification not indicated at this time.     TODAY, seen for diet monitor; pt on easy to chew/thin liquids given incomplete dentition/preference. Session conducted in Mandarin w/ this Mandarin-English speaking clinician. Oral cavity clear, pink, moist. Endorses that he has occasional throat clearing w/ meals that is similar to baseline prior to admission. Pt noted w/ overtly functional swallow w/ easy to chew and thin liquids, w/ functional oral management and grossly timely initiation of swallow w/ only 1x throat clearing observed w/ liquid wash after solids, however, not replicated in multiple additional trials. Pt appears a candidate for MBS to further assess swallow function, however, given that no indication for diet modification based on swallow profile, and d/c to GC today, recommendations to complete MBS in acute rehab (plan for [facility]).    IMPRESSIONS: Pt p/w overtly functional oropharyngeal swallow function, however, w/ reports of occasional throat clearing w/ meals approximately commensurate w/ baseline prior to admission. Objective swallow study is indicated, however, given no indication for diet modification, may have MBS completed at acute rehab.    RECOMMENDATIONS:  >Easy to chew/thin liquids   >Modified Barium Swallow Study at [facility]/acute rehab   >Maintain strict aspiration precautions  >Maintain oral hygiene  >D/C acute rehab     D/W Chinedu Guardado;  [place]    Speech pathology  [facility]-SLP *Teams preferred* ([id number])

## 2022-09-20 NOTE — DISCHARGE NOTE PROVIDER - CARE PROVIDER_API CALL
Alvaro Day)  Neurosurgery  General  [address], [address]  [place] [address]  Phone: [phone]  Fax: [phone]  Follow Up Time:     Serge Alvarado)  Radiation Oncology  [facility] - Dept. of Radiation Medicine, [address] [place] [address]  Phone: [phone]  Fax: [phone]  Follow Up Time:     Cora Hanson)  [facility] Internal Medicine  [address] [place] [address]  Phone: [phone]  Fax: [phone]  Follow Up Time:     Nghia Moon)  Hematology; Medical Oncology  [address] [place] [address]  Phone: [phone]  Fax: [phone]  Follow Up Time:

## 2022-09-20 NOTE — PROGRESS NOTE ADULT - SUBJECTIVE AND OBJECTIVE BOX
DATE OF SERVICE: 09-20-22 @ 14:06    Patient is a 67y old  Male who presents with a chief complaint of Brain mass    s/p right craniotomy for tumor resection 9/14/22   (20 Sep 2022 12:57)      SUBJECTIVE / OVERNIGHT EVENTS:  No chest pain. No shortness of breath. No complaints. No events overnight.     MEDICATIONS  (STANDING):  Biotene Dry Mouth Oral Rinse 5 milliLiter(s) Swish and Spit three times a day  dexAMETHasone     Tablet   Oral   dexAMETHasone     Tablet 4 milliGRAM(s) Oral every 8 hours  enoxaparin Injectable 40 milliGRAM(s) SubCutaneous <User Schedule>  gabapentin 1000 milliGRAM(s) Oral three times a day  levETIRAcetam 500 milliGRAM(s) Oral two times a day  methadone   Solution 2 milliGRAM(s) Oral three times a day  pantoprazole   Suspension 40 milliGRAM(s) Oral before breakfast  pazopanib 200 milliGRAM(s) Oral daily  polyethylene glycol 3350 17 Gram(s) Oral two times a day  senna 2 Tablet(s) Oral at bedtime  tetracaine/benzocaine/butamben Spray 1 Spray(s) Topical three times a day    MEDICATIONS  (PRN):  acetaminophen     Tablet .. 650 milliGRAM(s) Oral every 6 hours PRN Temp greater or equal to 38C (100.4F), Mild Pain (1 - 3)  artificial  tears Solution 2 Drop(s) Both EYES every 4 hours PRN Dry Eyes  bisacodyl 5 milliGRAM(s) Oral daily PRN Constipation  calcium carbonate    500 mG (Tums) Chewable 2 Tablet(s) Chew every 6 hours PRN Upset Stomach  oxyCODONE    IR 5 milliGRAM(s) Oral every 4 hours PRN Moderate Pain (4 - 6)  oxyCODONE    IR 10 milliGRAM(s) Oral every 4 hours PRN Severe Pain (7 - 10)      Vital Signs Last 24 Hrs  T(C): 36.7 (20 Sep 2022 12:56), Max: 37.3 (19 Sep 2022 17:13)  T(F): 98 (20 Sep 2022 12:56), Max: 99.2 (19 Sep 2022 17:13)  HR: 66 (20 Sep 2022 12:56) (49 - 66)  BP: 118/71 (20 Sep 2022 12:56) (106/70 - 128/76)  BP(mean): --  RR: 18 (20 Sep 2022 12:56) (17 - 18)  SpO2: 96% (20 Sep 2022 12:56) (95% - 98%)    Parameters below as of 20 Sep 2022 12:56  Patient On (Oxygen Delivery Method): room air      CAPILLARY BLOOD GLUCOSE        I&O's Summary    19 Sep 2022 07:01  -  20 Sep 2022 07:00  --------------------------------------------------------  IN: 240 mL / OUT: 0 mL / NET: 240 mL    20 Sep 2022 07:01  -  20 Sep 2022 14:06  --------------------------------------------------------  IN: 240 mL / OUT: 0 mL / NET: 240 mL        PHYSICAL EXAM:  GENERAL: NAD, well-developed  HEAD:  Atraumatic, Normocephalic  EYES: EOMI, PERRLA, conjunctiva and sclera clear  NECK: Supple, No JVD  CHEST/LUNG: Clear to auscultation bilaterally; No wheeze  HEART: Regular rate and rhythm; No murmurs, rubs, or gallops  ABDOMEN: Soft, Nontender, Nondistended; Bowel sounds present  EXTREMITIES:  2+ Peripheral Pulses, No clubbing, cyanosis, or edema  PSYCH: AAOx3  NEUROLOGY: non-focal  SKIN: No rashes or lesions    LABS:                        12.1   4.94  )-----------( 201      ( 20 Sep 2022 06:04 )             36.4     09-20    140  |  102  |  30<H>  ----------------------------<  124<H>  4.1   |  27  |  0.44<L>    Ca    8.8      20 Sep 2022 06:04                RADIOLOGY & ADDITIONAL TESTS:    Imaging Personally Reviewed:    Consultant(s) Notes Reviewed:      Care Discussed with Consultants/Other Providers:

## 2022-09-20 NOTE — PROGRESS NOTE ADULT - ASSESSMENT
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 CT head shows large 4cm mass in the R frontal lobe with mild associated edema, no midline shift.     s/p right craniotomy for tumor resection 9/14/22    NEURO:   - Continue neuro checks q 4  - F/u Surgical Pathology  - Patient has surgical staples on his right craniotomy incision that can be removed on POD 12 - 9/26  - Continue Decadron taper for cerebral edema  - Continue Keppra for seizure prophylaxis   - Continue pain control w/ Tylenol prn and Oxycodone prn  - Appreciate Palliative following for pain management, currently on Methadone 2mg TID, spoke with Palliative at the bedside patient will continue to follow with them outpatient  - Continue Gabapentin for neuropathic pain  - PT/OT/PMR - Acute Rehab, [facility]    PULM:   - On room air, O2Sat>95%  - Incentive spirometry  - CT CAP - 9/13 incr size of locally advanced pancreatic tail tumor, progressive pulmonary and pleural metastases, similar rt iliopsoas tumor  - Patient will continue to follow with his Oncologist Dr. Cisneros at [facility] and Dr. [facility] after rehab.    CV:  - -160    ENDO:   - Goal euglycemia, monitor glucose in the setting of steroids     HEME/ONC:             9/20 CBC stable, as stated above, patient will continue to follow with his Oncologist Dr. Cisneros at [facility] and Dr. [facility] after rehab; continue Votrient - chemotherapy - Rectal CA         DVT ppx: SCDs, SQL, Le Dopp - neg    RENAL:   - IVL  - BMP 9/20 stable    ID:   - Afebrile  - 9/19 COVID neg    GI:    - Continue oral diet   - Tums prn  - Senna and Miralax, Dulcolax, last BM 9/18   - Continue protonix for GI ppx while on steroids    DISCHARGE PLANNING:   PT/OT/PMR - Acute Rehab - [facility]    Plan to be discussed with Dr. 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 CT head shows large 4cm mass in the R frontal lobe with mild associated edema, no midline shift.     s/p right craniotomy for tumor resection 9/14/22    NEURO:   - Continue neuro checks q 4  - F/u Surgical Pathology  - Patient has surgical staples on his right craniotomy incision that can be removed on POD 12 - 9/26  - Continue Decadron taper for cerebral edema  - Continue Keppra for seizure prophylaxis   - Continue pain control w/ Tylenol prn and Oxycodone prn  - Appreciate Palliative following for pain management, currently on Methadone 2mg TID, spoke with Palliative at the bedside patient will continue to follow with them outpatient  - Continue Gabapentin for neuropathic pain  - PT/OT/PMR - Acute Rehab, [facility]    PULM:   - On room air, O2Sat>95%  - Incentive spirometry  - CT CAP - 9/13 incr size of locally advanced pancreatic tail tumor, progressive pulmonary and pleural metastases, similar rt iliopsoas tumor  - Patient will continue to follow with his Oncologist Dr. Cisneros at [facility] and Dr. [facility] after rehab.    CV:  - -160    ENDO:   - Goal euglycemia, monitor glucose in the setting of steroids     HEME/ONC:             9/20 CBC stable, as stated above, patient will continue to follow with his Oncologist Dr. Cisneros at [facility] and Dr. [facility] after rehab; continue Votrient - oral chemotherapy per Oncology - Rectal CA. To follow with surgical pathology.          DVT ppx: SCDs, SQL, Le Dopp - neg    RENAL:   - IVL  - BMP 9/20 stable    ID:   - Afebrile  - 9/19 COVID neg    GI:    - Continue oral diet   - Tums prn  - Senna and Miralax, Dulcolax, last BM 9/18   - Continue protonix for GI ppx while on steroids    DISCHARGE PLANNING:   PT/OT/PMR - Acute Rehab - [facility]    Plan to be discussed with Dr. Day  [id number]

## 2022-09-21 NOTE — PATIENT PROFILE ADULT - NSPROEXTENSIONSOFSELF_GEN_A_NUR
DISPLAY PLAN FREE TEXT DISPLAY PLAN FREE TEXT DISPLAY PLAN FREE TEXT DISPLAY PLAN FREE TEXT DISPLAY PLAN FREE TEXT DISPLAY PLAN FREE TEXT DISPLAY PLAN FREE TEXT DISPLAY PLAN FREE TEXT DISPLAY PLAN FREE TEXT DISPLAY PLAN FREE TEXT DISPLAY PLAN FREE TEXT DISPLAY PLAN FREE TEXT DISPLAY PLAN FREE TEXT DISPLAY PLAN FREE TEXT DISPLAY PLAN FREE TEXT DISPLAY PLAN FREE TEXT DISPLAY PLAN FREE TEXT DISPLAY PLAN FREE TEXT DISPLAY PLAN FREE TEXT DISPLAY PLAN FREE TEXT DISPLAY PLAN FREE TEXT DISPLAY PLAN FREE TEXT DISPLAY PLAN FREE TEXT DISPLAY PLAN FREE TEXT DISPLAY PLAN FREE TEXT DISPLAY PLAN FREE TEXT DISPLAY PLAN FREE TEXT DISPLAY PLAN FREE TEXT none

## 2022-09-21 NOTE — DIETITIAN INITIAL EVALUATION ADULT - PERTINENT MEDS FT
MEDICATIONS  (STANDING):  Biotene Dry Mouth Oral Rinse 5 milliLiter(s) Swish and Spit three times a day  chlorhexidine 2% Cloths 1 Application(s) Topical every 12 hours  dexAMETHasone     Tablet 4 milliGRAM(s) Oral every 8 hours  dexAMETHasone     Tablet   Oral   enoxaparin Injectable 30 milliGRAM(s) SubCutaneous every 24 hours  gabapentin Solution 1000 milliGRAM(s) Oral three times a day  influenza  Vaccine (HIGH DOSE) 0.7 milliLiter(s) IntraMuscular once  levETIRAcetam 500 milliGRAM(s) Oral two times a day  methadone    Tablet 2.5 milliGRAM(s) Oral three times a day  pantoprazole   Suspension 40 milliGRAM(s) Oral daily  pazopanib 200 milliGRAM(s) Oral daily  polyethylene glycol 3350 17 Gram(s) Oral two times a day  senna 2 Tablet(s) Oral at bedtime    MEDICATIONS  (PRN):  acetaminophen     Tablet .. 650 milliGRAM(s) Oral every 6 hours PRN Mild Pain (1 - 3)  artificial  tears Solution 1 Drop(s) Both EYES every 4 hours PRN Dry Eyes  bisacodyl Oral Tab/Cap - Peds 5 milliGRAM(s) Oral daily PRN Constipation  calcium carbonate    500 mG (Tums) Chewable 1 Tablet(s) Chew every 6 hours PRN Upset Stomach  oxyCODONE    IR 5 milliGRAM(s) Oral every 4 hours PRN Moderate Pain (4 - 6)  oxyCODONE    IR 10 milliGRAM(s) Oral every 4 hours PRN Severe Pain (7 - 10)

## 2022-09-21 NOTE — DIETITIAN INITIAL EVALUATION ADULT - ORAL INTAKE PTA/DIET HISTORY
Interview conducted with patient family members. PTA Patient w/ good appetite and intake, with decreased appetite x 2weeks, now improved. Wife prepares healthy balanced meals.

## 2022-09-21 NOTE — CHART NOTE - NSCHARTNOTEFT_GEN_A_CORE
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        COMORBIDITIES/COMPLICATING CONDITIONS IMPACTING REHABILITATION:  HEALTH ISSUES - PROBLEM Dx:  s/p craniotomy and resection  left hemiparesis  anemia  mild renal insufficiency  chronic pain management  sarcoma of hip   S1-3 pathologic fracture  right foot drop  right leg swelling    PAST MEDICAL & SURGICAL HISTORY:  Abdominal tumor      Back pain      Rectal cancer      No significant past surgical history    metastatic sarcoma high grade      Based upon consideration of the patient's impairments, functional status, complicating conditions and any other contributing factors and after information garnered from the assessments of all therapy disciplines involved in treating the patient and other pertinent clinicians:    INTERDISCIPLINARY REHABILITATION INTERVENTIONS:    [ x  ] Transfer Training  [  x ] Bed Mobility  [ x  ] Therapeutic Exercise  [x   ] Balance/Coordination Exercises  [x   ] Locomotion training  [ x  ] Stairs    [  x ] Functional transfers  [  x ] Activities of daily living  [ x  ] Visual Perceptual training    [ x  ] Bowel/Bladder program  [ x  ] Pain Management  [  x ] Skin/Wound Care    [x   ] Speech/Communication Exercise  [x   ] Swallowing Exercises    [ x  ] Cognitive Exercises  [ x  ] Cognitive-linguistic Treatment  [   ] Behavioral Program    [  x ] Patient/Family Counseling  [  x ] Family Training  [   ] Community Re-entry  [   ] Orthotic Evaluation/Training  [  x ] Prosthetic Eval/Training    MEDICAL PROGNOSIS:  fair-    REHAB POTENTIAL:  fair  EXPECTED DAILY THERAPIES:    [ x  ] PT  [  x ] OT  [  x ] ST    EXPECTED INTENSITY OF PROGRAM:  3 hours daily    EXPECTED FREQUENCY OF PROGRAM:  5 x week    ESTIMATED LOS:  17-21 days    ESTIMATED DISPOSITION:  home with caregiver support and home Pt, OT SLP    INTERDISCIPLINARY FUNCTIONAL OUTCOMES/GOALS:         Gait/Mobility: supervision       Transfers: supervision/CG       ADLs: supervision/set up       Functional Transfers: CG/min assist       Medication Management: supervision       Communication: independent conversational speech       Cognitive: supervision iADLs       Nutrition: regular solid thin liquids       Bladder: continent       Bowel: continent free from constipation

## 2022-09-21 NOTE — DIETITIAN INITIAL EVALUATION ADULT - OTHER INFO
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 s/p right frontal lobe mass s/p craniotomy and resection 9/14/22     Patient tolerating regular diet w/ good appetite and intake at this time, consuming 100% of meal tray per family. Likes fish, fruit, soup, and bread. NKFA. Family provides feeding assistance at meal time, patient will need feeding assistance when family not present. Patient with poor shelter, however no issues w/ chewing and swallowing. Patient w/ dry mouth, recommend increased hydration. Denies N/V/D, w/ constipation managed by medication, recommend increased fiber to aid in bowel regimen.  Recent weight stable 130lb per family member. Patient with recent surgical incision, recommend Severino 1 Packet BID (Provides 180kcal & 28grams of Protein) to aid in wound healing.

## 2022-09-21 NOTE — CONSULT NOTE ADULT - SUBJECTIVE AND OBJECTIVE BOX
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 CT head showed large 4cm mass in the R frontal lobe with mild associated edema, no midline shift. Patient was admitted under Neurosurgery Dr. Day for surgical planning.     CT CAP 9/12 revealed increased size of locally advanced pancreatic tail tumor, progressive pulmonary and pleural metastases. Similar-appearing right iliopsoas. CT T/L spine revealed no pathologic fracture or lytic / destructive lesions involving the thoracic and lumbar spine; S1-3 pathologic fracture no retropulsed bone, similar-appearing right sided epidural metastases in the lumbosacral region. On 9/14 patient underwent right craniotomy for tumor resection 9/14/22.    Post-op CT 9/15 revealed post-op changes. MRI Brain 9/15 revealed post-op resection of right posterior frontal lesion with small residual enhancement and 0.3cm leftward midline shift. Patient placed on Decadron taper. Now admitted for multidisciplinary rehabilitation program 9/20/22.    (20 Sep 2022 14:33)    Subjective History:  Patient seen and examined at bedside. Mandarin  used [id number] to provide translation. Wife also at bedside. Patient without acute complaints at this time. States he has some numbness in hands that has been worse since surgery. Denies headache, changes in vision, or other neurological symptoms.     PAST MEDICAL & SURGICAL HISTORY:  Abdominal tumor  Back pain  Rectal cancer  No significant past surgical history    SOCIAL HISTORY:  Lives at home with wife  Required assistance with ADLs (wife helped) and used walker at times for ambulation  Denies smoking, EtOH, illicit drug use    FAMILY HISTORY:  No history of CAD    ALLERGIES:  No Known Allergies    MEDICATIONS  (STANDING):  Biotene Dry Mouth Oral Rinse 5 milliLiter(s) Swish and Spit three times a day  chlorhexidine 2% Cloths 1 Application(s) Topical every 12 hours  dexAMETHasone     Tablet 4 milliGRAM(s) Oral every 8 hours  dexAMETHasone     Tablet   Oral   enoxaparin Injectable 30 milliGRAM(s) SubCutaneous every 24 hours  gabapentin Solution 1000 milliGRAM(s) Oral three times a day  influenza  Vaccine (HIGH DOSE) 0.7 milliLiter(s) IntraMuscular once  levETIRAcetam 500 milliGRAM(s) Oral two times a day  methadone    Tablet 2.5 milliGRAM(s) Oral three times a day  pantoprazole   Suspension 40 milliGRAM(s) Oral daily  pazopanib 200 milliGRAM(s) Oral daily  polyethylene glycol 3350 17 Gram(s) Oral two times a day  senna 2 Tablet(s) Oral at bedtime    MEDICATIONS  (PRN):  acetaminophen     Tablet .. 650 milliGRAM(s) Oral every 6 hours PRN Mild Pain (1 - 3)  artificial  tears Solution 1 Drop(s) Both EYES every 4 hours PRN Dry Eyes  bisacodyl Oral Tab/Cap - Peds 5 milliGRAM(s) Oral daily PRN Constipation  calcium carbonate    500 mG (Tums) Chewable 1 Tablet(s) Chew every 6 hours PRN Upset Stomach  oxyCODONE    IR 5 milliGRAM(s) Oral every 4 hours PRN Moderate Pain (4 - 6)  oxyCODONE    IR 10 milliGRAM(s) Oral every 4 hours PRN Severe Pain (7 - 10)    Review of Systems:   CONSTITUTIONAL: denies fever, chills, fatigue, weakness  HEENT: denies blurred vision, sore throat  CARDIOVASCULAR: denies chest pain, chest pressure, palpitations  RESPIRATORY: denies shortness of breath, sputum production  GASTROINTESTINAL: denies nausea, vomiting, diarrhea, abdominal pain  GENITOURINARY: denies dysuria, discharge  NEUROLOGICAL: admits to numbness of b/l UE, denies headache  MUSCULOSKELETAL: denies new joint pain, muscle aches  HEMATOLOGIC: denies gross bleeding, bruising  LYMPHATICS: denies enlarged lymph nodes, extremity swelling  PSYCHIATRIC: denies recent changes in anxiety, depression  ENDOCRINOLOGIC: denies sweating, cold or heat intolerance    Vital Signs Last 24 Hrs  T(F): 97.5 (21 Sep 2022 08:01), Max: 98 (20 Sep 2022 12:56)  HR: 53 (21 Sep 2022 08:01) (53 - 66)  BP: 101/64 (21 Sep 2022 08:01) (101/64 - 125/74)  RR: 15 (21 Sep 2022 08:01) (15 - 18)  SpO2: 99% (21 Sep 2022 08:01) (96% - 99%)  I&O's Summary    PHYSICAL EXAM:  GENERAL: NAD, sitting in chair  HEAD:  Normocephalic, staples on R frontal head incision site c/d/i  EYES: PERRL, conjunctiva and sclera clear  ENMT: Moist mucous membranes, Good dentition  NECK: Supple, No JVD  CHEST/LUNG: Clear to auscultation bilaterally, non-labored breathing, good air entry  HEART: RRR; S1/S2, No murmur  ABDOMEN: Soft, Nontender, Nondistended; Bowel sounds present  VASCULAR: Normal pulses, Normal capillary refill  EXTREMITIES: No cyanosis, No edema, RLE erythematous, R sided weakness LE > UE  SKIN: Warm, perfused  PSYCH: Normal mood and affect  NERVOUS SYSTEM:  A/O x3, Good concentration    LABS:                        11.1   5.37  )-----------( 199      ( 21 Sep 2022 06:00 )             32.7     09-21    137  |  102  |  27  ----------------------------<  114  3.9   |  32  |  0.46    Ca    8.1      21 Sep 2022 06:00    TPro  5.5  /  Alb  2.4  /  TBili  0.5  /  DBili  x   /  AST  20  /  ALT  41  /  AlkPhos  94  09-21                                      COVID-19 PCR: Nayla (09-19-22 @ 17:45)  COVID-19 PCR: Nayla (09-12-22 @ 13:09)    RADIOLOGY & ADDITIONAL TESTS: reviewed labs    Care Discussed with Consultants/Other Providers: discussed with Dr. Mosqueda

## 2022-09-21 NOTE — CONSULT NOTE ADULT - ASSESSMENT
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 (s/p radiation, on chemo) who presented with LUE weakness and possible speech deficit, found to have 4 cm mass in R frontal lobe, now post-resection 9/14/22, admitted to  rehab for ADL impairment     #R frontal tumor  #ADL impairment  - s/p craniotomy for tumor resection, left hemiparesis  - Continue decadron taper for cerebral edema  - Continue keppra 500mg BID for seizure prophylaxis  - continue votrient 200mg daily for chemotherapy  - follow up with heme regarding chemotherapy outpatient   - start comprehensive rehab program    #RLE erythema  - patient states always has this but worse now  - follow up dopplers 9/21    #DVT prophylaxis  - Last doppler on 9/14 negative  - lovenox

## 2022-09-21 NOTE — DIETITIAN INITIAL EVALUATION ADULT - PERTINENT LABORATORY DATA
23-May-2021 14:29 09-21    137  |  102  |  27<H>  ----------------------------<  114<H>  3.9   |  32<H>  |  0.46<L>    Ca    8.1<L>      21 Sep 2022 06:00    TPro  5.5<L>  /  Alb  2.4<L>  /  TBili  0.5  /  DBili  x   /  AST  20  /  ALT  41  /  AlkPhos  94  09-21  A1C with Estimated Average Glucose Result: 6.1 % (09-15-22 @ 21:01)

## 2022-09-21 NOTE — PATIENT PROFILE ADULT - FALL HARM RISK - HARM RISK INTERVENTIONS

## 2022-09-21 NOTE — DIETITIAN INITIAL EVALUATION ADULT - ADD RECOMMEND
1) Continue Regular diet.   2) Recommend Severino BID to aid in wound healing, recommend MVI, Vit D, Vit C & Zinc supplementation when medically feasible to assist pt in meeting estimated protein energy needs.  3) Monitor PO intake, GI tolerance, skin integrity, labs, weight, and bowel movement regularity.   4) Honor food preferences as feasible. Assist with meals PRN and encourage PO intake.  5) Provide ongoing diet education as needed  6) RD remains available upon request and will follow-up per protocol

## 2022-09-22 NOTE — PROGRESS NOTE ADULT - ASSESSMENT
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 Admitted for multidisciplinary rehab program    # S/p craniotomy for tumor resection, left hemiparesis  - F/up surgical pathology  - Remove surgical staples 9/26 (POD 12)  - Continue decadron taper for cerebral edema  - Continue keppra for seizure prophylaxis  - Begin comprehensive rehab program PT/OT/ SLP 3 hours a day 5 days a week  - right foot drop, may benefit from AFO  - Precautions: CA (rectal, sarcoma), fall, SZ aspiration, AMS    # Mood / Cognition:  - Neuropsych evaluation   - recreation therapy    # Palliative Care/pain  - palliative was following for pain management  - tylenol PRN  - methadone 2.5 mg TID now restarted  - Continue gabapentin for neuropathic pain     # Sleep:  - Maintain quiet hours and low stim environment  - Melatonin PRN    # GI/Bowel:  - Last BM (per records) 9/21  - Senna 2 tabs daily    # /Bladder:  - Monitor PVR if no void in 8h; SC for >400 cc  - Toileting schedule q4h    # DietL  - Diet: regular--> downgraded mince and moist 9/21  - MBS 9/23 9 AM  - BUn/Cr 27/0.46 9/21. Encourage po fluids    # Skin/ Pressure Injury Prevention:  - Assessment on admission   - Incisions: R frontal incision s/p craniotomy   - Turn Q2hrs in bed while awake, OOB to Chair, PT/OT/SLP     # DVT prophylaxis:  - Last doppler on 9/21 neg DVT RLE  - SCDs    # COVID PCR: Negative 9/19    # LABS  CBC BMP Mon    --------------------------------------------  Outpatient Follow up:  Alvaro Day)  Neurosurgery  General  [address] [place] [address], [address]  [place] [address]  Phone: [phone]  Fax: [phone]  Follow Up Time:     Serge Alvarado)  Radiation Oncology  [facility] - Dept. of Radiation Medicine, [address] [place] [address]  Phone: [phone]  Fax: [phone]  Follow Up Time:     Cora Hanson)  [facility]  [address] [place] [address]  Phone: [phone]  Fax: [phone]  Follow Up Time:     Nghia Moon)  Hematology; Medical Oncology  [address] [place] [address]  Phone: [phone]  Fax: [phone]    Scheduled Appointments  Cora Cabral  [facility]  GERIATRICS [address] [place] [address]   Scheduled Appointment: 10/12/2022  --------------------------------------------      MEDICAL PROGNOSIS: fair-            REHAB POTENTIAL: fair+             ESTIMATED DISPOSITION: HOME WITH HOME CARE and caregiver support           ELOS: 17-21 Days   EXPECTED THERAPY:     P.T. 1hr/day       O.T. 1hr/day      S.L.P. 1hr/day     P&O Unnecessary     EXP FREQUENCY: 5 days per 7 day period     PRESCREEN COMPARISON:   I have reviewed the prescreen information and I have found no relevant changes between the preadmission screening and my post admission evaluation     RATIONALE FOR INPATIENT ADMISSION - Patient demonstrates the following: (check all that apply)  [X] Medically appropriate for rehabilitation admission  [X] Has attainable rehab goals with an appropriate initial discharge plan  [X] Has rehabilitation potential (expected to make a significant improvement within a reasonable period of time)   [X] Requires close medical management by a rehab physician, rehab nursing care, Hospitalist and comprehensive interdisciplinary team (including PT, OT, & or SLP, Prosthetics and Orthotics)  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 (s/p radiation, on chemo) who presented with LUE weakness and possible speech deficit, found to have 4 cm mass in R frontal lobe, now post-resection 9/14/22    # S/p craniotomy for tumor resection, left hemiparesis  - F/up surgical pathology (still pending 9/23)  - Remove surgical staples 9/26 (POD 12)  - Continue decadron taper for cerebral edema  - Continue keppra for seizure prophylaxis  - continue comprehensive rehab program PT/OT/ SLP 3 hours a day 5 days a week  - right foot drop, may benefit from AFO  - Precautions: CA (rectal, sarcoma), fall, SZ aspiration, AMS    # Mood / Cognition:  - Neuropsych and recreation therapy    # Pain  - palliative was following for pain management at prior hospital  - tylenol PRN  - methadone 2.5 mg TID (lower than home dose, however reports controlled)  - gabapentin for neuropathic pain     # Sleep:  - Melatonin PRN    # GI/Bowel:  - Senna 2 tabs daily    # DietL  - Diet: downgraded mince and moist 9/21  - MBS 9/23 9 AM  - BUn/Cr 27/0.46 9/21--> 31/0.49. Encourage po fluids    # DVT prophylaxis:  - doppler on 9/21 neg DVT RLE. REviewed with patient and wife  - SCDs    # COVID PCR: Negative 9/19    # LABS  MBS 9/23  CBC BMP 9/26    --------------------------------------------  Outpatient Follow up:  Alvaro Day)  Neurosurgery  General  [address] [facility]  [place] [address]  Phone: [phone]  Fax: [phone]  Follow Up Time:     Serge Alvarado)  Radiation Oncology  [facility] - Dept. of Radiation Medicine, [address] [place] [address]  Phone: [phone]  Fax: [phone]  Follow Up Time:     Cora Hanson)  [facility]; Internal Medicine  [address] [place] [address]  Phone: [phone]  Fax: [phone]  Follow Up Time:     Nghia Moon)  Hematology; Medical Oncology  [address] [place] [address]  Phone: [phone]  Fax: [phone]    Scheduled Appointments  Cora Cabral  [facility]  GERIATRICS [address] [place] [address]   Scheduled Appointment: 10/12/2022  --------------------------------------------      MEDICAL PROGNOSIS: fair-            REHAB POTENTIAL: fair+             ESTIMATED DISPOSITION: HOME WITH HOME CARE and caregiver support           ELOS: 17-21 Days   EXPECTED THERAPY:     P.T. 1hr/day       O.T. 1hr/day      S.L.P. 1hr/day     P&O Unnecessary     EXP FREQUENCY: 5 days per 7 day period     PRESCREEN COMPARISON:   I have reviewed the prescreen information and I have found no relevant changes between the preadmission screening and my post admission evaluation     RATIONALE FOR INPATIENT ADMISSION - Patient demonstrates the following: (check all that apply)  [X] Medically appropriate for rehabilitation admission  [X] Has attainable rehab goals with an appropriate initial discharge plan  [X] Has rehabilitation potential (expected to make a significant improvement within a reasonable period of time)   [X] Requires close medical management by a rehab physician, rehab nursing care, Hospitalist and comprehensive interdisciplinary team (including PT, OT, & or SLP, Prosthetics and Orthotics)

## 2022-09-22 NOTE — PROGRESS NOTE ADULT - SUBJECTIVE AND OBJECTIVE BOX
Pt was seen for 45 min with his wife. [place]  services were used (Mary ID#[id number], [employer]; Samantha ID#[id number], [facility]). Pt had no complaints. Pt was seen for 45 min with his wife. [place]  services were used (Mary ID#[id number], [employer]; Samantha ID#[id number], [facility]). Pt had no complaints. Session focused on gathering biographical information and assessing Pt's current emotional functioning. Pt was able to answer all questions asked to explore Pt's personal hx including hx of current illness, medical hx and social hx. Pt also answered all questions asked to assess his current mental state. Pt was very talkative, even verbose during the session, taking a long time to answer even simple questions sometimes. Attempted to explore current concerns, both Pt and his wife denied any pressing concerns at this time. Agreed to continue the evaluation within the next day, due to Pt's getting fatigue and hunger, and taking a long time at the beginning of the session to get him ready for the session, Pt's verbosity, and then finding a [facility] . Support and encouragement were provided.

## 2022-09-22 NOTE — PROGRESS NOTE ADULT - NS ATTEND AMEND GEN_ALL_CORE FT
Progress note amended to include my discussions with the patient, NP, family. Patient appears comfortable, feels grateful to staff and care he gets here. He reports that his pain is controlled although current methadone dose is less than home dose (will monitor closely; was on 2 mg at other hospital, 2.5 mg here as do not carry 2 mg). Denies any B/B complaints    Cranial incision healing well, Currently on POD #8, for staple removal Tuesday POD #12. intact, no erythema, no swelling. Denies any significant H/A or dizziness although he feels a vague discomfort in the areas since surgery. Doppler of LE negative DVT, reviewed with pateitn and wife. RLE today without any erythema and no swelling, calves soft no TTP./ Recommend KAIN/compression stocking OOB as history of LE swelling. Labs reviewed, stable, still needs to encourage po fluids, MBS pending tomorrow./ Continue current program    Seen with [employer]  Jean Carlos ##[phone]

## 2022-09-22 NOTE — PROGRESS NOTE ADULT - SUBJECTIVE AND OBJECTIVE BOX
CHIEF COMPLAINT: right frontal lobe mass s/p craniotomy and resection 9/14/22      HISTORY OF PRESENT ILLNESS  Patient is a 68 yo M PMHx rectal CA (2011, s/p radiation/chemo) and 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 (s/p radiation and now on chemo) who presented to [facility] 9/12/22 with 2 week history of LUE weakness and possible speech deficit. CT head showed large 4cm mass in the R frontal lobe with mild associated edema, no midline shift. Patient was admitted under Neurosurgery Dr. Day for surgical planning.     CT CAP 9/12 revealed increased size of locally advanced pancreatic tail tumor, progressive pulmonary and pleural metastases. Similar-appearing right iliopsoas. CT T/L spine revealed no pathologic fracture or lytic / destructive lesions involving the thoracic and lumbar spine; S1-3 pathologic fracture no retropulsed bone, similar-appearing right sided epidural metastases in the lumbosacral region. On 9/14 patient underwent right craniotomy for tumor resection 9/14/22.    Post-op CT 9/15 revealed post-op changes. MRI Brain 9/15 revealed post-op resection of right posterior frontal lesion with small residual enhancement and 0.3cm leftward midline shift. Patient placed on Decadron taper. Now admitted for multidisciplinary rehabilitation program 9/20/22.        PAST MEDICAL & SURGICAL HISTORY:  Abdominal tumor      Back pain      Rectal cancer      No significant past surgical history      VITALS  Vital Signs Last 24 Hrs  T(C): 36.3 (22 Sep 2022 09:09), Max: 36.7 (21 Sep 2022 21:19)  T(F): 97.3 (22 Sep 2022 09:09), Max: 98 (21 Sep 2022 21:19)  HR: 61 (22 Sep 2022 09:09) (59 - 61)  BP: 100/62 (22 Sep 2022 09:09) (100/62 - 113/61)  BP(mean): --  RR: 16 (22 Sep 2022 09:09) (16 - 16)  SpO2: 96% (22 Sep 2022 09:09) (96% - 97%)    Parameters below as of 22 Sep 2022 09:09  Patient On (Oxygen Delivery Method): room air      RECENT LABS                        11.2   6.05  )-----------( 190      ( 22 Sep 2022 06:30 )             33.4     09-22    139  |  103  |  31<H>  ----------------------------<  95  3.7   |  31  |  0.49<L>    Ca    8.6      22 Sep 2022 06:30    TPro  5.5<L>  /  Alb  2.4<L>  /  TBili  0.5  /  DBili  x   /  AST  20  /  ALT  41  /  AlkPhos  94  09-21      LIVER FUNCTIONS - ( 21 Sep 2022 06:00 )  Alb: 2.4 g/dL / Pro: 5.5 g/dL / ALK PHOS: 94 U/L / ALT: 41 U/L / AST: 20 U/L / GGT: x             CURRENT MEDICATIONS  MEDICATIONS  (STANDING):  Biotene Dry Mouth Oral Rinse 5 milliLiter(s) Swish and Spit three times a day  chlorhexidine 2% Cloths 1 Application(s) Topical every 12 hours  dexAMETHasone     Tablet 4 milliGRAM(s) Oral every 12 hours  dexAMETHasone     Tablet   Oral   enoxaparin Injectable 30 milliGRAM(s) SubCutaneous every 24 hours  gabapentin Solution 1000 milliGRAM(s) Oral three times a day  influenza  Vaccine (HIGH DOSE) 0.7 milliLiter(s) IntraMuscular once  levETIRAcetam 500 milliGRAM(s) Oral two times a day  methadone    Tablet 2.5 milliGRAM(s) Oral three times a day  pantoprazole   Suspension 40 milliGRAM(s) Oral daily  pazopanib 200 milliGRAM(s) Oral daily  polyethylene glycol 3350 17 Gram(s) Oral two times a day  senna 2 Tablet(s) Oral at bedtime    MEDICATIONS  (PRN):  acetaminophen     Tablet .. 650 milliGRAM(s) Oral every 6 hours PRN Mild Pain (1 - 3)  artificial  tears Solution 1 Drop(s) Both EYES every 4 hours PRN Dry Eyes  bisacodyl Oral Tab/Cap - Peds 5 milliGRAM(s) Oral daily PRN Constipation  calcium carbonate    500 mG (Tums) Chewable 1 Tablet(s) Chew every 6 hours PRN Upset Stomach  oxyCODONE    IR 5 milliGRAM(s) Oral every 4 hours PRN Moderate Pain (4 - 6)  oxyCODONE    IR 10 milliGRAM(s) Oral every 4 hours PRN Severe Pain (7 - 10)      Gen - Mandarin speaking, AO3, PERRL, EOMI Low vocal volume but appropriate, O x 3 follows 1-2 step commands    Pulm - CTAB, No wheeze, No rhonchi, No crackles. Fair effort  Cardiovascular - RRR, S1S2, No m/r/g  Abdomen - Soft, NT/ND, +BS  Extremities - , No calf tenderness  RLE trace non pitting edema,+ertyehma, blanchable, no warmth no cord palpable +asymmetric  Neuro-     Cognitive - AAOx3     Communication - Fluent, No dysarthria     Attention: Intact      Judgement: Good evidence of judgement     Cranial Nerves - CN 2-12 intact; mild      Motor -                    LEFT    UE - ShAB 4/5, EF 4/5, EE 5/5, WE 4/5,  4/5                    RIGHT UE - ShAB 4/5, EF 4/5, EE 4/5, WE 4/5,  4/5                    LEFT    LE - HF 4/5, KE 4/5, DF 4/5, PF 4/5                    RIGHT LE - HF 2/5 , KE 3/5, DF 1/5, PF 1/5        Sensory - Intact to LT     Reflexes - DTR Intact, No primitive reflex     Coordination - FTN intact     Tone - normal  Psychiatric - Mood stable, Affect WNL  Skin: Intact; chemo port present right pectoral      Continue comprehensive acute rehab program consisting of 3hrs/day of OT/PT and SLP. CHIEF COMPLAINT: right frontal lobe mass s/p craniotomy and resection 9/14/22      HISTORY OF PRESENT ILLNESS  Patient is a 66 yo M PMHx rectal CA (2011, s/p radiation/chemo) and 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 (s/p radiation and now on chemo) who presented to [facility] 9/12/22 with 2 week history of LUE weakness and possible speech deficit. CT head showed large 4cm mass in the R frontal lobe with mild associated edema, no midline shift. Patient was admitted under Neurosurgery Dr. Day for surgical planning.     CT CAP 9/12 revealed increased size of locally advanced pancreatic tail tumor, progressive pulmonary and pleural metastases. Similar-appearing right iliopsoas. CT T/L spine revealed no pathologic fracture or lytic / destructive lesions involving the thoracic and lumbar spine; S1-3 pathologic fracture no retropulsed bone, similar-appearing right sided epidural metastases in the lumbosacral region. On 9/14 patient underwent right craniotomy for tumor resection 9/14/22.    Post-op CT 9/15 revealed post-op changes. MRI Brain 9/15 revealed post-op resection of right posterior frontal lesion with small residual enhancement and 0.3cm leftward midline shift. Patient placed on Decadron taper. Now admitted for multidisciplinary rehabilitation program 9/20/22.        PAST MEDICAL & SURGICAL HISTORY:  Abdominal tumor      Back pain      Rectal cancer      No significant past surgical history      VITALS  Vital Signs Last 24 Hrs  T(C): 36.3 (22 Sep 2022 09:09), Max: 36.7 (21 Sep 2022 21:19)  T(F): 97.3 (22 Sep 2022 09:09), Max: 98 (21 Sep 2022 21:19)  HR: 61 (22 Sep 2022 09:09) (59 - 61)  BP: 100/62 (22 Sep 2022 09:09) (100/62 - 113/61)  BP(mean): --  RR: 16 (22 Sep 2022 09:09) (16 - 16)  SpO2: 96% (22 Sep 2022 09:09) (96% - 97%)    Parameters below as of 22 Sep 2022 09:09  Patient On (Oxygen Delivery Method): room air      RECENT LABS                        11.2   6.05  )-----------( 190      ( 22 Sep 2022 06:30 )             33.4     09-22    139  |  103  |  31<H>  ----------------------------<  95  3.7   |  31  |  0.49<L>    Ca    8.6      22 Sep 2022 06:30    TPro  5.5<L>  /  Alb  2.4<L>  /  TBili  0.5  /  DBili  x   /  AST  20  /  ALT  41  /  AlkPhos  94  09-21      LIVER FUNCTIONS - ( 21 Sep 2022 06:00 )  Alb: 2.4 g/dL / Pro: 5.5 g/dL / ALK PHOS: 94 U/L / ALT: 41 U/L / AST: 20 U/L / GGT: x             CURRENT MEDICATIONS  MEDICATIONS  (STANDING):  Biotene Dry Mouth Oral Rinse 5 milliLiter(s) Swish and Spit three times a day  chlorhexidine 2% Cloths 1 Application(s) Topical every 12 hours  dexAMETHasone     Tablet 4 milliGRAM(s) Oral every 12 hours  dexAMETHasone     Tablet   Oral   enoxaparin Injectable 30 milliGRAM(s) SubCutaneous every 24 hours  gabapentin Solution 1000 milliGRAM(s) Oral three times a day  influenza  Vaccine (HIGH DOSE) 0.7 milliLiter(s) IntraMuscular once  levETIRAcetam 500 milliGRAM(s) Oral two times a day  methadone    Tablet 2.5 milliGRAM(s) Oral three times a day  pantoprazole   Suspension 40 milliGRAM(s) Oral daily  pazopanib 200 milliGRAM(s) Oral daily  polyethylene glycol 3350 17 Gram(s) Oral two times a day  senna 2 Tablet(s) Oral at bedtime    MEDICATIONS  (PRN):  acetaminophen     Tablet .. 650 milliGRAM(s) Oral every 6 hours PRN Mild Pain (1 - 3)  artificial  tears Solution 1 Drop(s) Both EYES every 4 hours PRN Dry Eyes  bisacodyl Oral Tab/Cap - Peds 5 milliGRAM(s) Oral daily PRN Constipation  calcium carbonate    500 mG (Tums) Chewable 1 Tablet(s) Chew every 6 hours PRN Upset Stomach  oxyCODONE    IR 5 milliGRAM(s) Oral every 4 hours PRN Moderate Pain (4 - 6)  oxyCODONE    IR 10 milliGRAM(s) Oral every 4 hours PRN Severe Pain (7 - 10)      Gen - Mandarin speaking, AO3, PERRL, EOMI Low vocal volume but appropriate, O x 3 follows 1-2 step commands  seen with [employer]  Jean Carlos #[id number]. wife at bedside  craniotomy incision healing well +staples, intact, no sig swelling    Pulm - CTAB, No wheeze, No rhonchi, No crackles. Fair effort  Cardiovascular - RRR, S1S2, No m/r/g +right chemoport  Abdomen - Soft, NT/ND, +BS  Extremities - , No calf tenderness  NO further erythema calf, no TTP, no warmth  Neuro-     Cognitive - AAOx3 grossly, mood stable     Motor -                    LEFT    UE - ShAB 4/5, EF 4/5, EE 5/5, WE 4/5,  4/5                    RIGHT UE - ShAB 4/5, EF 4/5, EE 4/5, WE 4/5,  4/5                    LEFT    LE - HF 4/5, KE 4/5, DF 4/5, PF 4/5                    RIGHT LE - HF 2/5 , KE 3/5, DF 1/5, PF 1/5        Sensory - Intact to LT        Continue comprehensive acute rehab program consisting of 3hrs/day of OT/PT and SLP.

## 2022-09-22 NOTE — PROGRESS NOTE ADULT - SUBJECTIVE AND OBJECTIVE BOX
Patient is a 67y old  Male who presents with a chief complaint of right frontal lobe mass s/p craniotomy and resection 9/14/22 (22 Sep 2022 11:54)      Subjective and overnight events:  Patient seen and examined at bedside. no complaints. no headache, dizziness, sob, cp, abd pain. + chronic, stable RLE pain and numbness     ALLERGIES:  No Known Allergies    MEDICATIONS  (STANDING):  Biotene Dry Mouth Oral Rinse 5 milliLiter(s) Swish and Spit three times a day  chlorhexidine 2% Cloths 1 Application(s) Topical every 12 hours  dexAMETHasone     Tablet 4 milliGRAM(s) Oral every 12 hours  dexAMETHasone     Tablet   Oral   enoxaparin Injectable 30 milliGRAM(s) SubCutaneous every 24 hours  gabapentin Solution 1000 milliGRAM(s) Oral three times a day  influenza  Vaccine (HIGH DOSE) 0.7 milliLiter(s) IntraMuscular once  levETIRAcetam 500 milliGRAM(s) Oral two times a day  methadone    Tablet 2.5 milliGRAM(s) Oral three times a day  pantoprazole   Suspension 40 milliGRAM(s) Oral daily  pazopanib 200 milliGRAM(s) Oral daily  polyethylene glycol 3350 17 Gram(s) Oral two times a day  senna 2 Tablet(s) Oral at bedtime    MEDICATIONS  (PRN):  acetaminophen     Tablet .. 650 milliGRAM(s) Oral every 6 hours PRN Mild Pain (1 - 3)  artificial  tears Solution 1 Drop(s) Both EYES every 4 hours PRN Dry Eyes  bisacodyl Oral Tab/Cap - Peds 5 milliGRAM(s) Oral daily PRN Constipation  calcium carbonate    500 mG (Tums) Chewable 1 Tablet(s) Chew every 6 hours PRN Upset Stomach  oxyCODONE    IR 5 milliGRAM(s) Oral every 4 hours PRN Moderate Pain (4 - 6)  oxyCODONE    IR 10 milliGRAM(s) Oral every 4 hours PRN Severe Pain (7 - 10)    Vital Signs Last 24 Hrs  T(F): 97.3 (22 Sep 2022 09:09), Max: 98 (21 Sep 2022 21:19)  HR: 61 (22 Sep 2022 09:09) (59 - 61)  BP: 100/62 (22 Sep 2022 09:09) (100/62 - 113/61)  RR: 16 (22 Sep 2022 09:09) (16 - 16)  SpO2: 96% (22 Sep 2022 09:09) (96% - 97%)  I&O's Summary    PHYSICAL EXAM:  General: NAD, A/O x 3  ENT: MMM  Neck: Supple, No JVD  Lungs: Clear to auscultation bilaterally  Cardio: RRR, S1/S2, No murmurs  Abdomen: Soft, Nontender, Nondistended; Bowel sounds present  Extremities: No calf tenderness, No pitting edema    LABS:                        11.2   6.05  )-----------( 190      ( 22 Sep 2022 06:30 )             33.4     09-22    139  |  103  |  31  ----------------------------<  95  3.7   |  31  |  0.49    Ca    8.6      22 Sep 2022 06:30    TPro  5.5  /  Alb  2.4  /  TBili  0.5  /  DBili  x   /  AST  20  /  ALT  41  /  AlkPhos  94  09-21        COVID-19 PCR: Rosatedav (09-19-22 @ 17:45)  COVID-19 PCR: Nayla (09-12-22 @ 13:09)      RADIOLOGY & ADDITIONAL TESTS:  - venous doppler 9/21 reviewed neg DVT    Care Discussed with Consultants/Other Providers: Rehab team during IDR rounds

## 2022-09-22 NOTE — PROGRESS NOTE ADULT - ASSESSMENT
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 (s/p radiation, on chemo) who presented with LUE weakness and possible speech deficit, found to have 4 cm mass in R frontal lobe, now post-resection 9/14/22, admitted to  rehab for ADL impairment     #R frontal tumor  #ADL impairment  - s/p craniotomy for tumor resection, left hemiparesis  - Continue decadron taper for cerebral edema  - Continue keppra 500mg BID for seizure prophylaxis  - continue votrient 200mg daily for chemotherapy  - follow up with heme regarding chemotherapy outpatient   - start comprehensive rehab program    #RLE erythema  - patient states always has this but worse now  - venous doppler 9/21 reviewed, neg DVT    #DVT prophylaxis  - lovenox

## 2022-09-22 NOTE — PROGRESS NOTE ADULT - ASSESSMENT
Pt alert, attentive, intact language, thought processes circumstantial, no abnormal thought contents noted, full range affect/mild elation noted at times, euthymic mood, denied AH/VH, denied SI/HI/I/P, calm behavior. Plan: Continue the evaluation process. Provide supportive tx if needed.

## 2022-09-23 NOTE — PROGRESS NOTE ADULT - ASSESSMENT
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 (s/p radiation, on chemo) who presented with LUE weakness and possible speech deficit, found to have 4 cm mass in R frontal lobe, now post-resection 9/14/22    # S/p craniotomy for tumor resection, left hemiparesis  - F/up surgical pathology (still pending 9/23)  - Remove surgical staples 9/26 (POD 12)  - Continue decadron taper for cerebral edema  - Continue keppra for seizure prophylaxis  - continue comprehensive rehab program PT/OT/ SLP 3 hours a day 5 days a week  - right foot drop, may benefit from AFO  - Precautions: CA (rectal, sarcoma), fall, SZ aspiration, AMS    # Mood / Cognition:  - Neuropsych and recreation therapy    # Pain  - palliative was following for pain management at prior hospital  - tylenol PRN  - methadone 2.5 mg TID (lower than home dose, however reports controlled)  - gabapentin for neuropathic pain     # Sleep:  - Melatonin PRN    # GI/Bowel:  - Senna 2 tabs daily    # DietL  - Diet: downgraded mince and moist 9/21  - MBS 9/23 completed-puree/thins  - BUn/Cr 27/0.46 9/21--> 31/0.49. Encourage po fluids    # DVT prophylaxis:  - doppler on 9/21 neg DVT RLE. REviewed with patient and wife  - SCDs    # COVID PCR: Negative 9/19    --------------------------------------------  Outpatient Follow up:  Alvaro Day)  Neurosurgery  General  [address] [place] [address], [address]  [place] [address]  Phone: [phone]  Fax: [phone]  Follow Up Time:     Serge Alvarado)  Radiation Oncology  [place] - Dept. of Radiation Medicine, [address] [place] [id number]  Phone: [phone]  Fax: [phone]  Follow Up Time:     Cora Hanson)  HospicePalliative Medicine; Internal Medicine  [address] [place] [address]  Phone: [phone]  Fax: [phone]  Follow Up Time:     Nghia Moon)  Hematology; Medical Oncology  [address] [place] [address]  Phone: [phone]  Fax: [phone]    Scheduled Appointments  [facility]  GERIATRICS [address] [place] [address]   Scheduled Appointment: 10/12/2022  --------------------------------------------

## 2022-09-23 NOTE — CONSULT NOTE ADULT - SUBJECTIVE AND OBJECTIVE BOX
Pt is a 68 y/o right-handed male with PMHx of rectal CA (2011, s/p radiation/chemo) and 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 (s/p radiation and now on chemo) who presented to [facility] 9/12/22 with 2 week history of LUE weakness and possible speech deficit. CT head showed large 4cm mass in the R frontal lobe with mild associated edema, no midline shift. Pt was admitted under Neurosurgery Dr. Day for surgical planning. CT CAP 9/12 revealed increased size of locally advanced pancreatic tail tumor, progressive pulmonary and pleural metastases. Similar-appearing right iliopsoas. CT T/L spine revealed no pathologic fracture or lytic / destructive lesions involving the thoracic and lumbar spine; S1-3 pathologic fracture no retropulsed bone, similar-appearing right sided epidural metastases in the lumbosacral region. On 9/14 patient underwent right craniotomy for tumor resection 9/14/22. Post-op CT 9/15 revealed post-op changes. MRI Brain 9/15 revealed post-op resection of right posterior frontal lesion with small residual enhancement and 0.3cm leftward midline shift. Pt placed on Decadron taper. Now admitted for multidisciplinary rehabilitation program 9/20/22. PMHx:   As noted above. Current meds: Please see list in Pt’s chart. Social Hx: Pt is  and has one adult daughter. He graduated from high school in [place], and is a retired . Pt lacks hx of mental illness. He quit smoking 20 years ago. Pt is Quaker. He enjoys going to the park, spending time with friends and playing [employer].    Findings: Pt was seen for an initial assessment of his cognitive and emotional functioning. [employer] were used ([place], ID#[id number], [facility]). The Modified MMSE (3MS) was administered; Pt’s results (76/100) were in the Moderately Impaired range. His scores in a geriatric mood measure suggested levels of depression (GDS =  /15). Pt was alert, closely Ox3 (he was disoriented to the city), and cooperative. Attn/Conc: Simple auditory attention - intact.  Concentration/Working memory for reversed counting and spelling – intact (7/7). Language: Pt’s speech was of normal volume, pitch and pace. Naming - mildly impaired (3/4). Sentence repetition - intact. Auditory Comprehension - intact. Reading - intact. Writing - intact. Memory: Encoding of 3 words was intact (3/3); short-delayed verbal recall – mildly impaired (2/3, no improvement was noted with cueing); long-delayed verbal recall – severely impaired (0/3, improving to 1/3 with saturated cueing). LTM was mildly impaired for US presidents (3/4, improving to 4/4 with cueing). Visual memory – mildly impaired. Visuospatial: Visuomotor integration – **** for copy of a 2D figure, was noted. Executive Functions: Motor Planning - intact. Organizational skills - mildly to moderately impaired impaired. Abstract reasoning - moderately impaired. Initiation/Phonemic Fluency - moderately impaired. Verbal problem solving – mildly impaired. Emotional functioning: Affect - initially full range, but irritable toward the end of the meeting (Pt reported feeling irritable in response to the 's interventions). Mood - euthymic ("happy"); Pt reported experiencing poor sleep, decreased appetite, low energy and fatigue. On mood measures he additionally reported .  Thought processes were goal-directed. No abnormal thought contents were observed.  Pt denied any AH/VH. Pt also denied SI/HI/I/P. Insight - WFL. Judgment - fair.   Pt is a 66 y/o right-handed male with PMHx of rectal CA (2011, s/p radiation/chemo) and 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 (s/p radiation and now on chemo) who presented to [facility] 9/12/22 with 2 week history of LUE weakness and possible speech deficit. CT head showed large 4cm mass in the R frontal lobe with mild associated edema, no midline shift. Pt was admitted under Neurosurgery Dr. Day for surgical planning. CT CAP 9/12 revealed increased size of locally advanced pancreatic tail tumor, progressive pulmonary and pleural metastases. Similar-appearing right iliopsoas. CT T/L spine revealed no pathologic fracture or lytic / destructive lesions involving the thoracic and lumbar spine; S1-3 pathologic fracture no retropulsed bone, similar-appearing right sided epidural metastases in the lumbosacral region. On 9/14 patient underwent right craniotomy for tumor resection 9/14/22. Post-op CT 9/15 revealed post-op changes. MRI Brain 9/15 revealed post-op resection of right posterior frontal lesion with small residual enhancement and 0.3cm leftward midline shift. Pt placed on Decadron taper. Now admitted for multidisciplinary rehabilitation program 9/20/22. PMHx:   As noted above. Current meds: Please see list in Pt’s chart. Social Hx: Pt is  and has one adult daughter. He graduated from high school in [place], and is a retired . Pt lacks hx of mental illness. He quit smoking 20 years ago. Pt is Restorationism. He enjoys going to the park, spending time with friends and playing [employer].    Findings: Pt was seen for an initial assessment of his cognitive and emotional functioning. [employer] were used ([place], ID#[id number], [facility]). The Modified MMSE (3MS) was administered; Pt’s results (76/100) were in the Moderately Impaired range. His scores in a geriatric mood measure suggested levels of depression (GDS =  /15). Pt was alert, closely Ox3 (he was disoriented to the city), and cooperative. Attn/Conc: Simple auditory attention - intact.  Concentration/Working memory for reversed counting and spelling – intact (7/7). Language: Pt’s speech was of normal volume, pitch and pace. Naming - mildly impaired (3/4). Sentence repetition - intact. Auditory Comprehension - intact. Reading - intact. Writing - intact. Memory: Encoding of 3 words was intact (3/3); short-delayed verbal recall – mildly impaired (2/3, no improvement was noted with cueing); long-delayed verbal recall – severely impaired (0/3, improving to 1/3 with saturated cueing). LTM was mildly impaired for US presidents (3/4, improving to 4/4 with cueing). Visual memory – mildly impaired. Visuospatial: Visuomotor integration – mildly impaired for copy of a 2D figure, mild distortion was noted. Executive Functions: Motor Planning - intact. Organizational skills - mildly to moderately impaired impaired. Abstract reasoning - moderately impaired. Initiation/Phonemic Fluency - moderately impaired. Verbal problem solving – mildly impaired. Emotional functioning: Affect - initially full range, but irritable toward the end of the meeting (Pt reported feeling irritable in response to the 's interventions). Mood - euthymic ("happy"); Pt reported experiencing poor sleep, decreased appetite, low energy and fatigue. On mood measures he additionally reported .  Thought processes were goal-directed. No abnormal thought contents were observed.  Pt denied any AH/VH. Pt also denied SI/HI/I/P. Insight - WFL. Judgment - fair.

## 2022-09-23 NOTE — CONSULT NOTE ADULT - ASSESSMENT
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 Pt exhibits difficulties with short-term memory for auditory-verbal material (with loss of information over time and minimal help from cueing), language (including fluency, naming), and aspects of executive functions (including abstract reasoning, organizational skills, problem solving and initiation). His affect is somewhat labile, irritable at times, and he reports adjustment symptoms including insomnia, decreased appetite, low energy and fatigue in response to his current medical condition. FIM scores: Social Interaction ; Problem Solving 5; Memory 4.  Plan: Individual supportive psychotherapy to monitor cognition, affect/mood, and behavior. Cognitive remediation during speech therapy sessions is strongly recommended. Participation in recreation/art therapy in order to have pleasure and mastery experiences and regain/reestablish a sense of routine.  Time spent with Pt, 50 minutes.     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 Pt exhibits difficulties with short-term memory for auditory-verbal material (with loss of information over time and minimal help from cueing), language (including fluency, naming), visuospatial skills (i.e., mild visuomotor integration problems) and aspects of executive functions (including abstract reasoning, organizational skills, problem solving and initiation). His affect is somewhat labile, irritable at times, and he reports adjustment symptoms including insomnia, decreased appetite, low energy and fatigue in response to his current medical condition. FIM scores: Social Interaction ; Problem Solving 5; Memory 4.  Plan: Individual supportive psychotherapy to monitor cognition, affect/mood, and behavior. Cognitive remediation during speech therapy sessions is strongly recommended. Participation in recreation/art therapy in order to have pleasure and mastery experiences and regain/reestablish a sense of routine.  Time spent with Pt, 50 minutes.

## 2022-09-23 NOTE — PROGRESS NOTE ADULT - SUBJECTIVE AND OBJECTIVE BOX
Patient is a 67y old  Male who presents with a chief complaint of right frontal lobe mass s/p craniotomy and resection 9/14/22 (23 Sep 2022 12:33)      Subjective and overnight events:  Patient seen and examined at bedside. no complaints. + BM. slept well. RLE numbness controlled with medications. no fever, chills, sob, cp, abd pain.    ALLERGIES:  No Known Allergies    MEDICATIONS  (STANDING):  Biotene Dry Mouth Oral Rinse 5 milliLiter(s) Swish and Spit three times a day  chlorhexidine 2% Cloths 1 Application(s) Topical every 12 hours  dexAMETHasone     Tablet 4 milliGRAM(s) Oral every 12 hours  dexAMETHasone     Tablet   Oral   enoxaparin Injectable 30 milliGRAM(s) SubCutaneous every 24 hours  gabapentin Solution 1000 milliGRAM(s) Oral three times a day  influenza  Vaccine (HIGH DOSE) 0.7 milliLiter(s) IntraMuscular once  levETIRAcetam 500 milliGRAM(s) Oral two times a day  methadone    Tablet 2.5 milliGRAM(s) Oral three times a day  pantoprazole   Suspension 40 milliGRAM(s) Oral daily  pazopanib 200 milliGRAM(s) Oral daily  polyethylene glycol 3350 17 Gram(s) Oral two times a day  senna 2 Tablet(s) Oral at bedtime    MEDICATIONS  (PRN):  acetaminophen     Tablet .. 650 milliGRAM(s) Oral every 6 hours PRN Mild Pain (1 - 3)  artificial  tears Solution 1 Drop(s) Both EYES every 4 hours PRN Dry Eyes  bisacodyl Oral Tab/Cap - Peds 5 milliGRAM(s) Oral daily PRN Constipation  calcium carbonate    500 mG (Tums) Chewable 1 Tablet(s) Chew every 6 hours PRN Upset Stomach  oxyCODONE    IR 5 milliGRAM(s) Oral every 4 hours PRN Moderate Pain (4 - 6)  oxyCODONE    IR 10 milliGRAM(s) Oral every 4 hours PRN Severe Pain (7 - 10)    Vital Signs Last 24 Hrs  T(F): 98.5 (23 Sep 2022 08:53), Max: 98.5 (23 Sep 2022 08:53)  HR: 60 (23 Sep 2022 08:53) (60 - 66)  BP: 124/81 (23 Sep 2022 08:53) (116/75 - 124/81)  RR: 16 (23 Sep 2022 08:53) (16 - 16)  SpO2: 99% (23 Sep 2022 08:53) (98% - 99%)  I&O's Summary    PHYSICAL EXAM:  General: NAD, A/O x 3  ENT: MMM  Neck: Supple, No JVD  Lungs: Clear to auscultation bilaterally  Cardio: RRR, S1/S2, No murmurs  Abdomen: Soft, Nontender, Nondistended; Bowel sounds present  Extremities: No calf tenderness, No pitting edema    LABS:                        11.2   6.05  )-----------( 190      ( 22 Sep 2022 06:30 )             33.4     09-22    139  |  103  |  31  ----------------------------<  95  3.7   |  31  |  0.49    Ca    8.6      22 Sep 2022 06:30    TPro  5.5  /  Alb  2.4  /  TBili  0.5  /  DBili  x   /  AST  20  /  ALT  41  /  AlkPhos  94  09-21          COVID-19 PCR: NotDetec (09-23-22 @ 06:30)  COVID-19 PCR: NotDetec (09-19-22 @ 17:45)  COVID-19 PCR: NotDetec (09-12-22 @ 13:09)      RADIOLOGY & ADDITIONAL TESTS:    Care Discussed with Consultants/Other Providers: rehab team during IDR round

## 2022-09-23 NOTE — PROGRESS NOTE ADULT - ASSESSMENT
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 (s/p radiation, on chemo) who presented with LUE weakness and possible speech deficit, found to have 4 cm mass in R frontal lobe, now post-resection 9/14/22, admitted to  rehab for ADL impairment     #R frontal tumor  #ADL impairment  - s/p craniotomy for tumor resection, left hemiparesis  - biopsy showed high grade neoplasm   - Continue decadron taper for cerebral edema  - Continue keppra 500mg BID for seizure prophylaxis  - continue votrient 200mg daily for chemotherapy  - follow up with heme regarding chemotherapy outpatient   - comprehensive rehab program    #RLE erythema  - chronic per pt  - venous doppler 9/21 reviewed, neg DVT    #RLE neuropathy   - gabapentin 1000mg TID    #DVT prophylaxis  - lovenox

## 2022-09-23 NOTE — PROGRESS NOTE ADULT - SUBJECTIVE AND OBJECTIVE BOX
CHIEF COMPLAINT: right frontal lobe mass s/p craniotomy and resection 9/14/22      HISTORY OF PRESENT ILLNESS  Patient is a 68 yo M PMHx rectal CA (2011, s/p radiation/chemo) and 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 (s/p radiation and now on chemo) who presented to [facility] 9/12/22 with 2 week history of LUE weakness and possible speech deficit. CT head showed large 4cm mass in the R frontal lobe with mild associated edema, no midline shift. Patient was admitted under Neurosurgery Dr. Day for surgical planning.     CT CAP 9/12 revealed increased size of locally advanced pancreatic tail tumor, progressive pulmonary and pleural metastases. Similar-appearing right iliopsoas. CT T/L spine revealed no pathologic fracture or lytic / destructive lesions involving the thoracic and lumbar spine; S1-3 pathologic fracture no retropulsed bone, similar-appearing right sided epidural metastases in the lumbosacral region. On 9/14 patient underwent right craniotomy for tumor resection 9/14/22.    Post-op CT 9/15 revealed post-op changes. MRI Brain 9/15 revealed post-op resection of right posterior frontal lesion with small residual enhancement and 0.3cm leftward midline shift. Patient placed on Decadron taper. Now admitted for multidisciplinary rehabilitation program 9/20/22.     ROS/Subjective: NAD in chair, night uneventful, denies pain, good appetite. Likes PT OT. Requests psychology to visit. VSS. Staples out 9/26. healing well.     PAST MEDICAL & SURGICAL HISTORY:  Abdominal tumor      Back pain      Rectal cancer      No significant past surgical history      VITALS  Vital Signs Last 24 Hrs  T(C): 36.9 (23 Sep 2022 08:53), Max: 36.9 (23 Sep 2022 08:53)  T(F): 98.5 (23 Sep 2022 08:53), Max: 98.5 (23 Sep 2022 08:53)  HR: 60 (23 Sep 2022 08:53) (60 - 66)  BP: 124/81 (23 Sep 2022 08:53) (116/75 - 124/81)  BP(mean): --  RR: 16 (23 Sep 2022 08:53) (16 - 16)  SpO2: 99% (23 Sep 2022 08:53) (98% - 99%)    Parameters below as of 23 Sep 2022 08:53  Patient On (Oxygen Delivery Method): room air     RECENT LABS                        11.2   6.05  )-----------( 190      ( 22 Sep 2022 06:30 )             33.4     09-22    139  |  103  |  31<H>  ----------------------------<  95  3.7   |  31  |  0.49<L>    Ca    8.6      22 Sep 2022 06:30        CURRENT MEDICATIONS  MEDICATIONS  (STANDING):  Biotene Dry Mouth Oral Rinse 5 milliLiter(s) Swish and Spit three times a day  chlorhexidine 2% Cloths 1 Application(s) Topical every 12 hours  dexAMETHasone     Tablet 4 milliGRAM(s) Oral every 12 hours  dexAMETHasone     Tablet   Oral   enoxaparin Injectable 30 milliGRAM(s) SubCutaneous every 24 hours  gabapentin Solution 1000 milliGRAM(s) Oral three times a day  influenza  Vaccine (HIGH DOSE) 0.7 milliLiter(s) IntraMuscular once  levETIRAcetam 500 milliGRAM(s) Oral two times a day  methadone    Tablet 2.5 milliGRAM(s) Oral three times a day  pantoprazole   Suspension 40 milliGRAM(s) Oral daily  pazopanib 200 milliGRAM(s) Oral daily  polyethylene glycol 3350 17 Gram(s) Oral two times a day  senna 2 Tablet(s) Oral at bedtime    MEDICATIONS  (PRN):  acetaminophen     Tablet .. 650 milliGRAM(s) Oral every 6 hours PRN Mild Pain (1 - 3)  artificial  tears Solution 1 Drop(s) Both EYES every 4 hours PRN Dry Eyes  bisacodyl Oral Tab/Cap - Peds 5 milliGRAM(s) Oral daily PRN Constipation  calcium carbonate    500 mG (Tums) Chewable 1 Tablet(s) Chew every 6 hours PRN Upset Stomach  oxyCODONE    IR 5 milliGRAM(s) Oral every 4 hours PRN Moderate Pain (4 - 6)  oxyCODONE    IR 10 milliGRAM(s) Oral every 4 hours PRN Severe Pain (7 - 10)      Gen - Mandarin speaking, AO3, PERRL, EOMI Low vocal volume but appropriate, O x 3 follows 1-2 step commands. wife at bedside  craniotomy incision healing well +staples, intact, no sig swelling    Pulm - CTAB, No wheeze, No rhonchi, No crackles. Fair effort  Cardiovascular - RRR, S1S2, No m/r/g +right chemoport  Abdomen - Soft, NT/ND, +BS  Extremities - , No calf tenderness  NO further erythema calf, no TTP, no warmth  Neuro-     Cognitive - AAOx3 grossly, mood stable     Motor -                    LEFT    UE - ShAB 4/5, EF 4/5, EE 5/5, WE 4/5,  4/5                    RIGHT UE - ShAB 4/5, EF 4/5, EE 4/5, WE 4/5,  4/5                    LEFT    LE - HF 4/5, KE 4/5, DF 4/5, PF 4/5                    RIGHT LE - HF 2/5 , KE 3/5, DF 1/5, PF 1/5        Sensory - Intact to LT          Continue comprehensive acute rehab program consisting of 3hrs/day of OT/PT and SLP.

## 2022-09-24 NOTE — PROGRESS NOTE ADULT - ASSESSMENT
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 (s/p radiation, on chemo) who presented with LUE weakness and possible speech deficit, found to have 4 cm mass in R frontal lobe, now post-resection 9/14/22, admitted to  rehab for ADL impairment     #R frontal tumor  #ADL impairment  - s/p craniotomy for tumor resection, left hemiparesis  - biopsy showed high grade neoplasm   - Continue decadron taper for cerebral edema  - Continue keppra 500mg BID for seizure prophylaxis  - continue votrient 200mg daily for chemotherapy  - follow up with heme regarding chemotherapy outpatient   - comprehensive rehab program    #RLE erythema  - chronic per pt  - venous doppler 9/21 reviewed, neg DVT    #RLE neuropathy   - gabapentin 1000mg TID    #Suicidal ideation statements  - Pt denies any suicidal or homicidal ideation or having any plans, clarified that SI related statements he made earlier was him trying to express his frustrations  - Pt also denies any depression or anxious feelings  - DC 1:1 constant observation and discontinue psych consult  - pt and his family also declining psych consult    #DVT prophylaxis  - lovenox 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 (s/p radiation, on chemo) who presented with LUE weakness and possible speech deficit, found to have 4 cm mass in R frontal lobe, now post-resection 9/14/22, admitted to  rehab for ADL impairment     #R frontal tumor  #ADL impairment  - s/p craniotomy for tumor resection, left hemiparesis  - biopsy showed high grade neoplasm   - Continue decadron taper for cerebral edema  - Continue keppra 500mg BID for seizure prophylaxis  - continue votrient 200mg daily for chemotherapy  - follow up with heme regarding chemotherapy outpatient   - comprehensive rehab program    #RLE erythema  - chronic per pt  - venous doppler 9/21 reviewed, neg DVT    #RLE neuropathy   - gabapentin 1000mg TID    #Suicidal ideation statements  - Pt denies any suicidal or homicidal ideation or having any plans  - Pt clarified that SI related statements he made earlier was him trying to express his frustrations  - DC 1:1 constant observation and discontinue psych consult  - pt and his family also declining psych consult    #DVT prophylaxis  - lovenox

## 2022-09-24 NOTE — PROVIDER CONTACT NOTE (OTHER) - ACTION/TREATMENT ORDERED:
put on  one to one, psych consult and palliative care consult
PA made aware and medication not given.

## 2022-09-24 NOTE — BH CONSULTATION LIAISON ASSESSMENT NOTE - DOMICILED WITH
Refill request for lansoprazole denied, patient requires f/u appt and update on patient symptoms status. Form faxed to patient's pharmacy. Last seen in clinic by MD on 11/2019. Per MD note, patient was to trial med for 1 month and f/u with updates on patient condition. Next step if symptomatic is EGD with biopsy per MD note.    Family

## 2022-09-24 NOTE — BH CONSULTATION LIAISON ASSESSMENT NOTE - SUMMARY
Patient is depressed and anxious with poor sleep ,may be due to multiple factors ;Steroid, pain, levotiracetam, Will benefit from low dose of zyprexa  vs SSRI

## 2022-09-24 NOTE — PROVIDER CONTACT NOTE (OTHER) - SITUATION
Pt refused to take meds, stated through the  that he wanted a shot to end it and to just kill himself, PA came to speak to him and he stated again that he wanted to kill himself
Pt. refused morning medication

## 2022-09-24 NOTE — PROGRESS NOTE ADULT - SUBJECTIVE AND OBJECTIVE BOX
Pt. seen and examined at bedside.  No overnight events.      REVIEW OF SYSTEMS  Constitutional - No fever,  No fatigue  Neurological - No headaches, No loss of strength  Musculoskeletal - No joint pain, No joint swelling, ++ muscle pain    VITALS  T(C): 36.3 (09-24-22 @ 10:33), Max: 36.3 (09-23-22 @ 20:30)  HR: 90 (09-24-22 @ 10:33) (60 - 90)  BP: 106/73 (09-24-22 @ 10:33) (100/64 - 106/73)  RR: 16 (09-24-22 @ 10:33) (15 - 16)  SpO2: 97% (09-24-22 @ 10:33) (94% - 97%)  Wt(kg): --       MEDICATIONS   acetaminophen     Tablet .. 650 milliGRAM(s) every 6 hours PRN  artificial  tears Solution 1 Drop(s) every 4 hours PRN  Biotene Dry Mouth Oral Rinse 5 milliLiter(s) three times a day  bisacodyl Oral Tab/Cap - Peds 5 milliGRAM(s) daily PRN  calcium carbonate    500 mG (Tums) Chewable 1 Tablet(s) every 6 hours PRN  chlorhexidine 2% Cloths 1 Application(s) every 12 hours  dexAMETHasone     Tablet 3 milliGRAM(s) every 12 hours  dexAMETHasone     Tablet     enoxaparin Injectable 30 milliGRAM(s) every 24 hours  gabapentin Solution 1000 milliGRAM(s) three times a day  influenza  Vaccine (HIGH DOSE) 0.7 milliLiter(s) once  levETIRAcetam 500 milliGRAM(s) two times a day  methadone    Tablet 2.5 milliGRAM(s) three times a day  oxyCODONE    IR 5 milliGRAM(s) every 4 hours PRN  oxyCODONE    IR 10 milliGRAM(s) every 4 hours PRN  pantoprazole   Suspension 40 milliGRAM(s) daily  pazopanib 200 milliGRAM(s) daily  polyethylene glycol 3350 17 Gram(s) two times a day  senna 2 Tablet(s) at bedtime      RECENT LABS/IMAGING                        ---------  PHYSICAL EXAM  Constitutional - NAD, Comfortable  HEENT - CRANI INCISION C/D/I  Pulm - Breathing comfortably, No wheezing  Abd - Soft, NTND  Extremities - No edema, No calf tenderness  Neurologic Exam -                    Cognitive - Awake, Alert     Communication - Fluent     Motor - No focal deficits     Sensory - Intact to LT  Psychiatric - Mood WNL, Affect WNL    ASSESSMENT/PLAN  67y Male with functional deficits S/P RIGHT FRONTAL LOBE MASS RESECTION + 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  Continue current medical management  Pain - Tylenol PRN; OXYCODONE; METHADONE; TITUS  DVT PPX - enoxaparin Injectable 30 milliGRAM(s) every 24 hours  SZ PPX - levETIRAcetam 500 milliGRAM(s) two times a day  Active issues - NONE  Continue 3hrs a day of comprehensive rehab program.

## 2022-09-24 NOTE — CHART NOTE - NSCHARTNOTEFT_GEN_A_CORE
Called to bedside by RN as patient refusing AM meds, making suicidal comments that he just wants "it all to end."  Spoke w/ patient via  (ID: [id number]) - patient states she is very unhappy and frustrated and would just like to end it all, wife at bedside with  comforting him.   Pt AAOx3, however visible frustrated and upset. Patient reassured and comforted at bedside by staff.   1:1 observation ordered, Psych & Palliative consults ordered. Will continue to monitor. Called to bedside by RN as patient refusing AM meds, making suicidal comments that he just wants "it all to end."  Spoke w/ patient via  (ID: [id number]) - patient states he is very unhappy and frustrated and would just like to end it all, wife at bedside with  comforting him.   Pt AAOx3, however visible frustrated and upset. Patient reassured and comforted at bedside by staff.   1:1 constant observation ordered, Psych & Palliative consults ordered. Will continue to monitor.

## 2022-09-24 NOTE — BH CONSULTATION LIAISON ASSESSMENT NOTE - CURRENT MEDICATION
MEDICATIONS  (STANDING):  Biotene Dry Mouth Oral Rinse 5 milliLiter(s) Swish and Spit three times a day  chlorhexidine 2% Cloths 1 Application(s) Topical every 12 hours  dexAMETHasone     Tablet 3 milliGRAM(s) Oral every 12 hours  dexAMETHasone     Tablet   Oral   enoxaparin Injectable 30 milliGRAM(s) SubCutaneous every 24 hours  gabapentin Solution 1000 milliGRAM(s) Oral three times a day  influenza  Vaccine (HIGH DOSE) 0.7 milliLiter(s) IntraMuscular once  levETIRAcetam 500 milliGRAM(s) Oral two times a day  methadone    Tablet 2.5 milliGRAM(s) Oral three times a day  OLANZapine 2.5 milliGRAM(s) Oral at bedtime  pantoprazole   Suspension 40 milliGRAM(s) Oral daily  pazopanib 200 milliGRAM(s) Oral daily  polyethylene glycol 3350 17 Gram(s) Oral two times a day  senna 2 Tablet(s) Oral at bedtime    MEDICATIONS  (PRN):  acetaminophen     Tablet .. 650 milliGRAM(s) Oral every 6 hours PRN Mild Pain (1 - 3)  artificial  tears Solution 1 Drop(s) Both EYES every 4 hours PRN Dry Eyes  bisacodyl Oral Tab/Cap - Peds 5 milliGRAM(s) Oral daily PRN Constipation  calcium carbonate    500 mG (Tums) Chewable 1 Tablet(s) Chew every 6 hours PRN Upset Stomach  oxyCODONE    IR 5 milliGRAM(s) Oral every 4 hours PRN Moderate Pain (4 - 6)  oxyCODONE    IR 10 milliGRAM(s) Oral every 4 hours PRN Severe Pain (7 - 10)

## 2022-09-24 NOTE — BH CONSULTATION LIAISON ASSESSMENT NOTE - NSBHCHARTREVIEWVS_PSY_A_CORE FT
Vital Signs Last 24 Hrs  T(C): 36.3 (24 Sep 2022 10:33), Max: 36.3 (24 Sep 2022 10:33)  T(F): 97.4 (24 Sep 2022 10:33), Max: 97.4 (24 Sep 2022 10:33)  HR: 90 (24 Sep 2022 10:33) (90 - 90)  BP: 106/73 (24 Sep 2022 10:33) (106/73 - 106/73)  BP(mean): --  RR: 16 (24 Sep 2022 10:33) (16 - 16)  SpO2: 97% (24 Sep 2022 10:33) (97% - 97%)    Parameters below as of 24 Sep 2022 10:33  Patient On (Oxygen Delivery Method): room air

## 2022-09-24 NOTE — BH CONSULTATION LIAISON ASSESSMENT NOTE - HPI (INCLUDE ILLNESS QUALITY, SEVERITY, DURATION, TIMING, CONTEXT, MODIFYING FACTORS, ASSOCIATED SIGNS AND SYMPTOMS)
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 CT head showed large 4cm mass in the R frontal lobe with mild associated edema, no midline shift. Pt was admitted under Neurosurgery Dr. Day for surgical planning. CT CAP 9/12 revealed increased size of locally advanced pancreatic tail tumor, progressive pulmonary and pleural metastases. Similar-appearing right iliopsoas. CT T/L spine revealed no pathologic fracture or lytic / destructive lesions involving the thoracic and lumbar spine; S1-3 pathologic fracture no retropulsed bone, similar-appearing right sided epidural metastases in the lumbosacral region. On 9/14 patient underwent right craniotomy for tumor resection 9/14/22. Post-op CT 9/15 revealed post-op changes. MRI Brain 9/15 revealed post-op resection of right posterior frontal lesion with small residual enhancement and 0.3cm leftward midline shift. Pt placed on Decadron taper. Now admitted for multidisciplinary rehabilitation program 9/20/22.  : Pt is  and has one adult daughter. He graduated from high school in [place], and is a retired . Pt lacks hx of mental illness. He quit smoking 20 years ago. Pt is [employer]. He enjoys going to the park, spending time with friends and playing mahjong.  Psychiatry asked to see patient for Depression, As per RN patient has been expressing passive S/I.

## 2022-09-24 NOTE — PROVIDER CONTACT NOTE (OTHER) - BACKGROUND
Pt. admitted for rectal cancer with metastasis to the brain
pt admitted for rectal cancer with metastasis to the brain

## 2022-09-24 NOTE — PROGRESS NOTE ADULT - SUBJECTIVE AND OBJECTIVE BOX
Patient is a 67y old  Male who presents with a chief complaint of right frontal lobe mass s/p craniotomy and resection 9/14/22 (23 Sep 2022 14:22)      Subjective and overnight events:  Patient seen and examined at bedside. Pt was placed on 1:1 constant observation early morning for possible suicidal ideation.   Interviewer is fluent in Mandarin and spoke to pt and pt's wife and daughter extensively by bedside. Pt denies any active suicidal or homicidal ideation and there is no plans to hurt himself. Pt clarified that statements such as  "want to end it all and doesn't want to live" was a result of frustration over being stuck in hospital and that he wanted to have warm water but staff was not able to get it for him. Pt reiterated throughout the conservation that he does not have any suicidal ideations. Pt doesn't want to speak to psychiatry and pt's daughter is also declining psych consult.         ALLERGIES:  No Known Allergies    MEDICATIONS  (STANDING):  Biotene Dry Mouth Oral Rinse 5 milliLiter(s) Swish and Spit three times a day  chlorhexidine 2% Cloths 1 Application(s) Topical every 12 hours  dexAMETHasone     Tablet 3 milliGRAM(s) Oral every 12 hours  dexAMETHasone     Tablet   Oral   enoxaparin Injectable 30 milliGRAM(s) SubCutaneous every 24 hours  gabapentin Solution 1000 milliGRAM(s) Oral three times a day  influenza  Vaccine (HIGH DOSE) 0.7 milliLiter(s) IntraMuscular once  levETIRAcetam 500 milliGRAM(s) Oral two times a day  methadone    Tablet 2.5 milliGRAM(s) Oral three times a day  pantoprazole   Suspension 40 milliGRAM(s) Oral daily  pazopanib 200 milliGRAM(s) Oral daily  polyethylene glycol 3350 17 Gram(s) Oral two times a day  senna 2 Tablet(s) Oral at bedtime    MEDICATIONS  (PRN):  acetaminophen     Tablet .. 650 milliGRAM(s) Oral every 6 hours PRN Mild Pain (1 - 3)  artificial  tears Solution 1 Drop(s) Both EYES every 4 hours PRN Dry Eyes  bisacodyl Oral Tab/Cap - Peds 5 milliGRAM(s) Oral daily PRN Constipation  calcium carbonate    500 mG (Tums) Chewable 1 Tablet(s) Chew every 6 hours PRN Upset Stomach  oxyCODONE    IR 5 milliGRAM(s) Oral every 4 hours PRN Moderate Pain (4 - 6)  oxyCODONE    IR 10 milliGRAM(s) Oral every 4 hours PRN Severe Pain (7 - 10)    Vital Signs Last 24 Hrs  T(F): 97.4 (24 Sep 2022 10:33), Max: 97.4 (23 Sep 2022 20:30)  HR: 90 (24 Sep 2022 10:33) (60 - 90)  BP: 106/73 (24 Sep 2022 10:33) (100/64 - 106/73)  RR: 16 (24 Sep 2022 10:33) (15 - 16)  SpO2: 97% (24 Sep 2022 10:33) (94% - 97%)  I&O's Summary    PHYSICAL EXAM:  General: NAD, A/O x 3  ENT: MMM  Neck: Supple, No JVD  Lungs: Clear to auscultation bilaterally  Cardio: RRR, S1/S2, No murmurs  Abdomen: Soft, Nontender, Nondistended; Bowel sounds present  Extremities: No calf tenderness, slight RLE edema    LABS:                        11.2   6.05  )-----------( 190      ( 22 Sep 2022 06:30 )             33.4     09-22    139  |  103  |  31  ----------------------------<  95  3.7   |  31  |  0.49    Ca    8.6      22 Sep 2022 06:30          COVID-19 PCR: NotDetec (09-23-22 @ 06:30)  COVID-19 PCR: NotDetec (09-19-22 @ 17:45)  COVID-19 PCR: NotDetec (09-12-22 @ 13:09)      RADIOLOGY & ADDITIONAL TESTS:    Care Discussed with Consultants/Other Providers: rehab team, DC psych consult and 1:1 observation

## 2022-09-24 NOTE — PROVIDER CONTACT NOTE (OTHER) - ASSESSMENT
Pt. was agitated when went into room to give medication.  was called and pt. still refused medication after several attempts to convince him to take his medication.
pt stated he wanted to end it all

## 2022-09-25 NOTE — PROGRESS NOTE ADULT - ASSESSMENT
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 (s/p radiation, on chemo) who presented with LUE weakness and possible speech deficit, found to have 4 cm mass in R frontal lobe, now post-resection 9/14/22, admitted to  rehab for ADL impairment     #R frontal tumor  #ADL impairment  - s/p craniotomy for tumor resection, left hemiparesis  - biopsy showed high grade neoplasm   - Continue decadron taper for cerebral edema  - Continue keppra 500mg BID for seizure prophylaxis  - follow up with oncology as outpt  - comprehensive rehab program    #High grade sarcoma with primary lesion in right hip/pelvic area s/p RT and currently on chemo  #RLE neuropathy   - continue votrient 200mg daily for chemotherapy  - gabapentin 1000mg TID  - f/u oncology as outpt    #Suicidal ideation statements  - Pt denies any suicidal or homicidal ideation or having any plans  - Pt clarified that SI related statements he made earlier was him trying to express his frustrations    #Anxiety and paranoia  - Psych consult   - medications reviewed, zyprexa 2.5mg qHS ordered by psych    #DVT prophylaxis  - lovenox

## 2022-09-25 NOTE — PROGRESS NOTE ADULT - SUBJECTIVE AND OBJECTIVE BOX
Pt. seen and examined at bedside.  No overnight events.      REVIEW OF SYSTEMS  Constitutional - No fever,  No fatigue  Neurological - No headaches, ++ loss of strength  Musculoskeletal - No joint pain, No joint swelling, No muscle pain    VITALS  T(C): 36.7 (09-25-22 @ 08:51), Max: 36.7 (09-25-22 @ 08:51)  HR: 73 (09-25-22 @ 08:51) (59 - 90)  BP: 96/63 (09-25-22 @ 08:51) (96/63 - 106/73)  RR: 16 (09-25-22 @ 08:51) (16 - 16)  SpO2: 97% (09-25-22 @ 08:51) (96% - 97%)  Wt(kg): --       MEDICATIONS   acetaminophen     Tablet .. 650 milliGRAM(s) every 6 hours PRN  artificial  tears Solution 1 Drop(s) every 4 hours PRN  Biotene Dry Mouth Oral Rinse 5 milliLiter(s) three times a day  bisacodyl Oral Tab/Cap - Peds 5 milliGRAM(s) daily PRN  calcium carbonate    500 mG (Tums) Chewable 1 Tablet(s) every 6 hours PRN  chlorhexidine 2% Cloths 1 Application(s) every 12 hours  dexAMETHasone     Tablet 3 milliGRAM(s) every 12 hours  dexAMETHasone     Tablet     enoxaparin Injectable 30 milliGRAM(s) every 24 hours  gabapentin Solution 1000 milliGRAM(s) three times a day  influenza  Vaccine (HIGH DOSE) 0.7 milliLiter(s) once  levETIRAcetam 500 milliGRAM(s) two times a day  methadone    Tablet 2.5 milliGRAM(s) three times a day  OLANZapine 2.5 milliGRAM(s) at bedtime  oxyCODONE    IR 5 milliGRAM(s) every 4 hours PRN  oxyCODONE    IR 10 milliGRAM(s) every 4 hours PRN  pantoprazole   Suspension 40 milliGRAM(s) daily  pazopanib 200 milliGRAM(s) daily  polyethylene glycol 3350 17 Gram(s) two times a day  senna 2 Tablet(s) at bedtime      RECENT LABS/IMAGING                        ---------  PHYSICAL EXAM  Constitutional - NAD, Comfortable  HEENT - RIGHT CRANI INCISION C/D/I  Pulm - Breathing comfortably, No wheezing  Abd - Soft, NTND  Extremities - No edema, No calf tenderness  Neurologic Exam -                    Cognitive - Awake, Alert     Communication - Fluent     Motor - No focal deficits     Sensory - Intact to LT  Psychiatric - Mood WNL, Affect WNL    ASSESSMENT/PLAN  67y Male with functional deficits 2' METASTATIC RECTAL CANCER -> S1-3 PATHOLOGIC FX SARCOMA.  Continue current medical management  Pain - Tylenol PRN; OXYCODONE; TITUS  DVT PPX - enoxaparin Injectable 30 milliGRAM(s) every 24 hours  Active issues - NONE  Continue 3hrs a day of comprehensive rehab program.

## 2022-09-25 NOTE — PROGRESS NOTE ADULT - SUBJECTIVE AND OBJECTIVE BOX
Patient is a 67y old  Male who presents with a chief complaint of right frontal lobe mass s/p craniotomy and resection 9/14/22 (25 Sep 2022 10:24)      Subjective and overnight events:  Patient seen and examined at bedside. pt reports feeling anxious today because he worries that hospital staffs is trying to harm him. pt now denies that he made  any suicidal statements yesterday, pt thinks that staffs had made it up and thinks that the hospital is trying to harm him. Denies to deny having any SI or HI thoughts.   + chronic RLE pain and numbness. no fever, chills, sob, cp, abd pain.    ALLERGIES:  No Known Allergies    MEDICATIONS  (STANDING):  Biotene Dry Mouth Oral Rinse 5 milliLiter(s) Swish and Spit three times a day  chlorhexidine 2% Cloths 1 Application(s) Topical every 12 hours  dexAMETHasone     Tablet 3 milliGRAM(s) Oral every 12 hours  dexAMETHasone     Tablet   Oral   enoxaparin Injectable 30 milliGRAM(s) SubCutaneous every 24 hours  gabapentin Solution 1000 milliGRAM(s) Oral three times a day  influenza  Vaccine (HIGH DOSE) 0.7 milliLiter(s) IntraMuscular once  levETIRAcetam 500 milliGRAM(s) Oral two times a day  methadone    Tablet 2.5 milliGRAM(s) Oral three times a day  OLANZapine 2.5 milliGRAM(s) Oral at bedtime  pantoprazole   Suspension 40 milliGRAM(s) Oral daily  pazopanib 200 milliGRAM(s) Oral daily  polyethylene glycol 3350 17 Gram(s) Oral two times a day  senna 2 Tablet(s) Oral at bedtime    MEDICATIONS  (PRN):  acetaminophen     Tablet .. 650 milliGRAM(s) Oral every 6 hours PRN Mild Pain (1 - 3)  artificial  tears Solution 1 Drop(s) Both EYES every 4 hours PRN Dry Eyes  bisacodyl Oral Tab/Cap - Peds 5 milliGRAM(s) Oral daily PRN Constipation  calcium carbonate    500 mG (Tums) Chewable 1 Tablet(s) Chew every 6 hours PRN Upset Stomach  oxyCODONE    IR 5 milliGRAM(s) Oral every 4 hours PRN Moderate Pain (4 - 6)  oxyCODONE    IR 10 milliGRAM(s) Oral every 4 hours PRN Severe Pain (7 - 10)    Vital Signs Last 24 Hrs  T(F): 98 (25 Sep 2022 08:51), Max: 98 (25 Sep 2022 08:51)  HR: 73 (25 Sep 2022 08:51) (59 - 73)  BP: 96/63 (25 Sep 2022 08:51) (96/63 - 103/57)  RR: 16 (25 Sep 2022 08:51) (16 - 16)  SpO2: 97% (25 Sep 2022 08:51) (96% - 97%)  I&O's Summary    PHYSICAL EXAM:  General: NAD, Awake alert answering questions   ENT: MMM  Neck: Supple, No JVD  Lungs: Clear to auscultation bilaterally  Cardio: RRR, S1/S2, No murmurs  Abdomen: Soft, Nontender, Nondistended; Bowel sounds present  Extremities: No calf tenderness, No pitting edema    LABS:    COVID-19 PCR: NotDetec (09-23-22 @ 06:30)  COVID-19 PCR: NotDetec (09-19-22 @ 17:45)  COVID-19 PCR: NotDetec (09-12-22 @ 13:09)      RADIOLOGY & ADDITIONAL TESTS:    Care Discussed with Consultants/Other Providers:

## 2022-09-25 NOTE — CHART NOTE - NSCHARTNOTEFT_GEN_A_CORE
Nutrition Follow Up Note  Source: Medical Record [X] Patient [X] Family [ ]       [employer] used for Mandarin interpretation (ID#[id number])    Diet: Puree consistency  Pt tolerating diet with fair/good PO intake per nursing flow sheets, eating % of meals. Pt's diet was downgraded after MBS on 9/23. Pt requested bread - but unable to provide this on current diet. Discussed current diet. Pt reports fair appetite. Pt receptive to adding Ensure enlive (provides 350 kcal, 20 g protein/serving). C/o constipation. Encouraged fiber intake, pt reports he will drink warm water to help with bowel movements. Pt observed with signs of malnutrition (loss of muscle from clavicles, shoulders, temples, loss of fat from buccal, orbital region). + pt with poor PO intake PTA (eating <50% of EEN >5 days), noted with 4# unintentional weight loss since admission (<1 week), therefore now meets the diagnostic criteria for severe, acute malnutrition. Pt's actual BMI based on height of 5'8" + weight of 126.3 = 19.2 kg/m2.    Enteral/Parenteral Nutrition: N/A    Current Weight: 126.3 lbs (9/25)  130.5 lbs (9/22)  130 lbs (9/20)    Pertinent Medications: MEDICATIONS  (STANDING):  Biotene Dry Mouth Oral Rinse 5 milliLiter(s) Swish and Spit three times a day  chlorhexidine 2% Cloths 1 Application(s) Topical every 12 hours  dexAMETHasone     Tablet 3 milliGRAM(s) Oral every 12 hours  dexAMETHasone     Tablet   Oral   enoxaparin Injectable 30 milliGRAM(s) SubCutaneous every 24 hours  gabapentin Solution 1000 milliGRAM(s) Oral three times a day  influenza  Vaccine (HIGH DOSE) 0.7 milliLiter(s) IntraMuscular once  levETIRAcetam 500 milliGRAM(s) Oral two times a day  methadone    Tablet 2.5 milliGRAM(s) Oral three times a day  OLANZapine 2.5 milliGRAM(s) Oral at bedtime  pantoprazole   Suspension 40 milliGRAM(s) Oral daily  pazopanib 200 milliGRAM(s) Oral daily  polyethylene glycol 3350 17 Gram(s) Oral two times a day  senna 2 Tablet(s) Oral at bedtime    MEDICATIONS  (PRN):  acetaminophen     Tablet .. 650 milliGRAM(s) Oral every 6 hours PRN Mild Pain (1 - 3)  artificial  tears Solution 1 Drop(s) Both EYES every 4 hours PRN Dry Eyes  bisacodyl Oral Tab/Cap - Peds 5 milliGRAM(s) Oral daily PRN Constipation  calcium carbonate    500 mG (Tums) Chewable 1 Tablet(s) Chew every 6 hours PRN Upset Stomach  oxyCODONE    IR 5 milliGRAM(s) Oral every 4 hours PRN Moderate Pain (4 - 6)  oxyCODONE    IR 10 milliGRAM(s) Oral every 4 hours PRN Severe Pain (7 - 10)      Pertinent Labs:   09-21 Alb 2.4 g/dL<L>        Skin: Moisture associated dermatitis per nursing flow sheets.     Edema: No edema per nursing flow sheets     Last BM: on 9/23 Per nursing flowsheets     Estimated Needs:   [X] No Change since Previous Assessment  [ ] Recalculated:     Previous Nutrition Diagnosis:   Inadequate oral intake    Nutrition Diagnosis is [X] Ongoing      New Nutrition Diagnosis: [ ] Not Applicable  [ ] Inadequate Protein Energy Intake   [ ] Inadequate Oral Intake   [ ] Excessive Energy Intake   [ ] Increased Nutrient Needs   [ ] Obesity   [ ] Altered GI Function   [ ] Unintended Weight Loss   [ ] Food & Nutrition Related Knowledge Deficit  [ ] Limited Adherence to nutrition related recommendations   [X] Malnutrition- Severe, acute related to suboptimal nutrient intake as evidenced by 3% unintentional weight loss in 1 week, moderate/severe loss of muscle/fat, meeting <50% of estimated needs >5 days PTA.     Interventions:   1. Recommend continuing with current diet, follow SLP recommendations  2. Ensure Enlive BID (provides 700 kcal, 40 g protein if 100% consumed)  3. Obtain and honor food preferences as able    Monitoring & Evaluation:   [X] Weights   [X] PO Intake   [X] Follow Up (Per Protocol)  [X] Tolerance to Diet Prescription   [X] Other: Labs    RD Remains Available.  Oxana Short RD

## 2022-09-26 NOTE — PROGRESS NOTE ADULT - ASSESSMENT
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 (s/p radiation, on chemo) who presented with LUE weakness and possible speech deficit, found to have 4 cm mass in R frontal lobe, now post-resection 9/14/22, admitted to  rehab for ADL impairment     #R frontal tumor  #ADL impairment  - s/p craniotomy for tumor resection, left hemiparesis  - biopsy showed high grade neoplasm   - Continue decadron taper for cerebral edema  - Continue keppra 500mg BID for seizure prophylaxis  - follow up with oncology as outpt  - comprehensive rehab program    #High grade sarcoma with primary lesion in right hip/pelvic area s/p RT and currently on chemo  #RLE neuropathy   - continue votrient 200mg daily for chemotherapy  - gabapentin 1000mg TID  - f/u oncology as outpt    #Leukocytosis   - pt afebrile and has no signs of active infection. had leukopenia previously, suspect leukocytosis now indicate marrow recovery with steroid induced leukocytosis   - check UA  - repeat cbc tomorrow AM    #Suicidal ideation statements  - Pt denies any suicidal or homicidal ideation or having any plans  - Pt clarified that SI related statements was due to frustration and pt denies having any active SI thoughts    #Anxiety and paranoia  - Psych and neuropsych follow up  - medications reviewed, zyprexa 2.5mg qHS ordered by psych    #DVT prophylaxis  - lovenox

## 2022-09-26 NOTE — PROGRESS NOTE ADULT - CONSTITUTIONAL COMMENTS
Mandarin speaking, AAox3, PEERL. Can perseverate at times but mostly answers questions appropriately Mandarin speaking, AAox3, PEERL. Can perseverate at times but mostly answers questions appropriately/ mood appears fair, although he does have some paranoid thinking and persecutory thoughts (people are exaggerating/making up things about his suicidal ideation to kick him out of hospital_)incision healing well, staples

## 2022-09-26 NOTE — PROGRESS NOTE ADULT - SUBJECTIVE AND OBJECTIVE BOX
Patient is a 67y old  Male who presents with a chief complaint of right frontal lobe mass s/p craniotomy and resection 9/14/22 (25 Sep 2022 12:15)      Subjective and overnight events:  Patient seen and examined at bedside.  no fever, chills, headache, lightheadedness, sob, cp, abd pain. + BM. admits to feeling depressed and anxious.     ALLERGIES:  No Known Allergies    MEDICATIONS  (STANDING):  Biotene Dry Mouth Oral Rinse 5 milliLiter(s) Swish and Spit three times a day  chlorhexidine 2% Cloths 1 Application(s) Topical every 12 hours  dexAMETHasone     Tablet 3 milliGRAM(s) Oral every 12 hours  dexAMETHasone     Tablet   Oral   enoxaparin Injectable 30 milliGRAM(s) SubCutaneous every 24 hours  gabapentin Solution 1000 milliGRAM(s) Oral three times a day  influenza  Vaccine (HIGH DOSE) 0.7 milliLiter(s) IntraMuscular once  levETIRAcetam 500 milliGRAM(s) Oral two times a day  methadone    Tablet 2.5 milliGRAM(s) Oral three times a day  OLANZapine Disintegrating Tablet 5 milliGRAM(s) Oral at bedtime  pantoprazole   Suspension 40 milliGRAM(s) Oral daily  pazopanib 200 milliGRAM(s) Oral daily  polyethylene glycol 3350 17 Gram(s) Oral two times a day  senna 2 Tablet(s) Oral at bedtime    MEDICATIONS  (PRN):  acetaminophen     Tablet .. 650 milliGRAM(s) Oral every 6 hours PRN Mild Pain (1 - 3)  artificial  tears Solution 1 Drop(s) Both EYES every 4 hours PRN Dry Eyes  bisacodyl Oral Tab/Cap - Peds 5 milliGRAM(s) Oral daily PRN Constipation  calcium carbonate    500 mG (Tums) Chewable 1 Tablet(s) Chew every 6 hours PRN Upset Stomach  oxyCODONE    IR 5 milliGRAM(s) Oral every 4 hours PRN Moderate Pain (4 - 6)  oxyCODONE    IR 10 milliGRAM(s) Oral every 4 hours PRN Severe Pain (7 - 10)    Vital Signs Last 24 Hrs  T(F): 98.3 (26 Sep 2022 08:35), Max: 98.3 (26 Sep 2022 08:35)  HR: 66 (26 Sep 2022 08:35) (66 - 76)  BP: 106/65 (26 Sep 2022 08:35) (98/61 - 106/65)  RR: 16 (26 Sep 2022 08:35) (16 - 16)  SpO2: 96% (26 Sep 2022 08:35) (96% - 98%)  I&O's Summary    PHYSICAL EXAM:  General: NAD, A/O x 3  ENT: MMM  Neck: Supple, No JVD  Lungs: Clear to auscultation bilaterally  Cardio: RRR, S1/S2, No murmurs  Abdomen: Soft, Nontender, Nondistended; Bowel sounds present  Extremities: No calf tenderness, trace LLE edema    LABS:                        12.4   13.23 )-----------( 182      ( 26 Sep 2022 10:00 )             36.1     09-26    139  |  104  |  23  ----------------------------<  176  4.0   |  27  |  0.68    Ca    8.4      26 Sep 2022 09:5      COVID-19 PCR: NotDetec (09-23-22 @ 06:30)  COVID-19 PCR: NotDetec (09-19-22 @ 17:45)  COVID-19 PCR: NotDetec (09-12-22 @ 13:09)      RADIOLOGY & ADDITIONAL TESTS:    Care Discussed with Consultants/Other Providers:

## 2022-09-26 NOTE — PROGRESS NOTE ADULT - ASSESSMENT
Pt alert, attentive, apparently intact language, thought processes somewhat circumstantial, mild suspiciousness noted in thought content, affect irritable, mood euthymic ("okay"), denied AH/VH, denied SI/HI/I/P, experiences difficulty coping with his deficits and disabilities. Plan: Continue supportive tx. PLEASE NOTE THAT PATIENT'S PRIMARY LANGUAGE IS Chalkable. PLEASE AVOID INVOLVEMENT OF ON THE PHONE MALE INTERPRETERS.

## 2022-09-26 NOTE — PROGRESS NOTE ADULT - SUBJECTIVE AND OBJECTIVE BOX
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 CT head showed large 4cm mass in the R frontal lobe with mild associated edema, no midline shift. Patient was admitted under Neurosurgery Dr. Day for surgical planning.     CT CAP  revealed increased size of locally advanced pancreatic tail tumor, progressive pulmonary and pleural metastases. Similar-appearing right iliopsoas. CT T/L spine revealed no pathologic fracture or lytic / destructive lesions involving the thoracic and lumbar spine; S1-3 pathologic fracture no retropulsed bone, similar-appearing right sided epidural metastases in the lumbosacral region. On  patient underwent right craniotomy for tumor resection 22.    Post-op CT 9/15 revealed post-op changes. MRI Brain 9/15 revealed post-op resection of right posterior frontal lesion with small residual enhancement and 0.3cm leftward midline shift. Patient placed on Decadron taper. Now admitted for multidisciplinary rehabilitation program 22.    (20 Sep 2022 14:33)      PAST MEDICAL & SURGICAL HISTORY:  Abdominal tumor      Back pain      Rectal cancer      No significant past surgical history          MEDICATIONS  (STANDING):  Biotene Dry Mouth Oral Rinse 5 milliLiter(s) Swish and Spit three times a day  chlorhexidine 2% Cloths 1 Application(s) Topical every 12 hours  dexAMETHasone     Tablet 3 milliGRAM(s) Oral every 12 hours  dexAMETHasone     Tablet   Oral   enoxaparin Injectable 30 milliGRAM(s) SubCutaneous every 24 hours  gabapentin Solution 1000 milliGRAM(s) Oral three times a day  influenza  Vaccine (HIGH DOSE) 0.7 milliLiter(s) IntraMuscular once  levETIRAcetam 500 milliGRAM(s) Oral two times a day  methadone    Tablet 2.5 milliGRAM(s) Oral three times a day  OLANZapine Disintegrating Tablet 5 milliGRAM(s) Oral at bedtime  pantoprazole   Suspension 40 milliGRAM(s) Oral daily  pazopanib 200 milliGRAM(s) Oral daily  polyethylene glycol 3350 17 Gram(s) Oral two times a day  senna 2 Tablet(s) Oral at bedtime    MEDICATIONS  (PRN):  acetaminophen     Tablet .. 650 milliGRAM(s) Oral every 6 hours PRN Mild Pain (1 - 3)  artificial  tears Solution 1 Drop(s) Both EYES every 4 hours PRN Dry Eyes  bisacodyl Oral Tab/Cap - Peds 5 milliGRAM(s) Oral daily PRN Constipation  calcium carbonate    500 mG (Tums) Chewable 1 Tablet(s) Chew every 6 hours PRN Upset Stomach  oxyCODONE    IR 5 milliGRAM(s) Oral every 4 hours PRN Moderate Pain (4 - 6)  oxyCODONE    IR 10 milliGRAM(s) Oral every 4 hours PRN Severe Pain (7 - 10)      Allergies    No Known Allergies    Intolerances          VITALS  67y  Vital Signs Last 24 Hrs  T(C): 36.8 (26 Sep 2022 08:35), Max: 36.8 (26 Sep 2022 08:35)  T(F): 98.3 (26 Sep 2022 08:35), Max: 98.3 (26 Sep 2022 08:35)  HR: 66 (26 Sep 2022 08:35) (66 - 76)  BP: 106/65 (26 Sep 2022 08:35) (98/61 - 106/65)  BP(mean): --  RR: 16 (26 Sep 2022 08:35) (16 - 16)  SpO2: 96% (26 Sep 2022 08:35) (96% - 98%)    Parameters below as of 26 Sep 2022 08:35  Patient On (Oxygen Delivery Method): room air      Daily     Daily Weight in k (26 Sep 2022 05:52)        RECENT LABS:                          12.4   13.23 )-----------( 182      ( 26 Sep 2022 10:00 )             36.1         139  |  104  |  23  ----------------------------<  176<H>  4.0   |  27  |  0.68    Ca    8.4      26 Sep 2022 09:50     HPI:  Patient is a 68 yo M PMHx rectal CA (, s/p radiation/chemo) and 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 CT head showed large 4cm mass in the R frontal lobe with mild associated edema, no midline shift. Patient was admitted under Neurosurgery Dr. Day for surgical planning.     CT CAP  revealed increased size of locally advanced pancreatic tail tumor, progressive pulmonary and pleural metastases. Similar-appearing right iliopsoas. CT T/L spine revealed no pathologic fracture or lytic / destructive lesions involving the thoracic and lumbar spine; S1-3 pathologic fracture no retropulsed bone, similar-appearing right sided epidural metastases in the lumbosacral region. On  patient underwent right craniotomy for tumor resection 22.    Post-op CT 9/15 revealed post-op changes. MRI Brain 9/15 revealed post-op resection of right posterior frontal lesion with small residual enhancement and 0.3cm leftward midline shift. Patient placed on Decadron taper. Now admitted for multidisciplinary rehabilitation program 22.    (20 Sep 2022 14:33)      PAST MEDICAL & SURGICAL HISTORY:  Abdominal tumor      Back pain      Rectal cancer      No significant past surgical history          MEDICATIONS  (STANDING):  Biotene Dry Mouth Oral Rinse 5 milliLiter(s) Swish and Spit three times a day  chlorhexidine 2% Cloths 1 Application(s) Topical every 12 hours  dexAMETHasone     Tablet 3 milliGRAM(s) Oral every 12 hours  dexAMETHasone     Tablet   Oral   enoxaparin Injectable 30 milliGRAM(s) SubCutaneous every 24 hours  gabapentin Solution 1000 milliGRAM(s) Oral three times a day  influenza  Vaccine (HIGH DOSE) 0.7 milliLiter(s) IntraMuscular once  levETIRAcetam 500 milliGRAM(s) Oral two times a day  methadone    Tablet 2.5 milliGRAM(s) Oral three times a day  OLANZapine Disintegrating Tablet 5 milliGRAM(s) Oral at bedtime  pantoprazole   Suspension 40 milliGRAM(s) Oral daily  pazopanib 200 milliGRAM(s) Oral daily  polyethylene glycol 3350 17 Gram(s) Oral two times a day  senna 2 Tablet(s) Oral at bedtime    MEDICATIONS  (PRN):  acetaminophen     Tablet .. 650 milliGRAM(s) Oral every 6 hours PRN Mild Pain (1 - 3)  artificial  tears Solution 1 Drop(s) Both EYES every 4 hours PRN Dry Eyes  bisacodyl Oral Tab/Cap - Peds 5 milliGRAM(s) Oral daily PRN Constipation  calcium carbonate    500 mG (Tums) Chewable 1 Tablet(s) Chew every 6 hours PRN Upset Stomach  oxyCODONE    IR 5 milliGRAM(s) Oral every 4 hours PRN Moderate Pain (4 - 6)  oxyCODONE    IR 10 milliGRAM(s) Oral every 4 hours PRN Severe Pain (7 - 10)      Allergies    No Known Allergies    Intolerances          VITALS  67y  Vital Signs Last 24 Hrs  T(C): 36.8 (26 Sep 2022 08:35), Max: 36.8 (26 Sep 2022 08:35)  T(F): 98.3 (26 Sep 2022 08:35), Max: 98.3 (26 Sep 2022 08:35)  HR: 66 (26 Sep 2022 08:35) (66 - 76)  BP: 106/65 (26 Sep 2022 08:35) (98/61 - 106/65)  BP(mean): --  RR: 16 (26 Sep 2022 08:35) (16 - 16)  SpO2: 96% (26 Sep 2022 08:35) (96% - 98%)    Parameters below as of 26 Sep 2022 08:35  Patient On (Oxygen Delivery Method): room air      Daily     Daily Weight in k (26 Sep 2022 05:52)        RECENT LABS:                          12.4   13.23 )-----------( 182      ( 26 Sep 2022 10:00 )             36.1         139  |  104  |  23  ----------------------------<  176<H>  4.0   |  27  |  0.68    Ca    8.4      26 Sep 2022 09:50

## 2022-09-26 NOTE — PROGRESS NOTE ADULT - MOTOR
LEFT    UE - ShAB 4/5, EF 4/5, EE 5/5, WE 4/5,  4/5   RIGHT UE - ShAB 4/5, EF 4/5, EE 4/5, WE 4/5,  4/5  LEFT    LE - HF 4/5, KE 4/5, DF 4/5, PF 4/5  RIGHT LE - HF 2/5 , KE 3/5, DF 1/5, PF 1/5

## 2022-09-26 NOTE — PROGRESS NOTE ADULT - SUBJECTIVE AND OBJECTIVE BOX
Pt was seen for 45 min. Pt c/o poor sleep, poor appetite, irritability. [employer] were used. Pt reported not feeling well today. Pt expressed concern about being seen by psychiatry after he reportedly said he wanted to kill himself. Pt stated that he does not want to kill himself or do anything to harm himself. He complained that since then every doctor that comes to see him ask him if he is having suicidal thoughts, and especially after his daughter was called to tell her that Pt wanted to kill himself. Pt admitted that he has been feeling frustrated about being in a hospital and not home, missing his home and preferred food. He also expressed feeling confined and made mention of having rights like every other citizen. Pt stated emphatically that he does not have any plans to hurt himself. On exploration, after noticing that Pt became upset with the  after our meeting on 9/23, Pt also expressed feeling disrespected by the  that day and also this morning when the same person was doing interpretation during a visit with one of his doctors. Reassured Pt that efforts would be made to make sure that he felt comfortable communicating via an . Pt also reported feeling a "discomfort" during the weekend until today, which after questioning back and forth meant some physical pain in his right leg, possibly the thigh, but also implied some psychological upsetting feelings. Actually the  (Ashley, ID#[id number]) took the time to explain and describe some of the difficulties with her and Pt's primary language, and how she had to question Pt repeatedly to clarify what they were talking about, first of all because Pt was speaking very rapidly, without clear punctuation, some times slurring some words, and some times with loose organization, which made it difficult to understand him at times. Pt was initially upset during the meeting, but appeared more relaxed toward the end. Support and encouragement were provided.

## 2022-09-26 NOTE — PROGRESS NOTE ADULT - COMMENTS
Patient seen and examined at bedside. Soon after entering the room, he expressed how pleased he was with the treatment that he'd been receiving, and wanted to dissuade the notion that we believed he was unhappy. This stemmed from a miscommunication via  services over the weekend, where an  took his frustration to possibly reflect suicidality. The patient vehemently and convincingly denies any desire to hurt himself. He admits to being frustrated, but does not appear unreasonably so, and wants to remain in the hospital as long as possible to continue receiving therapy. Even so, when evaluated by Dr. Conroy, the patient was deemed to be depressed and anxious, and was started on zyprexa.     Otherwise, the patient denies any headache, B/B issues, and felt that the soreness in his RLE was improved. Patient seen and examined at bedside. Wife was also at bedside. Soon after entering the room, he expressed how pleased he was with the treatment that he'd been receiving, and wanted to dissuade the notion that we believed he was unhappy. This stemmed from a miscommunication via  services over the weekend, where an  took his frustration to possibly reflect suicidality. The patient vehemently and convincingly denies any desire to hurt himself. He admits to being frustrated, but does not appear unreasonably so, and wants to remain in the hospital as long as possible to continue receiving therapy. Even so, when evaluated by Dr. Conroy, the patient was deemed to be depressed and anxious, and was started on zyprexa.     Otherwise, the patient denies any headache, B/B issues, and felt that the soreness in his RLE was improved.    Language line  in Mandarin #[id number] Amy

## 2022-09-26 NOTE — PROGRESS NOTE ADULT - ATTENDING COMMENTS
Progress note amended to include my discussions with patient, resident, hospitalist, RN, SW, PT and my findings. Patient also discussed in IDT rounds today, findings and recs input by me  in A/P. Patient seen with [employer] phone  in mandarin Amy #[id number]; patient has been giving "thumbs up" sign to everyone and insisting he is fine, that he is grateful to the staff and care. He emphatically denies suicidal ideation and feels that people are playing up concerns over the weekend to drum up excuses to kick him out of rehab, He denies pain, feelings of wanting his life to end. He does admit to frustration , but relays it back to events of weekend and worries about being kicked out. Was started on zyprexa low dose over weekend; psychology also requested for f/u. Discussed with palliative, hold palliative consult for now    Labs reviewed, has mild leukocytosis but afebrile with stable exam and no symptomatic complaints. Will send UA, monitor carefully, repeat CBC in AM. Reviewed with hospitalist. Estrella right cranium removed by me during AM rounds without event, C.D/I , cleared to take shower. Continue program.

## 2022-09-26 NOTE — PROGRESS NOTE ADULT - ASSESSMENT
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 (s/p radiation, on chemo) who presented with LUE weakness and possible speech deficit, found to have 4 cm mass in R frontal lobe, now post-resection 9/14/22    # S/p craniotomy for tumor resection, left hemiparesis  # Impaired ADL   - Bx showed high-grade neoplasm   - Staples removed 9/26 (POD 12)  - Continue decadron taper for cerebral edema  - Continue keppra 500 mg BID for seizure prophylaxis  - Continue gabapentin 100 mg TID   - Continue votrient 200 mg daily for chemotherapy (to be brought in from outside)   - Oncology outpt f/u   - right foot drop, may benefit from AFO  - continue comprehensive rehab program PT/OT/ SLP 3 hours a day 5 days a week    #Leukocytosis 9/26  - Currently w/o symptoms  - F/up UA  - Monitor for symptoms  - If febrile, septic w/up     # Mood / Cognition:  # Suicidal ideation statements  - Patient denies any suicidal or homicidal plans  - Zyprexa 2.5 mg qHS ordered by psych    # Pain  - Palliative was following for pain management at prior hospital  - methadone 2.5 mg TID (lower than home dose, however reports controlled)  - Tylenol PRN Oxycodone PRN   - gabapentin for neuropathic pain     # GI/Bowel:  - Senna 2 tabs daily    # DietL  - Diet: downgraded mince and moist 9/21  - MBS 9/23 completed-puree/thins  - BUn/Cr 27/0.46 9/21--> 31/0.49 9/22 -> 23/0.68 9/26    # DVT prophylaxis:  - doppler on 9/21 neg DVT RLE. Reviewed with patient and wife  - SCDs    # COVID PCR: Negative 9/19    --------------------------------------------  Outpatient Follow up:  Alvaro Day)  Neurosurgery  General  [address] [place] [address], [address]  [place] [address]  Phone: [phone]  Fax: [phone]  Follow Up Time:     Serge Alvarado)  Radiation Oncology  [facility] - Dept. of Radiation Medicine, [address] [place] [address]  Phone: [phone]  Fax: [phone]  Follow Up Time:     Cora Hanson)  [facility]  [address] [place] [address]  Phone: [phone]  Fax: [phone]  Follow Up Time:     Nghia Moon)  Hematology; Medical Oncology  [address] [place] [address]  Phone: [phone]  Fax: [phone]    Scheduled Appointments  Cora Cabral  [facility] Physician [facility]  GERIATRICS [address] [place] [address]   Scheduled Appointment: 10/12/2022  --------------------------------------------   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 (s/p radiation, on chemo) who presented with LUE weakness and possible speech deficit, found to have 4 cm mass in R frontal lobe, now post-resection 9/14/22    # S/p craniotomy for tumor resection, left hemiparesis  - Bx showed high-grade neoplasm   - Staples removed 9/26 (POD 12) without incident as recommended by NSGY. Cleared to shower  - Continue decadron taper for cerebral edema  - Continue keppra 500 mg BID for seizure prophylaxis  - Continue gabapentin 100 mg TID   - Continue votrient 200 mg daily for chemotherapy (to be brought in from outside)   - Oncology outpt f/u   - right foot drop, may benefit from AFO  - continue comprehensive rehab program PT/OT/ SLP 3 hours a day 5 days a week    # Leukocytosis 9/26  - Currently w/o symptoms, afebrile  - F/up UA  - Monitor for symptoms  - If febrile, septic w/up     # Mood / Cognition:  - ? Suicidal ideation statements  - Patient denies any suicidal or homicidal plans  - Zyprexa 2.5 mg qHS ordered by psych  - psychology follow up    # Pain  - Palliative was following for pain management at prior hospital  - methadone 2.5 mg TID (lower than home dose, however reports controlled)  - Tylenol PRN Oxycodone PRN   - gabapentin for neuropathic pain     # GI/Bowel:  - Senna 2 tabs daily    # DietL  - Diet: downgraded mince and moist 9/21  - MBS 9/23 completed-puree/thins  - BUn/Cr 27/0.46 9/21--> 31/0.49 9/22 -> 23/0.68 9/26    # DVT prophylaxis:  - doppler on 9/21 neg DVT RLE.  - SCDs    # Case discussed in IDT rounds 9/26:  - min-mod assist, min assist bathroom transfers, mild cognitive deficits, supervision/CG transfers, ambulates 110 feet with CG/CS RW  - goals: supervision /set up bADLs, supervision iADLs, supervision transfers and ambulation  - target dc home 10/5 with caregiver supervision and home care referral, home Pt, OT SLP      # LABS  CBC BMP 9/29    # COVID PCR: Negative 9/19    --------------------------------------------  Outpatient Follow up:  Alvaro Day)  Neurosurgery  General  [address] [facility], [address]  [place] [address]  Phone: [phone]  Fax: [phone]  Follow Up Time:     Serge Alvarado)  Radiation Oncology  [facility] - Dept. of Radiation Medicine, [address] [place] [address]  Phone: [phone]  Fax: [phone]  Follow Up Time:     Cora Hanson)  [facility]; Internal Medicine  [address] [place] [address]  Phone: [phone]  Fax: [phone]  Follow Up Time:     Nghia Moon)  Hematology; Medical Oncology  [address] [place] [address]  Phone: [phone]  Fax: [phone]    Scheduled Appointments  Cora Cabral  [facility] Physician [facility]  GERIATRICS [address] [place] [address]   Scheduled Appointment: 10/12/2022  --------------------------------------------

## 2022-09-27 NOTE — PROGRESS NOTE ADULT - SUBJECTIVE AND OBJECTIVE BOX
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 22 with 2 week history of LUE weakness and possible speech deficit. CT head showed large 4cm mass in the R frontal lobe with mild associated edema, no midline shift. Patient was admitted under Neurosurgery Dr. Day for surgical planning.     CT CAP  revealed increased size of locally advanced pancreatic tail tumor, progressive pulmonary and pleural metastases. Similar-appearing right iliopsoas. CT T/L spine revealed no pathologic fracture or lytic / destructive lesions involving the thoracic and lumbar spine; S1-3 pathologic fracture no retropulsed bone, similar-appearing right sided epidural metastases in the lumbosacral region. On  patient underwent right craniotomy for tumor resection 22.    Post-op CT 9/15 revealed post-op changes. MRI Brain 9/15 revealed post-op resection of right posterior frontal lesion with small residual enhancement and 0.3cm leftward midline shift. Patient placed on Decadron taper. Now admitted for multidisciplinary rehabilitation program 22.    (20 Sep 2022 14:33)      PAST MEDICAL & SURGICAL HISTORY:  Abdominal tumor      Back pain      Rectal cancer      No significant past surgical history          MEDICATIONS  (STANDING):  Biotene Dry Mouth Oral Rinse 5 milliLiter(s) Swish and Spit three times a day  chlorhexidine 2% Cloths 1 Application(s) Topical every 12 hours  dexAMETHasone     Tablet 3 milliGRAM(s) Oral every 12 hours  dexAMETHasone     Tablet   Oral   enoxaparin Injectable 30 milliGRAM(s) SubCutaneous every 24 hours  gabapentin Solution 1000 milliGRAM(s) Oral three times a day  influenza  Vaccine (HIGH DOSE) 0.7 milliLiter(s) IntraMuscular once  levETIRAcetam 500 milliGRAM(s) Oral two times a day  methadone    Tablet 2.5 milliGRAM(s) Oral three times a day  OLANZapine Disintegrating Tablet 5 milliGRAM(s) Oral at bedtime  pantoprazole   Suspension 40 milliGRAM(s) Oral daily  pazopanib 200 milliGRAM(s) Oral daily  polyethylene glycol 3350 17 Gram(s) Oral two times a day  senna 2 Tablet(s) Oral at bedtime    MEDICATIONS  (PRN):  acetaminophen     Tablet .. 650 milliGRAM(s) Oral every 6 hours PRN Mild Pain (1 - 3)  artificial  tears Solution 1 Drop(s) Both EYES every 4 hours PRN Dry Eyes  bisacodyl Oral Tab/Cap - Peds 5 milliGRAM(s) Oral daily PRN Constipation  calcium carbonate    500 mG (Tums) Chewable 1 Tablet(s) Chew every 6 hours PRN Upset Stomach  oxyCODONE    IR 5 milliGRAM(s) Oral every 4 hours PRN Moderate Pain (4 - 6)  oxyCODONE    IR 10 milliGRAM(s) Oral every 4 hours PRN Severe Pain (7 - 10)      Allergies    No Known Allergies    Intolerances          VITALS  67y  Vital Signs Last 24 Hrs  T(C): 36.6 (27 Sep 2022 09:36), Max: 36.8 (26 Sep 2022 21:41)  T(F): 97.9 (27 Sep 2022 09:36), Max: 98.2 (26 Sep 2022 21:41)  HR: 86 (27 Sep 2022 09:36) (75 - 86)  BP: 103/66 (27 Sep 2022 09:36) (99/66 - 103/66)  BP(mean): --  RR: 16 (27 Sep 2022 09:36) (16 - 16)  SpO2: 94% (27 Sep 2022 09:36) (94% - 95%)    Parameters below as of 27 Sep 2022 09:36  Patient On (Oxygen Delivery Method): room air      Daily     Daily         RECENT LABS:                          10.7   10.83 )-----------( 160      ( 27 Sep 2022 06:50 )             32.0     -    139  |  104  |  23  ----------------------------<  176<H>  4.0   |  27  |  0.68    Ca    8.4      26 Sep 2022 09:50          Urinalysis Basic - ( 27 Sep 2022 01:40 )    Color: Yellow / Appearance: Clear / S.020 / pH: x  Gluc: x / Ketone: Negative  / Bili: Negative / Urobili: Negative   Blood: x / Protein: 15 / Nitrite: Negative   Leuk Esterase: Negative / RBC: 0-4 /HPF / WBC 0-2 /HPF   Sq Epi: x / Non Sq Epi: Neg.-Few / Bacteria: Negative /HPF    Review of Systems:   · Additional ROS	Patient seen and examined at bedside. Wife was also at bedside. Patient had no events overnight and generally is in good spirits, wishing to remain in rehab for as long as needed. He is moving his bowels, and did not have any new clinical complaints.     We discussed both the zyprexa that was added over the weekend, as well as his chemotherapy regimen. The patient voiced understanding during the conversation, but could not recall the physician who prescribed him votrient. However, he said that this medication has been on hold lately, via advice of his oncologist, given his debilitated state.       Language line  in Mandarin     Physical Exam:   · Constitutional Comments	Mandarin speaking, AAox3, PEERL. Answers most questions appropriately, less paranoid today. Incision remains well appearing, staples removed without complication.   · Eyes	PERRL; conjunctiva clear  · Respiratory	clear to auscultation bilaterally; no wheezes; no rales; no rhonchi  · Cardiovascular	regular rate and rhythm; S1 S2 present; no gallops; no rub; no murmur  · Motor	LEFT    UE - ShAB 4/5, EF 4/5, EE 5/5, WE 4/5,  4/5   RIGHT UE - ShAB 4/5, EF 4/5, EE 4/5, WE 4/5,  4/5  LEFT    LE - HF 4/5, KE 4/5, DF 4/5, PF 4/5  RIGHT LE - HF 2/5 , KE 3/5, DF 1/5, PF 1/5  · Sensory	Intact to light touch  No calf tenderness  · Skin Comments	Skin of RLE is no longer erythematous, nor tender to palpation     Patient is a 67y old  Male who presents with a chief complaint of right frontal lobe mass s/p craniotomy and resection 22 (26 Sep 2022 15:15)      HPI:  Patient is a 68 yo M PMHx rectal CA (, s/p radiation/chemo) and 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 CT head showed large 4cm mass in the R frontal lobe with mild associated edema, no midline shift. Patient was admitted under Neurosurgery Dr. Day for surgical planning.     CT CAP  revealed increased size of locally advanced pancreatic tail tumor, progressive pulmonary and pleural metastases. Similar-appearing right iliopsoas. CT T/L spine revealed no pathologic fracture or lytic / destructive lesions involving the thoracic and lumbar spine; S1-3 pathologic fracture no retropulsed bone, similar-appearing right sided epidural metastases in the lumbosacral region. On  patient underwent right craniotomy for tumor resection 22.    Post-op CT 9/15 revealed post-op changes. MRI Brain 9/15 revealed post-op resection of right posterior frontal lesion with small residual enhancement and 0.3cm leftward midline shift. Patient placed on Decadron taper. Now admitted for multidisciplinary rehabilitation program 22.    (20 Sep 2022 14:33)      PAST MEDICAL & SURGICAL HISTORY:  Abdominal tumor      Back pain      Rectal cancer      No significant past surgical history          MEDICATIONS  (STANDING):  Biotene Dry Mouth Oral Rinse 5 milliLiter(s) Swish and Spit three times a day  chlorhexidine 2% Cloths 1 Application(s) Topical every 12 hours  dexAMETHasone     Tablet 3 milliGRAM(s) Oral every 12 hours  dexAMETHasone     Tablet   Oral   enoxaparin Injectable 30 milliGRAM(s) SubCutaneous every 24 hours  gabapentin Solution 1000 milliGRAM(s) Oral three times a day  influenza  Vaccine (HIGH DOSE) 0.7 milliLiter(s) IntraMuscular once  levETIRAcetam 500 milliGRAM(s) Oral two times a day  methadone    Tablet 2.5 milliGRAM(s) Oral three times a day  OLANZapine Disintegrating Tablet 5 milliGRAM(s) Oral at bedtime  pantoprazole   Suspension 40 milliGRAM(s) Oral daily  pazopanib 200 milliGRAM(s) Oral daily  polyethylene glycol 3350 17 Gram(s) Oral two times a day  senna 2 Tablet(s) Oral at bedtime    MEDICATIONS  (PRN):  acetaminophen     Tablet .. 650 milliGRAM(s) Oral every 6 hours PRN Mild Pain (1 - 3)  artificial  tears Solution 1 Drop(s) Both EYES every 4 hours PRN Dry Eyes  bisacodyl Oral Tab/Cap - Peds 5 milliGRAM(s) Oral daily PRN Constipation  calcium carbonate    500 mG (Tums) Chewable 1 Tablet(s) Chew every 6 hours PRN Upset Stomach  oxyCODONE    IR 5 milliGRAM(s) Oral every 4 hours PRN Moderate Pain (4 - 6)  oxyCODONE    IR 10 milliGRAM(s) Oral every 4 hours PRN Severe Pain (7 - 10)      Allergies    No Known Allergies    Intolerances          VITALS  67y  Vital Signs Last 24 Hrs  T(C): 36.6 (27 Sep 2022 09:36), Max: 36.8 (26 Sep 2022 21:41)  T(F): 97.9 (27 Sep 2022 09:36), Max: 98.2 (26 Sep 2022 21:41)  HR: 86 (27 Sep 2022 09:36) (75 - 86)  BP: 103/66 (27 Sep 2022 09:36) (99/66 - 103/66)  BP(mean): --  RR: 16 (27 Sep 2022 09:36) (16 - 16)  SpO2: 94% (27 Sep 2022 09:36) (94% - 95%)    Parameters below as of 27 Sep 2022 09:36  Patient On (Oxygen Delivery Method): room air      Daily     Daily         RECENT LABS:                          10.7   10.83 )-----------( 160      ( 27 Sep 2022 06:50 )             32.0     -    139  |  104  |  23  ----------------------------<  176<H>  4.0   |  27  |  0.68    Ca    8.4      26 Sep 2022 09:50          Urinalysis Basic - ( 27 Sep 2022 01:40 )    Color: Yellow / Appearance: Clear / S.020 / pH: x  Gluc: x / Ketone: Negative  / Bili: Negative / Urobili: Negative   Blood: x / Protein: 15 / Nitrite: Negative   Leuk Esterase: Negative / RBC: 0-4 /HPF / WBC 0-2 /HPF   Sq Epi: x / Non Sq Epi: Neg.-Few / Bacteria: Negative /HPF    Review of Systems:   · Additional ROS	Patient seen and examined at bedside with language line  in Mandarin [id number]. Wife was also at bedside. Patient had no events overnight and generally is in good spirits, wishing to remain in rehab for as long as needed. He still has feelings that he will be discharged before he is ready and endorses stress. He is moving his bowels, and did not have any new clinical complaints.     We discussed both the zyprexa that was added over the weekend, as well as his chemotherapy regimen. The patient voiced understanding during the conversation, but could not recall the physician who prescribed him votrient. However, he said that this medication has been on hold lately, via advice of his oncologist, given his debilitated state.         Physical Exam:   · Constitutional Comments	AAox3, PEERL. Answers most questions appropriately, less paranoid today. Incision remains well appearing, C//dI   · Eyes	PERRL; conjunctiva clear  · Respiratory	clear to auscultation bilaterally; no wheezes; no rales; no rhonchi  · Cardiovascular	regular rate and rhythm; S1 S2 present; no gallops; no rub; no murmur  · Motor	Left ShAB 4/5, EF 4/5, EE 5/5, WE 4/5,  4/5   Right ShAB 4/5, EF 4/5, EE 4/5, WE 4/5,  4/5  Left HF 4/5, KE 4/5, DF 4/5, PF 4/5  Right HF 2/5 , KE 3/5, DF 1/5, PF 1/5  · Sensory	Intact to light touch  No calf tenderness

## 2022-09-27 NOTE — CONSULT NOTE ADULT - SUBJECTIVE AND OBJECTIVE BOX
This is a 67 man with a history of rectal cancer s/p Chemo RT, followed by a high grade metastatic sarcoma in the pelvis.  Patient was treated with first line [place]/Taxotere, Followed by Doxorubicin, and more recently pazopanib (votrient) since June 2022.  Patient in o [facility] 9/12/22 with 2 week history of LUE weakness and speech deficit.  CT head showed large 4cm mass in the R frontal lobe with mild associated edema, no midline shift.  Mass was resected, demonstrates a high grade carcinoma, presumably the metastatic recurrence of the high grade sarcoma.  Patient remains at rehab. Thus far he states that he is improving with the acute rehab. Has been able to walk 50 steps with the rolling walker, and is eating appropriately though he does not like the food here. Wife has been bringing food from home. Denies any problems with bowel movement.s Right leg remains paralyzed for the most parts, upper extremities have motion and sight He does notice edema in the left leg.          MEDICATIONS  (STANDING):  Biotene Dry Mouth Oral Rinse 5 milliLiter(s) Swish and Spit three times a day  chlorhexidine 2% Cloths 1 Application(s) Topical every 12 hours  dexAMETHasone     Tablet   Oral   enoxaparin Injectable 30 milliGRAM(s) SubCutaneous every 24 hours  gabapentin 1000 milliGRAM(s) Oral three times a day  influenza  Vaccine (HIGH DOSE) 0.7 milliLiter(s) IntraMuscular once  levETIRAcetam 500 milliGRAM(s) Oral two times a day  methadone    Tablet 2.5 milliGRAM(s) Oral three times a day  OLANZapine Disintegrating Tablet 5 milliGRAM(s) Oral at bedtime  pantoprazole   Suspension 40 milliGRAM(s) Oral daily  pazopanib 200 milliGRAM(s) Oral daily  polyethylene glycol 3350 17 Gram(s) Oral two times a day  senna 2 Tablet(s) Oral at bedtime    MEDICATIONS  (PRN):  acetaminophen     Tablet .. 650 milliGRAM(s) Oral every 6 hours PRN Mild Pain (1 - 3)  artificial  tears Solution 1 Drop(s) Both EYES every 4 hours PRN Dry Eyes  bisacodyl Oral Tab/Cap - Peds 5 milliGRAM(s) Oral daily PRN Constipation  calcium carbonate    500 mG (Tums) Chewable 1 Tablet(s) Chew every 6 hours PRN Upset Stomach  oxyCODONE    IR 5 milliGRAM(s) Oral every 4 hours PRN Moderate Pain (4 - 6)  oxyCODONE    IR 10 milliGRAM(s) Oral every 4 hours PRN Severe Pain (7 - 10)    Vital Signs Last 24 Hrs  T(C): 36.6 (09-27-22 @ 09:36), Max: 36.8 (09-26-22 @ 21:41)  T(F): 97.9 (09-27-22 @ 09:36), Max: 98.2 (09-26-22 @ 21:41)  HR: 86 (09-27-22 @ 09:36) (75 - 86)  BP: 103/66 (09-27-22 @ 09:36) (99/66 - 103/66)  BP(mean): --  RR: 16 (09-27-22 @ 09:36) (16 - 16)  SpO2: 94% (09-27-22 @ 09:36) (94% - 95%)    09-26    139  |  104  |  23  ----------------------------<  176<H>  4.0   |  27  |  0.68    Ca    8.4      26 Sep 2022 09:50                            10.7   10.83 )-----------( 160      ( 27 Sep 2022 06:50 )             32.0

## 2022-09-27 NOTE — PROGRESS NOTE ADULT - ATTENDING COMMENTS
Progress note amended to include my discussions with patient, resident, hospitalist, RN, SW, PT and my findings. Seen with Mandarin language line  #[id number]. Wife also at bedside.    Patient still with deficits in reasoning and complex comprehension; still frustrated although appears less irritable and paranoid than yesterday. Continues to endorse the feeling that being in the hospital is a stressful environment, and he knows his body, and he has rights/doesn't want to be discharged before he is ready. Reassured patient that we are working with him as a team and that discharge plans would include him    Results of pathology returned, discussed with patient. He had been under the impression that the frontal mass was from his rectal CA; however, per path read, appears more likely from sarcoma. Stiven also reports that was previously taking the votrient 200 mg daily, however had stopped about a week ago because his oncologist told him "he was too weak". we requested that if his oncologist makes any changes/recs, that we are informed as well. He could not remember his oncologist's name (later states that his daughter helps him with his appts); however Dr Moon's name on prescription bottle. Will reach out to office to discuss plans for votrient as well as path report and any future Rx.    continue program. Stiven informed he was started on zyprexa and agreeable

## 2022-09-27 NOTE — CONSULT NOTE ADULT - ASSESSMENT
This is a 67 man with a history of rectal cancer s/p Chemo RT, followed by a high grade metastatic sarcoma in the pelvis.  Patient was treated with first line [place]/Taxotere, Followed by Doxorubicin, and more recently pazopanib (votrient) since June 2022.  Patient in [facility] 9/12/22 with 2 week history of LUE weakness and speech deficit.  CT head showed large 4cm mass in the R frontal lobe with mild associated edema, no midline shift.  Mass was resected, demonstrates a high grade carcinoma, presumably the metastatic recurrence of the high grade sarcoma.  Patient remains at rehab. Thus far he states that he is improving with the acute rehab.  Has been able to walk 50 steps with the rolling walker, and is eating appropriately though he does not like the food here. Wife has been bringing food from home. Denies any problems with bowel movement.s Right leg remains paralyzed for the most parts, upper extremities have motion and sight He does notice edema in the left leg.      ECOG PS currently seems to be just barely at 2. Spens 3 hours in a chair and then 50 steps with a walker.  Will need to complete rehab before restarting the Votrient or continuing with another line of therapy. Unclear if the Votrient made a significant impact, was not able to prevent the metastasis to the brain as we had just witnessed, but this is a medication that does not have an effect across the blood brain barrier.  Will report findings with Dr. Osman to prepare plans for after he is discharged from rehab.  No further oncologic care will be done here at [place].

## 2022-09-27 NOTE — PROGRESS NOTE ADULT - ASSESSMENT
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 (s/p radiation, on chemo) who presented with LUE weakness and possible speech deficit, found to have 4 cm mass in R frontal lobe, now post-resection 9/14/22    # S/p craniotomy for tumor resection, left hemiparesis  - Bx showed high-grade neoplasm   - Staples removed 9/26 (POD 12) without incident as recommended by NSGY. Cleared to shower  - Continue decadron taper for cerebral edema  - Continue keppra 500 mg BID for seizure prophylaxis  - Continue gabapentin 100 mg TID   - Per outside Onc, hold votrient 200 mg daily for chemotherapy   - Oncology consulted, f/up recs   - continue comprehensive rehab program PT/OT/ SLP 3 hours a day 5 days a week    # Leukocytosis 9/26 - downtrending   - Currently w/o symptoms, afebrile  - Monitor for symptoms  - If febrile, septic w/up     # Mood / Cognition:  - Endorses being frustrated but does not have any intention to harm himself   - Zyprexa 2.5 mg qHS ordered by psych    # Pain  - Palliative was following for pain management at prior hospital  - methadone 2.5 mg TID (lower than home dose, however reports controlled)  - Tylenol PRN Oxycodone PRN   - gabapentin for neuropathic pain     # GI/Bowel:  - Senna 2 tabs daily    # DietL  - Diet: downgraded mince and moist 9/21  - MBS 9/23 completed-puree/thins  - BUN/Cr 27/0.46 9/21--> 31/0.49 9/22 -> 23/0.68 9/26    # DVT prophylaxis:  - doppler on 9/21 neg DVT RLE.  - SCDs    # Case discussed in IDT rounds 9/26:  - min-mod assist, min assist bathroom transfers, mild cognitive deficits, supervision/CG transfers, ambulates 110 feet with CG/CS RW  - goals: supervision /set up bADLs, supervision iADLs, supervision transfers and ambulation  - target dc home 10/5 with caregiver supervision and home care referral, home Pt, OT SLP      # LABS  CBC BMP 9/29    # COVID PCR: Negative 9/19    --------------------------------------------  Outpatient Follow up:  Alvaro Day)  Neurosurgery  [place] [id number] [facility], [address]  [place] [address]  Phone: [phone]  Fax: [phone]  Follow Up Time:     Serge Alvarado)  Radiation Oncology  [facility] - Dept. of Radiation Medicine, [address] [place] [address]  Phone: [phone]  Fax: [phone]  Follow Up Time:     Cora Hanson)  [facility] Medicine  [address] [place] [address]  Phone: [phone]  Fax: [phone]  Follow Up Time:     Nghia Moon)  Hematology; Medical Oncology  [address] [place] [address]  Phone: [phone]  Fax: [phone]    Scheduled Appointments  Cora Cabral  [facility] Physician [facility]  GERIATRICS [address] [place] [address]   Scheduled Appointment: 10/12/2022   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 (s/p radiation, on chemo) who presented with LUE weakness and possible speech deficit, found to have 4 cm mass in R frontal lobe, now post-resection 9/14/22    # S/p craniotomy for tumor resection, left hemiparesis  - Bx showed high-grade neoplasm, may be c./w sarcoma. Reviewed with patient and wife, will reach out to Dr Moon, oncologist  - Staples removed 9/26. Cleared to shower  - Continue decadron taper for cerebral edema. On 3 mg q12 9/27  - Continue keppra 500 mg BID for seizure prophylaxis  - Continue gabapentin 100 mg TID   - Per outside Onc, hold votrient 200 mg daily for chemotherapy   - Oncology consulted, f/up recs   - continue comprehensive rehab program PT/OT/ SLP 3 hours a day 5 days a week    # Leukocytosis  - downtrending WBC 10.83 9./27  - Currently w/o symptoms, afebrile  - Monitor for symptoms  - If febrile, septic w/up     # Mood / Cognition: major depression  - Endorses being frustrated but does not have any intention to harm himself   - seen by psychiatry 9/24  - continue psychology support  - Zyprexa 2.5 mg qHS ordered by psych. REviewed with patient and wife    # Pain  - Palliative was following for pain management at prior hospital  - methadone 2.5 mg TID (lower than home dose, however reports controlled)  - Tylenol PRN Oxycodone PRN   - gabapentin for neuropathic pain     # GI/Bowel:  - Senna 2 tabs daily    # DietL  - Diet: downgraded mince and moist 9/21  - MBS 9/23 completed-puree/thins  - BUN/Cr 27/0.46 9/21--> 31/0.49 9/22 -> 23/0.68 9/26  - BMP 9/29    # DVT prophylaxis:  - doppler on 9/21 neg DVT RLE.  - SCDs    # Case discussed in IDT rounds 9/26:  - min-mod assist, min assist bathroom transfers, mild cognitive deficits, supervision/CG transfers, ambulates 110 feet with CG/CS RW  - goals: supervision /set up bADLs, supervision iADLs, supervision transfers and ambulation  - target dc home 10/5 with caregiver supervision and home care referral, home Pt, OT SLP      # LABS  CBC BMP 9/29  oncology input    # COVID PCR: Negative 9/19    --------------------------------------------  Outpatient Follow up:  Alvaro Day)  Neurosurgery  [address], [address]  [place] [address]  Phone: [phone]  Fax: [phone]  Follow Up Time:     Serge Alvarado)  Radiation Oncology  [facility] - Dept. of Radiation Medicine, [address] [place] [address]  Phone: [phone]  Fax: [phone]  Follow Up Time:     Cora Hanson)  [facility]  [address] [place] [address]  Phone: [phone]  Fax: [phone]  Follow Up Time:     Nghia Moon)  Hematology; Medical Oncology  [address] [place] [address]  Phone: [phone]  Fax: [phone]    Scheduled Appointments  Cora Cabral  [facility] Physician [facility]  GERIATRICS [address] [place] [address]   Scheduled Appointment: 10/12/2022

## 2022-09-27 NOTE — CONSULT NOTE ADULT - REASON FOR ADMISSION
right frontal lobe mass s/p craniotomy and resection 9/14/22

## 2022-09-28 NOTE — BH CONSULTATION LIAISON PROGRESS NOTE - CURRENT MEDICATION
MEDICATIONS  (STANDING):  Biotene Dry Mouth Oral Rinse 5 milliLiter(s) Swish and Spit three times a day  chlorhexidine 2% Cloths 1 Application(s) Topical every 12 hours  dexAMETHasone     Tablet 2 milliGRAM(s) Oral every 12 hours  dexAMETHasone     Tablet   Oral   enoxaparin Injectable 30 milliGRAM(s) SubCutaneous every 24 hours  gabapentin 1000 milliGRAM(s) Oral three times a day  influenza  Vaccine (HIGH DOSE) 0.7 milliLiter(s) IntraMuscular once  levETIRAcetam 500 milliGRAM(s) Oral two times a day  methadone    Tablet 2.5 milliGRAM(s) Oral three times a day  OLANZapine Disintegrating Tablet 5 milliGRAM(s) Oral at bedtime  pantoprazole   Suspension 40 milliGRAM(s) Oral daily  pazopanib 200 milliGRAM(s) Oral daily  polyethylene glycol 3350 17 Gram(s) Oral two times a day  senna 2 Tablet(s) Oral at bedtime    MEDICATIONS  (PRN):  acetaminophen     Tablet .. 650 milliGRAM(s) Oral every 6 hours PRN Mild Pain (1 - 3)  artificial  tears Solution 1 Drop(s) Both EYES every 4 hours PRN Dry Eyes  bisacodyl Oral Tab/Cap - Peds 5 milliGRAM(s) Oral daily PRN Constipation  calcium carbonate    500 mG (Tums) Chewable 1 Tablet(s) Chew every 6 hours PRN Upset Stomach  oxyCODONE    IR 5 milliGRAM(s) Oral every 4 hours PRN Moderate Pain (4 - 6)  oxyCODONE    IR 10 milliGRAM(s) Oral every 4 hours PRN Severe Pain (7 - 10)

## 2022-09-28 NOTE — BH CONSULTATION LIAISON PROGRESS NOTE - NSBHCHARTREVIEWVS_PSY_A_CORE FT
Vital Signs Last 24 Hrs  T(C): 36.4 (28 Sep 2022 09:23), Max: 36.6 (27 Sep 2022 22:30)  T(F): 97.6 (28 Sep 2022 09:23), Max: 97.8 (27 Sep 2022 22:30)  HR: 74 (28 Sep 2022 09:23) (73 - 74)  BP: 100/66 (28 Sep 2022 09:23) (100/66 - 105/67)  BP(mean): --  RR: 16 (28 Sep 2022 09:23) (16 - 16)  SpO2: 96% (28 Sep 2022 09:23) (95% - 96%)    Parameters below as of 28 Sep 2022 09:23  Patient On (Oxygen Delivery Method): room air

## 2022-09-28 NOTE — BH CONSULTATION LIAISON PROGRESS NOTE - NSBHFUPINTERVALHXFT_PSY_A_CORE
Patient seen with wife at his bedside . As per staff doing better , slept , participated in PT , not paranoid, compliant with meds ,  looks happy  and anxious with improved sleep and appetite . Will continue 5  mg zyprexa.

## 2022-09-28 NOTE — PROGRESS NOTE ADULT - SUBJECTIVE AND OBJECTIVE BOX
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 CT head showed large 4cm mass in the R frontal lobe with mild associated edema, no midline shift. Patient was admitted under Neurosurgery Dr. Day for surgical planning.     CT CAP  revealed increased size of locally advanced pancreatic tail tumor, progressive pulmonary and pleural metastases. Similar-appearing right iliopsoas. CT T/L spine revealed no pathologic fracture or lytic / destructive lesions involving the thoracic and lumbar spine; S1-3 pathologic fracture no retropulsed bone, similar-appearing right sided epidural metastases in the lumbosacral region. On  patient underwent right craniotomy for tumor resection 22.    Post-op CT 9/15 revealed post-op changes. MRI Brain 9/15 revealed post-op resection of right posterior frontal lesion with small residual enhancement and 0.3cm leftward midline shift. Patient placed on Decadron taper. Now admitted for multidisciplinary rehabilitation program 22.      PAST MEDICAL & SURGICAL HISTORY:  Abdominal tumor      Back pain      Rectal cancer      No significant past surgical history        VITALS  Vital Signs Last 24 Hrs  T(C): 36.4 (28 Sep 2022 09:23), Max: 36.6 (27 Sep 2022 22:30)  T(F): 97.6 (28 Sep 2022 09:23), Max: 97.8 (27 Sep 2022 22:30)  HR: 74 (28 Sep 2022 09:23) (73 - 74)  BP: 100/66 (28 Sep 2022 09:23) (100/66 - 105/67)  BP(mean): --  RR: 16 (28 Sep 2022 09:23) (16 - 16)  SpO2: 96% (28 Sep 2022 09:23) (95% - 96%)    Parameters below as of 28 Sep 2022 09:23  Patient On (Oxygen Delivery Method): room air      RECENT LABS                        10.7   10.83 )-----------( 160      ( 27 Sep 2022 06:50 )             32.0               Urinalysis Basic - ( 27 Sep 2022 01:40 )    Color: Yellow / Appearance: Clear / S.020 / pH: x  Gluc: x / Ketone: Negative  / Bili: Negative / Urobili: Negative   Blood: x / Protein: 15 / Nitrite: Negative   Leuk Esterase: Negative / RBC: 0-4 /HPF / WBC 0-2 /HPF   Sq Epi: x / Non Sq Epi: Neg.-Few / Bacteria: Negative /HPF      CURRENT MEDICATIONS  MEDICATIONS  (STANDING):  Biotene Dry Mouth Oral Rinse 5 milliLiter(s) Swish and Spit three times a day  chlorhexidine 2% Cloths 1 Application(s) Topical every 12 hours  dexAMETHasone     Tablet 2 milliGRAM(s) Oral every 12 hours  dexAMETHasone     Tablet   Oral   enoxaparin Injectable 30 milliGRAM(s) SubCutaneous every 24 hours  gabapentin 1000 milliGRAM(s) Oral three times a day  influenza  Vaccine (HIGH DOSE) 0.7 milliLiter(s) IntraMuscular once  levETIRAcetam 500 milliGRAM(s) Oral two times a day  methadone    Tablet 2.5 milliGRAM(s) Oral three times a day  OLANZapine Disintegrating Tablet 5 milliGRAM(s) Oral at bedtime  pantoprazole   Suspension 40 milliGRAM(s) Oral daily  pazopanib 200 milliGRAM(s) Oral daily  polyethylene glycol 3350 17 Gram(s) Oral two times a day  senna 2 Tablet(s) Oral at bedtime    MEDICATIONS  (PRN):  acetaminophen     Tablet .. 650 milliGRAM(s) Oral every 6 hours PRN Mild Pain (1 - 3)  artificial  tears Solution 1 Drop(s) Both EYES every 4 hours PRN Dry Eyes  bisacodyl Oral Tab/Cap - Peds 5 milliGRAM(s) Oral daily PRN Constipation  calcium carbonate    500 mG (Tums) Chewable 1 Tablet(s) Chew every 6 hours PRN Upset Stomach  oxyCODONE    IR 5 milliGRAM(s) Oral every 4 hours PRN Moderate Pain (4 - 6)  oxyCODONE    IR 10 milliGRAM(s) Oral every 4 hours PRN Severe Pain (7 - 10)    Review of Systems:   · Additional ROS	Patient seen and examined at bedside with language line  in Mandarin [phone]. Wife was also at bedside. Patient had no events overnight and generally is in good spirits. He is moving his bowels, and did not have any new clinical complaints.       Physical Exam:   · Constitutional Comments	AAox3, PEERL. Answers most questions appropriately, less paranoid today. Incision remains well appearing, C//dI   · Eyes	PERRL; conjunctiva clear  · Respiratory	clear to auscultation bilaterally; no wheezes; no rales; no rhonchi  · Cardiovascular	regular rate and rhythm; S1 S2 present; no gallops; no rub; no murmur  · Motor	Left ShAB 4/5, EF 4/5, EE 5/5, WE 4/5,  4/5   Right ShAB 4/5, EF 4/5, EE 4/5, WE 4/5,  4/5  Left HF 4/5, KE 4/5, DF 4/5, PF 4/5  Right HF 2/5 , KE 3/5, DF 1/5, PF 1/5  · Sensory	Intact to light touch  No calf tenderness    Continue comprehensive acute rehab program consisting of 3hrs/day of OT/PT. 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 CT head showed large 4cm mass in the R frontal lobe with mild associated edema, no midline shift. Patient was admitted under Neurosurgery Dr. Day for surgical planning.     CT CAP  revealed increased size of locally advanced pancreatic tail tumor, progressive pulmonary and pleural metastases. Similar-appearing right iliopsoas. CT T/L spine revealed no pathologic fracture or lytic / destructive lesions involving the thoracic and lumbar spine; S1-3 pathologic fracture no retropulsed bone, similar-appearing right sided epidural metastases in the lumbosacral region. On  patient underwent right craniotomy for tumor resection 22.    Post-op CT 9/15 revealed post-op changes. MRI Brain 9/15 revealed post-op resection of right posterior frontal lesion with small residual enhancement and 0.3cm leftward midline shift. Patient placed on Decadron taper. Now admitted for multidisciplinary rehabilitation program 22.      PAST MEDICAL & SURGICAL HISTORY:  Abdominal tumor      Back pain      Rectal cancer      No significant past surgical history        VITALS  Vital Signs Last 24 Hrs  T(C): 36.4 (28 Sep 2022 09:23), Max: 36.6 (27 Sep 2022 22:30)  T(F): 97.6 (28 Sep 2022 09:23), Max: 97.8 (27 Sep 2022 22:30)  HR: 74 (28 Sep 2022 09:23) (73 - 74)  BP: 100/66 (28 Sep 2022 09:23) (100/66 - 105/67)  BP(mean): --  RR: 16 (28 Sep 2022 09:23) (16 - 16)  SpO2: 96% (28 Sep 2022 09:23) (95% - 96%)    Parameters below as of 28 Sep 2022 09:23  Patient On (Oxygen Delivery Method): room air      RECENT LABS                        10.7   10.83 )-----------( 160      ( 27 Sep 2022 06:50 )             32.0               Urinalysis Basic - ( 27 Sep 2022 01:40 )    Color: Yellow / Appearance: Clear / S.020 / pH: x  Gluc: x / Ketone: Negative  / Bili: Negative / Urobili: Negative   Blood: x / Protein: 15 / Nitrite: Negative   Leuk Esterase: Negative / RBC: 0-4 /HPF / WBC 0-2 /HPF   Sq Epi: x / Non Sq Epi: Neg.-Few / Bacteria: Negative /HPF      CURRENT MEDICATIONS  MEDICATIONS  (STANDING):  Biotene Dry Mouth Oral Rinse 5 milliLiter(s) Swish and Spit three times a day  chlorhexidine 2% Cloths 1 Application(s) Topical every 12 hours  dexAMETHasone     Tablet 2 milliGRAM(s) Oral every 12 hours  dexAMETHasone     Tablet   Oral   enoxaparin Injectable 30 milliGRAM(s) SubCutaneous every 24 hours  gabapentin 1000 milliGRAM(s) Oral three times a day  influenza  Vaccine (HIGH DOSE) 0.7 milliLiter(s) IntraMuscular once  levETIRAcetam 500 milliGRAM(s) Oral two times a day  methadone    Tablet 2.5 milliGRAM(s) Oral three times a day  OLANZapine Disintegrating Tablet 5 milliGRAM(s) Oral at bedtime  pantoprazole   Suspension 40 milliGRAM(s) Oral daily  pazopanib 200 milliGRAM(s) Oral daily  polyethylene glycol 3350 17 Gram(s) Oral two times a day  senna 2 Tablet(s) Oral at bedtime    MEDICATIONS  (PRN):  acetaminophen     Tablet .. 650 milliGRAM(s) Oral every 6 hours PRN Mild Pain (1 - 3)  artificial  tears Solution 1 Drop(s) Both EYES every 4 hours PRN Dry Eyes  bisacodyl Oral Tab/Cap - Peds 5 milliGRAM(s) Oral daily PRN Constipation  calcium carbonate    500 mG (Tums) Chewable 1 Tablet(s) Chew every 6 hours PRN Upset Stomach  oxyCODONE    IR 5 milliGRAM(s) Oral every 4 hours PRN Moderate Pain (4 - 6)  oxyCODONE    IR 10 milliGRAM(s) Oral every 4 hours PRN Severe Pain (7 - 10)    Review of Systems:   · Additional ROS	Patient seen and examined at bedside with language line  in Mandarin #[id number]. Wife was also at bedside.     Patient had no events overnight and generally is in good spirits. He is moving his bowels, and did not have any new clinical complaints. Pain is controlled, oxy and methadone renewed at current dosage/frequency    Met with oncology yesterday. aware of recs, reviewed      Physical Exam:   · Constitutional Comments	AAox3, mood improved, less fatigued and irritable, less perseverative. Incision  C//dI no swelling  · Eyes	PERRL; conjunctiva clear  · Respiratory	clear to auscultation bilaterally; no wheezes; no rales; no rhonchi  · Cardiovascular	regular rate and rhythm; S1 S2 present; no gallops; no rub; no murmur  · Motor	Left ShAB 4/5, EF 4/5, EE 5/5, WE 4/5,  4/5   Right ShAB 4/5, EF 4/5, EE 4/5, WE 4/5,  4/5  Left HF 4/5, KE 4/5, DF 4/5, PF 4/5  Right HF 2/5 , KE 3/5, DF 1/5, PF 1/5  · Sensory	Intact to light touch  No calf tenderness

## 2022-09-28 NOTE — PROGRESS NOTE ADULT - ASSESSMENT
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 (s/p radiation, on chemo) who presented with LUE weakness and possible speech deficit, found to have 4 cm mass in R frontal lobe, now post-resection 9/14/22    # S/p craniotomy for tumor resection, left hemiparesis  - Bx showed high-grade neoplasm, may be c./w sarcoma. Heme Dr Harris appreciated.   - Staples removed 9/26. Cleared to shower  - Continue decadron taper for cerebral edema. On 3 mg q12 9/27  - Continue keppra 500 mg BID for seizure prophylaxis  - Continue gabapentin 100 mg TID   - Per outside Onc, hold votrient 200 mg daily for chemotherapy   - Oncology consulted, f/up recs   - continue comprehensive rehab program PT/OT/ SLP 3 hours a day 5 days a week    # Leukocytosis  - downtrending WBC 10.83 9./27  - Currently w/o symptoms, afebrile  - Monitor for symptoms  - If febrile, septic w/up     # Mood / Cognition: major depression  - Endorses being frustrated but does not have any intention to harm himself   - seen by psychiatry 9/24  - continue psychology support  - Zyprexa 2.5 mg qHS ordered by psych.    # Pain  - Palliative was following for pain management at prior hospital  - methadone 2.5 mg TID (lower than home dose, however reports controlled)  - Tylenol PRN Oxycodone PRN   - gabapentin for neuropathic pain     # GI/Bowel:  - Senna 2 tabs daily    # DietL  - Diet: downgraded mince and moist 9/21  - MBS 9/23 completed-puree/thins  - BUN/Cr 27/0.46 9/21--> 31/0.49 9/22 -> 23/0.68 9/26  - BMP 9/29    # DVT prophylaxis:  - doppler on 9/21 neg DVT RLE.  - SCDs    # Case discussed in IDT rounds 9/26:  - min-mod assist, min assist bathroom transfers, mild cognitive deficits, supervision/CG transfers, ambulates 110 feet with CG/CS RW  - goals: supervision /set up bADLs, supervision iADLs, supervision transfers and ambulation  - target [place] home 10/5 with caregiver supervision and home care referral, home Pt, OT SLP      # LABS  CBC BMP 9/29  oncology input    # COVID PCR: Negative 9/19    --------------------------------------------  Outpatient Follow up:  Alvaro Day)  Neurosurgery  [place] [id number] [facility], [address]  [place] [address]  Phone: [phone]  Fax: [phone]  Follow Up Time:     Serge Alvarado)  Radiation Oncology  [facility] - Dept. of Radiation Medicine, [address] [place] [address]  Phone: [phone]  Fax: [phone]  Follow Up Time:     Cora Hanson)  [facility]  [address] [place] [address]  Phone: [phone]  Fax: [phone]  Follow Up Time:     Nghia Moon)  Hematology; Medical Oncology  [address] [place] [address]  Phone: [phone]  Fax: [phone]    Scheduled Appointments  Cora Cabral  [facility] Physician [facility]  GERIATRICS [address] [place] [address]   Scheduled Appointment: 10/12/2022   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 (s/p radiation, on chemo) who presented with LUE weakness and possible speech deficit, found to have 4 cm mass in R frontal lobe, now post-resection 9/14/22    # S/p craniotomy for tumor resection, left hemiparesis  - Bx showed high-grade neoplasm, may be c./w sarcoma. Heme Dr Harris appreciated, to f/u with Dr Szymanski. Hold on votrient for now, patient aware and agreeable with plan.   - Staples removed 9/26. Cleared to shower  - Continue decadron taper for cerebral edema. On 2 mg q12 9/28  - Continue keppra 500 mg BID for seizure prophylaxis  - Continue gabapentin 100 mg TID   - Per outside Onc, hold votrient 200 mg daily for chemotherapy   - Oncology consulted, f/up recs   - continue comprehensive rehab program PT/OT/ SLP 3 hours a day 5 days a week    # Leukocytosis  - downtrending WBC 10.83 9./27  - Currently w/o symptoms, afebrile  - If febrile, septic w/up   - CBC 9/29    # Mood / Cognition: major depression  - seen by psychiatry 9/24  - continue psychology support  - Zyprexa 2.5 mg qHS. Improved    # Pain  - Palliative was following for pain management at prior hospital  - methadone 2.5 mg TID  - Tylenol PRN Oxycodone PRN   - gabapentin    # GI/Bowel:  - Senna 2 tabs daily    # DietL  - Diet: downgraded mince and moist 9/21  - MBS 9/23 completed-puree/thins  - BUN/Cr 27/0.46 9/21--> 31/0.49 9/22 -> 23/0.68 9/26  - BMP 9/29    # DVT prophylaxis:  - doppler on 9/21 neg DVT RLE.  - SCDs  - TEDs BLE. If left LE swelling persist, will order doppler    # Case discussed in IDT rounds 9/26:  - min-mod assist, min assist bathroom transfers, mild cognitive deficits, supervision/CG transfers, ambulates 110 feet with CG/CS RW  - goals: supervision /set up bADLs, supervision iADLs, supervision transfers and ambulation  - target dc home 10/5 with caregiver supervision and home care referral, home Pt, OT SLP      # LABS  CBC BMP 9/29          --------------------------------------------  Outpatient Follow up:  Alvaro Day)  Neurosurgery  [facility]  [address], [address]  [place] [address]  Phone: [phone]  Fax: [phone]  Follow Up Time:     Serge Alvarado)  Radiation Oncology  [facility] - Dept. of Radiation Medicine, [address] [place] [address]  Phone: [phone]  Fax: [phone]  Follow Up Time:     Cora Hanson)  [facility]  [address] [place] [address]  Phone: [phone]  Fax: [phone]  Follow Up Time:     Nghia Moon)  Hematology; Medical Oncology  [address] [place] [address]  Phone: [phone]  Fax: [phone]    Scheduled Appointments  Cora Cabral  [facility] Physician [facility]  GERIATRICS [address] [place] [address]   Scheduled Appointment: 10/12/2022

## 2022-09-28 NOTE — PROGRESS NOTE ADULT - NS ATTEND AMEND GEN_ALL_CORE FT
Progress note amended to include my discussions with the patient, NP, hospitalist, RN, SW, PT, and my findings. Patient seen with wife at bedside; reports some noted left lower extremity swelling. Left leg examined, trace pedal edema and lower calf swelling, non pitting, no erythema or warmth, no cord palpable. no calf TTP. RLE with baseline trace swelling, no significant erythema no TTP (more than left, but this is his baseline). increased standing activity and OOB to chair likely culprit with dependent edema, however will monitor. KAIN stockings BLE reviewed with staff. If persists or developed calf pain, will order doppler (previously only of RLE)    Dr Harris/oncology consult read and appreciated. Patietn able to verbalize independently that he is not taking medication (votrient) anymore and that Dr Harris will follow with his colleagues (Dr Green) mood overall improved, less paranoid, and again expresses that he is feeling well and his gratitude to staff. Continue program    seen with Language Line  #[id number].

## 2022-09-29 NOTE — PROGRESS NOTE ADULT - SUBJECTIVE AND OBJECTIVE BOX
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 CT head showed large 4cm mass in the R frontal lobe with mild associated edema, no midline shift. Patient was admitted under Neurosurgery Dr. Day for surgical planning.     CT CAP  revealed increased size of locally advanced pancreatic tail tumor, progressive pulmonary and pleural metastases. Similar-appearing right iliopsoas. CT T/L spine revealed no pathologic fracture or lytic / destructive lesions involving the thoracic and lumbar spine; S1-3 pathologic fracture no retropulsed bone, similar-appearing right sided epidural metastases in the lumbosacral region. On  patient underwent right craniotomy for tumor resection 22.    Post-op CT 9/15 revealed post-op changes. MRI Brain 9/15 revealed post-op resection of right posterior frontal lesion with small residual enhancement and 0.3cm leftward midline shift. Patient placed on Decadron taper. Now admitted for multidisciplinary rehabilitation program 22.    (20 Sep 2022 14:33)      PAST MEDICAL & SURGICAL HISTORY:  Abdominal tumor      Back pain      Rectal cancer      No significant past surgical history          MEDICATIONS  (STANDING):  Biotene Dry Mouth Oral Rinse 5 milliLiter(s) Swish and Spit three times a day  chlorhexidine 2% Cloths 1 Application(s) Topical every 12 hours  dexAMETHasone     Tablet   Oral   dexAMETHasone     Tablet 2 milliGRAM(s) Oral every 12 hours  enoxaparin Injectable 30 milliGRAM(s) SubCutaneous every 24 hours  gabapentin 1000 milliGRAM(s) Oral three times a day  influenza  Vaccine (HIGH DOSE) 0.7 milliLiter(s) IntraMuscular once  levETIRAcetam 500 milliGRAM(s) Oral two times a day  methadone    Tablet 2.5 milliGRAM(s) Oral three times a day  OLANZapine Disintegrating Tablet 5 milliGRAM(s) Oral at bedtime  pantoprazole   Suspension 40 milliGRAM(s) Oral daily  pazopanib 200 milliGRAM(s) Oral daily  polyethylene glycol 3350 17 Gram(s) Oral two times a day  senna 2 Tablet(s) Oral at bedtime    MEDICATIONS  (PRN):  acetaminophen     Tablet .. 650 milliGRAM(s) Oral every 6 hours PRN Mild Pain (1 - 3)  artificial  tears Solution 1 Drop(s) Both EYES every 4 hours PRN Dry Eyes  bisacodyl Oral Tab/Cap - Peds 5 milliGRAM(s) Oral daily PRN Constipation  calcium carbonate    500 mG (Tums) Chewable 1 Tablet(s) Chew every 6 hours PRN Upset Stomach  oxyCODONE    IR 5 milliGRAM(s) Oral every 4 hours PRN Moderate Pain (4 - 6)  oxyCODONE    IR 10 milliGRAM(s) Oral every 4 hours PRN Severe Pain (7 - 10)      Allergies    No Known Allergies    Intolerances          VITALS  67y  Vital Signs Last 24 Hrs  T(C): 36.3 (29 Sep 2022 09:08), Max: 36.5 (28 Sep 2022 21:14)  T(F): 97.4 (29 Sep 2022 09:08), Max: 97.7 (28 Sep 2022 21:14)  HR: 74 (29 Sep 2022 09:08) (69 - 77)  BP: 101/62 (29 Sep 2022 09:08) (100/60 - 101/62)  BP(mean): --  RR: 16 (29 Sep 2022 09:08) (16 - 16)  SpO2: 95% (29 Sep 2022 09:08) (95% - 95%)    Parameters below as of 29 Sep 2022 09:08  Patient On (Oxygen Delivery Method): room air      Daily     Daily Weight in k.5 (29 Sep 2022 04:30)        RECENT LABS:                          11.3   7.84  )-----------( 175      ( 29 Sep 2022 09:17 )             34.1     -    142  |  108  |  26<H>  ----------------------------<  111<H>  3.7   |  28  |  0.58    Ca    8.0<L>      29 Sep 2022 06:30    TPro  5.1<L>  /  Alb  2.1<L>  /  TBili  0.4  /  DBili  x   /  AST  22  /  ALT  56<H>  /  AlkPhos  87  09    LIVER FUNCTIONS - ( 29 Sep 2022 06:30 )  Alb: 2.1 g/dL / Pro: 5.1 g/dL / ALK PHOS: 87 U/L / ALT: 56 U/L / AST: 22 U/L / GGT: x           Review of Systems:   · Additional ROS	Patient seen and examined at bedside with language line  in Mandarin. Wife was also at bedside.    No events overnight, remains lucid and in fine spirits. Denies any B/B complaints, and his pain is well-controlled. He is agreeable with onc recs from earlier in the week.      Also denies any headache, dizziness, or new complaints overall. Enjoying therapy.       Physical Exam:   · Constitutional Comments	AAox3, mood improved, less fatigued and irritable, less perseverative. Incision  C//dI no swelling  · Eyes	PERRL; conjunctiva clear  · Respiratory	clear to auscultation bilaterally; no wheezes; no rales; no rhonchi  · Cardiovascular	regular rate and rhythm; S1 S2 present; no gallops; no rub; no murmur  · Motor	Left ShAB 4/5, EF 4/5, EE 5/5, WE 4/5,  4/5   Right ShAB 4/5, EF 4/5, EE 4/5, WE 4/5,  4/5  Left HF 4/5, KE 4/5, DF 4/5, PF 4/5  Right HF 2/5 , KE 3/5, DF 1/5, PF 1/5  · Sensory	Intact to light touch  No calf tenderness             Patient is a 67y old  Male who presents with a chief complaint of right frontal lobe mass s/p craniotomy and resection 22 (28 Sep 2022 11:20)      HPI:  Patient is a 66 yo M PMHx rectal CA (, s/p radiation/chemo) and 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 CT head showed large 4cm mass in the R frontal lobe with mild associated edema, no midline shift. Patient was admitted under Neurosurgery Dr. Day for surgical planning.     CT CAP  revealed increased size of locally advanced pancreatic tail tumor, progressive pulmonary and pleural metastases. Similar-appearing right iliopsoas. CT T/L spine revealed no pathologic fracture or lytic / destructive lesions involving the thoracic and lumbar spine; S1-3 pathologic fracture no retropulsed bone, similar-appearing right sided epidural metastases in the lumbosacral region. On  patient underwent right craniotomy for tumor resection 22.    Post-op CT 9/15 revealed post-op changes. MRI Brain 9/15 revealed post-op resection of right posterior frontal lesion with small residual enhancement and 0.3cm leftward midline shift. Patient placed on Decadron taper. Now admitted for multidisciplinary rehabilitation program 22.    (20 Sep 2022 14:33)      PAST MEDICAL & SURGICAL HISTORY:  Abdominal tumor      Back pain      Rectal cancer      No significant past surgical history          MEDICATIONS  (STANDING):  Biotene Dry Mouth Oral Rinse 5 milliLiter(s) Swish and Spit three times a day  chlorhexidine 2% Cloths 1 Application(s) Topical every 12 hours  dexAMETHasone     Tablet   Oral   dexAMETHasone     Tablet 2 milliGRAM(s) Oral every 12 hours  enoxaparin Injectable 30 milliGRAM(s) SubCutaneous every 24 hours  gabapentin 1000 milliGRAM(s) Oral three times a day  influenza  Vaccine (HIGH DOSE) 0.7 milliLiter(s) IntraMuscular once  levETIRAcetam 500 milliGRAM(s) Oral two times a day  methadone    Tablet 2.5 milliGRAM(s) Oral three times a day  OLANZapine Disintegrating Tablet 5 milliGRAM(s) Oral at bedtime  pantoprazole   Suspension 40 milliGRAM(s) Oral daily  pazopanib 200 milliGRAM(s) Oral daily  polyethylene glycol 3350 17 Gram(s) Oral two times a day  senna 2 Tablet(s) Oral at bedtime    MEDICATIONS  (PRN):  acetaminophen     Tablet .. 650 milliGRAM(s) Oral every 6 hours PRN Mild Pain (1 - 3)  artificial  tears Solution 1 Drop(s) Both EYES every 4 hours PRN Dry Eyes  bisacodyl Oral Tab/Cap - Peds 5 milliGRAM(s) Oral daily PRN Constipation  calcium carbonate    500 mG (Tums) Chewable 1 Tablet(s) Chew every 6 hours PRN Upset Stomach  oxyCODONE    IR 5 milliGRAM(s) Oral every 4 hours PRN Moderate Pain (4 - 6)  oxyCODONE    IR 10 milliGRAM(s) Oral every 4 hours PRN Severe Pain (7 - 10)      Allergies    No Known Allergies    Intolerances          VITALS  67y  Vital Signs Last 24 Hrs  T(C): 36.3 (29 Sep 2022 09:08), Max: 36.5 (28 Sep 2022 21:14)  T(F): 97.4 (29 Sep 2022 09:08), Max: 97.7 (28 Sep 2022 21:14)  HR: 74 (29 Sep 2022 09:08) (69 - 77)  BP: 101/62 (29 Sep 2022 09:08) (100/60 - 101/62)  BP(mean): --  RR: 16 (29 Sep 2022 09:08) (16 - 16)  SpO2: 95% (29 Sep 2022 09:08) (95% - 95%)    Parameters below as of 29 Sep 2022 09:08  Patient On (Oxygen Delivery Method): room air      Daily     Daily Weight in k.5 (29 Sep 2022 04:30)        RECENT LABS:                          11.3   7.84  )-----------( 175      ( 29 Sep 2022 09:17 )             34.1     -    142  |  108  |  26<H>  ----------------------------<  111<H>  3.7   |  28  |  0.58    Ca    8.0<L>      29 Sep 2022 06:30    TPro  5.1<L>  /  Alb  2.1<L>  /  TBili  0.4  /  DBili  x   /  AST  22  /  ALT  56<H>  /  AlkPhos  87  09    LIVER FUNCTIONS - ( 29 Sep 2022 06:30 )  Alb: 2.1 g/dL / Pro: 5.1 g/dL / ALK PHOS: 87 U/L / ALT: 56 U/L / AST: 22 U/L / GGT: x           Review of Systems:   · Additional ROS	Patient seen and examined at bedside with language line  in Mandarin #[id number]. Wife was also present, seen in PT gym    No events overnight, remains lucid and in fine spirits. Denies any B/B complaints, and his pain is well-controlled. He is agreeable with onc recs from earlier in the week.      Also denies any headache, dizziness, or new complaints overall. Enjoying therapy. goals are to continue to feel stronger and walk      Physical Exam:   · Constitutional Comments	AAox3, mood improved, less fatigued and irritable, less perseverative. Incision  C//dI no swelling  · Eyes	PERRL; conjunctiva clear  · Respiratory	clear to auscultation bilaterally; no wheezes; no rales; no rhonchi  · Cardiovascular	regular rate and rhythm; S1 S2 present; no gallops; no rub; no murmur  · Motor	Left ShAB 4/5, EF 4/5, EE 5/5, WE 4/5,  4/5   Right ShAB 4/5, EF 4/5, EE 4/5, WE 4/5,  4/5  Left HF 4/5, KE 4/5, DF 4/5, PF 4/5  Right HF 2/5 , KE 3/5, DF 1/5, PF 1/5  · Sensory	Intact to light touch  No calf tenderness

## 2022-09-29 NOTE — PROGRESS NOTE ADULT - ASSESSMENT
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 (s/p radiation, on chemo) who presented with LUE weakness and possible speech deficit, found to have 4 cm mass in R frontal lobe, now post-resection 9/14/22    # S/p craniotomy for tumor resection, left hemiparesis  - Bx showed high-grade neoplasm, may be c./w sarcoma.   - Heme Dr Harris appreciated, to f/u with Dr Szymanski. Hold on votrient for now, patient aware and agreeable with plan.   - Staples removed 9/26. Cleared to shower  - Continue decadron taper for cerebral edema. On 2 mg q12 9/28; taper goes through 10/22  - Continue keppra 500 mg BID for seizure prophylaxis  - Continue gabapentin 100 mg TID   - Per outside Onc, hold votrient 200 mg daily for chemotherapy   - Oncology consulted, f/up recs   - continue comprehensive rehab program PT/OT/ SLP 3 hours a day 5 days a week    # Leukocytosis  - Downtrending WBC 10.83 9/27->6.99 9/29  - Currently w/o symptoms, afebrile  - If febrile, septic w/up   - CBC, CMP 10/3    #Anemia  - Noted to have Hgb of 9.7 on AM labs, 9/29  - Repeat 9/29 was 11.3   - F/up anemia panel and stool occult     # Mood / Cognition: major depression  - Seen by psychiatry 9/24  - Continue psychology support  - Zyprexa 2.5 mg qHS. Tolerating    # Pain  - Palliative was following for pain management at prior hospital  - methadone 2.5 mg TID  - Tylenol PRN Oxycodone PRN   - gabapentin    # GI/Bowel:  - Senna 2 tabs daily    # DietL  - Diet: downgraded mince and moist 9/21  - MBS 9/23 completed-puree/thins  - BUN/Cr 27/0.46 9/21--> 31/0.49 9/22 -> 23/0.68 9/26 ->26/0.58 9/29  - BMP 10/3    # DVT prophylaxis:  - doppler on 9/21 neg DVT RLE.  - SCDs  - TEDs BLE. If left LE swelling persist, will order doppler    # Case discussed in IDT rounds 9/26:  - Min-mod assist, min assist bathroom transfers, mild cognitive deficits, supervision/CG transfers, ambulates 110 feet with CG/CS RW  - Goals: supervision /set up bADLs, supervision iADLs, supervision transfers and ambulation  - [facility] home 10/5 with caregiver supervision and home care referral, home Pt, OT SLP      # LABS  CBC BMP 10/3  Anemia w/up  Occult    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 (s/p radiation, on chemo) who presented with LUE weakness and possible speech deficit, found to have 4 cm mass in R frontal lobe, now post-resection 9/14/22    # S/p craniotomy for tumor resection, left hemiparesis  - Bx showed high-grade neoplasm, may be c./w sarcoma.   - Heme Dr Harris appreciated, to f/u with Dr Szymanski. Hold on votrient for now, patient aware and agreeable with plan.   - Staples removed 9/26. Cleared to shower  - Continue decadron taper for cerebral edema. On 2 mg q12 9/29; taper goes through 10/22  - Continue keppra 500 mg BID for seizure prophylaxis  - Continue gabapentin 100 mg TID   - Per outside Onc, hold votrient 200 mg daily for chemotherapy   - Oncology consulted, f/up recs   - continue comprehensive rehab program PT/OT/ SLP 3 hours a day 5 days a week    # Leukocytosis  - Downtrending WBC 10.83 9/27->6.99 9/29  - Currently w/o symptoms, afebrile  - If febrile, septic w/up   - CBC, CMP 10/3    #Anemia  - Noted to have Hgb of 9.7 on AM labs, 9/29  - Repeat 9/29 was 11.3   - F/up anemia panel and stool occult     # Mood / Cognition: major depression  - psychiatry follow up appreciated 9/28  - Continue psychology support  - Zyprexa 2.5 mg qHS. Tolerating    # Pain  - Palliative was following for pain management at prior hospital  - methadone 2.5 mg TID  - Tylenol PRN Oxycodone PRN   - gabapentin    # GI/Bowel:  - Senna 2 tabs daily    # DietL  - MBS 9/23 completed-puree/thins  - BUN/Cr 26/0.58 9/29  - BMP 10/3    # DVT prophylaxis:  - doppler on 9/21 neg DVT RLE.  - SCDs  - TEDs BLE. If left LE swelling persist, will order doppler    # Case discussed in IDT rounds 9/26:  - Min-mod assist, min assist bathroom transfers, mild cognitive deficits, supervision/CG transfers, ambulates 110 feet with CG/CS RW  - Goals: supervision /set up bADLs, supervision iADLs, supervision transfers and ambulation  - [facility] home 10/5 with caregiver supervision and home care referral, home Pt, OT SLP      # LABS  CBC BMP 10/3  Anemia w/up  Occult   CBC 9/30

## 2022-09-29 NOTE — PROGRESS NOTE ADULT - ASSESSMENT
Pt alert, attentive, intact language (per ), thought processes circumstantial/somewhat perseverative, no abnormal thought contents noted, affect irritable, mood euthymic, denied AH/VH, denied SI/HI/I/P, calm behavior, still experiencing frustration with incident involving interpreters. Plan: Neuropsychologist remains available.

## 2022-09-29 NOTE — PROGRESS NOTE ADULT - ATTENDING COMMENTS
Progress note amended to include my discussions with patient, resident, hospitalist. Patient seen with Mandarin Language line  #[id number] at 9:45 AM. Patient looks well, less stressed and irritable. Improved sleep, tolerating zyprexa. Psychiatry follow up 9/28 read and appreciated    Reports that pain is controlled. When asked about his goals, he states he wants to work on walking and feeling stronger (points to body). He is grateful for the care he has received here. Denies any B/B issues. No N/V. Has been having slightly downward trending Hgb, but today 10.7--> 9.7. HR and BP stable. Repeat Hgb ordered, 11.3. Stool guaiac and anemia work up ordered as well, CBC in AM< 9/30    Continue current program

## 2022-09-30 NOTE — PROGRESS NOTE ADULT - ASSESSMENT
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 (s/p radiation, on chemo) who presented with LUE weakness and possible speech deficit, found to have 4 cm mass in R frontal lobe, now post-resection 9/14/22    # S/p craniotomy for tumor resection, left hemiparesis  - Bx showed high-grade neoplasm, may be c./w sarcoma.   - Heme Dr Harris appreciated, to f/u with Dr Szymanski. Hold on votrient for now, patient aware and agreeable with plan.   - Staples removed 9/26. Cleared to shower  - Continue decadron taper for cerebral edema. On 2 mg q12 9/29; taper goes through 10/22  - Continue keppra 500 mg BID for seizure prophylaxis  - Continue gabapentin 100 mg TID   - Per outside Onc, hold votrient 200 mg daily for chemotherapy   - Oncology consulted, f/up recs   - continue comprehensive rehab program PT/OT/ SLP 3 hours a day 5 days a week    # Leukocytosis  - Downtrending WBC 10.83 9/27->6.99 9/29  - Currently w/o symptoms, afebrile  - If febrile, septic w/up   - CBC, CMP 10/3    #Anemia, multifactorial   - Noted to have Hgb of 9.7 on AM labs, 9/29  - Repeat 9/29 was 11.3, then 10.6 on 9/30  - Anemia panel: Borderline low TIBC, increased in haptoglobin (326), ferritin (565); RDW elevated 15.3  - F/up stool occult     # Mood / Cognition: major depression  - psychiatry follow up appreciated 9/28  - Continue psychology support  - Zyprexa 2.5 mg qHS. Tolerating    # Pain  - Palliative was following for pain management at prior hospital  - methadone 2.5 mg TID  - Tylenol PRN Oxycodone PRN   - gabapentin    # GI/Bowel:  - Senna 2 tabs daily    # DietL  - MBS 9/23 completed-puree/thins  - BUN/Cr 26/0.58 9/29  - BMP 10/3    # DVT prophylaxis:  - doppler on 9/21 neg DVT RLE.  - SCDs  - TEDs BLE. If left LE swelling persist, will order doppler    # Case discussed in IDT rounds 9/26:  - Min-mod assist, min assist bathroom transfers, mild cognitive deficits, supervision/CG transfers, ambulates 110 feet with CG/CS RW  - Goals: supervision /set up bADLs, supervision iADLs, supervision transfers and ambulation  - [facility] home 10/5 with caregiver supervision and home care referral, home Pt, OT SLP      # LABS  CBC BMP 10/3  Occult    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 (s/p radiation, on chemo) who presented with LUE weakness and possible speech deficit, found to have 4 cm mass in R frontal lobe, now post-resection 9/14/22    # S/p craniotomy for tumor resection, left hemiparesis  - Bx showed high-grade neoplasm, may be c./w sarcoma.   - Head CT 9/15: right frontal craniotomy. The irregular ring enhancing mass in the right frontal lobe has been removed.There is a small amount of postsurgical material fluid and air present. There is no change in vasogenic edema or mass effect on the right lateral ventricle. There is a small postoperative epidural collection with low density. Bifrontal subdural air is identified.  - patient with increased irritability and some plateauing in PT, some paranoid ideation and more stubbornness, refusal. Will repeat Head CT evaluate edema and mass effect  - Heme Dr Harris appreciated, to f/u with Dr Szymanski. Hold on votrient for now, patient aware and agreeable with plan.   - Staples removed 9/26. Cleared to shower  - Continue decadron taper for cerebral edema. On 2 mg q12 9/30; taper goes through 10/22. May need to adjust depending on Head CT results  - Continue keppra 500 mg BID for seizure prophylaxis  - Continue gabapentin 100 mg TID   - Per outside Onc, hold votrient 200 mg daily for chemotherapy   - Oncology consulted, f/up recs   - continue comprehensive rehab program PT/OT/ SLP 3 hours a day 5 days a week    # Leukocytosis  - resolved  -  WBC 6.99 9/29  - If febrile, septic w/up   - CBC, CMP 10/3    # Anemia, multifactorial   - Noted to have Hgb of 9.7 on AM labs, 9/29  - Repeat Hgb 9/29 was 11.3.  10.6 on 9/30 (previously 10.7)  - Anemia panel: Borderline low TIBC, increased in haptoglobin (326), ferritin (565); RDW elevated 15.3  - F/up stool occult     # Mood / Cognition: major depression  - psychiatry follow up appreciated 9/28  - Continue psychology support  - Zyprexa 2.5 mg qHS. Tolerating    # Pain  - Palliative was following for pain management at prior hospital  - methadone 2.5 mg TID  - Tylenol PRN Oxycodone PRN   - gabapentin    # GI/Bowel:  - Senna 2 tabs daily    # DietL  - MBS 9/23 completed-puree/thins  - BUN/Cr 26/0.58 9/29  - BMP 10/3    # DVT prophylaxis:  - doppler on 9/21 neg DVT RLE.  - SCDs  - TEDs BLE. If left LE swelling persist, will order doppler    # Case discussed in IDT rounds 9/26:  - Min-mod assist, min assist bathroom transfers, mild cognitive deficits, supervision/CG transfers, ambulates 110 feet with CG/CS RW  - Goals: supervision /set up bADLs, supervision iADLs, supervision transfers and ambulation  - [facility] home 10/5 with caregiver supervision and home care referral, home Pt, OT SLP      # LABS  CBC BMP 10/3  Occult blood stool  Head CT

## 2022-09-30 NOTE — CHART NOTE - NSCHARTNOTEFT_GEN_A_CORE
Nutrition Follow Up Note  Source: Medical Record [X] Patient [X] Family [ ]         Diet: Regular, Ensure Enlive BID  Pt tolerating diet with fair-good PO intake, eating % of meals per nursing flow sheets. Pt observed during meal rounds - observed with good PO intake of lunch. No digestive issues reported.     Enteral/Parenteral Nutrition: N/A    Current Weight: 127.2 lbs (9/30)  126.1 lbs (9/28)  126.3 lbs (9/24)  130.5 lbs (9/22)  130 lbs (9/20)    Pertinent Medications: MEDICATIONS  (STANDING):  Biotene Dry Mouth Oral Rinse 5 milliLiter(s) Swish and Spit three times a day  chlorhexidine 2% Cloths 1 Application(s) Topical every 12 hours  dexAMETHasone     Tablet 2 milliGRAM(s) Oral every 12 hours  dexAMETHasone     Tablet   Oral   enoxaparin Injectable 30 milliGRAM(s) SubCutaneous every 24 hours  gabapentin 1000 milliGRAM(s) Oral three times a day  influenza  Vaccine (HIGH DOSE) 0.7 milliLiter(s) IntraMuscular once  levETIRAcetam 500 milliGRAM(s) Oral two times a day  methadone    Tablet 2.5 milliGRAM(s) Oral three times a day  OLANZapine Disintegrating Tablet 5 milliGRAM(s) Oral at bedtime  pantoprazole   Suspension 40 milliGRAM(s) Oral daily  pazopanib 200 milliGRAM(s) Oral daily  polyethylene glycol 3350 17 Gram(s) Oral two times a day  senna 2 Tablet(s) Oral at bedtime    MEDICATIONS  (PRN):  acetaminophen     Tablet .. 650 milliGRAM(s) Oral every 6 hours PRN Mild Pain (1 - 3)  artificial  tears Solution 1 Drop(s) Both EYES every 4 hours PRN Dry Eyes  bisacodyl Oral Tab/Cap - Peds 5 milliGRAM(s) Oral daily PRN Constipation  calcium carbonate    500 mG (Tums) Chewable 1 Tablet(s) Chew every 6 hours PRN Upset Stomach  oxyCODONE    IR 5 milliGRAM(s) Oral every 4 hours PRN Moderate Pain (4 - 6)  oxyCODONE    IR 10 milliGRAM(s) Oral every 4 hours PRN Severe Pain (7 - 10)      Pertinent Labs:  09-29 Na142 mmol/L Glu 111 mg/dL<H> K+ 3.7 mmol/L Cr  0.58 mg/dL BUN 26 mg/dL<H> 09-29 Alb 2.1 g/dL<L>        Skin: surgical incision per nursing flow sheets     Edema: No edema per nursing flow sheets     Last BM: on 9/28 Per nursing flowsheets     Estimated Needs:   [X] No Change since Previous Assessment  [ ] Recalculated:     Previous Nutrition Diagnosis:   1. Inadequate oral nutrition  2. Severe acute malnutrition    Nutrition Diagnosis is [X] Ongoing  - addressed with Ensure Enlive BID (provides 700 kcal, 40 g protein if 100% consumed)     New Nutrition Diagnosis: [X] Not Applicable  [ ] Inadequate Protein Energy Intake   [ ] Inadequate Oral Intake   [ ] Excessive Energy Intake   [ ] Increased Nutrient Needs   [ ] Obesity   [ ] Altered GI Function   [ ] Unintended Weight Loss   [ ] Food & Nutrition Related Knowledge Deficit  [ ] Limited Adherence to nutrition related recommendations   [ ] Malnutrition      Interventions:   1. Recommend continuing with current plan of care  2. Encourage PO intake    Monitoring & Evaluation:   [X] Weights   [X] PO Intake   [X] Follow Up (Per Protocol)  [X] Tolerance to Diet Prescription   [X] Other: Labs     RD Remains Available.  Oxana Short RD

## 2022-09-30 NOTE — PROGRESS NOTE ADULT - ASSESSMENT
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 (s/p radiation, on chemo) who presented with LUE weakness and possible speech deficit, found to have 4 cm mass in R frontal lobe, now post-resection 9/14/22, admitted to  rehab for ADL impairment     #R frontal tumor  #ADL impairment  - s/p craniotomy for tumor resection, left hemiparesis  - biopsy showed high grade neoplasm   - Continue decadron taper for cerebral edema  - Continue keppra 500mg BID for seizure prophylaxis  - follow up with oncology as outpt  - comprehensive rehab program    #High grade sarcoma with primary lesion in right hip/pelvic area s/p RT and currently on chemo  #RLE neuropathy   - restart votrient or other chemotherapy as outpt   - gabapentin 1000mg TID  - f/u oncology as outpt    #Leukocytosis   - no signs of active infection. monitor off abx  - resolved     #Anemia suspect anemia of chronic disease  - monitor     #Suicidal ideation statements  - Pt denies any suicidal or homicidal ideation or having any plans  - Pt clarified that SI related statements was due to frustration and pt denies having any active SI thoughts    #Anxiety and depression  - Psych and neuropsych following  - on zyprexa 5mg qHS    #DVT prophylaxis  - lovenox

## 2022-09-30 NOTE — PROGRESS NOTE ADULT - SUBJECTIVE AND OBJECTIVE BOX
Patient is a 67y old  Male who presents with a chief complaint of right frontal lobe mass s/p craniotomy and resection 22 (30 Sep 2022 11:21)      HPI:  Patient is a 66 yo M PMHx rectal CA (, s/p radiation/chemo) and 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 (s/p radiation and now on chemo) who presented to [facility] 22 with 2 week history of LUE weakness and possible speech deficit. CT head showed large 4cm mass in the R frontal lobe with mild associated edema, no midline shift. Patient was admitted under Neurosurgery Dr. Day for surgical planning.     CT CAP  revealed increased size of locally advanced pancreatic tail tumor, progressive pulmonary and pleural metastases. Similar-appearing right iliopsoas. CT T/L spine revealed no pathologic fracture or lytic / destructive lesions involving the thoracic and lumbar spine; S1-3 pathologic fracture no retropulsed bone, similar-appearing right sided epidural metastases in the lumbosacral region. On  patient underwent right craniotomy for tumor resection 22.    Post-op CT 9/15 revealed post-op changes. MRI Brain 9/15 revealed post-op resection of right posterior frontal lesion with small residual enhancement and 0.3cm leftward midline shift. Patient placed on Decadron taper. Now admitted for multidisciplinary rehabilitation program 22.    (20 Sep 2022 14:33)      PAST MEDICAL & SURGICAL HISTORY:  Abdominal tumor      Back pain      Rectal cancer      No significant past surgical history          MEDICATIONS  (STANDING):  Biotene Dry Mouth Oral Rinse 5 milliLiter(s) Swish and Spit three times a day  chlorhexidine 2% Cloths 1 Application(s) Topical every 12 hours  dexAMETHasone     Tablet 2 milliGRAM(s) Oral every 12 hours  dexAMETHasone     Tablet   Oral   enoxaparin Injectable 30 milliGRAM(s) SubCutaneous every 24 hours  gabapentin 1000 milliGRAM(s) Oral three times a day  influenza  Vaccine (HIGH DOSE) 0.7 milliLiter(s) IntraMuscular once  levETIRAcetam 500 milliGRAM(s) Oral two times a day  methadone    Tablet 2.5 milliGRAM(s) Oral three times a day  OLANZapine Disintegrating Tablet 5 milliGRAM(s) Oral at bedtime  pantoprazole   Suspension 40 milliGRAM(s) Oral daily  pazopanib 200 milliGRAM(s) Oral daily  polyethylene glycol 3350 17 Gram(s) Oral two times a day  senna 2 Tablet(s) Oral at bedtime    MEDICATIONS  (PRN):  acetaminophen     Tablet .. 650 milliGRAM(s) Oral every 6 hours PRN Mild Pain (1 - 3)  artificial  tears Solution 1 Drop(s) Both EYES every 4 hours PRN Dry Eyes  bisacodyl Oral Tab/Cap - Peds 5 milliGRAM(s) Oral daily PRN Constipation  calcium carbonate    500 mG (Tums) Chewable 1 Tablet(s) Chew every 6 hours PRN Upset Stomach  oxyCODONE    IR 5 milliGRAM(s) Oral every 4 hours PRN Moderate Pain (4 - 6)  oxyCODONE    IR 10 milliGRAM(s) Oral every 4 hours PRN Severe Pain (7 - 10)      Allergies    No Known Allergies    Intolerances          VITALS  67y  Vital Signs Last 24 Hrs  T(C): 36.5 (30 Sep 2022 08:14), Max: 36.8 (29 Sep 2022 20:43)  T(F): 97.7 (30 Sep 2022 08:14), Max: 98.3 (29 Sep 2022 20:43)  HR: 62 (30 Sep 2022 08:14) (62 - 78)  BP: 101/67 (30 Sep 2022 08:14) (101/67 - 105/64)  BP(mean): --  RR: 16 (30 Sep 2022 08:14) (16 - 17)  SpO2: 98% (30 Sep 2022 08:14) (96% - 98%)    Parameters below as of 30 Sep 2022 08:14  Patient On (Oxygen Delivery Method): room air      Daily     Daily Weight in k.7 (30 Sep 2022 04:19)        RECENT LABS:                          10.6   7.45  )-----------( 169      ( 30 Sep 2022 06:00 )             32.0     09-29    142  |  108  |  26<H>  ----------------------------<  111<H>  3.7   |  28  |  0.58    Ca    8.0<L>      29 Sep 2022 06:30    TPro  5.1<L>  /  Alb  2.1<L>  /  TBili  0.4  /  DBili  x   /  AST  22  /  ALT  56<H>  /  AlkPhos  87  09    LIVER FUNCTIONS - ( 29 Sep 2022 06:30 )  Alb: 2.1 g/dL / Pro: 5.1 g/dL / ALK PHOS: 87 U/L / ALT: 56 U/L / AST: 22 U/L / GGT: x           Review of Systems:   · Additional ROS	Patient seen and examined at bedside with language line  in Mandarin. Wife was also present.     No events overnight, and although he seemed a bit agitated this morning, he stated that he feels fine and has no new complaints. Denies any headache, dizziness, or new complaints overall. Participating in and enjoying therapy.     Goals are to continue to feel stronger and walk      Physical Exam:   · Constitutional Comments	AAox3, mood improved, less fatigued and irritable, less perseverative. Incision  C//dI no swelling  · Eyes	PERRL; conjunctiva clear  · Respiratory	clear to auscultation bilaterally; no wheezes; no rales; no rhonchi  · Cardiovascular	regular rate and rhythm; S1 S2 present; no gallops; no rub; no murmur  · Motor	Left ShAB 4/5, EF 4/5, EE 5/5, WE 4/5,  4/5   Right ShAB 4/5, EF 4/5, EE 4/5, WE 4/5,  4/5  Left HF 4/5, KE 4/5, DF 4/5, PF 4/5  Right HF 2/5 , KE 3/5, DF 1/5, PF 1/5  · Sensory	Intact to light touch  No calf tenderness                 Patient is a 67y old  Male who presents with a chief complaint of right frontal lobe mass s/p craniotomy and resection 22 (30 Sep 2022 11:21)      HPI:  Patient is a 68 yo M PMHx rectal CA (, s/p radiation/chemo) and 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 (s/p radiation and now on chemo) who presented to [facility] 22 with 2 week history of LUE weakness and possible speech deficit. CT head showed large 4cm mass in the R frontal lobe with mild associated edema, no midline shift. Patient was admitted under Neurosurgery Dr. Day for surgical planning.     CT CAP  revealed increased size of locally advanced pancreatic tail tumor, progressive pulmonary and pleural metastases. Similar-appearing right iliopsoas. CT T/L spine revealed no pathologic fracture or lytic / destructive lesions involving the thoracic and lumbar spine; S1-3 pathologic fracture no retropulsed bone, similar-appearing right sided epidural metastases in the lumbosacral region. On  patient underwent right craniotomy for tumor resection 22.    Post-op CT 9/15 revealed post-op changes. MRI Brain 9/15 revealed post-op resection of right posterior frontal lesion with small residual enhancement and 0.3cm leftward midline shift. Patient placed on Decadron taper. Now admitted for multidisciplinary rehabilitation program 22.    (20 Sep 2022 14:33)      PAST MEDICAL & SURGICAL HISTORY:  Abdominal tumor      Back pain      Rectal cancer      No significant past surgical history          MEDICATIONS  (STANDING):  Biotene Dry Mouth Oral Rinse 5 milliLiter(s) Swish and Spit three times a day  chlorhexidine 2% Cloths 1 Application(s) Topical every 12 hours  dexAMETHasone     Tablet 2 milliGRAM(s) Oral every 12 hours  dexAMETHasone     Tablet   Oral   enoxaparin Injectable 30 milliGRAM(s) SubCutaneous every 24 hours  gabapentin 1000 milliGRAM(s) Oral three times a day  influenza  Vaccine (HIGH DOSE) 0.7 milliLiter(s) IntraMuscular once  levETIRAcetam 500 milliGRAM(s) Oral two times a day  methadone    Tablet 2.5 milliGRAM(s) Oral three times a day  OLANZapine Disintegrating Tablet 5 milliGRAM(s) Oral at bedtime  pantoprazole   Suspension 40 milliGRAM(s) Oral daily  pazopanib 200 milliGRAM(s) Oral daily  polyethylene glycol 3350 17 Gram(s) Oral two times a day  senna 2 Tablet(s) Oral at bedtime    MEDICATIONS  (PRN):  acetaminophen     Tablet .. 650 milliGRAM(s) Oral every 6 hours PRN Mild Pain (1 - 3)  artificial  tears Solution 1 Drop(s) Both EYES every 4 hours PRN Dry Eyes  bisacodyl Oral Tab/Cap - Peds 5 milliGRAM(s) Oral daily PRN Constipation  calcium carbonate    500 mG (Tums) Chewable 1 Tablet(s) Chew every 6 hours PRN Upset Stomach  oxyCODONE    IR 5 milliGRAM(s) Oral every 4 hours PRN Moderate Pain (4 - 6)  oxyCODONE    IR 10 milliGRAM(s) Oral every 4 hours PRN Severe Pain (7 - 10)      Allergies    No Known Allergies    Intolerances          VITALS  67y  Vital Signs Last 24 Hrs  T(C): 36.5 (30 Sep 2022 08:14), Max: 36.8 (29 Sep 2022 20:43)  T(F): 97.7 (30 Sep 2022 08:14), Max: 98.3 (29 Sep 2022 20:43)  HR: 62 (30 Sep 2022 08:14) (62 - 78)  BP: 101/67 (30 Sep 2022 08:14) (101/67 - 105/64)  BP(mean): --  RR: 16 (30 Sep 2022 08:14) (16 - 17)  SpO2: 98% (30 Sep 2022 08:14) (96% - 98%)    Parameters below as of 30 Sep 2022 08:14  Patient On (Oxygen Delivery Method): room air      Daily     Daily Weight in k.7 (30 Sep 2022 04:19)        RECENT LABS:                          10.6   7.45  )-----------( 169      ( 30 Sep 2022 06:00 )             32.0     09-29    142  |  108  |  26<H>  ----------------------------<  111<H>  3.7   |  28  |  0.58    Ca    8.0<L>      29 Sep 2022 06:30    TPro  5.1<L>  /  Alb  2.1<L>  /  TBili  0.4  /  DBili  x   /  AST  22  /  ALT  56<H>  /  AlkPhos  87  09    LIVER FUNCTIONS - ( 29 Sep 2022 06:30 )  Alb: 2.1 g/dL / Pro: 5.1 g/dL / ALK PHOS: 87 U/L / ALT: 56 U/L / AST: 22 U/L / GGT: x           Review of Systems:   · Additional ROS	Patient seen and examined at bedside with language line  in Mandarin (seen with OT via their line at 10:25 AM). Wife was also present.     No events overnight, and although he seemed a bit agitated this morning, he stated that he feels fine and has no new complaints. Denies any headache, dizziness, or new complaints overall. Participating in and enjoying therapy. Ot recommending training of wife who has been seen to attempt transfers to toilet with patient due to safety concerns, however patient is reluctant/later becomes irritable at suggestion    Goals are to continue to feel stronger and walk      Physical Exam:   · Constitutional Comments	AAox3, mood improved, less fatigued and irritable, less perseverative. Incision  C//dI no swelling  · Eyes	PERRL; conjunctiva clear  · Respiratory	clear to auscultation bilaterally; no wheezes; no rales; no rhonchi  · Cardiovascular	regular rate and rhythm; S1 S2 present; no gallops; no rub; no murmur  · Motor	Left ShAB 4/5, EF 4/5, EE 5/5, WE 4/5,  4/5   Right ShAB 4/5, EF 4/5, EE 4/5, WE 4/5,  4/5  Left HF 4/5, KE 4/5, DF 4/5, PF 4/5  Right HF 2/5 , KE 3/5, DF 1/5, PF 1/5  · Sensory	Intact to light touch  No calf tenderness

## 2022-09-30 NOTE — PROGRESS NOTE ADULT - ATTENDING COMMENTS
Progress note amended to include my discussions with patient, resident, hospitalist, RN, SW, PT and my findings    Patient seen during OT with assistance Mandarin language line  at 10: 25 AM. Patient was alert, gave thumbs up sign and denies any pain or issues "good". However, has not made significant improvement in last several days in therapy per PT/plateauing and also becoming more irritable, resistance, with continued persecutory thoughts/some paranoid ideation about people wanting to push him out of rehab. He also has certain staff he gets along with more than others, and was seen arguing with wife and not allowing the  to speak with the wife. Issues of safety in the wife performing transfers to the toilet discussed, with OT recommending caregiver training for safe technique before clearance, however patient refused.    Has been on dexamethasone 2 q12 as part of slow taper. Last Head CT 9/15 with persistent vasogenic edema. Will repeat Head CT today given behavior this week and slowing of progress. Pain has appeared well controlled, afebrile, labs generally stable although anemic (fluctuating Hgb but within range). Stool guaiac pending.     continue program.

## 2022-09-30 NOTE — PROGRESS NOTE ADULT - SUBJECTIVE AND OBJECTIVE BOX
Patient is a 67y old  Male who presents with a chief complaint of Right frontal lobe mass s/p craniotomy and resection 9/14/22 (29 Sep 2022 12:03)      Subjective and overnight events:  Patient seen and examined at bedside. no complaints. no fever, chills, sob, cp, abd pain, n/v/d.    ALLERGIES:  No Known Allergies    MEDICATIONS  (STANDING):  Biotene Dry Mouth Oral Rinse 5 milliLiter(s) Swish and Spit three times a day  chlorhexidine 2% Cloths 1 Application(s) Topical every 12 hours  dexAMETHasone     Tablet 2 milliGRAM(s) Oral every 12 hours  dexAMETHasone     Tablet   Oral   enoxaparin Injectable 30 milliGRAM(s) SubCutaneous every 24 hours  gabapentin 1000 milliGRAM(s) Oral three times a day  influenza  Vaccine (HIGH DOSE) 0.7 milliLiter(s) IntraMuscular once  levETIRAcetam 500 milliGRAM(s) Oral two times a day  methadone    Tablet 2.5 milliGRAM(s) Oral three times a day  OLANZapine Disintegrating Tablet 5 milliGRAM(s) Oral at bedtime  pantoprazole   Suspension 40 milliGRAM(s) Oral daily  pazopanib 200 milliGRAM(s) Oral daily  polyethylene glycol 3350 17 Gram(s) Oral two times a day  senna 2 Tablet(s) Oral at bedtime    MEDICATIONS  (PRN):  acetaminophen     Tablet .. 650 milliGRAM(s) Oral every 6 hours PRN Mild Pain (1 - 3)  artificial  tears Solution 1 Drop(s) Both EYES every 4 hours PRN Dry Eyes  bisacodyl Oral Tab/Cap - Peds 5 milliGRAM(s) Oral daily PRN Constipation  calcium carbonate    500 mG (Tums) Chewable 1 Tablet(s) Chew every 6 hours PRN Upset Stomach  oxyCODONE    IR 5 milliGRAM(s) Oral every 4 hours PRN Moderate Pain (4 - 6)  oxyCODONE    IR 10 milliGRAM(s) Oral every 4 hours PRN Severe Pain (7 - 10)    Vital Signs Last 24 Hrs  T(F): 97.7 (30 Sep 2022 08:14), Max: 98.3 (29 Sep 2022 20:43)  HR: 62 (30 Sep 2022 08:14) (62 - 78)  BP: 101/67 (30 Sep 2022 08:14) (101/67 - 105/64)  RR: 16 (30 Sep 2022 08:14) (16 - 17)  SpO2: 98% (30 Sep 2022 08:14) (96% - 98%)  I&O's Summary    PHYSICAL EXAM:  General: NAD, Awake alert and answering questions appropriately   ENT: MMM  Neck: Supple, No JVD  Lungs: Clear to auscultation bilaterally  Cardio: RRR, S1/S2, No murmurs  Abdomen: Soft, Nontender, Nondistended; Bowel sounds present  Extremities: No calf tenderness, RLE mild erythema (chronic).     LABS:                        10.6   7.45  )-----------( 169      ( 30 Sep 2022 06:00 )             32.0     09-29    142  |  108  |  26  ----------------------------<  111  3.7   |  28  |  0.58    Ca    8.0      29 Sep 2022 06:30    TPro  5.1  /  Alb  2.1  /  TBili  0.4  /  DBili  x   /  AST  22  /  ALT  56  /  AlkPhos  87  09-2        COVID-19 PCR: NotDetec (09-27-22 @ 06:40)  COVID-19 PCR: NotDetec (09-23-22 @ 06:30)  COVID-19 PCR: NotDetec (09-19-22 @ 17:45)  COVID-19 PCR: NotDetec (09-12-22 @ 13:09)      RADIOLOGY & ADDITIONAL TESTS:    Care Discussed with Consultants/Other Providers: rehab team during IDR round

## 2022-10-01 NOTE — PROGRESS NOTE ADULT - SUBJECTIVE AND OBJECTIVE BOX
Hospitalist: Octavia Alexander DO    CHIEF COMPLAINT: Patient is a 67y old  male who presents with a chief complaint of right frontal lobe mass s/p craniotomy and resection 9/14/22 (30 Sep 2022 11:42)      SUBJECTIVE / OVERNIGHT EVENTS: Patient seen and examined. No acute events overnight. Pain well controlled and patient without any complaints.    MEDICATIONS  (STANDING):  Biotene Dry Mouth Oral Rinse 5 milliLiter(s) Swish and Spit three times a day  chlorhexidine 2% Cloths 1 Application(s) Topical every 12 hours  dexAMETHasone     Tablet 2 milliGRAM(s) Oral every 12 hours  dexAMETHasone     Tablet   Oral   enoxaparin Injectable 30 milliGRAM(s) SubCutaneous every 24 hours  gabapentin 1000 milliGRAM(s) Oral three times a day  influenza  Vaccine (HIGH DOSE) 0.7 milliLiter(s) IntraMuscular once  levETIRAcetam 500 milliGRAM(s) Oral two times a day  methadone    Tablet 2.5 milliGRAM(s) Oral three times a day  OLANZapine Disintegrating Tablet 5 milliGRAM(s) Oral at bedtime  pantoprazole   Suspension 40 milliGRAM(s) Oral daily  pazopanib 200 milliGRAM(s) Oral daily  polyethylene glycol 3350 17 Gram(s) Oral two times a day  senna 2 Tablet(s) Oral at bedtime    MEDICATIONS  (PRN):  acetaminophen     Tablet .. 650 milliGRAM(s) Oral every 6 hours PRN Mild Pain (1 - 3)  artificial  tears Solution 1 Drop(s) Both EYES every 4 hours PRN Dry Eyes  bisacodyl Oral Tab/Cap - Peds 5 milliGRAM(s) Oral daily PRN Constipation  calcium carbonate    500 mG (Tums) Chewable 1 Tablet(s) Chew every 6 hours PRN Upset Stomach  oxyCODONE    IR 5 milliGRAM(s) Oral every 4 hours PRN Moderate Pain (4 - 6)  oxyCODONE    IR 10 milliGRAM(s) Oral every 4 hours PRN Severe Pain (7 - 10)      VITALS:  T(F): 97.4 (10-01-22 @ 09:43), Max: 97.6 (09-30-22 @ 20:35)  HR: 72 (10-01-22 @ 09:43) (72 - 72)  BP: 101/65 (10-01-22 @ 09:43) (100/67 - 101/65)  RR: 16 (10-01-22 @ 09:43) (16 - 16)  SpO2: 97% (10-01-22 @ 09:43)      REVIEW OF SYSTEMS:  For ROV please refer back to H&P     PHYSICAL EXAM:  General: NAD, Awake alert and answering questions appropriately   ENT: MMM  Neck: Supple, No JVD  Lungs: Clear to auscultation bilaterally  Cardio: RRR, S1/S2, No murmurs  Abdomen: Soft, Nontender, Nondistended; Bowel sounds present  Extremities: No calf tenderness, RLE mild erythema (chronic).       LABS:              10.6                 x    | x    | x            7.45  >-----------< 169     ------------------------< x                     32.0                 x    | x    | x                                            Ca x     Mg x     Ph x          [X] Consultant(s) Notes Reviewed:  [X] Care Discussed with Consultants/Other Providers:

## 2022-10-01 NOTE — PROGRESS NOTE ADULT - SUBJECTIVE AND OBJECTIVE BOX
Chief complaint: no new complaints, comfortable     Patient is a 67y old  Male who presents with a chief complaint of right frontal lobe mass s/p craniotomy and resection 9/14/22 (01 Oct 2022 13:11)    PAST MEDICAL & SURGICAL HISTORY:  Abdominal tumor      Back pain      Rectal cancer      No significant past surgical history        VITALS  Vital Signs Last 24 Hrs  T(C): 36.3 (01 Oct 2022 09:43), Max: 36.4 (30 Sep 2022 20:35)  T(F): 97.4 (01 Oct 2022 09:43), Max: 97.6 (30 Sep 2022 20:35)  HR: 72 (01 Oct 2022 09:43) (72 - 72)  BP: 101/65 (01 Oct 2022 09:43) (100/67 - 101/65)  BP(mean): --  RR: 16 (01 Oct 2022 09:43) (16 - 16)  SpO2: 97% (01 Oct 2022 09:43) (97% - 97%)    Parameters below as of 01 Oct 2022 09:43  Patient On (Oxygen Delivery Method): room air          PHYSICAL EXAM  Constitutional - NAD, Comfortable  HEENT - NCAT, EOMI  Neck - Supple, No limited ROM  Chest - CTA bilaterally  Cardiovascular - RRR, S1S2  Abdomen - Soft, NTND  Extremities -  No calf tenderness   Neurologic Exam -                    Cognitive - Awake, Alert,     No new focal deficits              RECENT LABS                        10.6   7.45  )-----------( 169      ( 30 Sep 2022 06:00 )             32.0           CURRENT MEDICATIONS    MEDICATIONS  (STANDING):  Biotene Dry Mouth Oral Rinse 5 milliLiter(s) Swish and Spit three times a day  chlorhexidine 2% Cloths 1 Application(s) Topical every 12 hours  dexAMETHasone     Tablet 2 milliGRAM(s) Oral every 12 hours  dexAMETHasone     Tablet   Oral   enoxaparin Injectable 30 milliGRAM(s) SubCutaneous every 24 hours  gabapentin 1000 milliGRAM(s) Oral three times a day  influenza  Vaccine (HIGH DOSE) 0.7 milliLiter(s) IntraMuscular once  levETIRAcetam 500 milliGRAM(s) Oral two times a day  methadone    Tablet 2.5 milliGRAM(s) Oral three times a day  OLANZapine Disintegrating Tablet 5 milliGRAM(s) Oral at bedtime  pantoprazole   Suspension 40 milliGRAM(s) Oral daily  pazopanib 200 milliGRAM(s) Oral daily  polyethylene glycol 3350 17 Gram(s) Oral two times a day  senna 2 Tablet(s) Oral at bedtime    MEDICATIONS  (PRN):  acetaminophen     Tablet .. 650 milliGRAM(s) Oral every 6 hours PRN Mild Pain (1 - 3)  artificial  tears Solution 1 Drop(s) Both EYES every 4 hours PRN Dry Eyes  bisacodyl Oral Tab/Cap - Peds 5 milliGRAM(s) Oral daily PRN Constipation  calcium carbonate    500 mG (Tums) Chewable 1 Tablet(s) Chew every 6 hours PRN Upset Stomach  oxyCODONE    IR 5 milliGRAM(s) Oral every 4 hours PRN Moderate Pain (4 - 6)  oxyCODONE    IR 10 milliGRAM(s) Oral every 4 hours PRN Severe Pain (7 - 10)    ASSESSMENT & PLAN          GI/Bowel Management - Dulcolax PRN, Fleet PRN   Management - Toilet Q2  Skin - Turn Q2  Pain - Tylenol PRN  DVT PPX - Lovenox  Monitor stool Guaiacs       Continue comprehensive acute rehab program consisting of 3hrs/day of OT/PT and SLP.

## 2022-10-02 NOTE — PROGRESS NOTE ADULT - ASSESSMENT
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 (s/p radiation, on chemo) who presented with LUE weakness and possible speech deficit, found to have 4 cm mass in R frontal lobe, now post-resection 9/14/22, admitted to  rehab for ADL impairment     Neurology  R frontal tumor/ADL impairment  - s/p craniotomy for tumor resection, left hemiparesis  - biopsy showed high grade neoplasm   - Continue decadron taper for cerebral edema  - Continue keppra 500mg BID for seizure prophylaxis  - follow up with oncology as outpt  - comprehensive rehab program    MSK/neuropathy  High grade sarcoma with primary lesion in right hip/pelvic area s/p RT and currently on chemo  RLE neuropathy   - restart votrient or other chemotherapy as outpt   - gabapentin 1000mg TID  - f/u oncology as outpt    Hematology  Leukocytosis   - no signs of active infection. monitor off abx  - resolved   Anemia suspect anemia of chronic disease  - monitor     Psych  Suicidal ideation statements  - Pt denies any suicidal or homicidal ideation or having any plans  - Pt clarified that SI related statements was due to frustration and pt denies having any active SI thoughts  Anxiety and depression  - Psych and neuropsych following  - on zyprexa 5mg qHS    DVT prophylaxis: lovenox

## 2022-10-02 NOTE — PROGRESS NOTE ADULT - SUBJECTIVE AND OBJECTIVE BOX
Chief complaint: no new complaints     Patient is a 67y old  Male who presents with a chief complaint of right frontal lobe mass s/p craniotomy and resection 9/14/22 (02 Oct 2022 09:23)  PAST MEDICAL & SURGICAL HISTORY:  Abdominal tumor      Back pain      Rectal cancer      No significant past surgical history          VITALS  Vital Signs Last 24 Hrs  T(C): 36.7 (02 Oct 2022 09:49), Max: 36.7 (01 Oct 2022 19:57)  T(F): 98.1 (02 Oct 2022 09:49), Max: 98.1 (02 Oct 2022 09:49)  HR: 68 (02 Oct 2022 09:49) (68 - 78)  BP: 102/65 (02 Oct 2022 09:49) (102/65 - 111/69)  BP(mean): --  RR: 16 (02 Oct 2022 09:49) (16 - 16)  SpO2: 95% (02 Oct 2022 09:49) (95% - 95%)    Parameters below as of 02 Oct 2022 09:49  Patient On (Oxygen Delivery Method): room air          PHYSICAL EXAM  Constitutional - NAD, Comfortable  HEENT - NCAT, EOMI  Neck - Supple, No limited ROM  Chest - CTA bilaterally  Cardiovascular - RRR, S1S2  Abdomen - Soft, NTND  Extremities - No C/C/E, No calf tenderness   Neurologic Exam -                    Cognitive - Awake, Alert     No new focal deficits       CURRENT MEDICATIONS    MEDICATIONS  (STANDING):  Biotene Dry Mouth Oral Rinse 5 milliLiter(s) Swish and Spit three times a day  chlorhexidine 2% Cloths 1 Application(s) Topical every 12 hours  dexAMETHasone     Tablet 2 milliGRAM(s) Oral daily  dexAMETHasone     Tablet   Oral   enoxaparin Injectable 30 milliGRAM(s) SubCutaneous every 24 hours  gabapentin 1000 milliGRAM(s) Oral three times a day  influenza  Vaccine (HIGH DOSE) 0.7 milliLiter(s) IntraMuscular once  levETIRAcetam 500 milliGRAM(s) Oral two times a day  methadone    Tablet 2.5 milliGRAM(s) Oral three times a day  OLANZapine Disintegrating Tablet 5 milliGRAM(s) Oral at bedtime  pantoprazole   Suspension 40 milliGRAM(s) Oral daily  pazopanib 200 milliGRAM(s) Oral daily  polyethylene glycol 3350 17 Gram(s) Oral two times a day  senna 2 Tablet(s) Oral at bedtime    MEDICATIONS  (PRN):  acetaminophen     Tablet .. 650 milliGRAM(s) Oral every 6 hours PRN Mild Pain (1 - 3)  artificial  tears Solution 1 Drop(s) Both EYES every 4 hours PRN Dry Eyes  bisacodyl Oral Tab/Cap - Peds 5 milliGRAM(s) Oral daily PRN Constipation  calcium carbonate    500 mG (Tums) Chewable 1 Tablet(s) Chew every 6 hours PRN Upset Stomach  oxyCODONE    IR 5 milliGRAM(s) Oral every 4 hours PRN Moderate Pain (4 - 6)  oxyCODONE    IR 10 milliGRAM(s) Oral every 4 hours PRN Severe Pain (7 - 10)    ASSESSMENT & PLAN          GI/Bowel Management - Dulcolax PRN, Fleet PRN   Management - Toilet Q2  Skin - Turn Q2  Pain - Tylenol PRN  DVT PPX - Lovenox       Continue comprehensive acute rehab program consisting of 3hrs/day of OT/PT and SLP.

## 2022-10-03 NOTE — DISCHARGE NOTE PROVIDER - CARE PROVIDER_API CALL
Alvaro Day)  Neurosurgery  General  [address], [address]  [place] [address]  Phone: [phone]  Fax: [phone]  Follow Up Time: 2 weeks    Sally Mosqueda  Physical Medicine and Rehabilitation  [address] [place] [address]  Phone: [phone]  Fax: [phone]  Follow Up Time: 1 month

## 2022-10-03 NOTE — DISCHARGE NOTE PROVIDER - NSDCFUSCHEDAPPT_GEN_ALL_CORE_FT
Nghia Moon  Perrymanteresa Physician [facility]  [facility]  Scheduled Appointment: 10/10/2022    Cora Cabral  Perrymanteresa Physician [facility]  GERIATRICS [address] [place] [address]   Scheduled Appointment: 10/12/2022

## 2022-10-03 NOTE — DISCHARGE NOTE PROVIDER - NSDCMRMEDTOKEN_GEN_ALL_CORE_FT
acetaminophen 325 mg oral tablet: 2 tab(s) orally every 6 hours, As needed, Mild Pain (1 - 3)  bisacodyl 5 mg oral delayed release tablet: 1 tab(s) orally once a day, As needed, Constipation  calcium carbonate 500 mg (200 mg elemental calcium) oral tablet, chewable: 1 tab(s) orally every 6 hours, As needed, Upset Stomach  dexamethasone 2 mg oral tablet: 1 tab(s) orally once a day   gabapentin 100 mg oral capsule: 10 cap(s) orally 3 times a day  levETIRAcetam 500 mg oral tablet: 1 tab(s) orally 2 times a day  methadone 5 mg oral tablet: 0.5 tab(s) orally every 8 hours MDD:7.5 mg  ocular lubricant ophthalmic solution: 1 drop(s) to each affected eye every 4 hours, As needed, Dry Eyes  OLANZapine 5 mg oral tablet, disintegratin tab(s) orally once a day (at bedtime)  oxyCODONE 5 mg oral tablet: 1 tab(s) orally every 6 hours, As Needed -severe pain (7-10) MDD:20 mg  pantoprazole 40 mg oral delayed release tablet: 1 tab(s) orally once a day  saliva substitutes oral solution: 5 milliliter(s) swish and spit, three times a day  senna leaf extract oral tablet: 2 tab(s) orally once a day (at bedtime)

## 2022-10-03 NOTE — PROGRESS NOTE ADULT - REASON FOR ADMISSION
Right frontal lobe mass s/p craniotomy and resection 9/14/22

## 2022-10-03 NOTE — DISCHARGE NOTE PROVIDER - NSDCCAREPROVSEEN_GEN_ALL_CORE_FT
Sudha, Constanza Mosqueda, Sally Harris, Jad Espinoza, Kassandra Dawkins, Celeste Vazquez-Casals, Justin JI

## 2022-10-03 NOTE — DISCHARGE NOTE PROVIDER - NSDCCPCAREPLAN_GEN_ALL_CORE_FT
PRINCIPAL DISCHARGE DIAGNOSIS  Diagnosis: Metastatic sarcoma  Assessment and Plan of Treatment: S/p craniectomy and resection      SECONDARY DISCHARGE DIAGNOSES  Diagnosis: Chronic venous insufficiency  Assessment and Plan of Treatment:     Diagnosis: Anemia of chronic disease  Assessment and Plan of Treatment:

## 2022-10-03 NOTE — DISCHARGE NOTE PROVIDER - DETAILS OF MALNUTRITION DIAGNOSIS/DIAGNOSES
This patient has been assessed with a concern for Malnutrition and was treated during this hospitalization for the following Nutrition diagnosis/diagnoses:     -  09/25/2022: Severe protein-calorie malnutrition

## 2022-10-03 NOTE — DISCHARGE NOTE PROVIDER - HOSPITAL COURSE
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 CT head showed large 4cm mass in the R frontal lobe with mild associated edema, no midline shift. Patient was admitted under Neurosurgery Dr. Day for surgical planning.     CT CAP 9/12 revealed increased size of locally advanced pancreatic tail tumor, progressive pulmonary and pleural metastases. Similar-appearing right iliopsoas. CT T/L spine revealed no pathologic fracture or lytic / destructive lesions involving the thoracic and lumbar spine; S1-3 pathologic fracture no retropulsed bone, similar-appearing right sided epidural metastases in the lumbosacral region. On 9/14 patient underwent right craniotomy for tumor resection 9/14/22.    Post-op CT 9/15 revealed post-op changes. MRI Brain 9/15 revealed post-op resection of right posterior frontal lesion with small residual enhancement and 0.3cm leftward midline shift. Patient placed on Decadron taper. Was then admitted for multidisciplinary rehabilitation program 9/20/22 to [facility]. His hospital course at [facility] was mostly unremarkable, he was noted to the  have anemia but his serologic workup was negative, and he patient was able to tolerate physical and occupational therapy throughout his admission, and is being discharged on 10/3/22. 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 CT head showed large 4cm mass in the R frontal lobe with mild associated edema, no midline shift. Patient was admitted under Neurosurgery Dr. Day for surgical planning.     CT CAP 9/12 revealed increased size of locally advanced pancreatic tail tumor, progressive pulmonary and pleural metastases. Similar-appearing right iliopsoas. CT T/L spine revealed no pathologic fracture or lytic / destructive lesions involving the thoracic and lumbar spine; S1-3 pathologic fracture no retropulsed bone, similar-appearing right sided epidural metastases in the lumbosacral region. On 9/14 patient underwent right craniotomy for tumor resection 9/14/22.    Post-op CT 9/15 revealed post-op changes. MRI Brain 9/15 revealed post-op resection of right posterior frontal lesion with small residual enhancement and 0.3cm leftward midline shift. Patient placed on Decadron taper. Was then admitted for multidisciplinary rehabilitation program 9/20/22 to [facility].     His hospital course at [facility] was mostly unremarkable, he was noted to the  have anemia but his serologic workup was negative, and he patient was able to tolerate physical and occupational therapy throughout his admission. he had periods of irritability and agitation as well as poor sleep, seen by psychiatry and neuropsychology. Zyprexa was initiated at bedtime with some improvement in symptoms, less paranoid ideation. Repeat Head CT also performed 10/2, which showed resolving edema in the right temporal posterior frontal  region. Residual lucency identified subjacent to the craniotomy  consistent with evolving encephalomalacia at the operative site. Decreased extra-axial air with residual right subdural fluid collection remaining subjacent to the craniotomy and extending to the right frontal region. Soft tissue lesion subtly identified crossing the midline consistent with a falx meningioma better appreciated on the prior MR.     Patient was originally scheduled to dc home 10/5 at [facility] level but requested earlier discharge. He is currently CG for transfers, ambulation with RW, and baDLs. Wife has been present throughout hospital stay and during therapy sessions, and patient and family agreeable to earlier dc at  level with home care referral for PT, OT SLP. He and is being discharged on 10/3/22. f/u oncology, NSGY, PMR and pain management

## 2022-10-03 NOTE — DISCHARGE NOTE NURSING/CASE MANAGEMENT/SOCIAL WORK - PATIENT PORTAL LINK FT
You can access the FollowMyHealth Patient Portal offered by [facility] by registering at the following website: [email or web address]. By joining [employer]’s FollowMyHealth portal, you will also be able to view your health information using other applications (apps) compatible with our system.

## 2022-10-03 NOTE — DISCHARGE NOTE PROVIDER - CARE PROVIDERS DIRECT ADDRESSES
[email or web address]

## 2022-10-03 NOTE — DISCHARGE NOTE PROVIDER - PROVIDER TOKENS
PROVIDER:[TOKEN:[[id number]],FOLLOWUP:[2 weeks]],PROVIDER:[TOKEN:[[id number]],FOLLOWUP:[1 month]]

## 2022-10-03 NOTE — PROGRESS NOTE ADULT - ASSESSMENT
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 (s/p radiation, on chemo) who presented with LUE weakness and possible speech deficit, found to have 4 cm mass in R frontal lobe, now post-resection 9/14/22    # S/p craniotomy for tumor resection, left hemiparesis  - Bx showed high-grade neoplasm, may be c./w sarcoma.   - Head CT 9/15: right frontal craniotomy. The irregular ring enhancing mass in the right frontal lobe has been removed.There is a small amount of postsurgical material fluid and air present. There is no change in vasogenic edema or mass effect on the right lateral ventricle. There is a small postoperative epidural collection with low density. Bifrontal subdural air is identified.  - CT Head 10/2:  Resolving edema in the right temporal posterior frontal  region. Residual lucency identified subjacent to the craniotomy  consistent with evolving encephalomalacia at the operative site. Decreased extra-axial air with residual right subdural fluid collection remaining subjacent to the craniotomy and extending to the right frontal region. Soft tissue lesion subtly identified crossing the midline consistent with a falx meningioma better appreciated on the prior MR 9/15/2022  - Patient continues to appear irritated; unclear if this truly has to do with therapies or frustration out of acute clinical realm.   - Heme Dr Harris appreciated, to f/u with Dr Szymanski. Hold on votrient for now, patient aware and agreeable with plan.   - Staples removed 9/26. Cleared to shower  - Continue decadron taper for cerebral edema. On 2 mg q12 9/30; taper goes through 10/22. May need to adjust depending on Head CT results  - Continue keppra 500 mg BID for seizure prophylaxis  - Continue gabapentin 100 mg TID   - Per outside Onc, hold votrient 200 mg daily for chemotherapy   - Oncology consulted, f/up recs   - continue comprehensive rehab program PT/OT/ SLP 3 hours a day 5 days a week    # Leukocytosis  - resolved  - WBC 6.99 9/29 -> 6.43 10/3  - If febrile, septic w/up    # Anemia, multifactorial   - Noted to have Hgb of 9.7 on AM labs, 9/29  - Repeat Hgb 9/29 was 11.3.  10.6 on 9/30 -> 10.7 on 10/3  - Occult negative   - f/up CBC 10/6   - Anemia panel: Borderline low TIBC, increased in haptoglobin (326), ferritin (565); RDW elevated 15.3    # Mood / Cognition: major depression  - psychiatry follow up appreciated 9/28  - Continue psychology support  - Zyprexa 2.5 mg qHS. Tolerating    # Pain  - Palliative was following for pain management at prior hospital  - methadone 2.5 mg TID  - Tylenol PRN Oxycodone PRN   - gabapentin    # GI/Bowel:  - Senna 2 tabs daily    # Diet  - MBS 9/23 completed-puree/thins  - BUN/Cr 26/0.58 9/29 -> 30/0.71 10/3   - BMP 10/6    # DVT prophylaxis:  - doppler on 9/21 neg DVT RLE.  - SCDs  - TEDs BLE. If left LE swelling persist, will order doppler    # Case discussed in IDT rounds 9/26:  - Min-mod assist, min assist bathroom transfers, mild cognitive deficits, supervision/CG transfers, ambulates 110 feet with CG/CS RW  - Goals: supervision /set up bADLs, supervision iADLs, supervision transfers and ambulation  - Target dc home 10/5 with caregiver supervision and home care referral, home Pt, OT SLP     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 (s/p radiation, on chemo) who presented with LUE weakness and possible speech deficit, found to have 4 cm mass in R frontal lobe, now post-resection 9/14/22    # S/p craniotomy for tumor resection, left hemiparesis  - Bx showed high-grade neoplasm, may be c./w sarcoma.   - CT Head 10/2:  Resolving edema in the right temporal posterior frontal  region. Residual lucency identified subjacent to the craniotomy  consistent with evolving encephalomalacia at the operative site. Decreased extra-axial air with residual right subdural fluid collection remaining subjacent to the craniotomy and extending to the right frontal region. Soft tissue lesion subtly identified crossing the midline consistent with a falx meningioma better appreciated on the prior MR 9/15/2022  - Patient continues to appear irritated; unclear if this truly has to do with therapies or frustration out of acute clinical realm.   - Heme Dr Harris appreciated, to f/u with Dr Szymanski. Hold on votrient for now, patient aware and agreeable with plan.   - Staples removed 9/26. Cleared to shower  - Continue decadron taper for cerebral edema. On 2 mg daily 10/3; taper goes through 10/22  - keppra 500 mg BID for seizure prophylaxis  - gabapentin 100 mg TID   - Per outside Onc, hold votrient 200 mg daily for chemotherapy   - Oncology consulted, f/up recs   - continue comprehensive rehab program PT/OT/ SLP 3 hours a day 5 days a week    # Leukocytosis  - resolved  - WBC 6.99 9/29 -> 6.43 10/3  - If febrile, septic w/up    # Anemia, multifactorial   - Noted to have Hgb of 9.7 on AM labs, 9/29  - Anemia panel: Borderline low TIBC, increased in haptoglobin (326), ferritin (565); RDW elevated 15.3  - Hgb 10.7 on 10/3. STable, PCP f/u on dc  - Occult negative     # Mood / Cognition: major depression  - psychiatry follow up appreciated 9/28  - Continue psychology support  - Zyprexa 2.5 mg qHS    # Pain  - Palliative was following for pain management at prior hospital  - methadone 2.5 mg TID  - Tylenol PRN Oxycodone PRN   - gabapentin  - has follow up appointment with Dr. Cabral or ELBA Arteaga.  checked 10/3/22 3:54 PM, last prescribed oxycodone 5 mg, 40 tabs Dr Boo 10/2021 [id number]    # GI/Bowel:  - Senna 2 tabs daily    # Diet  - MBS 9/23 completed-puree/thins  - BUN/Cr 26/0.58 9/29 -> 30/0.71 10/3   - BMP 10/6    # DVT prophylaxis:  - doppler on 9/21 neg DVT RLE.  - SCDs  - TEDs BLE. If left LE swelling persist, will order doppler    # Case discussed in IDT rounds 10/3:  - CG transfers bathroom, supervision UB/min LB dressing (usually relies on wife to perform), CG transfers and ambulation  - Goals: supervision /set up bADLs, supervision iADLs, supervision transfers and ambulation  - Target dc home 10/5 with caregiver supervision and home care referral, home Pt, OT SLP. Patient was requested to change from private to double bedded room given need for isolation patient but refused. he stated he preferred to dc home instead. Wife has been present on comfort pass throughout entire stay and attended Pt, OT, SLP sessions; in addition, staff had offered formal caregiver training especially for bathroom transfers which was declined by patient. Will need continued supervision on dc, with home care referral and home Pt, TO, SLP

## 2022-10-03 NOTE — PROGRESS NOTE ADULT - ATTENDING COMMENTS
Progress note amended to include my discussions with patient, resident, hospitalist, RN, SW, PT and my findings. Case also discussed in IDT rounds, current function and dc updated by me. Progress note amended to include my discussions with patient, resident, hospitalist, RN, SW, PT and my findings. Case also discussed in IDT rounds, current function and dc updated by me. Discussed in detail with patient and wife, and daughter who was available through speakerphone    Patient was asked to move to another room due to need for isolation bed. However, he refused, became upset and stated he wanted to go home instead. Case discussed with team; patient is currently CG for transfers, ambulation with RW, with goal of supervision with original stay to 10/5. His wife is available and has been assisting him for transfers, toileting, although they refuse formal caregiver training; has attended all his therapies throughout his hospital stay. This afternoon, I spent half an hour with him and family. He reiterates that he wants to go home, feels well. no agitation or irritability. Original dc date, his prior concerns with not being dc too early if "safety" and "strength" were concern addressed, but he states he feels ready. He also states he has HHA; SW to see if can increase hours, but no guarantee, reiterated with patient    Meds and pharmacy reviewed. Patient gets all his meds at [facility] in [place], [phone], and his methadone and [facility] in [place] as part of the [facility]. He has oncology, pain management specialists following him; need for NSGY f/u also discussed. Head CT reviewed with patient and family, signs of neurological change and importance of seeking immediate medical attention. Verbalized understanding and agreement. Medically cleared for dc today with home care referal and home PT, OT SLP. Time spent for evaluation, education, coordination dc 70 min

## 2022-10-03 NOTE — PROGRESS NOTE ADULT - ASSESSMENT
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 (s/p radiation, on chemo) who presented with LUE weakness and possible speech deficit, found to have 4 cm mass in R frontal lobe, now post-resection 9/14/22, admitted to  rehab for ADL impairment     *Right frontal tumor/ADL impairment  - s/p craniotomy for tumor resection, left hemiparesis  - biopsy showed high grade neoplasm   - Continue decadron taper for cerebral edema  - Continue keppra 500mg BID for seizure prophylaxis  - follow up with oncology as outpt    *High grade sarcoma with primary lesion in right hip/pelvic area s/p RT and currently on chemo  RLE neuropathy   - restart votrient 200mg daily  - gabapentin 1000mg TID  - f/u oncology as outpt    *Leukocytosis, resolved, likely due to steroids  - no signs of active infection. monitor off abx  - resolved   *Anemia suspect anemia of chronic disease  - monitor     *Suicidal ideation statements  - Pt denies any suicidal or homicidal ideation or having any plans  - Pt clarified that SI related statements was due to frustration and pt denies having any active SI thoughts  Anxiety and depression  - Psych and neuropsych following  - on zyprexa 5mg qHS    DVT prophylaxis: lovenox

## 2022-10-03 NOTE — PROGRESS NOTE ADULT - NUTRITIONAL ASSESSMENT
This patient has been assessed with a concern for Malnutrition and has been determined to have a diagnosis/diagnoses of Severe protein-calorie malnutrition.    This patient is being managed with:   Diet Pureed-  Supplement Feeding Modality:  Oral  Ensure Enlive Cans or Servings Per Day:  1       Frequency:  Two Times a day  Entered: Sep 25 2022 11:10AM    
This patient has been assessed with a concern for Malnutrition and has been determined to have a diagnosis/diagnoses of Severe protein-calorie malnutrition.    This patient is being managed with:   Diet Pureed-  Supplement Feeding Modality:  Oral  Ensure Enlive Cans or Servings Per Day:  1       Frequency:  Two Times a day  Entered: Sep 25 2022 11:10AM    Diet Pureed-  Entered: Sep 23 2022 10:29AM    The following pending diet order is being considered for treatment of Severe protein-calorie malnutrition:null  This patient has been assessed with a concern for Malnutrition and has been determined to have a diagnosis/diagnoses of Severe protein-calorie malnutrition.    This patient is being managed with:   Diet Pureed-  Supplement Feeding Modality:  Oral  Ensure Enlive Cans or Servings Per Day:  1       Frequency:  Two Times a day  Entered: Sep 25 2022 11:10AM    Diet Pureed-  Entered: Sep 23 2022 10:29AM    The following pending diet order is being considered for treatment of Severe protein-calorie malnutrition:null
This patient has been assessed with a concern for Malnutrition and has been determined to have a diagnosis/diagnoses of Severe protein-calorie malnutrition.    This patient is being managed with:   Diet Pureed-  Supplement Feeding Modality:  Oral  Ensure Enlive Cans or Servings Per Day:  1       Frequency:  Two Times a day  Entered: Sep 25 2022 11:10AM    

## 2022-10-03 NOTE — PROGRESS NOTE ADULT - SUBJECTIVE AND OBJECTIVE BOX
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 CT head showed large 4cm mass in the R frontal lobe with mild associated edema, no midline shift. Patient was admitted under Neurosurgery Dr. Day for surgical planning.     CT CAP  revealed increased size of locally advanced pancreatic tail tumor, progressive pulmonary and pleural metastases. Similar-appearing right iliopsoas. CT T/L spine revealed no pathologic fracture or lytic / destructive lesions involving the thoracic and lumbar spine; S1-3 pathologic fracture no retropulsed bone, similar-appearing right sided epidural metastases in the lumbosacral region. On  patient underwent right craniotomy for tumor resection 22.    Post-op CT 9/15 revealed post-op changes. MRI Brain 9/15 revealed post-op resection of right posterior frontal lesion with small residual enhancement and 0.3cm leftward midline shift. Patient placed on Decadron taper. Now admitted for multidisciplinary rehabilitation program 22.    (20 Sep 2022 14:33)      PAST MEDICAL & SURGICAL HISTORY:  Abdominal tumor      Back pain      Rectal cancer      No significant past surgical history          MEDICATIONS  (STANDING):  Biotene Dry Mouth Oral Rinse 5 milliLiter(s) Swish and Spit three times a day  chlorhexidine 2% Cloths 1 Application(s) Topical every 12 hours  dexAMETHasone     Tablet 2 milliGRAM(s) Oral daily  dexAMETHasone     Tablet   Oral   enoxaparin Injectable 30 milliGRAM(s) SubCutaneous every 24 hours  gabapentin 1000 milliGRAM(s) Oral three times a day  influenza  Vaccine (HIGH DOSE) 0.7 milliLiter(s) IntraMuscular once  levETIRAcetam 500 milliGRAM(s) Oral two times a day  methadone    Tablet 2.5 milliGRAM(s) Oral three times a day  OLANZapine Disintegrating Tablet 5 milliGRAM(s) Oral at bedtime  pantoprazole   Suspension 40 milliGRAM(s) Oral daily  pazopanib 200 milliGRAM(s) Oral daily    MEDICATIONS  (PRN):  acetaminophen     Tablet .. 650 milliGRAM(s) Oral every 6 hours PRN Mild Pain (1 - 3)  artificial  tears Solution 1 Drop(s) Both EYES every 4 hours PRN Dry Eyes  bisacodyl Oral Tab/Cap - Peds 5 milliGRAM(s) Oral daily PRN Constipation  calcium carbonate    500 mG (Tums) Chewable 1 Tablet(s) Chew every 6 hours PRN Upset Stomach  oxyCODONE    IR 5 milliGRAM(s) Oral every 4 hours PRN Moderate Pain (4 - 6)  oxyCODONE    IR 10 milliGRAM(s) Oral every 4 hours PRN Severe Pain (7 - 10)      Allergies    No Known Allergies    Intolerances          VITALS  67y  Vital Signs Last 24 Hrs  T(C): 36.8 (03 Oct 2022 08:24), Max: 36.8 (03 Oct 2022 08:24)  T(F): 98.2 (03 Oct 2022 08:24), Max: 98.2 (03 Oct 2022 08:24)  HR: 78 (03 Oct 2022 08:24) (66 - 78)  BP: 105/68 (03 Oct 2022 08:24) (97/67 - 105/68)  BP(mean): --  RR: 16 (03 Oct 2022 08:24) (16 - 16)  SpO2: 95% (03 Oct 2022 08:24) (95% - 97%)    Parameters below as of 03 Oct 2022 08:24  Patient On (Oxygen Delivery Method): room air      Daily     Daily Weight in k.7 (02 Oct 2022 22:36)        RECENT LABS:                          10.7   6.43  )-----------( 161      ( 03 Oct 2022 06:09 )             32.7     10-03    145  |  109<H>  |  30<H>  ----------------------------<  90  4.0   |  30  |  0.71    Ca    8.2<L>      03 Oct 2022 06:09    TPro  5.6<L>  /  Alb  2.4<L>  /  TBili  0.5  /  DBili  x   /  AST  18  /  ALT  46<H>  /  AlkPhos  103  10-03    LIVER FUNCTIONS - ( 03 Oct 2022 06:09 )  Alb: 2.4 g/dL / Pro: 5.6 g/dL / ALK PHOS: 103 U/L / ALT: 46 U/L / AST: 18 U/L / GGT: x           Review of Systems:   · Additional ROS	Patient seen and examined at bedside with language line  in Mandarin #[id number]. Wife was also present.     Clinically the patient has no new complaints; no dizziness, headache, B/B wnl, no pain or discomfort at this time. He has been participating in multidisciplinary therapy programs, which have been going well for the most part. However he did state that he felt unsafe at certain times in PT, and requested that his therapist be changed. We reassured him that although part of the responsibilities of therapist is to push a patient, that our trained therapists would never endanger him in the process. Nonetheless, we will try to arrange for a switch in therapists. Over the weekend, he had a repeat CT done, which showed interval improvement in the original injury since prior exam.      Physical Exam:   · Constitutional Comments	AAox3, appears dismissive of questioning at times, insisting that everything is great while motioning that we should head out of the room. Incision c/d, no swelling.   · Eyes	PERRL; conjunctiva clear  · Respiratory	clear to auscultation bilaterally; no wheezes; no rales; no rhonchi  · Cardiovascular	regular rate and rhythm; S1 S2 present; no gallops; no rub; no murmur  · Motor	Left ShAB 4/5, EF 4/5, EE 5/5, WE 4/5,  4/5   Right ShAB 4/5, EF 4/5, EE 4/5, WE 4/5,  4/5  Left HF 4/5, KE 4/5, DF 4/5, PF 4/5  Right HF 3/5 , KE 3/5, DF 2/5, PF 2/5  · Sensory	Intact to light touch  No calf tenderness       Patient is a 67y old  Male who presents with a chief complaint of right frontal lobe mass s/p craniotomy and resection 22 (03 Oct 2022 09:12)      HPI:  Patient is a 66 yo M PMHx rectal CA (, s/p radiation/chemo) and 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 CT head showed large 4cm mass in the R frontal lobe with mild associated edema, no midline shift. Patient was admitted under Neurosurgery Dr. Day for surgical planning.     CT CAP  revealed increased size of locally advanced pancreatic tail tumor, progressive pulmonary and pleural metastases. Similar-appearing right iliopsoas. CT T/L spine revealed no pathologic fracture or lytic / destructive lesions involving the thoracic and lumbar spine; S1-3 pathologic fracture no retropulsed bone, similar-appearing right sided epidural metastases in the lumbosacral region. On  patient underwent right craniotomy for tumor resection 22.    Post-op CT 9/15 revealed post-op changes. MRI Brain 9/15 revealed post-op resection of right posterior frontal lesion with small residual enhancement and 0.3cm leftward midline shift. Patient placed on Decadron taper. Now admitted for multidisciplinary rehabilitation program 22.    (20 Sep 2022 14:33)      PAST MEDICAL & SURGICAL HISTORY:  Abdominal tumor      Back pain      Rectal cancer      No significant past surgical history          MEDICATIONS  (STANDING):  Biotene Dry Mouth Oral Rinse 5 milliLiter(s) Swish and Spit three times a day  chlorhexidine 2% Cloths 1 Application(s) Topical every 12 hours  dexAMETHasone     Tablet 2 milliGRAM(s) Oral daily  dexAMETHasone     Tablet   Oral   enoxaparin Injectable 30 milliGRAM(s) SubCutaneous every 24 hours  gabapentin 1000 milliGRAM(s) Oral three times a day  influenza  Vaccine (HIGH DOSE) 0.7 milliLiter(s) IntraMuscular once  levETIRAcetam 500 milliGRAM(s) Oral two times a day  methadone    Tablet 2.5 milliGRAM(s) Oral three times a day  OLANZapine Disintegrating Tablet 5 milliGRAM(s) Oral at bedtime  pantoprazole   Suspension 40 milliGRAM(s) Oral daily  pazopanib 200 milliGRAM(s) Oral daily    MEDICATIONS  (PRN):  acetaminophen     Tablet .. 650 milliGRAM(s) Oral every 6 hours PRN Mild Pain (1 - 3)  artificial  tears Solution 1 Drop(s) Both EYES every 4 hours PRN Dry Eyes  bisacodyl Oral Tab/Cap - Peds 5 milliGRAM(s) Oral daily PRN Constipation  calcium carbonate    500 mG (Tums) Chewable 1 Tablet(s) Chew every 6 hours PRN Upset Stomach  oxyCODONE    IR 5 milliGRAM(s) Oral every 4 hours PRN Moderate Pain (4 - 6)  oxyCODONE    IR 10 milliGRAM(s) Oral every 4 hours PRN Severe Pain (7 - 10)      Allergies    No Known Allergies    Intolerances          VITALS  67y  Vital Signs Last 24 Hrs  T(C): 36.8 (03 Oct 2022 08:24), Max: 36.8 (03 Oct 2022 08:24)  T(F): 98.2 (03 Oct 2022 08:24), Max: 98.2 (03 Oct 2022 08:24)  HR: 78 (03 Oct 2022 08:24) (66 - 78)  BP: 105/68 (03 Oct 2022 08:24) (97/67 - 105/68)  BP(mean): --  RR: 16 (03 Oct 2022 08:24) (16 - 16)  SpO2: 95% (03 Oct 2022 08:24) (95% - 97%)    Parameters below as of 03 Oct 2022 08:24  Patient On (Oxygen Delivery Method): room air      Daily     Daily Weight in k.7 (02 Oct 2022 22:36)        RECENT LABS:                          10.7   6.43  )-----------( 161      ( 03 Oct 2022 06:09 )             32.7     10-03    145  |  109<H>  |  30<H>  ----------------------------<  90  4.0   |  30  |  0.71    Ca    8.2<L>      03 Oct 2022 06:09    TPro  5.6<L>  /  Alb  2.4<L>  /  TBili  0.5  /  DBili  x   /  AST  18  /  ALT  46<H>  /  AlkPhos  103  10-03    LIVER FUNCTIONS - ( 03 Oct 2022 06:09 )  Alb: 2.4 g/dL / Pro: 5.6 g/dL / ALK PHOS: 103 U/L / ALT: 46 U/L / AST: 18 U/L / GGT: x           Review of Systems:   · Additional ROS	Patient seen and examined at bedside with language line  in Mandarin #[id number]. Wife was also present.     Clinically the patient has no new complaints; no dizziness, headache, B/B wnl, no pain or discomfort at this time. He has been participating in multidisciplinary therapy programs, which have been going well for the most part. However he did state that he felt unsafe at certain times in PT, and requested that his therapist be changed. We reassured him that although part of the responsibilities of therapist is to push a patient, that our trained therapists would never endanger him in the process. Nonetheless, we will try to arrange for a switch in therapists. He was vague in description, repeated "not safe" but could not give a specific example    Over the weekend, he had a repeat CT done, which showed interval improvement in the original injury since prior exam. Reviwed with patient      Physical Exam:   · Constitutional Comments	AAox3, appears dismissive of questioning at times, insisting that everything is great while motioning that we should head out of the room. Incision c/d, no swelling.     wife also present  · Eyes	PERRL; conjunctiva clear  · Respiratory	clear to auscultation bilaterally; no wheezes; no rales; no rhonchi  · Cardiovascular	regular rate and rhythm; S1 S2 present; no gallops; no rub; no murmur  · Motor	Left ShAB 4/5, EF 4/5, EE 5/5, WE 4/5,  4/5   Right ShAB 4/5, EF 4/5, EE 4/5, WE 4/5,  4/5  Left HF 4/5, KE 4/5, DF 4/5, PF 4/5  Right HF 3/5 , KE 3/5, DF 2/5, PF 2/5  · Sensory	Intact to light touch  No calf tenderness

## 2022-10-03 NOTE — PROGRESS NOTE ADULT - SUBJECTIVE AND OBJECTIVE BOX
Patient is a 67y old  Male who presents with a chief complaint of right frontal lobe mass s/p craniotomy and resection 9/14/22 (02 Oct 2022 13:43)    No events overnight  Seen walking with walker   Had a BM this AM, urinating well  Patient seen and examined at bedside.    ALLERGIES:  No Known Allergies    MEDICATIONS  (STANDING):  Biotene Dry Mouth Oral Rinse 5 milliLiter(s) Swish and Spit three times a day  chlorhexidine 2% Cloths 1 Application(s) Topical every 12 hours  dexAMETHasone     Tablet 2 milliGRAM(s) Oral daily  dexAMETHasone     Tablet   Oral   enoxaparin Injectable 30 milliGRAM(s) SubCutaneous every 24 hours  gabapentin 1000 milliGRAM(s) Oral three times a day  influenza  Vaccine (HIGH DOSE) 0.7 milliLiter(s) IntraMuscular once  levETIRAcetam 500 milliGRAM(s) Oral two times a day  methadone    Tablet 2.5 milliGRAM(s) Oral three times a day  OLANZapine Disintegrating Tablet 5 milliGRAM(s) Oral at bedtime  pantoprazole   Suspension 40 milliGRAM(s) Oral daily  pazopanib 200 milliGRAM(s) Oral daily    MEDICATIONS  (PRN):  acetaminophen     Tablet .. 650 milliGRAM(s) Oral every 6 hours PRN Mild Pain (1 - 3)  artificial  tears Solution 1 Drop(s) Both EYES every 4 hours PRN Dry Eyes  bisacodyl Oral Tab/Cap - Peds 5 milliGRAM(s) Oral daily PRN Constipation  calcium carbonate    500 mG (Tums) Chewable 1 Tablet(s) Chew every 6 hours PRN Upset Stomach  oxyCODONE    IR 5 milliGRAM(s) Oral every 4 hours PRN Moderate Pain (4 - 6)  oxyCODONE    IR 10 milliGRAM(s) Oral every 4 hours PRN Severe Pain (7 - 10)    Vital Signs Last 24 Hrs  T(F): 98.2 (03 Oct 2022 08:24), Max: 98.2 (03 Oct 2022 08:24)  HR: 78 (03 Oct 2022 08:24) (66 - 78)  BP: 105/68 (03 Oct 2022 08:24) (97/67 - 105/68)  RR: 16 (03 Oct 2022 08:24) (16 - 16)  SpO2: 95% (03 Oct 2022 08:24) (95% - 97%)  I&O's Summary      PHYSICAL EXAM:  General: NAD, A/O x 3, Chinese speaking  ENT: MMM, no scleral icterus  Neck: Supple, No JVD, no thyroidomegaly  Lungs: Clear to auscultation bilaterally, no wheezes, no rales, no rhonchi, good inspiratory effort  Cardio: RRR, S1/S2, No murmurs  Abdomen: Soft, Nontender, Nondistended; Bowel sounds present  Extremities: RLE weakness noted, No calf tenderness, No pitting edema, no skin changes    LABS:                        10.7   6.43  )-----------( 161      ( 03 Oct 2022 06:09 )             32.7       10-03    145  |  109  |  30  ----------------------------<  90  4.0   |  30  |  0.71    Ca    8.2      03 Oct 2022 06:09    TPro  5.6  /  Alb  2.4  /  TBili  0.5  /  DBili  x   /  AST  18  /  ALT  46  /  AlkPhos  103  10-03     COVID-19 PCR: NotDetec (09-27-22 @ 06:40)  COVID-19 PCR: NotDetec (09-23-22 @ 06:30)  COVID-19 PCR: NotDetec (09-19-22 @ 17:45)  COVID-19 PCR: NotDetec (09-12-22 @ 13:09)  COVID-19 PCR: NotDetec (09-27-22 @ 06:40)  COVID-19 PCR: NotDetec (09-23-22 @ 06:30)  COVID-19 PCR: NotDetec (09-19-22 @ 17:45)  COVID-19 PCR: NotDetec (09-12-22 @ 13:09)  COVID-19 PCR: NotDetec (02-05-22 @ 16:13)  COVID-19 PCR: NotDetec (10-03-21 @ 16:34)  COVID-19 PCR: NotDetec (09-28-21 @ 22:59)

## 2022-10-03 NOTE — DISCHARGE NOTE PROVIDER - NSDCHHHOMEBOUND_GEN_ALL_CORE
Stroke/TBI (neurological/cognitive impairment)/Fall risk/Pain greater than 7 on scale of 10 on ambulation

## 2022-10-10 NOTE — CONSULT NOTE ADULT - TIME-BASED BILLING (NON-CRITICAL CARE)
Time-based billing (NON-critical care) [employer] Counseling:  I discussed with the patient the risks of mycophenolate mofetil including but not limited to infection/immunosuppression, GI upset, hypokalemia, hypercholesterolemia, bone marrow suppression, lymphoproliferative disorders, malignancy, GI ulceration/bleed/perforation, colitis, interstitial lung disease, kidney failure, progressive multifocal leukoencephalopathy, and birth defects.  The patient understands that monitoring is required including a baseline creatinine and regular CBC testing. In addition, patient must alert us immediately if symptoms of infection or other concerning signs are noted.

## 2022-10-12 PROBLEM — G89.3 PAIN OF METASTATIC MALIGNANCY: Status: ACTIVE | Noted: 2021-10-14

## 2022-10-12 PROBLEM — R11.0 NAUSEA: Status: ACTIVE | Noted: 2021-01-01

## 2022-10-12 NOTE — PHYSICAL EXAM
[General Appearance - Alert] : alert [General Appearance - In No Acute Distress] : in no acute distress [No Focal Deficits] : no focal deficits [Oriented To Time, Place, And Person] : oriented to person, place, and time No [Affect] : the affect was normal [FreeTextEntry1] : Laying flat

## 2022-10-12 NOTE — ASSESSMENT
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 Med Onc follow up. \par \par # Pain 2/2 Neoplasm - Pt on lower doses of opioids now than before his hospitalization. Increase methadone TID to 3/3/4mg; c/w PRN Oxycodone 10mg. C/w PRN Tylenol  -Increase Gabapentin TID to 900mg. \par \par #  Nausea - C/w PRN Compazine; medical cannabis may be of benefit. Suggest obtaining from medical dispensary.\par \par #  Encounter for Palliative Care - Emotional support provided.\par - HCP on file.\par Hospice benefit discussed in detail, answered all questions to patient's apparent satisfaction.  They are amenable to referral. Referral sent to [facility]. \par Daughter wishes for follow up appointment to be made with our team; will schedule televisit for 2 weeks.

## 2022-10-12 NOTE — HISTORY OF PRESENT ILLNESS
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 Moon.\par Patient is Mandarin-speaking and prefers for his daughter Gaby to translate for him. \par \par Patient developed pain in his R hip in 2/2021. It was thought that he was suffering with sciatica and he started PT. This did not make much difference in his pain. He established care with a pain management doctor in [place].  Workup revealed a large RP mass. \par He is s/p 10 fractions of RT to the pelvic mass, completed 11/19. \par \par His pain is localized to the R buttock and extends down his R leg. He endorses tingling along the dorsum of his r foot. The pain is always present  The pain renders him unable to bear weight on his R leg. He uses a walker for assistance with ambulation. \par Currently he rates his pain as 6/10. He states that since the chemotherapy has started the intensity of his pain has lessened. Prior to chemotherapy his pain got up to 10/10 regularly. Nowadays his pain tends to only get to 10/10 at night, and this is \par \par He is currently taking Gabapentin 900mg TID, PRN Oxycodone 10mg, up to four times daily.  \par He was started on methadone two months ago for his pain, he felt restless on it and self-discontinued it.  He was subsequently started on MS ER 15mg BID.  He states that he did not feel well on it and stopped using it. \par \par Interval history (10/12/22): Patient is seen for follow-up via telemedicine. \par He is no longer receiving chemotherapy and family is inquiring about the home hospice program which was brought up to them by Oncology earlier this week.  \par Last month he began to experience left sided weakness and was hospitalized at [facility]. He was found to have a right frontal lobe mass; s/p craniotomy and resection on 9/14/22. He went to rehab for three weeks. He is now wheelchair bound and unable to walk due to continued left sided weakness. He requires full assistance to transfer from wheelchair to use the restroom. He continues to have pain to his back and left hand. Pain regimen is methadone TID 2/2/3mg and PRN oxycodone IR 10mg, no recent use. Gabapentin 600mg TID, when pain is bad he will take 900mg. Daughter is inquiring about hospice services. \par \par Current pain regimen:\par Methadone 3/3/4mg \par PRN Oxycodone IR 10mg (no recent use)\par Gabapentin 900mg TID \par \par ROS: \par + fatigue\par + right LE edema (improved)\par +~10lb weight loss since diagnosis \par + constipation - uses miralax PRN \par + nocturia (improved)  \par Denies nausea, vomiting, diarrhea. \par All other ROS as outlined or noncontributory. \par \par Patient is , lives with his wife. They have one daughter, Gaby and three grandchildren. Has been ambulating.

## 2022-10-12 NOTE — DATA REVIEWED
[FreeTextEntry1] : CT BRAIN  (10/02/2022):\par \par COMPARISON EXAMINATION: 9/15/2022\par \par FINDINGS:\par VENTRICLES AND SULCI: Decrease mass effect on the right lateral ventricle\par INTRA-AXIAL: Lucency subjacent to the craniotomy in the right high temporal parietal region. Decreased vasogenic edema at the level of the craniotomy when compared with the prior\par EXTRA-AXIAL: Resorption of bifrontal pneumocephalus. Extra axial fluid collection subjacent to the craniotomy relatively stable in appearance compared with the prior now more fluid in terms of attenuation extending anteriorly to the right frontal territory. Soft tissue lesion crosses the midline in the region of the centrum semiovale ovale that corresponds with a falx meningioma seen on the prior MR as well VISUALIZED SINUSES: Clear.\par VISUALIZED MASTOIDS: Clear.\par CALVARIUM: Right temporal parietal craniotomy\par MISCELLANEOUS: None.\par \par IMPRESSION: Resolving edema in the right temporal posterior frontal region. Residual lucency identified subjacent to the craniotomy consistent with evolving encephalomalacia at the operative site. Decreased extra-axial air with residual right subdural fluid collection remaining subjacent to the craniotomy and extending to the right frontal region. Soft tissue lesion subtly identified crossing the midline consistent with a falx meningioma better appreciated on the prior MR 9/15/2022\par ------\par CT C/A/P  (07/27/2022): \par COMPARISON: CT 5/20/2022\par \par CONTRAST/COMPLICATIONS:\par IV Contrast: Omnipaque 350  90 cc administered   10 cc discarded\par Oral Contrast: Smoothie Readi-Cat 2\par Complications: None reported at time of study completion\par \par PROCEDURE:\par CT of the Chest, Abdomen and Pelvis was performed.\par Sagittal and coronal reformats were performed.\par \par FINDINGS:\par CHEST:\par LUNGS AND LARGE AIRWAYS: Patent central airways. Most bilateral pulmonary metastases are decreased in size, although at least one right lower lobe mass has enlarged and another is unchanged. Representative nodules as follows:\par \par Right lower lobe 1.9 x 1.9 cm (2, 74), previously 2.4 x 2.3 cm.\par Left lower lobe 1.6 x 1.3 cm (2, 77) previously 2.1 x 2.1 cm.\par Left lower lobe 1.3 x 1.2 cm (2, 72), previously 2.0 x 1.9 cm.\par \par Medial right lower lobe mass 3.6 x 2.6 cm (2, 69), enlarged from 2.2 x 1.9 cm.\par Medial right lower lobe 2.2 x 2.1 cm (2, 57), unchanged.\par PLEURA: No pleural effusion. Previous left effusion has resolved.\par VESSELS: Atherosclerotic changes of the aorta.\par HEART: Heart size is normal. No pericardial effusion.\par MEDIASTINUM AND MONSE: No lymphadenopathy.\par CHEST WALL AND LOWER NECK: Right chest wall port with catheter tip in the SVC.\par \par ABDOMEN AND PELVIS:\par LIVER: Within normal limits.\par BILE DUCTS: Normal caliber.\par GALLBLADDER: Within normal limits.\par SPLEEN: Within normal limits.\par PANCREAS: Low-density 4.2 x 3.9 cm pancreatic tail mass at the splenic hilum is slightly enlarged (2, 89), previously 3.9 x 3.3 cm.\par ADRENALS: Unchanged mild left adrenal thickening.\par KIDNEYS/URETERS: Within normal limits.\par \par BLADDER: Thick-walled urinary bladder and perivesicular inflammatory changes, question cystitis.\par REPRODUCTIVE ORGANS: Prostate within normal limits.\par \par BOWEL: Rectal and right lower quadrant small bowel anastomoses. No bowel obstruction. Appendix is normal.\par PERITONEUM: No ascites.\par VESSELS: Within normal limits.\par RETROPERITONEUM/LYMPH NODES: No lymphadenopathy.\par ABDOMINAL WALL: Within normal limits.\par BONES: Large heterogeneous soft tissue mass involving the right iliac/iliopsoas muscle is unchanged, measuring approximately 10 x 5 cm (2, 140), with associated destructive bony changes involving the sacrum and iliac bone. Again noted is involvement of the right neural foramina at L5-S1 and in the sacrum.\par \par IMPRESSION:\par \par New diffuse bladder wall thickening and surrounding, question cystitis. Correlate with urinalysis.\par \par Large pelvic/retroperitoneal mass is unchanged since 5/20/2022, as above.\par \par Numerous pulmonary metastases are largely decreased, although a few are either enlarged or stable.\par \par Pancreatic tail mass is slightly enlarged since prior.

## 2022-10-13 PROBLEM — C78.00 METASTATIC SARCOMA TO LUNG: Status: ACTIVE | Noted: 2022-01-01

## 2022-10-13 PROBLEM — C49.5: Status: ACTIVE | Noted: 2021-01-01

## 2022-10-13 PROBLEM — C79.31 BRAIN METASTASES: Status: ACTIVE | Noted: 2022-01-01

## 2022-10-13 PROBLEM — Z51.5 ENCOUNTER FOR PALLIATIVE CARE: Status: ACTIVE | Noted: 2021-01-01

## 2022-10-13 NOTE — REASON FOR VISIT
[Home] : at home, [unfilled] , at the time of the visit. [Medical Office: ([facility])___] : at the medical office located in  [Follow-Up Visit] : a follow-up [Family Member] : family member [FreeTextEntry2] : sarcoma

## 2022-10-13 NOTE — HISTORY OF PRESENT ILLNESS
[Disease: _____________________] : Disease: [unfilled] [AJCC Stage: ____] : AJCC Stage: [unfilled] [de-identified] : 66 M w/ h/o rectal cancer in 2011 treated with chemotherapy and radiation, presents for evaluation of large RP mass c/w high grade malignancy. \par \par Balwinder walker has been experiencing low back/upper buttock pain since Feb 2021. Pain has been progressive and refractory to conservative treatment with PT and NSAIDs. He was referred by his PCP to Dr Wilmar Oliva, pain management, who directed him to go to [facility] for further evaluation. On Sept 29, 2021 pt went to [facility] ER and underwent a CT A/P \par which showed a 4.6 x 6.9 x 5.7 cm R pelvic sidewall mass with destruction of the sacrum and involvement of multiple nerve roots concerning for primary malignancy, CT Chest with 2 mm two JOURDAN nodules. An MRI Pelvis described this mass as invading hemisacrum, involving nerve roots and vasculature. Biopsy was performed on 10/4/21 and pathology so far only c/w high grade malignancy. Preliminary diagnosis upon discussion with pathologist is that this may be a sarcoma. was seen by Dr Fung initially\par \par Referred to Dr. 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 \par \par [place]/Taxotere started Dec 2021 \par doxorubicin March 2022 due to POD \par May 2022 POD \par June starting pazopanib \par Sept Brain metastasis and resection at [facility]  [de-identified] : SOMMER, TMB low \par FGFR1 amplification\par BCL2L2 amplification - equivocal†\par CDKN2A/B CDKN2A loss, CDKN2B loss\par LRP1B * , MYST3 amplification , TP53 loss [FreeTextEntry1] : [place]/Taxotere -starting 12/3/21 --> POD 3/2022 ---> changed to Doxorubicin q3 weeks 3/18/22 --> POD 5/2022  -->  changed to Votrient 6/2022 [de-identified] : presents in follow up , d/c out of Rehab and back home \par spending most of time in bed or chair \par no acute complaints

## 2022-10-13 NOTE — PHYSICAL EXAM
[Capable of only limited self care, confined to bed or chair more than 50% of waking hours] : Status 3- Capable of only limited self care, confined to bed or chair more than 50% of waking hours [Thin] : thin [Normal] : affect appropriate [de-identified] : lying flat  [de-identified] : anicteric  [de-identified] : no jvd  [de-identified] :  no audible wheeze [de-identified] : limited ROM

## 2022-10-13 NOTE — REVIEW OF SYSTEMS
[Lower Ext Edema] : lower extremity edema [Joint Pain] : joint pain [Negative] : Allergic/Immunologic [Fever] : no fever [Chills] : no chills [Night Sweats] : no night sweats [Fatigue] : no fatigue [Recent Change In Weight] : ~T no recent weight change [Abdominal Pain] : no abdominal pain [Vomiting] : no vomiting [Constipation] : no constipation [Diarrhea] : no diarrhea [Dysuria] : no dysuria [Incontinence] : no incontinence [Joint Stiffness] : no joint stiffness [FreeTextEntry9] : right posterior hip pain worse with ROM

## 2022-10-13 NOTE — ASSESSMENT
[Palliative] : Goals of care discussed with patient: Palliative [Palliative Care Plan] : patient was apprised of terminal prognosis of 6 month or less. Hospice and Palliative care options discussed

## 2022-11-08 PROCEDURE — 82607 VITAMIN B-12: CPT

## 2022-11-08 PROCEDURE — 92611 MOTION FLUOROSCOPY/SWALLOW: CPT

## 2022-11-08 PROCEDURE — 92507 TX SP LANG VOICE COMM INDIV: CPT

## 2022-11-08 PROCEDURE — 70450 CT HEAD/BRAIN W/O DYE: CPT

## 2022-11-08 PROCEDURE — 97116 GAIT TRAINING THERAPY: CPT

## 2022-11-08 PROCEDURE — 36415 COLL VENOUS BLD VENIPUNCTURE: CPT

## 2022-11-08 PROCEDURE — 85027 COMPLETE CBC AUTOMATED: CPT

## 2022-11-08 PROCEDURE — 83615 LACTATE (LD) (LDH) ENZYME: CPT

## 2022-11-08 PROCEDURE — 83540 ASSAY OF IRON: CPT

## 2022-11-08 PROCEDURE — 81001 URINALYSIS AUTO W/SCOPE: CPT

## 2022-11-08 PROCEDURE — 97110 THERAPEUTIC EXERCISES: CPT

## 2022-11-08 PROCEDURE — 85045 AUTOMATED RETICULOCYTE COUNT: CPT

## 2022-11-08 PROCEDURE — 82272 OCCULT BLD FECES 1-3 TESTS: CPT

## 2022-11-08 PROCEDURE — 97535 SELF CARE MNGMENT TRAINING: CPT

## 2022-11-08 PROCEDURE — 80053 COMPREHEN METABOLIC PANEL: CPT

## 2022-11-08 PROCEDURE — 93971 EXTREMITY STUDY: CPT

## 2022-11-08 PROCEDURE — 97530 THERAPEUTIC ACTIVITIES: CPT

## 2022-11-08 PROCEDURE — 92610 EVALUATE SWALLOWING FUNCTION: CPT

## 2022-11-08 PROCEDURE — 87635 SARS-COV-2 COVID-19 AMP PRB: CPT

## 2022-11-08 PROCEDURE — 82728 ASSAY OF FERRITIN: CPT

## 2022-11-08 PROCEDURE — 97167 OT EVAL HIGH COMPLEX 60 MIN: CPT

## 2022-11-08 PROCEDURE — U0005: CPT

## 2022-11-08 PROCEDURE — U0003: CPT

## 2022-11-08 PROCEDURE — 97163 PT EVAL HIGH COMPLEX 45 MIN: CPT

## 2022-11-08 PROCEDURE — 83010 ASSAY OF HAPTOGLOBIN QUANT: CPT

## 2022-11-08 PROCEDURE — 92523 SPEECH SOUND LANG COMPREHEN: CPT

## 2022-11-08 PROCEDURE — 80048 BASIC METABOLIC PNL TOTAL CA: CPT

## 2022-11-08 PROCEDURE — 92526 ORAL FUNCTION THERAPY: CPT

## 2022-11-08 PROCEDURE — 85025 COMPLETE CBC W/AUTO DIFF WBC: CPT

## 2022-11-08 PROCEDURE — 74230 X-RAY XM SWLNG FUNCJ C+: CPT

## 2022-11-08 PROCEDURE — 83550 IRON BINDING TEST: CPT

## 2022-11-08 PROCEDURE — 82746 ASSAY OF FOLIC ACID SERUM: CPT

## 2022-12-01 ENCOUNTER — APPOINTMENT (OUTPATIENT)
Dept: PHYSICAL MEDICINE AND REHAB | Facility: CLINIC | Age: 67
End: 2022-12-01

## 2022-12-13 ENCOUNTER — APPOINTMENT (OUTPATIENT)
Dept: RADIATION ONCOLOGY | Facility: CLINIC | Age: 67
End: 2022-12-13

## 2023-01-15 NOTE — PHYSICAL THERAPY INITIAL EVALUATION ADULT - BED MOBILITY LIMITATIONS, REHAB EVAL
Subjective: Caller states \"Was in the ED because of pain and constipation\"     Current Symptoms: Abd pain    Onset: 4 days ago    Pain Severity: 7/10    Temperature: Patient is reported to not have a fever. Also denies chills and sweats     What has been tried: Lactulose    History related to the current reason for call: Gastric bypass    Recommended disposition: See HCP within 4 Hours (or PCP triage)    Care advice provided, patient verbalizes understanding; denies any other questions or concerns; instructed to call back for any new or worsening symptoms. Patient/caller agrees to proceed to the Emergency Department     Attention Provider: Thank you for allowing me to participate in the care of your patient. The patient was connected to triage in response to information provided to the ECC/PSC. Please do not respond through this encounter as the response is not directed to a shared pool.     Reason for Disposition   [1] Constant abdominal pain AND [2] present > 2 hours    Protocols used: Constipation-ADULT-AH decreased ability to use arms for pushing/pulling/decreased ability to use legs for bridging/pushing/impaired ability to control trunk for mobility

## 2023-03-22 NOTE — REASON FOR VISIT
2 weeks of duloxetine (cymbalta) 2 capsules (120 mg) daily, one in the morning and one at night.     2 weeks of duloxetine (cymbalta) 1 capsule (60 mg) every other day, 2 capsules (120 mg) every other day.     2 weeks of duloxetine (cymbalta) 1 capsule (60 mg) daily.     Keep a record of how you are feeling and keep me updated via [employer]. If one of the doses is not working well, send me a [employer] message and we can adjust as needed.    You can take melatonin 0.5-1 mg as needed for sleep.       [Follow-Up] : a follow-up visit [Other: _____] : [unfilled]

## 2023-09-11 NOTE — PRE-OP CHECKLIST - WEIGHT IN LBS
ANTICOAGULATION SERVICE    Vijay Lewis is a 57 y.o. male with PMHx significant for history of DVT (Feb 2011, Oct 2011, Dec 2012, Jan 2021 - surgically provoked), borderline protein S deficiency who presents to clinic 9/11/2023 for anticoagulation monitoring and adjustment. Patient has tried Xarelto, Eliquis, Pradaxa, and Savaysa in the past, but stopped due to GI distress.      Anticoagulation Indication(s):  DVT (acute, recurrent)  Referring Physician:  Dr. Awais Akhtar  Goal INR Range:  2-3  Duration of Anticoagulation Therapy:  Indefinite   Time of day dose taken:  AM  Product patient has at home:  warfarin 5 mg (peach)    INR Summary                           Warfarin regimen (mg)  Date INR   A/P    Sun Mon Tue Wed Thu Fri Sat Mg/wk  9/11 2.2 At goal, continue   7.5 7.5 5 7.5 5 7.5 5 45  9/7 1.4 Below goal, missed  7.5 7.5 5 7.5 10/5 7.5 5 45  8/14 3.4 Above goal, continue  7.5 7.5 5 7.5 5 7.5 5 45  7/13 2.3 At goal, continue  7.5 7.5 5 7.5 5 7.5 5 45  6/19 3.7 Above goal, hold  7.5 7.5 0/5 7.5 5 7.5 5 45  5/8 2.2 At goal, no change  7.5 7.5 5 7.5 5 7.5 5 45  3/27 2.4 At goal, no change  7.5 7.5 5 7.5 5 7.5 5 45  2/13 2.1 At goal, no change  7.5 7.5 5 7.5 5 7.5 5 45  1/9 2.3 At goal, no change  7.5 7.5 5 7.5 5 7.5 5 45  11/21 2.3 At goal, no change  7.5 7.5 5 7.5 5 7.5 5 45  10/5 2.7 At goal, no change  7.5 7.5 5 7.5 5 7.5 5 45  9/7 2.8 At goal, no change  7.5 7.5 5 7.5 5 7.5 5 45  8/8 3.2 Above goal (ABX), continue 7.5 7.5 5 7.5 5 7.5 5 45  7/20 1.7 Below goal, bolus  7.5 7.5 5 10/7.5 5 7.5 5 45   6/8 2.1 At goal, no change  7.5 7.5 5 7.5 5 7.5 5 45   4/27 2.6 At goal, no change  7.5 7.5 5 7.5 5 7.5 5 45  3/16 2.2 At goal, no change  7.5 7.5 5 7.5 5 7.5 5 45   2/16 2.4 At goal, no change  7.5 7.5 5 7.5 5 7.5 5 45  1/19 2.8 At goal, no change  7.5 7.5 5 7.5 5 7.5 5 45  12/22 2.3 At goal, no change  7.5 7.5 5 7.5 5 7.5 5 45  11/24 2.6 At goal, no change  7.5 7.5 5 7.5 5 7.5 5 45  11/5 3.5 Above goal, hold
130
130

## 2023-10-08 NOTE — DISCHARGE NOTE NURSING/CASE MANAGEMENT/SOCIAL WORK - NSDCPETBCESMAN_GEN_ALL_CORE
Pt had a syncope episode in room 10 @ 0401. Pt was laying in the bed at the time of the syncope episode. Pt had the syncope episode from the IV insertion and lab draw, I was informed by the patient that this happens frequently with lab draws and IV starts. VSS, unresponsive for 24 seconds, diaphoretic post syncope episode. Resting comfortably now with call light in place.   
If you are a smoker, it is important for your health to stop smoking. Please be aware that second hand smoke is also harmful.

## 2025-02-27 NOTE — PROCEDURE NOTE - SEDATION
Left Voicemail (1st Attempt) and Sent [employer] (1st Attempt) for the patient to call back and schedule the following:    Appointment type: RETURN ENDOCRINE  Provider: MARICHUY Finnegan  Return date: next available, LIEN BRYANT  Specialty phone number: [phone]  Additional appointment(s) needed: N/A  Additonal Notes: JAZZMINE, Lolly Bowen, RN to [facility] AGUSTIN Vimal   DM      2/27/25 10:31 AM  Adair Ansari,   Thank you for reaching out. Your message has been forwarded to Dr Reza. As you have a detailed follow up request, we will do our best to get you scheduled for a visit with Dr Reza in the next few business days. [employer] is not the platform for complex medical care.     Available appts:  03/03 in person [facility] slot on hold for pt  03/04 virtual [facility]  04/08 virtual [facility]  04/11 in person [facility]    This slot is on hold and can be used for this patient. Once finished scheduling patient into a slot that is on hold, please remember to remove the hold by right clicking on the time slot and clicking on the trash can next to the hold.    Dafne Perez on 2/27/2025 at 10:52 AM    
Provided by Anesthesia Department

## 2025-04-17 NOTE — ED PROVIDER NOTE - WET READ LAUNCH FT
Patient ID band present and verified. There are no Wet Read(s) to document. There is 1 Wet Read(s) to document.

## 2025-06-23 NOTE — PRE-OP CHECKLIST - TO WHOM
He has been waiting for his sleep apnea supplies for well over a month.  The company has reached out via email saying that event is on the way but it has never arrived.  He is looking forward to getting a good night sleep.        OR RN

## (undated) DEVICE — SURGIPHOR STERILE WOUND IRR POVIDONE IODINE

## (undated) DEVICE — VISITEC 4X4

## (undated) DEVICE — AESCULAP SCALPFIX 10 CLIPS

## (undated) DEVICE — Device

## (undated) DEVICE — MIDAS REX MR8 BALL FLUTED SM BORE 3MM X 10CM

## (undated) DEVICE — SUT NUROLON 4-0 8-18" TF (POP-OFF)

## (undated) DEVICE — DRAPE 1/2 SHEET 40X57"

## (undated) DEVICE — MEDICATION LABELS W MARKER

## (undated) DEVICE — MARKING PEN W RULER

## (undated) DEVICE — SPHERE MARKER FOR BRAIN LAB IMAGE (3 SPHERES)

## (undated) DEVICE — MIDAS REX MR8 TAPERED SM BORE 1.MM X 7CM

## (undated) DEVICE — DRSG TAPE HYPAFIX 4"

## (undated) DEVICE — MIDAS REX MR8 TAPERED SM BORE 2.3MM X 7CM FOOTED

## (undated) DEVICE — SPECIMEN CONTAINER 100ML

## (undated) DEVICE — DRSG STOCKINETTE IMPERVIOUS XL

## (undated) DEVICE — SUT VICRYL 2-0 18" CP-2 UNDYED (POP-OFF)

## (undated) DEVICE — WARMING BLANKET FULL ADULT

## (undated) DEVICE — STRYKER SONOPET IQ TUBING SET

## (undated) DEVICE — SPHERE MARKER (5 SPHERES)

## (undated) DEVICE — DRAPE 3/4 SHEET W REINFORCEMENT 56X77"

## (undated) DEVICE — SUT VICRYL 3-0 18" X-1 (POP-OFF)

## (undated) DEVICE — ELCTR SUBDERMAL CORKSCREW NDL 1.2MM

## (undated) DEVICE — GLV 8 PROTEXIS (WHITE)

## (undated) DEVICE — FOLEY TRAY 16FR LF URINE METER SURESTEP

## (undated) DEVICE — ELCTR BOVIE TIP BLADE INSULATED 2.75" EDGE

## (undated) DEVICE — LAP PAD 18 X 18"

## (undated) DEVICE — SUT ETHILON 3-0 18" FS-1

## (undated) DEVICE — GLV 6.5 PROTEXIS (WHITE)

## (undated) DEVICE — STRYKER SONOPET IQ TIP 12CM STANDARD

## (undated) DEVICE — PREP CHLORAPREP HI-LITE ORANGE 10.5ML

## (undated) DEVICE — ELCTR PEDICLE SCREW PROBE 3MM BALL 1.8MM X 100MM

## (undated) DEVICE — POSITIONER FOAM EGG CRATE ULNAR 2PCS (PINK)

## (undated) DEVICE — DRAPE CAMERA VIDEO 7"X96"

## (undated) DEVICE — ELCTR MONOPOLAR STIMULATOR PROBE FLUSH-TIP

## (undated) DEVICE — ELCTR SUBDERMAL NDL 27G X 1/2" WITH TWISTED PAIR

## (undated) DEVICE — DRAPE MICROSCOPE ZEISS

## (undated) DEVICE — DRAPE TOWEL BLUE 17" X 24"

## (undated) DEVICE — CANISTER SUCTION 2000CC

## (undated) DEVICE — CODMAN PERFORATOR 14MM (BLUE)

## (undated) DEVICE — ELCTR BOVIE PENCIL SMOKE EVACUATION

## (undated) DEVICE — GLV 8.5 PROTEXIS (WHITE)

## (undated) DEVICE — SYR LUER LOK 20CC

## (undated) DEVICE — DRSG CURITY GAUZE SPONGE 4 X 4" 12-PLY

## (undated) DEVICE — STAPLER SKIN VISI-STAT 35 WIDE

## (undated) DEVICE — MIDAS REX MR7 LUBRICANT DIFFUSER CARTRIDGE

## (undated) DEVICE — VENODYNE/SCD SLEEVE CALF LARGE

## (undated) DEVICE — GLV 7 PROTEXIS (WHITE)

## (undated) DEVICE — ELCTR 4-DISC 20MM 49" (RED, BLUE, GREEN, BLACK)

## (undated) DEVICE — DRSG TELFA 3 X 8

## (undated) DEVICE — GLV 7.5 PROTEXIS (WHITE)

## (undated) DEVICE — DRSG XEROFORM 1 X 8"

## (undated) DEVICE — ELCTR SUBDERMAL NDL CLASSIC 1.5M X 59" (6 COLOR)

## (undated) DEVICE — ELCTR BIPOLAR CORD AESCULAP 12FT DISP

## (undated) DEVICE — BIPOLAR FORCEP CODMAN VERSATRU 8" X 0.5MM (BLUE)

## (undated) DEVICE — ELCTR BIPOLAR PROBE

## (undated) DEVICE — SOL IRR POUR H2O 250ML

## (undated) DEVICE — SOL IRR POUR NS 0.9% 500ML